# Patient Record
Sex: FEMALE | Race: WHITE
[De-identification: names, ages, dates, MRNs, and addresses within clinical notes are randomized per-mention and may not be internally consistent; named-entity substitution may affect disease eponyms.]

---

## 2018-09-24 ENCOUNTER — HOSPITAL ENCOUNTER (OUTPATIENT)
Dept: HOSPITAL 97 - OR | Age: 78
Discharge: HOME | End: 2018-09-24
Attending: OPHTHALMOLOGY
Payer: COMMERCIAL

## 2018-09-24 DIAGNOSIS — Z82.49: ICD-10-CM

## 2018-09-24 DIAGNOSIS — I10: ICD-10-CM

## 2018-09-24 DIAGNOSIS — Z83.511: ICD-10-CM

## 2018-09-24 DIAGNOSIS — H35.3130: ICD-10-CM

## 2018-09-24 DIAGNOSIS — Z88.3: ICD-10-CM

## 2018-09-24 DIAGNOSIS — H25.012: ICD-10-CM

## 2018-09-24 DIAGNOSIS — H25.12: Primary | ICD-10-CM

## 2018-09-24 DIAGNOSIS — Z88.8: ICD-10-CM

## 2018-09-24 DIAGNOSIS — Z83.518: ICD-10-CM

## 2018-09-24 DIAGNOSIS — Z91.041: ICD-10-CM

## 2018-09-24 DIAGNOSIS — H04.123: ICD-10-CM

## 2018-09-24 PROCEDURE — 08RK3JZ REPLACEMENT OF LEFT LENS WITH SYNTHETIC SUBSTITUTE, PERCUTANEOUS APPROACH: ICD-10-PCS

## 2018-09-24 PROCEDURE — 66984 XCAPSL CTRC RMVL W/O ECP: CPT

## 2018-09-24 RX ADMIN — TETRACAINE HYDROCHLORIDE ONE DROPS: 5 SOLUTION OPHTHALMIC at 13:33

## 2018-09-24 RX ADMIN — TETRACAINE HYDROCHLORIDE ONE DROPS: 5 SOLUTION OPHTHALMIC at 14:02

## 2018-09-24 RX ADMIN — BUPIVACAINE HYDROCHLORIDE ONE ML: 2.5 INJECTION, SOLUTION EPIDURAL; INFILTRATION; INTRACAUDAL at 14:03

## 2018-09-24 RX ADMIN — BUPIVACAINE HYDROCHLORIDE ONE ML: 2.5 INJECTION, SOLUTION EPIDURAL; INFILTRATION; INTRACAUDAL at 13:34

## 2018-09-24 RX ADMIN — BUPIVACAINE HYDROCHLORIDE ONE ML: 2.5 INJECTION, SOLUTION EPIDURAL; INFILTRATION; INTRACAUDAL at 13:33

## 2018-09-24 NOTE — P.BOP
Preoperative diagnosis: Nuclear sclerotic cataract OS


Postoperative diagnosis: Same


Primary procedure: Phacoemulsification with IOL OS


Estimated blood loss: None


Anesthesia: Local (Subtenon's infusion with anesthesia for cataract surgery)


Complications: None


Implants: [id number] +26.5


Transferred to: Other (Day surgery)


Condition: Good

## 2018-09-24 NOTE — XMS REPORT
Patient Summary Document

 Created on:2018



Patient:SHAHIDA ESTRADA

Sex:Female

:1940

External Reference #:[id number]





Demographics







 Address  [address] [place] [address]

 

 Home Phone  [phone]

 

 Preferred Language  Unknown

 

 Marital Status  Unknown

 

 Jain Affiliation  Unknown

 

 Race  Unknown

 

 Additional Race(s)  Unavailable

 

 Ethnic Group  Unknown









Author







 Organization  [facility]

 

 Address  [address] [place] [address]

 

 Phone  [phone]









Care Team Providers







 Name  Role  Phone

 

 MIGUE AMIN  Unavailable  Unavailable

 

 CHATA CALLE  Unavailable  Unavailable

 

 CIVUNIGUNTA MEJIA  Unavailable  Unavailable









Problems

This patient has no known problems.



Allergies, Adverse Reactions, Alerts

This patient has no known allergies or adverse reactions.



Medications

This patient has no known medications.



Results







 Test Description  Test Time  Test Comments  Text Results  Atomic Results  
Result Comments









 NV, ANGIOGRAM,  2017  Reason for  FINAL REPORT PATIENT ID:  



 CEREBRAL  11:48:00  Exam:->cerebral  [id number] DATE:  



     aneurysm unruptured  2017NAME: Shahida EstradaATTENDING:  



       Migue Amin MDFIRST  



       ASSISTANT: CESARIO LooREOPERATIVE DIAGNOSIS:  



       Unruptured right posterior  



       communicating artery  



       aneurysm status post stent  



       assisted  



       coilingPOSTOPERATIVE  



       DIAGNOSIS: Unruptured right  



       posterior communicating  



       artery aneurysm status post  



       stent assisted  



       coilingPROCEDURE PERFORMED:  



       Diagnostic Cerebral  



       AngiogramANESTHESIA:  



       MACCOMPLICATIONS:  



       NoneESTIMATED BLOOD LOSS:  



       Less than 15ml ARTERIAL  



       VESSELS STUDIED:RIGHT  



       SUBCLAVIAN ARTERY (x1)RIGHT  



       COMMON CAROTID ARTERY  



       (CERVICAL x2)RIGHT COMMON  



       CAROTID ARTERY (CRANIAL  



       x3)RIGHT COMMON FEMORAL  



       ARTERY (x1) MATERIALS  



       EMPLOYED:1. 5 [place] short  



       sheath 2. 4 [place]  



       Berenstein catheter3.  



       Bentson guidewire4. Terumo  



       0.035 LT glidewire5. 5  



       [place] Mynx device  



       INDICATIONS: 77-year-old  



       female with hypertension  



       who is status post  



       endovascular treatment of  



       an unruptured right  



       posterior communicating  



       artery aneurysm with  



       stent-assisted coiling on  



       May 19, 2017. She presents  



       for follow-up cerebral  



       angiogram. The indications  



       for the procedure as well  



       as the risks, benefits and  



       alternatives were discussed  



       with the patient and the  



       family. The risks discussed  



       included but were not  



       limited to stroke,  



       intracranial hemorrhage,  



       injury to the cervical  



       femoral or aortic vessels,  



       contrast reaction, kidney  



       to toxicity, groin  



       hematoma, weakness  



       paralysis and even death.  



       They demonstrated  



       understanding of the risk  



       benefit profile and agreed  



       to proceed. PROCEDURE:  



       After appropriate consent  



       was obtained, the patient  



       was brought to the  



       angiographic suite and  



       cardiopulmonary monitoring  



       was placed. The anesthesia  



       team performed light  



       sedation. A timeout was  



       performed. Both groins were  



       prepped and draped in the  



       usual sterile fashion.  



       After administration of 10  



       mL of 2% lidocaine, a  



       micropuncture needle was  



       used to perform a single  



       wall puncture of the right  



       common femoral artery, a  



       micropuncture sheath was  



       inserted, and an angled DSA  



       angiogram was performed  



       through the sheath. Once  



       good location of the  



       puncture site was  



       confirmed, a 5 [place] short  



       sheath was inserted over a  



       Glowpoint wire and was  



       maintained on heparinized  



       saline flush throughout the  



       remainder of the procedure.  



        Using coaxial technique, a  



       preflushed Berenstein  



       catheter on constant  



       heparinized saline flush  



       was advanced over the  



       Glidewire into the  



       descending aorta, the  



       Glidewire was removed, the  



       catheter back bled, flushed  



       in usual fashion and the  



       catheter was maintained on  



       heparinized flushed  



       throughout duration of the  



       case. Using coaxial  



       technique, the catheter was  



       advanced into the aortic  



       arch and with the aid of  



       roadmapping, digital  



       fluoroscopy, and careful  



       guidewire manipulation, the  



       arteries described below  



       were selectively  



       catheterized. Upon each  



       successive catheterization,  



       digital subtraction  



       angiography using the  



       appropriate rate and volume  



       of contrast in multiple  



       projections was performed.  



       At the conclusion of the  



       procedure, the catheter was  



       retracted into the aorta  



       and the images reviewed at  



       the outside workstation for  



       quality and content.  Then  



       the femoral sheath was  



       removed and hemostasis  



       achieved with a 5 [place]  



       Mynx device and manual  



       compression. The patient  



       tolerated the procedure  



       well and was transported in  



       unchanged neurological  



       status without groin  



       hematoma and with good  



       distal lower extremity  



       pulses. The patient was  



       transferred to the recovery  



       area to be monitored as per  



       protocol.  FINDINGS: RIGHT  



       SUBCLAVIAN ARTERY (DSA, PA,  



       LATERAL ROADMAP, x1): There  



       is normal course and  



       caliber of the subclavian  



       artery with physiological  



       filling of its distal  



       branches. Limited  



       visualization in this  



       roadmap of the origins of  



       the right vertebral artery,  



       internal mamillary artery,  



       thyrocervical and  



       costocervical trunks.  



       RIGHT COMMON CAROTID ARTERY  



       (DSA, PA, LATERAL, CERVICAL  



       x2): Tortuous course of the  



       right common carotid  



       artery. The distal cervical  



       common carotid as well as  



       the origins of the right  



       internal and external  



       carotid arteries are widely  



       patent without evidence of  



       ulceration or stenosis. The  



       proximal portions of the  



       superficial temporal  



       artery, middle meningeal  



       artery, internal maxillary  



       artery, occipital artery  



       and their branches are  



       visualized and appear  



       normal. RIGHT COMMON  



       CAROTID ARTERY (DSA, PA,  



       LATERAL, MAGNIFIED OBLIQUE,  



       CRANIAL x3): Minimal  



       residual aneurysm neck in  



       the previously treated  



       right posterior  



       communicating artery  



       aneurysm, best visualized  



       on sequence A9. Normal  



       distal cervical, petrous,  



       cavernous and supraclinoid  



       internal carotid artery  



       with physiological filling  



       of the MCA and MAXIMINO  



       branches. Limited  



       visualization of the venous  



       phase. No other aneurysms  



       or other vascular lesions  



       are seen. There is no  



       significant atherosclerosis  



       or stenosis. Normal course  



       and caliber of the external  



       carotid artery and its  



       branches. There is a well  



       visualized superficial  



       temporal artery, middle  



       meningeal artery, internal  



       maxillary artery, occipital  



       artery and their branches.  



       RIGHT COMMON FEMORAL ARTERY  



       (DSA, PA, X 1): Normal  



       location of the puncture  



       site above the bifurcation  



       and below markers of the  



       inguinal ligament.  



       IMPRESSION: Minimal  



       residual aneurysm neck in  



       the previously treated  



       right posterior  



       communicating artery  



       aneurysm. No technical or  



       procedural complications  



       FACULTY ATTESTATION: I,  



       Migue Amin M.D., was  



       present for the entirety of  



       the procedure. I performed  



       or directly supervised all  



       aspects of the procedure. I  



       performed all critical  



       aspects of the case. I  



       interpreted the images and  



       reported the results.  



       Signed: Chaz Cox  



       Verified Date/Time:  



       2017 11:48:56 Reading  



       Location: [facility] B1 Y026 Neuro  



       Angio Reading Room  



       Electronically signed by:  



       CHAZ COX on  



       2017 11:48 AM  









 BASIC METABOLIC PANEL  2017 11:56:00    









   Test Item  Value  Reference Range  Comments









 SODIUM (BEAKER) (test  136 meq/L  136-145  



 vcav=539)      

 

 POTASSIUM (BEAKER) (test  3.9 meq/L  3.5-5.1  



 code=379)      

 

 CHLORIDE (BEAKER) (test  102 meq/L    



 code=382)      

 

 CO2 (BEAKER) (test code=355)  25 meq/L  22-29  

 

 BLOOD UREA NITROGEN (BEAKER)  20 mg/dL  7-21  



 (test code=354)      

 

 CREATININE (BEAKER) (test  0.82 mg/dL  0.57-1.25  



 code=358)      

 

 GLUCOSE RANDOM (BEAKER)  106 mg/dL    



 (test code=652)      

 

 CALCIUM (BEAKER) (test  9.6 mg/dL  8.4-10.2  



 code=697)      

 

 EGFR (BEAKER) (test  68 mL/min/1.73 sq m    ESTIMATED GFR IS NOT AS



 code=1092)      ACCURATE AS CREATININE



       CLEARANCE IN PREDICTING



       GLOMERULAR FILTRATION RATE.



       ESTIMATED GFR IS NOT



       APPLICABLE FOR DIALYSIS



       PATIENTS.



CBC WITH PLATELET COUNT + MANUAL [id number] 11:33:00





 Test Item  Value  Reference Range  Comments

 

 WHITE BLOOD CELL COUNT  5.0 K/ L  3.5-10.5  



 (BEAKER) (test code=775)      

 

 RED BLOOD CELL COUNT (BEAKER)  3.94 M/ L  3.93-5.22  



 (test code=761)      

 

 HEMOGLOBIN (BEAKER) (test  12.1 GM/DL  11.2-15.7  



 code=410)      

 

 HEMATOCRIT (BEAKER) (test  37.1 %  34.1-44.9  



 code=411)      

 

 MEAN CORPUSCULAR VOLUME  94.2 fL  79.4-94.8  



 (BEAKER) (test code=753)      

 

 MEAN CORPUSCULAR HEMOGLOBIN  30.7 pg  25.6-32.2  



 (BEAKER) (test code=751)      

 

 MEAN CORPUSCULAR HEMOGLOBIN  32.6 GM/DL  32.2-35.5  



 CONC (BEAKER) (test code=752)      

 

 RED CELL DISTRIBUTION WIDTH  12.7 %  11.7-14.4  



 (BEAKER) (test code=412)      

 

 PLATELET COUNT (BEAKER) (test  212 K/CU MM  150-450  



 knge=774)      

 

 MEAN PLATELET VOLUME (BEAKER)  9.4 fL  9.4-12.3  



 (test code=754)      

 

 NUCLEATED RED BLOOD CELLS  0 /100 WBC  0-0  



 (BEAKER) (test code=413)      

 

 IMMATURE GRANULOCYTES-RELATIVE  0 %  0-1  



 PERCENT (BEAKER) (test      



 code=2801)      

 

 NEUTROPHILS RELATIVE PERCENT  60 %    This is an appended report.



 (BEAKER) (test code=429)       These results have been



       appended to a previously



       final verified report.

 

 LYMPHOCYTES RELATIVE PERCENT  30 %    This is an appended report.



 (BEAKER) (test code=430)       These results have been



       appended to a previously



       final verified report.

 

 MONOCYTES RELATIVE PERCENT  9 %    This is an appended report.



 (BEAKER) (test code=431)       These results have been



       appended to a previously



       final verified report.

 

 EOSINOPHILS RELATIVE PERCENT  1 %    This is an appended report.



 (BEAKER) (test code=432)       These results have been



       appended to a previously



       final verified report.

 

 BASOPHILS RELATIVE PERCENT  1 %    This is an appended report.



 (BEAKER) (test code=437)       These results have been



       appended to a previously



       final verified report.

 

 NEUTROPHILS ABSOLUTE COUNT  2.97 K/ L  1.56-6.13  This is an appended report.



 (BEAKER) (test code=670)       These results have been



       appended to a previously



       final verified report.

 

 LYMPHOCYTES ABSOLUTE COUNT  1.46 K/ L  1.18-3.74  This is an appended report.



 (BEAKER) (test code=414)       These results have been



       appended to a previously



       final verified report.

 

 MONOCYTES ABSOLUTE COUNT  0.42 K/ L  0.24-0.36  This is an appended report.



 (BEAKER) (test code=415)       These results have been



       appended to a previously



       final verified report.

 

 EOSINOPHILS ABSOLUTE COUNT  0.05 K/ L  0.04-0.36  This is an appended report.



 (BEAKER) (test code=416)       These results have been



       appended to a previously



       final verified report.

 

 BASOPHILS ABSOLUTE COUNT  0.04 K/ L  0.01-0.08  This is an appended report.



 (BEAKER) (test code=417)       These results have been



       appended to a previously



       final verified report.



[id number] 11:30:00





 Test Item  Value  Reference Range  Comments

 

 PROTIME (BEAKER) (test code=759)  15.2 seconds  11.7-14.7  

 

 INR (BEAKER) (test code=370)  1.2  <=5.9  

 

 PARTIAL THROMBOPLASTIN TIME (BEAKER) (test  42.8 seconds  22.5-36.0  



 code=760)      



RECOMMENDED COUMADIN/WARFARIN INR THERAPY RANGESSTANDARD DOSE: 2.0 - 3.0   
Includes: PROPHYLAXIS forvenous thrombosis, systemic embolization; TREATMENT 
for venous thrombosis and/or pulmonary embolus.HIGH RISK: Target INR is 2.5-3.5 
for patients with mechanical heart valves.PLATELET AGGREGATION: FUNCTION 
[id number] 09:23:00





 Test Item  Value  Reference Range  Comments

 

 WEAK ADP RESULT(BEAKER) (test  5 %  60-91  



 code=7180)      

 

 PLATELET FUNCTION SCREEN  0-39% indicates marked platelet    



 INTERP (BEAKER) (test  dysfunction    



 code=2173)      

 

 [id number]  Nelly Zurita MD    



 (BEAKER) (test code=2622)  (electronic signature)    

 

 PLATELET COUNT AGG (BEAKER)  243 K/CU MM  150-430  



 (test code=4806)      



CBC W/PLT COUNT &amp; AUTO [id number] 07:07:00





 Test Item  Value  Reference Range  Comments

 

 WHITE BLOOD CELL COUNT (BEAKER) (test code=775)  5.9 K/ L  4.0-10.0  

 

 RED BLOOD CELL COUNT (BEAKER) (test code=761)  3.88 M/ L  4.00-5.00  

 

 HEMOGLOBIN (BEAKER) (test code=410)  12.5 GM/DL  12.0-15.0  

 

 HEMATOCRIT (BEAKER) (test code=411)  37.8 %  36.0-45.0  

 

 MEAN CORPUSCULAR VOLUME (BEAKER) (test code=753)  97.3 fL  82.0-99.0  

 

 MEAN CORPUSCULAR HEMOGLOBIN (BEAKER) (test  32.1 pg  27.0-33.0  



 code=751)      

 

 MEAN CORPUSCULAR HEMOGLOBIN CONC (BEAKER) (test  33.0 GM/DL  32.0-36.0  



 code=752)      

 

 RED CELL DISTRIBUTION WIDTH (BEAKER) (test  13.4 %  10.3-14.2  



 code=412)      

 

 PLATELET COUNT (BEAKER) (test code=756)  238 K/CU MM  150-430  

 

 MEAN PLATELET VOLUME (BEAKER) (test code=754)  7.2 fL  6.5-10.5  

 

 NUCLEATED RED BLOOD CELLS (BEAKER) (test  0 /100 WBC  0-0  



 code=413)      

 

 NEUTROPHILS RELATIVE PERCENT (BEAKER) (test  58 %    



 code=429)      

 

 LYMPHOCYTES RELATIVE PERCENT (BEAKER) (test  31 %    



 code=430)      

 

 MONOCYTES RELATIVE PERCENT (BEAKER) (test  10 %    



 code=431)      

 

 EOSINOPHILS RELATIVE PERCENT (BEAKER) (test  0 %    



 code=432)      

 

 BASOPHILS RELATIVE PERCENT (BEAKER) (test  1 %    



 code=437)      

 

 NEUTROPHILS ABSOLUTE COUNT (BEAKER) (test  3.36 K/ L  1.80-8.00  



 code=670)      

 

 LYMPHOCYTES ABSOLUTE COUNT (BEAKER) (test  1.84 K/ L  1.48-4.50  



 code=414)      

 

 MONOCYTES ABSOLUTE COUNT (BEAKER) (test  0.58 K/ L  0.00-1.30  



 code=415)      

 

 EOSINOPHILS ABSOLUTE COUNT (BEAKER) (test  0.01 K/ L  0.00-0.50  



 code=416)      

 

 BASOPHILS ABSOLUTE COUNT (BEAKER) (test  0.06 K/ L  0.00-0.20  



 code=417)      



0.00BASIC METABOLIC [id number] 06:36:00





 Test Item  Value  Reference Range  Comments

 

 SODIUM (BEAKER) (test  138 meq/L  136-145  



 svpd=191)      

 

 POTASSIUM (BEAKER) (test  4.0 meq/L  3.5-5.1  Specimen slightly



 code=379)      hemolyzed

 

 CHLORIDE (BEAKER) (test  106 meq/L    



 code=382)      

 

 CO2 (BEAKER) (test  23 meq/L  22-29  



 code=355)      

 

 BLOOD UREA NITROGEN  13 mg/dL  7-21  



 (BEAKER) (test code=354)      

 

 CREATININE (BEAKER) (test  0.79 mg/dL  0.57-1.25  Specimen slightly



 code=358)      hemolyzed

 

 GLUCOSE RANDOM (BEAKER)  97 mg/dL    



 (test code=652)      

 

 CALCIUM (BEAKER) (test  9.0 mg/dL  8.4-10.2  



 code=697)      

 

 EGFR (BEAKER) (test  71 mL/min/1.73 sq m    ESTIMATED GFR IS NOT AS



 code=1092)      ACCURATE AS CREATININE



       CLEARANCE IN PREDICTING



       GLOMERULAR FILTRATION



       RATE. ESTIMATED GFR IS



       NOT APPLICABLE FOR



       DIALYSIS PATIENTS.



[id number] 06:21:00





 Test Item  Value  Reference Range  Comments

 

 PROTIME (BEAKER) (test code=759)  14.1 seconds  11.7-14.7  

 

 INR (BEAKER) (test code=370)  1.1  <=5.9  

 

 PARTIAL THROMBOPLASTIN TIME (BEAKER) (test  37.0 seconds  22.5-36.0  



 code=760)      



RECOMMENDED COUMADIN/WARFARIN INR THERAPY RANGESSTANDARD DOSE: 2.0 - 3.0   
Includes: PROPHYLAXIS forvenous thrombosis, systemic embolization; TREATMENT 
for venous thrombosis and/or pulmonary embolus.HIGH RISK: Target INR is 2.5-3.5 
for patients with mechanical heart valves.PROTHROMBIN [id number] 06:20:
00





 Test Item  Value  Reference Range  Comments

 

 PROTIME (BEAKER) (test code=759)  14.1 seconds  11.7-14.7  

 

 INR (BEAKER) (test code=370)  1.1  <=5.9  



RECOMMENDED COUMADIN/WARFARIN INR THERAPY RANGESSTANDARD DOSE: 2.0 - 3.0   
Includes: PROPHYLAXIS forvenous thrombosis, systemic embolization; TREATMENT 
for venous thrombosis and/or pulmonary embolus.HIGH RISK: Target INR is 2.5-3.5 
for patients with mechanical heart valves.PT/[id number] 23:38:00





 Test Item  Value  Reference Range  Comments

 

 PROTIME (BEAKER) (test code=759)  15.2 seconds  11.7-14.7  

 

 INR (BEAKER) (test code=370)  1.2  <=5.9  

 

 PARTIAL THROMBOPLASTIN TIME (BEAKER) (test  36.5 seconds  22.5-36.0  



 code=760)      



RECOMMENDED COUMADIN/WARFARIN INR THERAPY RANGESSTANDARD DOSE: 2.0 - 3.0   
Includes: PROPHYLAXIS forvenous thrombosis, systemic embolization; TREATMENT 
for venous thrombosis and/or pulmonary embolus.HIGH RISK: Target INR is 2.5-3.5 
for patients with mechanical heart valves.BASIC METABOLIC [id number] 23:33:
00





 Test Item  Value  Reference Range  Comments

 

 SODIUM (BEAKER) (test  137 meq/L  136-145  



 svyb=889)      

 

 POTASSIUM (BEAKER) (test  5.7 meq/L  3.5-5.1  Specimen markedly



 code=379)      hemolyzed

 

 CHLORIDE (BEAKER) (test  105 meq/L    



 code=382)      

 

 CO2 (BEAKER) (test  25 meq/L  22-29  



 code=355)      

 

 BLOOD UREA NITROGEN  13 mg/dL  7-21  



 (BEAKER) (test code=354)      

 

 CREATININE (BEAKER) (test  0.84 mg/dL  0.57-1.25  Specimen markedly



 code=358)      hemolyzed

 

 GLUCOSE RANDOM (BEAKER)  86 mg/dL    



 (test code=652)      

 

 CALCIUM (BEAKER) (test  8.9 mg/dL  8.4-10.2  



 code=697)      

 

 EGFR (BEAKER) (test  66 mL/min/1.73 sq m    ESTIMATED GFR IS NOT AS



 code=1092)      ACCURATE AS CREATININE



       CLEARANCE IN PREDICTING



       GLOMERULAR FILTRATION



       RATE. ESTIMATED GFR IS



       NOT APPLICABLE FOR



       DIALYSIS PATIENTS.



CBC W/PLT COUNT &amp; AUTO [id number] 23:19:00





 Test Item  Value  Reference Range  Comments

 

 WHITE BLOOD CELL COUNT (BEAKER) (test code=775)  7.0 K/ L  4.0-10.0  

 

 RED BLOOD CELL COUNT (BEAKER) (test code=761)  3.76 M/ L  4.00-5.00  

 

 HEMOGLOBIN (BEAKER) (test code=410)  12.7 GM/DL  12.0-15.0  

 

 HEMATOCRIT (BEAKER) (test code=411)  36.9 %  36.0-45.0  

 

 MEAN CORPUSCULAR VOLUME (BEAKER) (test code=753)  98.2 fL  82.0-99.0  

 

 MEAN CORPUSCULAR HEMOGLOBIN (BEAKER) (test  33.7 pg  27.0-33.0  



 code=751)      

 

 MEAN CORPUSCULAR HEMOGLOBIN CONC (BEAKER) (test  34.4 GM/DL  32.0-36.0  



 code=752)      

 

 RED CELL DISTRIBUTION WIDTH (BEAKER) (test  12.2 %  10.3-14.2  



 code=412)      

 

 PLATELET COUNT (BEAKER) (test code=756)  241 K/CU MM  150-430  

 

 MEAN PLATELET VOLUME (BEAKER) (test code=754)  7.2 fL  6.5-10.5  

 

 NUCLEATED RED BLOOD CELLS (BEAKER) (test  0 /100 WBC  0-0  



 code=413)      

 

 NEUTROPHILS RELATIVE PERCENT (BEAKER) (test  58 %    



 code=429)      

 

 LYMPHOCYTES RELATIVE PERCENT (BEAKER) (test  34 %    



 code=430)      

 

 MONOCYTES RELATIVE PERCENT (BEAKER) (test  7 %    



 code=431)      

 

 EOSINOPHILS RELATIVE PERCENT (BEAKER) (test  0 %    



 code=432)      

 

 BASOPHILS RELATIVE PERCENT (BEAKER) (test  1 %    



 code=437)      

 

 NEUTROPHILS ABSOLUTE COUNT (BEAKER) (test  4.08 K/ L  1.80-8.00  



 code=670)      

 

 LYMPHOCYTES ABSOLUTE COUNT (BEAKER) (test  2.36 K/ L  1.48-4.50  



 code=414)      

 

 MONOCYTES ABSOLUTE COUNT (BEAKER) (test  0.52 K/ L  0.00-1.30  



 code=415)      

 

 EOSINOPHILS ABSOLUTE COUNT (BEAKER) (test  0.02 K/ L  0.00-0.50  



 code=416)      

 

 BASOPHILS ABSOLUTE COUNT (BEAKER) (test  0.05 K/ L  0.00-0.20  



 code=417)      



0.00POCT-P2Y12 PLATELET [id number] 20:57:00





 Test Item  Value  Reference Range  Comments

 

 POC-P2Y12 PLATELET AGG (BEAKER) (test code=2303)  31 PRU    



RANGE INFORMATION: PRU reference range is 194-418. Post Drug Results: Lower PRU 
levels are associated with expected antiplatelet effect. Values may be below 
the stated reference range above. The post-drug PRU values reported in the 
[employer] P2Y12 package insert are .URINALYSIS W/ [id number] 09
:22:00





 Test Item  Value  Reference Range  Comments

 

 COLOR (BEAKER) (test code=470)  Light Yellow    

 

 CLARITY (BEAKER) (test code=469)  Hazy    

 

 SPECIFIC GRAVITY UA (BEAKER) (test  1.009  1.001-1.035  



 code=468)      

 

 PH UA (BEAKER) (test code=467)  5.5  5.0-8.0  

 

 PROTEIN UA (BEAKER) (test code=464)  Negative  Negative  

 

 GLUCOSE UA (BEAKER) (test code=365)  Negative  Negative  

 

 KETONES UA (BEAKER) (test code=371)  Negative  Negative  

 

 BILIRUBIN UA (BEAKER) (test  Negative  Negative  



 code=462)      

 

 BLOOD UA (BEAKER) (test code=461)  Small  Negative  

 

 NITRITE UA (BEAKER) (test code=465)  Positive  Negative  

 

 LEUKOCYTE ESTERASE UA (BEAKER)  Large  Negative  



 (test code=466)      

 

 UROBILINOGEN UA (BEAKER) (test  0.2 mg/dL  0.2-1.0  



 code=463)      

 

 RBC UA (BEAKER) (test code=519)  12 /HPF    

 

 WBC UA (BEAKER) (test code=520)  92 /HPF    

 

 BACTERIA (BEAKER) (test lkxe=787)  Rare    

 

 MUCUS (BEAKER) (test code=1574)  Rare    

 

 SQUAMOUS EPITHELIAL (BEAKER) (test  < /HPF    



 code=516)      

 

 YEAST (BEAKER) (test code=1585)  Occasional    

 

 SOURCE(BEAKER) (test code=8725)  Urine, Straight Catheter    



[id number] 04:55:00





 Test Item  Value  Reference Range  Comments

 

 PHOSPHORUS (BEAKER) (test code=604)  3.3 mg/dL  2.3-4.7  



[id number] 04:55:00





 Test Item  Value  Reference Range  Comments

 

 MAGNESIUM (BEAKER) (test code=627)  1.6 mg/dL  1.6-2.6  



BASIC METABOLIC [id number] 04:55:00





 Test Item  Value  Reference Range  Comments

 

 SODIUM (BEAKER) (test  140 meq/L  136-145  



 jufv=685)      

 

 POTASSIUM (BEAKER) (test  3.4 meq/L  3.5-5.1  



 code=379)      

 

 CHLORIDE (BEAKER) (test  107 meq/L    



 code=382)      

 

 CO2 (BEAKER) (test  22 meq/L  22-29  



 code=355)      

 

 BLOOD UREA NITROGEN  8 mg/dL  7-21  



 (BEAKER) (test code=354)      

 

 CREATININE (BEAKER) (test  0.71 mg/dL  0.57-1.25  



 code=358)      

 

 GLUCOSE RANDOM (BEAKER)  109 mg/dL    



 (test code=652)      

 

 CALCIUM (BEAKER) (test  8.6 mg/dL  8.4-10.2  



 code=697)      

 

 EGFR (BEAKER) (test  80 mL/min/1.73 sq m    ESTIMATED GFR IS NOT AS



 code=1092)      ACCURATE AS CREATININE



       CLEARANCE IN PREDICTING



       GLOMERULAR FILTRATION



       RATE. ESTIMATED GFR IS



       NOT APPLICABLE FOR



       DIALYSIS PATIENTS.



CBC W/PLT COUNT &amp; AUTO [id number] 04:52:00





 Test Item  Value  Reference Range  Comments

 

 WHITE BLOOD CELL COUNT (BEAKER) (test code=775)  7.5 K/ L  4.0-10.0  

 

 RED BLOOD CELL COUNT (BEAKER) (test code=761)  4.04 M/ L  4.00-5.00  

 

 HEMOGLOBIN (BEAKER) (test code=410)  12.9 GM/DL  12.0-15.0  

 

 HEMATOCRIT (BEAKER) (test code=411)  39.7 %  36.0-45.0  

 

 MEAN CORPUSCULAR VOLUME (BEAKER) (test code=753)  98.3 fL  82.0-99.0  

 

 MEAN CORPUSCULAR HEMOGLOBIN (BEAKER) (test  31.9 pg  27.0-33.0  



 code=751)      

 

 MEAN CORPUSCULAR HEMOGLOBIN CONC (BEAKER) (test  32.5 GM/DL  32.0-36.0  



 code=752)      

 

 RED CELL DISTRIBUTION WIDTH (BEAKER) (test  12.0 %  10.3-14.2  



 code=412)      

 

 PLATELET COUNT (BEAKER) (test code=756)  196 K/CU MM  150-430  

 

 MEAN PLATELET VOLUME (BEAKER) (test code=754)  7.2 fL  6.5-10.5  

 

 NUCLEATED RED BLOOD CELLS (BEAKER) (test  0 /100 WBC  0-0  



 code=413)      

 

 NEUTROPHILS RELATIVE PERCENT (BEAKER) (test  81 %    



 code=429)      

 

 LYMPHOCYTES RELATIVE PERCENT (BEAKER) (test  14 %    



 code=430)      

 

 MONOCYTES RELATIVE PERCENT (BEAKER) (test  6 %    



 code=431)      

 

 EOSINOPHILS RELATIVE PERCENT (BEAKER) (test  0 %    



 code=432)      

 

 BASOPHILS RELATIVE PERCENT (BEAKER) (test  0 %    



 code=437)      

 

 NEUTROPHILS ABSOLUTE COUNT (BEAKER) (test  6.03 K/ L  1.80-8.00  



 code=670)      

 

 LYMPHOCYTES ABSOLUTE COUNT (BEAKER) (test  1.01 K/ L  1.48-4.50  



 code=414)      

 

 MONOCYTES ABSOLUTE COUNT (BEAKER) (test  0.42 K/ L  0.00-1.30  



 code=415)      

 

 EOSINOPHILS ABSOLUTE COUNT (BEAKER) (test  0.01 K/ L  0.00-0.50  



 code=416)      

 

 BASOPHILS ABSOLUTE COUNT (BEAKER) (test  0.01 K/ L  0.00-0.20  



 code=417)      



[id number] 16:01:00





 Test Item  Value  Reference Range  Comments

 

 ACTIVATED CLOTTING TIME  245 sec    [facility] [place] [address] [facility]



 (BEAKER) (test code=441)      [place] [address]



[id number] 15:00:00





 Test Item  Value  Reference Range  Comments

 

 ACTIVATED CLOTTING TIME  255 sec    [facility] [place] [address] [facility]



 (BEAKER) (test code=441)      [place] [address]



[id number] 14:44:00





 Test Item  Value  Reference Range  Comments

 

 ACTIVATED CLOTTING TIME  234 sec    [facility] AT [place] [address] [facility]



 (BEAKER) (test code=441)      [place] [address]



[id number] 14:31:00





 Test Item  Value  Reference Range  Comments

 

 ACTIVATED CLOTTING TIME  219 sec    [facility]ED AT [place] [address] [facility]



 (BEAKER) (test code=441)      [place] [address]



[id number] 14:31:00





 Test Item  Value  Reference Range  Comments

 

 ACTIVATED CLOTTING TIME  183 sec    TESTED AT [facility] [address]



 (BEAKER) (test code=441)      [facility] [address]



[id number] 13:22:00





 Test Item  Value  Reference Range  Comments

 

 ACTIVATED CLOTTING TIME  137 sec    TESTED AT [facility] [address]



 (BEAKER) (test code=441)      [facility] [address]



POCT-P2Y12 PLATELET [id number] 07:24:00





 Test Item  Value  Reference Range  Comments

 

 POC-P2Y12 PLATELET AGG (BEAKER) (test code=2303)  110 PRU    



RANGE INFORMATION: PRU reference range is 194-418. Post Drug Results: Lower PRU 
levels are associated with expected antiplatelet effect. Values may be below 
the stated reference range above. The post-drug PRU values reported in the 
[employer] P2Y12 package insert are .POCT-ASPIRIN PLATELET [id number]
[id number] 07:24:00





 Test Item  Value  Reference Range  Comments

 

 POC-ASPIRIN PLATELET AGG (BEAKER) (test code=2302)  402 ARU    



RANGE INFORMATION: 350-549 ARU Therapeutic range for platelet function.        
           550-700 ARU Non-Therapeutic range for platelet function.PLATELET 
AGGREGATION: FUNCTION [id number] 10:21:00





 Test Item  Value  Reference Range  Comments

 

 WEAK ADP RESULT(BEAKER) (test  92 %  60-91  



 code=2135)      

 

 PLATELET FUNCTION SCREEN  % indicates normal    



 INTERP (BEAKER) (test  platelet function    



 code=2173)      

 

 [id number] (BEAKER)  Meredith Reyes, MD (electronic    



 (test code=2622)  signature)    

 

 PLATELET COUNT AGG (BEAKER)  219 K/CU MM  150-430  



 (test nvdt=1459)      



COMPREHENSIVE METABOLIC [id number] 05:11:00





 Test Item  Value  Reference Range  Comments

 

 TOTAL PROTEIN (BEAKER)  6.5 gm/dL  6.0-8.3  



 (test code=770)      

 

 ALBUMIN (BEAKER) (test  3.6 g/dL  3.5-5.0  



 code=1145)      

 

 ALKALINE PHOSPHATASE  60 U/L    



 (BEAKER) (test code=346)      

 

 BILIRUBIN TOTAL (BEAKER)  1.1 mg/dL  0.2-1.2  



 (test code=377)      

 

 SODIUM (BEAKER) (test  136 meq/L  136-145  



 fkec=971)      

 

 POTASSIUM (BEAKER) (test  3.8 meq/L  3.5-5.1  



 code=379)      

 

 CHLORIDE (BEAKER) (test  104 meq/L    



 code=382)      

 

 CO2 (BEAKER) (test  23 meq/L  22-29  



 code=355)      

 

 BLOOD UREA NITROGEN  13 mg/dL  7-21  



 (BEAKER) (test code=354)      

 

 CREATININE (BEAKER) (test  0.79 mg/dL  0.57-1.25  



 code=358)      

 

 GLUCOSE RANDOM (BEAKER)  89 mg/dL    



 (test code=652)      

 

 CALCIUM (BEAKER) (test  9.2 mg/dL  8.4-10.2  



 code=697)      

 

 AST (SGOT) (BEAKER) (test  19 U/L  5-34  



 code=353)      

 

 ALT (SGPT) (BEAKER) (test  8 U/L  6-55  



 code=347)      

 

 EGFR (BEAKER) (test  71 mL/min/1.73 sq m    ESTIMATED GFR IS NOT AS



 code=1092)      ACCURATE AS CREATININE



       CLEARANCE IN PREDICTING



       GLOMERULAR FILTRATION



       RATE. ESTIMATED GFR IS



       NOT APPLICABLE FOR



       DIALYSIS PATIENTS.



[id number] 05:04:00





 Test Item  Value  Reference Range  Comments

 

 PARTIAL THROMBOPLASTIN TIME (BEAKER) (test  37.9 seconds  22.5-36.0  



 code=760)      



[id number] 16:56:00





 Test Item  Value  Reference Range  Comments

 

 PARTIAL THROMBOPLASTIN TIME (BEAKER) (test  39.4 seconds  22.5-36.0  



 code=760)      



PROTHROMBIN [id number] 16:55:00





 Test Item  Value  Reference Range  Comments

 

 PROTIME (BEAKER) (test code=759)  14.4 seconds  11.7-14.7  

 

 INR (BEAKER) (test code=370)  1.1  <=5.9  



RECOMMENDED COUMADIN/WARFARIN INR THERAPY RANGESSTANDARD DOSE: 2.0 - 3.0   
Includes: PROPHYLAXIS forvenous thrombosis, systemic embolization; TREATMENT 
for venous thrombosis and/or pulmonary embolus.HIGH RISK: Target INR is 2.5-3.5 
for patients with mechanical heart valves.COMPREHENSIVE METABOLIC [id number]
[id number] 02:20:00





 Test Item  Value  Reference Range  Comments

 

 TOTAL PROTEIN (BEAKER)  7.0 gm/dL  6.0-8.3  



 (test code=770)      

 

 ALBUMIN (BEAKER) (test  3.8 g/dL  3.5-5.0  



 code=1145)      

 

 ALKALINE PHOSPHATASE  68 U/L    



 (BEAKER) (test code=346)      

 

 BILIRUBIN TOTAL (BEAKER)  0.9 mg/dL  0.2-1.2  



 (test code=377)      

 

 SODIUM (BEAKER) (test  137 meq/L  136-145  



 ljoy=222)      

 

 POTASSIUM (BEAKER) (test  3.5 meq/L  3.5-5.1  



 code=379)      

 

 CHLORIDE (BEAKER) (test  104 meq/L    



 code=382)      

 

 CO2 (BEAKER) (test  23 meq/L  22-29  



 code=355)      

 

 BLOOD UREA NITROGEN  10 mg/dL  7-21  



 (BEAKER) (test code=354)      

 

 CREATININE (BEAKER) (test  0.81 mg/dL  0.57-1.25  



 code=358)      

 

 GLUCOSE RANDOM (BEAKER)  106 mg/dL    



 (test code=652)      

 

 CALCIUM (BEAKER) (test  9.7 mg/dL  8.4-10.2  



 code=697)      

 

 AST (SGOT) (BEAKER) (test  21 U/L  5-34  



 code=353)      

 

 ALT (SGPT) (BEAKER) (test  8 U/L  6-55  



 code=347)      

 

 EGFR (BEAKER) (test  69 mL/min/1.73 sq m    ESTIMATED GFR IS NOT AS



 code=1092)      ACCURATE AS CREATININE



       CLEARANCE IN PREDICTING



       GLOMERULAR FILTRATION



       RATE. ESTIMATED GFR IS



       NOT APPLICABLE FOR



       DIALYSIS PATIENTS.



HEMOGLOBIN [id number] 07:40:00





 Test Item  Value  Reference Range  Comments

 

 HEMOGLOBIN A1C (BEAKER) (test code=368)  4.9 %  4.3-6.1  



VITAMIN [id number] 06:01:00





 Test Item  Value  Reference Range  Comments

 

 VITAMIN B12 (BEAKER) (test code=774)  826 pg/mL  213-816  



TSH/FREE T4 IF [id number] 06:01:00





 Test Item  Value  Reference Range  Comments

 

 THYROID STIMULATING HORMONE (BEAKER) (test  1.37 uIU/mL  0.35-4.94  



 code=772)      



CALCIUM, [id number] 05:48:00





 Test Item  Value  Reference Range  Comments

 

 CALCIUM IONIZED (BEAKER) (test code=698)  1.11 mmol/L  1.12-1.27  

 

 PH, BLOOD (BEAKER) (test code=1810)  7.41    



[id number] 05:33:00





 Test Item  Value  Reference Range  Comments

 

 PHOSPHORUS (BEAKER) (test code=604)  3.3 mg/dL  2.3-4.7  



[id number] 05:33:00





 Test Item  Value  Reference Range  Comments

 

 MAGNESIUM (BEAKER) (test code=627)  2.0 mg/dL  1.6-2.6  



COMPREHENSIVE METABOLIC [id number] 05:33:00





 Test Item  Value  Reference Range  Comments

 

 TOTAL PROTEIN (BEAKER)  5.9 gm/dL  6.0-8.3  



 (test code=770)      

 

 ALBUMIN (BEAKER) (test  3.3 g/dL  3.5-5.0  



 code=1145)      

 

 ALKALINE PHOSPHATASE  56 U/L    



 (BEAKER) (test code=346)      

 

 BILIRUBIN TOTAL (BEAKER)  0.6 mg/dL  0.2-1.2  



 (test code=377)      

 

 SODIUM (BEAKER) (test  140 meq/L  136-145  



 tlra=134)      

 

 POTASSIUM (BEAKER) (test  3.7 meq/L  3.5-5.1  



 code=379)      

 

 CHLORIDE (BEAKER) (test  106 meq/L    



 code=382)      

 

 CO2 (BEAKER) (test  27 meq/L  22-29  



 code=355)      

 

 BLOOD UREA NITROGEN  13 mg/dL  7-21  



 (BEAKER) (test code=354)      

 

 CREATININE (BEAKER) (test  0.79 mg/dL  0.57-1.25  



 code=358)      

 

 GLUCOSE RANDOM (BEAKER)  95 mg/dL    



 (test code=652)      

 

 CALCIUM (BEAKER) (test  8.7 mg/dL  8.4-10.2  



 code=697)      

 

 AST (SGOT) (BEAKER) (test  16 U/L  5-34  



 code=353)      

 

 ALT (SGPT) (BEAKER) (test  7 U/L  6-55  



 code=347)      

 

 EGFR (BEAKER) (test  71 mL/min/1.73 sq m    ESTIMATED GFR IS NOT AS



 code=1092)      ACCURATE AS CREATININE



       CLEARANCE IN PREDICTING



       GLOMERULAR FILTRATION



       RATE. ESTIMATED GFR IS



       NOT APPLICABLE FOR



       DIALYSIS PATIENTS.



LIPID [id number] 05:33:00





 Test Item  Value  Reference Range  Comments

 

 TRIGLYCERIDES (BEAKER) (test code=540)  57 mg/dL    

 

 CHOLESTEROL (BEAKER) (test code=631)  204 mg/dL    

 

 HDL CHOLESTEROL (BEAKER) (test code=976)  62 mg/dL    

 

 LDL CHOLESTEROL CALCULATED (BEAKER) (test  131 mg/dL    



 code=633)      



Triglyceride Reference Range:   Low Risk         &lt;150   Borderline    150-
199   High Risk     200-499   Very High Risk  &gt;=500Cholesterol Reference 
Range:   Low Risk         &lt;200   Borderline 200-239    High Risk        &gt;
240HDL Cholesterol Reference Range:   Low Risk         &gt;=60   High Risk     
    &lt;40LDL Cholesterol Reference Range:   Optimal          &lt;100   Near 
Optimal  100-129   Borderline    130-159   High          160-189   Very High   
    &gt;=190CBC W/PLT COUNT &amp; AUTO [id number] 04:51:00





 Test Item  Value  Reference Range  Comments

 

 WHITE BLOOD CELL COUNT (BEAKER) (test code=775)  4.8 K/ L  4.0-10.0  

 

 RED BLOOD CELL COUNT (BEAKER) (test code=761)  4.01 M/ L  4.00-5.00  

 

 HEMOGLOBIN (BEAKER) (test code=410)  13.4 GM/DL  12.0-15.0  

 

 HEMATOCRIT (BEAKER) (test code=411)  39.0 %  36.0-45.0  

 

 MEAN CORPUSCULAR VOLUME (BEAKER) (test code=753)  97.5 fL  82.0-99.0  

 

 MEAN CORPUSCULAR HEMOGLOBIN (BEAKER) (test  33.5 pg  27.0-33.0  



 code=751)      

 

 MEAN CORPUSCULAR HEMOGLOBIN CONC (BEAKER) (test  34.4 GM/DL  32.0-36.0  



 code=752)      

 

 RED CELL DISTRIBUTION WIDTH (BEAKER) (test  12.8 %  10.3-14.2  



 code=412)      

 

 PLATELET COUNT (BEAKER) (test code=756)  193 K/CU MM  150-430  

 

 MEAN PLATELET VOLUME (BEAKER) (test code=754)  7.0 fL  6.5-10.5  

 

 NUCLEATED RED BLOOD CELLS (BEAKER) (test  0 /100 WBC  0-0  



 code=413)      

 

 NEUTROPHILS RELATIVE PERCENT (BEAKER) (test  50 %    



 code=429)      

 

 LYMPHOCYTES RELATIVE PERCENT (BEAKER) (test  40 %    



 code=430)      

 

 MONOCYTES RELATIVE PERCENT (BEAKER) (test  9 %    



 code=431)      

 

 EOSINOPHILS RELATIVE PERCENT (BEAKER) (test  0 %    



 code=432)      

 

 BASOPHILS RELATIVE PERCENT (BEAKER) (test  0 %    



 code=437)      

 

 NEUTROPHILS ABSOLUTE COUNT (BEAKER) (test  2.38 K/ L  1.80-8.00  



 code=670)      

 

 LYMPHOCYTES ABSOLUTE COUNT (BEAKER) (test  1.93 K/ L  1.48-4.50  



 code=414)      

 

 MONOCYTES ABSOLUTE COUNT (BEAKER) (test  0.44 K/ L  0.00-1.30  



 code=415)      

 

 EOSINOPHILS ABSOLUTE COUNT (BEAKER) (test  0.01 K/ L  0.00-0.50  



 code=416)      

 

 BASOPHILS ABSOLUTE COUNT (BEAKER) (test  0.02 K/ L  0.00-0.20  



 code=417)      



0.00COMPREHENSIVE METABOLIC [id number] 22:47:00





 Test Item  Value  Reference Range  Comments

 

 TOTAL PROTEIN (BEAKER)  5.8 gm/dL  6.0-8.3  



 (test code=770)      

 

 ALBUMIN (BEAKER) (test  3.2 g/dL  3.5-5.0  



 code=1145)      

 

 ALKALINE PHOSPHATASE  56 U/L    



 (BEAKER) (test code=346)      

 

 BILIRUBIN TOTAL (BEAKER)  0.5 mg/dL  0.2-1.2  



 (test code=377)      

 

 SODIUM (BEAKER) (test  140 meq/L  136-145  



 tsae=170)      

 

 POTASSIUM (BEAKER) (test  3.7 meq/L  3.5-5.1  



 code=379)      

 

 CHLORIDE (BEAKER) (test  108 meq/L    



 code=382)      

 

 CO2 (BEAKER) (test  25 meq/L  22-29  



 code=355)      

 

 BLOOD UREA NITROGEN  11 mg/dL  7-21  



 (BEAKER) (test code=354)      

 

 CREATININE (BEAKER) (test  0.83 mg/dL  0.57-1.25  



 code=358)      

 

 GLUCOSE RANDOM (BEAKER)  94 mg/dL    



 (test code=652)      

 

 CALCIUM (BEAKER) (test  8.6 mg/dL  8.4-10.2  



 code=697)      

 

 AST (SGOT) (BEAKER) (test  15 U/L  5-34  



 code=353)      

 

 ALT (SGPT) (BEAKER) (test  7 U/L  6-55  



 code=347)      

 

 EGFR (JOVI) (test  67 mL/min/1.73 sq m    ESTIMATED GFR IS NOT AS



 code=1092)      ACCURATE AS CREATININE



       CLEARANCE IN PREDICTING



       GLOMERULAR FILTRATION



       RATE. ESTIMATED GFR IS



       NOT APPLICABLE FOR



       DIALYSIS PATIENTS.

## 2018-09-24 NOTE — XMS REPORT
Clinical Summary

 Created on:2018



Patient:Shahida Estrada

Sex:Female

:1940

External Reference #:[id number]





Demographics







 Address  [address] [place] [address]

 

 Mobile Phone  [phone]

 

 Home Phone  [phone]

 

 Email Address  [email or web address]

 

 Preferred Language  English

 

 Marital Status  

 

 Catholic Affiliation  Unknown

 

 Race  White

 

 Ethnic Group  Not  or 









Author







 Organization  [facility]

 

 Address  [address] [place] [address]









Support







 Name  Relationship  Address  Phone

 

 Wan Estrada  Spouse  [address] [facility]  [phone]



     [place] [address]  









Care Team Providers







 Name  Role  Phone

 

 Samuel Todd MD  Primary Care Provider  [phone]









Allergies







 Active Allergy  Reactions  Severity  Noted Date  Comments

 

 Sulfamethoxazole-Trimethoprim      2018  

 

 Ciprofloxacin      2018  

 

 Valsartan      2018  

 

 Iodine      2018  

 

 No Known Drug Allergies      2016  

 

 Prednisone      2018  

 

 Tetanus Vaccines And Toxoid      2018  







Current Medications







 Prescription  Sig.  Disp.  Refills  Start Date  End Date  Status

 

 doxycycline  Take 100 mg by    0  2016    Active



 (VIBRAMYCIN) 100 MG  mouth 2 (two)          



 capsule  times a day.          

 

 losartan (COZAAR) 50        10/31/2016    Active



 MG tablet            

 

 nitrofurantoin,        10/26/2016    Active



 macrocrystal-monohyd            



 rate, (MACROBID) 100            



 MG capsule            

 

 amLODIPine (NORVASC)  TAKE ONE (1)    1  03/15/2018    Active



 10 mg tablet  TABLET(S) BY MOUTH          



   ONCE A DAY.          

 

 atorvastatin  TAKE ONE (1)    0  2017    Active



 (LIPITOR) 80 MG  TABLET(S) BY MOUTH          



 tablet  ONCE A DAY.          

 

 clopidogrel (PLAVIX)  TAKE ONE (1)    1  2018    Active



 75 mg tablet  TABLET(S) BY MOUTH          



   ONCE A DAY.          

 

 metoprolol tartrate  TAKE ONE (1)    1  2018    Active



 (LOPRESSOR) 25 mg  TABLET(S) BY MOUTH          



 tablet  TWICE A DAY.          

 

 aspirin 325 MG  Take 325 mg by          Active



 tablet  mouth daily.          

 

 amoxicillin-pot  Dental/Surgical: 4 tablets at once 1-2 hr prior to procedure  
28 tablet  1  2018  Active



 clavulanate  Other: 4 tablets at once immediately          



 (AUGMENTIN) 500-125            



 mg per            



 tabletIndications:            



 History of total            



 right knee            



 replacement,            



 Prophylactic            



 antibiotic            







Active Problems

No known active problems



Encounters







 Date  Type  Specialty  Care Team  Description

 

 2018  Office Visit  Orthopedic Surgery  Sanjay Samson,  Rotator cuff 
tendinitis, left (Primary Dx);



       MD  Acute pain of left shoulder

 

 2018  Office Visit  Orthopedic Surgery  Sanjay Samson,  Chronic left 
shoulder pain (Primary Dx);



       MD  Arthritis, shoulder region;



         Rotator cuff tendinitis, left;



         Trochanteric bursitis of right hip;



         Pain of right hip joint;



         Degeneration of lumbar intervertebral disc;



         Chronic right-sided low back pain, with sciatica presence unspecified

 

 2018  Office Visit  Orthopedic Surgery  Sanjay Samson,  Chronic right-
sided low back pain, with sciatica presence unspecified (Primary Dx);



       MD  Degeneration of lumbar intervertebral disc;



         Trochanteric bursitis of right hip;



         Pain of right hip joint;



         Chronic pain of right knee;



         History of total right knee replacement;



         Prophylactic antibiotic



after 2017



Social History







 Tobacco Use  Types  Packs/Day  Years Used  Date

 

 Never Assessed        









 Sex Assigned at Birth  Date Recorded

 

 Not on file  







Last Filed Vital Signs







 Vital Sign  Reading  Time Taken

 

 Blood Pressure  -  -

 

 Pulse  -  -

 

 Temperature  -  -

 

 Respiratory Rate  -  -

 

 Oxygen Saturation  -  -

 

 Inhaled Oxygen Concentration  -  -

 

 Weight  84.8 kg (187 lb)  2018 10:17 AM CDT

 

 Height  167.6 cm (5' 6")  2018 10:17 AM CDT

 

 Body Mass Index  30.18  2018 10:17 AM CDT







Plan of Treatment







 Health Maintenance  Due Date  Last Done  Comments

 

 SHINGRIX VACCINE (#1)  1992    

 

 ZOSTER VACCINE  2002    

 

 PNEUMOCOCCAL POLYSACCHARIDE VACCINE AGE 65 AND OVER  2007    

 

 PNEUMOCOCCAL-13  2007    

 

 INFLUENZA VACCINE  2018    







Procedures







 Procedure Name  Priority  Date/Time  Associated Diagnosis  Comments

 

 CT ARTHROCENTESIS  Routine  2018 10:10  Rotator cuff  Results for this



 ASPIR&/INJ MAJOR    AM CDT  tendinitis, left



  procedure are in



 JT/BURSA W/O US      Acute pain of left  the results



       shoulder  section.

 

 CT INJECT TRIGGER  Routine  2018  9:40  Degeneration of  Results for this



 POINT, 1 OR 2    AM CDT  lumbar intervertebral  procedure are in



       disc



  the results



       Chronic right-sided  section.



       low back pain, with  



       sciatica presence  



       unspecified  

 

 CT ARTHROCENTESIS  Routine  2018  9:40  Trochanteric bursitis  Results 
for this



 ASPIR&/INJ MAJOR    AM CDT  of right hip



  procedure are in



 JT/BURSA W/O US      Pain of right hip  the results



       joint  section.

 

 XR SHOULDER 2+ VW LEFT  Routine  2018  9:21  Chronic left shoulder  
Results for this



     AM CDT  pain  procedure are in



         the results



         section.

 

 XR KNEE 1 OR 2 VW  Routine  2018 10:21  Chronic pain of right  Results 
for this



 RIGHT    AM CDT  knee



  procedure are in



       History of total  the results



       right knee  section.



       replacement  

 

 XR HIP 2-3 VIEWS RIGHT  Routine  2018 10:21  Pain of right hip  Results 
for this



     AM CDT  joint  procedure are in



         the results



         section.

 

 XR LUMBAR SPINE  Routine  2018 10:21  Chronic right-sided  Results for 
this



 COMPLETE 4+ VW    AM CDT  low back pain, with  procedure are in



       sciatica presence  the results



       unspecified  section.

 

 CT ARTHROCENTESIS  Routine  2018  9:50  Trochanteric bursitis  Results 
for this



 ASPIR&/INJ MAJOR    AM CDT  of right hip



  procedure are in



 JT/BURSA W/O US      Pain of right hip  the results



       joint  section.

 

 CT INJECT TRIGGER  Routine  2018  9:50  Chronic right-sided  Results for 
this



 POINT, 1 OR 2    AM CDT  low back pain, with  procedure are in



       sciatica presence  the results



       unspecified



  section.



       Degeneration of  



       lumbar intervertebral  



       disc  



after 2017



Results

Large Joint Arthrocentesis (2018 10:10 AM)





 Narrative  Performed At

 

 Sanjay Samson MD 20183:13 PM



  



 Large Joint Arthrocentesis



  



 Consent given by: patient



  



 Supporting Documentation



  



 Indications: pain 



  



 Procedure Details



  



 Ultrasound guided: no



  



  



  



 Platelet Rich Plasma Used: no PRP Used Location: shoulder - L 



  



 subacromial bursa



  



  



  



 Left side:



  



 Needle size: 25 G



  



 Approach: anterior



  



 Left shoulder medications administered: 1 mL lidocaine 10 mg/mL (1 %); 6  



 



  



 mg betamethasone acetate & sodium phosphate 6 mg/mL



  



 Patient tolerance: patient tolerated the procedure well with no  



 immediate 



  



 complications



  



  



  



  



  



   



Large Joint Arthrocentesis (2018  9:40 AM)





 Narrative  Performed At

 

 Sanjay Samson MD 3/29/2018 11:47 AM



  



 Large Joint Arthrocentesis



  



 Consent given by: patient



  



 Timeout: Immediately prior to procedure a time out was called to verify 



  



 the correct patient, procedure, equipment, support staff and site/side 



  



 marked as required 



  



 Supporting Documentation



  



 Indications: pain 



  



 Procedure Details



  



 Ultrasound guided: no



  



  



  



 Platelet Rich Plasma Used: no PRP Used Location: hip - R greater 



  



 trochanteric bursa



  



  



  



 Right side:



  



 Needle size: 25 G



  



 Approach: lateral



  



 Right hip medications administered: 1 mL lidocaine 10 mg/mL (1 %); 6 mg 



  



 betamethasone acetate & sodium phosphate 6 mg/mL



  



 Patient tolerance: patient tolerated the procedure well with no  



 immediate 



  



 complications



  



  



  



  



  



   



INJECT TRIGGER POINT, 1 OR 2 (2018  9:40 AM)





 Narrative  Performed At

 

 Sanjay Samson MD 3/29/2018 11:47 AM



  



 1 or 2 Trigger Point Injection



  



 Date/Time: 3/29/2018 11:46 AM



  



 Performed by: SANJAY SAMSON



  



 Authorized by: SANJAY SAMSON 



  



  



  



 Consent: 



  



 Consent obtained:Verbal



  



 Consent given by:Patient



  



 Risks discussed:Allergic reaction, swelling and pain



  



 Indications: 



  



 Indications:Pain relief



  



 Location: 



  



 Therapeutic Trigger Point Injection:Single/multiple trigger  



 point(s): 



  



 1-2 muscle groups



  



 Location: back



  



 Back location injected:R sacral



  



 Platelet Rich Plasma Used: no PRP Used 



  



 EMG Guidance Used: no emg guidance used



  



 Right side:



  



 Right Sacral Medications administered: 6 mg betamethasone acetate &  



 sodium 



  



 phosphate 6 mg/mL; 1 mL lidocaine 10 mg/mL (1 %)



  



  



  



  



  



  



  



 Pre-procedure details: 



  



 Neurovascular status: intact



  



 Skin preparation:Alcohol



  



 Procedure details: 



  



 Topical anesthetic:Ethyl chloride



  



 Syringe type:Normal syringe



  



 Needle gauge:25 G



  



 Post-procedure details: 



  



 Patient tolerance of procedure:Tolerated well, no immediate 



  



 complications  



XR Shoulder 2+ Vw Left (2018  9:21 AM)





 Narrative  Performed At

 

 This result has an attachment that is not available.



Internal and external rotation and outlet view of the left shoulder so  [facility] 
[facility]



 glenohumeral narrowing with humeral head spurring. This patient had  



 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  









 Performing Organization  Address  City/State/Zipcode  Phone Number

 

  [facility]  [address] [place] [address]  



XR Hip 2-3 View Right (2018 10:21 AM)





 Narrative  Performed At

 

 This result has an attachment that is not available.



AP and lateral x-rays right hip are normal.   [facility]









 Performing Organization  Address  City/[facility]/Zipcode  Phone Number

 

  [facility]  [address] [place] [address]  



XR Knee 1 Or 2 Vw Right (2018 10:21 AM)





 Narrative  Performed At

 

 This result has an attachment that is not available.



AP and lateral x-rays of the right knee shows a right total knee  [facility]



 replacement with a cemented femoral, tibial patellar components. Position  



 alignment are satisfactory. There is no evidence of loosening, lysis or  



 abnormal wear  









 Performing Organization  Address  City[facility]  Phone Number

 

  [facility]  [address] [place] [address]  



XR Lumbar Spine Complete 4+ Vw (2018 10:21 AM)





 Narrative  Performed At

 

 This result has an attachment that is not available.



AP, lateral and oblique x-rays of the lumbar spine show marked narrowing  [facility] 
[facility]



 and almost obliteration of the interspace at all levels in the lumbar  



 spine. The patient also has a mild degenerative first degree  



 spondylolisthesis of L4 on L5. There is marked facet narrowing at all  



 levels in the lumbar spine.  









 Performing Organization  Address  City/State[place]

 

  [facility]  [address] [place] [address]  



Large Joint Arthrocentesis (2018  9:50 AM)





 Narrative  Performed At

 

 Sanjay Samson MD 3/16/2018 12:17 PM



  



 Large Joint Arthrocentesis



  



 Consent given by: patient



  



 Timeout: Immediately prior to procedure a time out was called to verify 



  



 the correct patient, procedure, equipment, support staff and site/side 



  



 marked as required 



  



 Supporting Documentation



  



 Indications: pain 



  



 Procedure Details



  



 Ultrasound guided: no



  



  



  



 Platelet Rich Plasma Used: no PRP Used Location: hip - R greater 



  



 trochanteric bursa



  



  



  



 Right side:



  



 Needle size: 25 G



  



 Approach: lateral



  



 Right hip medications administered: 1 mL lidocaine 10 mg/mL (1 %); 6 mg 



  



 betamethasone acetate & sodium phosphate 6 mg/mL



  



 Patient tolerance: patient tolerated the procedure well with no  



 immediate 



  



 complications



  



  



  



  



  



   



INJECT TRIGGER POINT, 1 OR 2 (2018  9:50 AM)





 Narrative  Performed At

 

 Sanjay Samson MD 3/16/2018 12:17 PM



  



 1 or 2 Trigger Point Injection



  



 Date/Time: 3/16/2018 12:16 PM



  



 Performed by: SANJAY SAMSON



  



 Authorized by: SANJAY SAMSON 



  



  



  



 Consent: 



  



 Consent obtained:Verbal



  



 Consent given by:Patient



  



 Risks discussed:Allergic reaction, swelling and pain



  



 Indications: 



  



 Indications:Pain relief



  



 Location: 



  



 Therapeutic Trigger Point Injection:Single/multiple trigger  



 point(s): 



  



 1-2 muscle groups



  



 Location: back



  



 Back location injected:R sacral



  



 Platelet Rich Plasma Used: no PRP Used 



  



 EMG Guidance Used: no emg guidance used



  



 Right side:



  



 Right Sacral Medications administered: 6 mg betamethasone acetate &  



 sodium 



  



 phosphate 6 mg/mL; 1 mL lidocaine 10 mg/mL (1 %)



  



  



  



  



  



  



  



 Pre-procedure details: 



  



 Neurovascular status: intact



  



 Skin preparation:Alcohol



  



 Procedure details: 



  



 Topical anesthetic:Ethyl chloride



  



 Syringe type:Normal syringe



  



 Needle gauge:25 G



  



 Post-procedure details: 



  



 Patient tolerance of procedure:Tolerated well, no immediate 



  



 complications  



after 2017



Insurance







 Payer  Benefit Plan / Group  Subscriber ID  Type  Phone  Address

 

 MEDICARE  MEDICARE PART A AND B  xxxxxxxxxx  Medicare    [facility]  AAR SUPPLEMENT  xxxxxxxxxxx  Commercial    









 Guarantor Name  Account Type  Relation to  Date of  Phone  Billing



     Patient  Birth    Address

 

 SHAHIDA ESTRADA  Personal/Family  Self  1940  Home:  [address] [place], [place] [phone]  [address] [address]

## 2018-09-24 NOTE — OP
Date of Procedure:  09/24/2018



Surgeon:  Padmini An MD



Anesthesiologist:  Nelida Phillip CRNA and iTmi Houston MD.



Preoperative Diagnosis:  Nuclear sclerotic cataract, left eye.



Operation Performed:  Phacoemulsification with intraocular lens implant, left eye.



Anesthesia:  Per cataract surgery.



Complications:  None.



Description Of Procedure:  In day surgery, the patient was prepped with Betadine and draped.  A conju
nctival incision was made in the inferior nasal quadrant with Sydnee scissors.  A sub-Tenon block c
onsisting of a 1:1 mixture of 2% Xylocaine and 0.25% bupivacaine was placed through the conjunctival 
incision with a blunt cannula.  A Honan balloon was placed over the eye and the patient was transferr
ed to the operating room. 



In the operating room the patient was prepped and draped in the usual sterile fashion for ophthalmic 
surgery.  A lid speculum was placed in the left eye.  Two paracentesis sites were made superiorly and
 inferiorly in the limbal cornea.  Viscoat was placed in the anterior chamber and a crescent blade wa
s used to make a corneal groove and tunnel, and a keratome was used to enter the anterior chamber.  P
rovisc was placed in the anterior chamber and a 360 degree capsulotomy was performed with a cystitome
.  The lens was hydrodissected with BSS and rotated freely.  The lens was removed with a stop and cho
p technique.  7.59 phaco CDE was used to remove the lens.  Residual cortex was removed with the irrig
ation and aspiration.  Provisc was placed in the capsular bag.  A ZCB00 +26.5 lens was placed in the 
capsular bag without complications.  Irrigation and aspiration was used to remove residual viscoelast
ic.  The paracentesis sites were hydrated with BSS.  The wound and paracentesis sites were inspected 
and found to be watertight.  Vigamox 0.07 cc was placed intracamerally at the end of the procedure.  
The eye was irrigated with balanced salt solution.  The eye was patched with a soft cotton patch and 
Kim metal shield. 



The patient was returned to day surgery in good condition.



Comments:  The lens was hydrodelineated rather than hydrodissected.



Discharge Instructions:  Ms. Estrada is discharged to home in good condition and is to follow up [facility] Dr. nA in the morning.





JHL/MODL

DD:  09/24/2018 15:00:40Voice ID:  [id number]

DT:  09/24/2018 23:42:24Report ID:  [id number]

## 2018-09-24 NOTE — XMS REPORT
Clinical Summary

 Created on:2018



Patient:Jerica Estrada

Sex:Female

:1940

External Reference #:[id number]





Demographics







 Address  [address] [place] [address]

 

 Mobile Phone  [phone]

 

 Home Phone  [phone]

 

 Preferred Language  English

 

 Marital Status  Unknown

 

 [employer] Affiliation  Unknown

 

 Race  White

 

 Ethnic Group  Not  or 









Author







 Organization  [facility]

 

 Address  [address] [place]



   [place] [address]

 

 Phone  [phone]









Support







 Name  Relationship  Address  Phone

 

 Unavailable  Unavailable  [address] [facility]  [phone]



     [place] [address]  

 

 Unavailable  [employer]  Unavailable  [phone]









Care Team Providers







 Name  Role  Phone

 

 Unavailable  Primary Care Provider  Unavailable









Allergies







 Active Allergy  Reactions  Severity  Noted Date  Comments

 

 Ciprofloxacin  Anaphylaxis  High  2017  

 

 Penicillins  Anaphylaxis  High  2017  

 

 Tetanus Vaccines And Toxoid  Itching  High  2017  

 

 Iodine And Iodide Containing  Other (See Comments)  Medium  2017  Cold 
chills



 Products        







Current Medications







 Prescription  Sig.  Disp.  Refills  Start Date  End Date  Status

 

 CALCIUM  Take by mouth          Active



 CARBONATE/VITAMIN D3  2 (two) times          



 (CALCIUM 600 + D,3,  daily.          



 ORAL)            

 

 VIT C/VIT  Take by mouth          Active



 E/LUTEIN/MIN/OMEGA-3  daily.          



 (OCUVITE ORAL)            

 

 folic acid (FOLVITE)  Take 400 mcg          Active



 400 MCG tablet  by mouth          



   nightly.          

 

 cyanocobalamin 1000 MCG  Take 1,000 mcg          Active



 tablet  by mouth          



   daily.          

 

 cranberry extract  Take by mouth          Active



 (CRANBERRY CONCENTRATE)  2 (two) times          



 500 mg Cap  daily.          

 

 docusate sodium  Take 100 mg by          Active



 (COLACE) 100 MG capsule  mouth 2 (two)          



   times daily as          



   needed for          



   Constipation.          

 

 losartan (COZAAR) 100  Take 1 tablet  30 tablet  0  2017  




 MG tablet  (100 mg total)          



   by mouth          



   daily.          

 

 amLODIPine (NORVASC) 10  Take 1 tablet  30 tablet  0  2017  




 MG tablet  (10 mg total)          



   by mouth          



   daily.          

 

 atorvastatin (LIPITOR)  Take 1 tablet  30 tablet  0  2017  




 80 MG tablet  (80 mg total)          



   by mouth          



   nightly.          

 

 metoprolol (LOPRESSOR)  Take 1 tablet  30 tablet  0  2017  




 25 MG tablet  (25 mg total)          



   by mouth 2          



   (two) times          



   daily.          

 

 aspirin 325 MG EC  Take 1 tablet  90 tablet  0  2017  



 tablet  (325 mg total)          



   by mouth          



   daily.          

 

 clopidogrel (PLAVIX) 75  Take 1 tablet  90 tablet  0  2017  




 mg tablet  (75 mg total)          



   by mouth          



   daily.          







Active Problems







 Problem  Noted Date

 

 Aneurysm (HCC)  2017

 

 Hypertension  2017

 

 Other specified transient cerebral ischemias  2017

 

 Hypertensive emergency  2017

 

 Syncope  05/15/2017

 

 Carotid aneurysm, right (HCC)  05/15/2017

 

 Essential hypertension  2017

 

 TIA (transient ischemic attack)  2017

 

 Hypertension  2017







Encounters







 Date  Type  Specialty  Care Team  Description

 

 2018  Hospital Encounter  Radiology  Migue Weber  Cerebral aneurysm



       MD Kaushal  

 

 2018  Outside Orders  Radiology  Migue Weber  Cerebral aneurysm



       MD Kaushal  (Primary Dx)

 

 2017  Hospital Encounter  Radiology  Migue Weber  Aneurysm ([facility])



       MD Kaushal  

 

 2017  Orders Only    [facility]  Cerebral aneurysm,



         nonruptured

 

 2017  Anesthesia Event    Sheri Melendez MD  

 

 2017  Procedure Pass      

 

 2017  Surgery    Virtual, Surgeon  PROCEDURE DONE



         OUTSIDE OR

 

 2017  Hospital Encounter    Migue Weber  No Show



       MD Kaushal  

 

 2017  Hospital Encounter      Aneurysm (HCC)

 

 2017  Procedure Pass      

 

 2017  Surgery    Virtual, Surgeon  PROCEDURE DONE



         OUTSIDE OR

 

 2017  Anesthesia Event    Casandra Peacock MD  

 

 2017  Orders Only    Grace Orozco,  Cerebral aneurysm,



       RN  nonruptured

 

 2017  Outside Orders  Central Scheduling  Migue Weber  Cerebral 
Kaushal chavez MD  nonruptured



         (Primary Dx)



after 2017



Social History







 Tobacco Use  Types  Packs/Day  Years Used  Date

 

 Never Smoker        









 Smokeless Tobacco: Never Used      









 Alcohol Use  Drinks/Week  oz/Week  Comments

 

 No      









 Sex Assigned at Birth  Date Recorded

 

 Not on file  







Last Filed Vital Signs







 Vital Sign  Reading  Time Taken

 

 Blood Pressure  142/67  2017  7:48 PM CST

 

 Pulse  90  2017  7:48 PM CST

 

 Temperature  35.6 C (96.1 F)  2017 10:08 AM CST

 

 Respiratory Rate  18  2017  7:48 PM CST

 

 Oxygen Saturation  97%  2017  6:00 PM CST

 

 Inhaled Oxygen Concentration  -  -

 

 Weight  84.8 kg (186 lb 14.4 oz)  2017 10:08 AM CST

 

 Height  160 cm (5' 3")  2017 10:08 AM CST

 

 Body Mass Index  33.11  2017 10:08 AM CST







Plan of Treatment

Not on file



Procedures







 Procedure Name  Priority  Date/Time  Associated Diagnosis  Comments

 

 PROCEDURE DONE OUTSIDE    2017 12:30 PM CST  Cerebral aneurysm  



 OR        



after 2017



Results

NV cerebral 4 vessel angiogram (2017  5:34 PM)





 Specimen  Performing Laboratory

 

   GE RIS









 Narrative

 

 FINAL REPORT







  







 PATIENT ID: [id number]







  







 DATE: 2017







 NAME: Jerica Estrada







 ATTENDING: Migue Weber MD







 FIRST ASSISTANT: Asaf Hurtado MD







 PREOPERATIVE DIAGNOSIS: Unruptured right posterior communicating 







 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







 POSTOPERATIVE DIAGNOSIS: Unruptured right posterior communicating 







 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







 PROCEDURE PERFORMED: Diagnostic Cerebral Angiogram







 ANESTHESIA: MAC







 COMPLICATIONS: None







 ESTIMATED BLOOD LOSS: Less than 15ml







  







 ARTERIAL VESSELS STUDIED:







 RIGHT SUBCLAVIAN ARTERY (x1)







 RIGHT COMMON CAROTID ARTERY (CERVICAL x2)







 RIGHT COMMON CAROTID ARTERY (CRANIAL x3)







 RIGHT COMMON FEMORAL ARTERY (x1)







  







 MATERIALS EMPLOYED:







 1. 5 French short sheath 







 2. 4 French Berenstein catheter







 3. Bentson guidewire







 4. Terumo 0.035 LT glidewire







 5. 5 French Mynx device







  







 INDICATIONS: 77-year-old female with hypertension who is status post 







 endovascular treatment of an unruptured right posterior communicating 







 artery aneurysm with stent-assisted coiling on May 19, 2017. She 







 presents for follow-up cerebral angiogram. The indications for the 







 procedure as well as the risks, benefits and alternatives were 







 discussed with the patient and the family. The risks discussed 







 included but were not limited to stroke, intracranial hemorrhage, 







 injury to the cervical femoral or aortic vessels, contrast reaction, 







 kidney to toxicity, groin hematoma, weakness paralysis and even 







 death. They demonstrated understanding of the risk benefit profile 







 and agreed to proceed.







  







 PROCEDURE: After appropriate consent was obtained, the patient was 







 brought to the angiographic suite and cardiopulmonary monitoring was 







 placed. The anesthesia team performed light sedation. A timeout was 







 performed. Both groins were prepped and draped in the usual sterile 







 fashion. After administration of 10 mL of 2% lidocaine, a 







 micropuncture needle was used to perform a single wall puncture of 







 the right common femoral artery, a micropuncture sheath was inserted, 







 and an angled DSA angiogram was performed through the sheath. Once 







 good location of the puncture site was confirmed, a 5 French short 







 sheath was inserted over a Bentson wire and was maintained on 







 heparinized saline flush throughout the remainder of the procedure.







 Using coaxial technique, a preflushed Zokemenstein catheter on constant 







 heparinized saline flush was advanced over the Glidewire into the 







 descending aorta, the Glidewire was removed, the catheter back bled, 







 flushed in usual fashion and the catheter was maintained on 







 heparinized flushed throughout duration of the case. Using coaxial 







 technique, the catheter was advanced into the aortic arch and with 







 the aid of roadmapping, digital fluoroscopy, and careful guidewire 







 manipulation, the arteries described below were selectively 







 catheterized. Upon each successive catheterization, digital 







 subtraction angiography using the appropriate rate and volume of 







 contrast in multiple projections was performed. At the conclusion of 







 the procedure, the catheter was retracted into the aorta and the 







 images reviewed at the outside workstation for quality and content.







 Then the femoral sheath was removed and hemostasis achieved with a 5 







 French Mynx device and manual compression. The patient tolerated the 







 procedure well and was transported in unchanged neurological status 







 without groin hematoma and with good distal lower extremity pulses. 







 The patient was transferred to the recovery area to be monitored as 







 per protocol.







  







  







 FINDINGS:







  







 RIGHT SUBCLAVIAN ARTERY (DSA, PA, LATERAL ROADMAP, x1): There is 







 normal course and caliber of the subclavian artery with physiological 







 filling of its distal branches. Limited visualization in this roadmap 







 of the origins of the right vertebral artery, internal mamillary 







 artery, thyrocervical and costocervical trunks. 







  







 RIGHT COMMON CAROTID ARTERY (DSA, PA, LATERAL, CERVICAL x2): Tortuous 







 course of the right common carotid artery. The distal cervical common 







 carotid as well as the origins of the right internal and external 







 carotid arteries are widely patent without evidence of ulceration or 







 stenosis. The proximal portions of the superficial temporal artery, 







 middle meningeal artery, internal maxillary artery, occipital artery 







 and their branches are visualized and appear normal.







  







 RIGHT COMMON CAROTID ARTERY (DSA, PA, LATERAL, MAGNIFIED OBLIQUE, 







 CRANIAL x3): Minimal residual aneurysm neck in the previously treated 







 right posterior communicating artery aneurysm, best visualized on 







 sequence A9. Normal distal cervical, petrous, cavernous and 







 supraclinoid internal carotid artery with physiological filling of 







 the MCA and MAXIMINO branches. Limited visualization of the venous phase. 







 No other aneurysms or other vascular lesions are seen. There is no 







 significant atherosclerosis or stenosis. Normal course and caliber of 







 the external carotid artery and its branches. There is a well 







 visualized superficial temporal artery, middle meningeal artery, 







 internal maxillary artery, occipital artery and their branches.







  







 RIGHT COMMON FEMORAL ARTERY (DSA, PA, X 1): Normal location of the 







 puncture site above the bifurcation and below markers of the inguinal 







 ligament.







  







  







 IMPRESSION: Minimal residual aneurysm neck in the previously treated 







 right posterior communicating artery aneurysm. No technical or 







 procedural complications







  







 FACULTY ATTESTATION: I, Migue Weber M.D., was present for the 







 entirety of the procedure. I performed or directly supervised all 







 aspects of the procedure. I performed all critical aspects of the 







 case. I interpreted the images and reported the results.







  







 Signed: Joshua Cox MD







 Report Verified Date/Time:2017 11:48:56







  







 Reading [facility]







  Electronically signed by: JOSHUA COX on 2017 11:48 AM







 









 Procedure Note

 

 Interface, External Ris In - 2017 11:51 AM CST



FINAL REPORT



 



 PATIENT ID:   [id number]



 



 DATE: 2017



 NAME: Jericahua Felix Sean



 ATTENDING: Migue Weber MD



 FIRST ASSISTANT: Asaf Hurtado MD



 PREOPERATIVE DIAGNOSIS: Unruptured right posterior communicating



 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



 POSTOPERATIVE DIAGNOSIS: Unruptured right posterior communicating



 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



 PROCEDURE PERFORMED: Diagnostic Cerebral Angiogram



 ANESTHESIA: MAC



 COMPLICATIONS: None



 ESTIMATED BLOOD LOSS: Less than 15ml



 



 ARTERIAL VESSELS STUDIED:



 RIGHT SUBCLAVIAN ARTERY (x1)



 RIGHT COMMON CAROTID ARTERY (CERVICAL x2)



 RIGHT COMMON CAROTID ARTERY (CRANIAL x3)



 RIGHT COMMON FEMORAL ARTERY (x1)



 



 MATERIALS EMPLOYED:



 1. 5 French short sheath



 2. 4 French Berenstein catheter



 3. Bentson guidewire



 4. Terumo 0.035 LT glidewire



 5. 5 French Mynx device



 



 INDICATIONS: 77-year-old female with hypertension who is status post



 endovascular treatment of an unruptured right posterior communicating



 artery aneurysm with stent-assisted coiling on May 19, 2017. She



 presents for follow-up cerebral angiogram. The indications for the



 procedure as well as the risks, benefits and alternatives were



 discussed with the patient and the family. The risks discussed



 included but were not limited to stroke, intracranial hemorrhage,



 injury to the cervical femoral or aortic vessels, contrast reaction,



 kidney to toxicity, groin hematoma, weakness paralysis and even



 death. They demonstrated understanding of the risk benefit profile



 and agreed to proceed.



 



 PROCEDURE: After appropriate consent was obtained, the patient was



 brought to the angiographic suite and cardiopulmonary monitoring was



 placed. The anesthesia team performed light sedation. A timeout was



 performed. Both groins were prepped and draped in the usual sterile



 fashion. After administration of 10 mL of 2% lidocaine, a



 micropuncture needle was used to perform a single wall puncture of



 the right common femoral artery, a micropuncture sheath was inserted,



 and an angled DSA angiogram was performed through the sheath. Once



 good location of the puncture site was confirmed, a 5 French short



 sheath was inserted over a Bentson wire and was maintained on



 heparinized saline flush throughout the remainder of the procedure.



 Using coaxial technique, a preflushed Berenstein catheter on constant



 heparinized saline flush was advanced over the Glidewire into the



 descending aorta, the Glidewire was removed, the catheter back bled,



 flushed in usual fashion and the catheter was maintained on



 heparinized flushed throughout duration of the case. Using coaxial



 technique, the catheter was advanced into the aortic arch and with



 the aid of roadmapping, digital fluoroscopy, and careful guidewire



 manipulation, the arteries described below were selectively



 catheterized. Upon each successive catheterization, digital



 subtraction angiography using the appropriate rate and volume of



 contrast in multiple projections was performed. At the conclusion of



 the procedure, the catheter was retracted into the aorta and the



 images reviewed at the outside workstation for quality and content.



 Then the femoral sheath was removed and hemostasis achieved with a 5



 French Mynx device and manual compression. The patient tolerated the



 procedure well and was transported in unchanged neurological status



 without groin hematoma and with good distal lower extremity pulses.



 The patient was transferred to the recovery area to be monitored as



 per protocol.



 



 



 FINDINGS:



 



 RIGHT SUBCLAVIAN ARTERY (DSA, PA, LATERAL ROADMAP, x1): There is



 normal course and caliber of the subclavian artery with physiological



 filling of its distal branches. Limited visualization in this roadmap



 of the origins of the right vertebral artery, internal mamillary



 artery, thyrocervical and costocervical trunks.



 



 RIGHT COMMON CAROTID ARTERY (DSA, PA, LATERAL, CERVICAL x2): Tortuous



 course of the right common carotid artery. The distal cervical common



 carotid as well as the origins of the right internal and external



 carotid arteries are widely patent without evidence of ulceration or



 stenosis. The proximal portions of the superficial temporal artery,



 middle meningeal artery, internal maxillary artery, occipital artery



 and their branches are visualized and appear normal.



 



 RIGHT COMMON CAROTID ARTERY (DSA, PA, LATERAL, MAGNIFIED OBLIQUE,



 CRANIAL x3): Minimal residual aneurysm neck in the previously treated



 right posterior communicating artery aneurysm, best visualized on



 sequence A9. Normal distal cervical, petrous, cavernous and



 supraclinoid internal carotid artery with physiological filling of



 the MCA and MAXIMINO branches. Limited visualization of the venous phase.



 No other aneurysms or other vascular lesions are seen. There is no



 significant atherosclerosis or stenosis. Normal course and caliber of



 the external carotid artery and its branches. There is a well



 visualized superficial temporal artery, middle meningeal artery,



 internal maxillary artery, occipital artery and their branches.



 



 RIGHT COMMON FEMORAL ARTERY (DSA, PA, X 1): Normal location of the



 puncture site above the bifurcation and below markers of the inguinal



 ligament.



 



 



 IMPRESSION: Minimal residual aneurysm neck in the previously treated



 right posterior communicating artery aneurysm. No technical or



 procedural complications



 



 FACULTY ATTESTATION: I, Migue Weber M.D., was present for the



 entirety of the procedure. I performed or directly supervised all



 aspects of the procedure. I performed all critical aspects of the



 case. I interpreted the images and reported the results.



 



 Signed: Joshua Cox MD



 Report Verified Date/Time:  2017 11:48:56



 



 Reading Location: [facility] B1 Y026 Neuro Angio Reading Room



        Electronically signed by: JOSHUA COX on 2017 11:48 AM



 



ECG 12 lead (2017 11:31 AM)





 Specimen  Performing Laboratory

 

   GE MUSE









 Narrative

 

 Ventricular Rate 66 BPM







 Atrial Rate 66 BPM







 P-R Interval 192 ms







 QRS Duration 80 ms







 Q-T Interval 414 ms







 QTC Calculation(Bazett) 434 ms







 P Axis 69 degrees







 R Axis 28 degrees







 T Axis 54 degrees







  







 Normal sinus rhythm







 Normal ECG







 When compared with ECG of 15-MAY-2017 16:43,







 No significant change was found







 Confirmed by MD MURO YOCHAI ([id number]) on 2017 6:40:26 AM









 Procedure Note

 

 Interface, External Ris In - 2017  6:40 AM CST



Ventricular Rate 66 BPM



 Atrial Rate 66 BPM



 P-R Interval 192 ms



 QRS Duration 80 ms



 Q-T Interval 414 ms



 QTC Calculation(Bazett) 434 ms



 P Axis 69 degrees



 R Axis 28 degrees



 T Axis 54 degrees



 



 Normal sinus rhythm



 Normal ECG



 When compared with ECG of 15-MAY-2017 16:43,



 No significant change was found



 Confirmed by MD MURO YOCHAI ([id number]) on 2017 6:40:26 AM



CBC with platelet count + manual diff (2017 11:14 AM)





 Component  Value  Ref Range

 

 WBC  5.0  3.5 - 10.5 K/L

 

 RBC  3.94  3.93 - 5.22 M/L

 

 Hemoglobin  12.1  11.2 - 15.7 GM/DL

 

 Hematocrit  37.1  34.1 - 44.9 %

 

 MCV  94.2  79.4 - 94.8 fL

 

 MCH  30.7  25.6 - 32.2 pg

 

 MCHC  32.6  32.2 - 35.5 GM/DL

 

 RDW  12.7  11.7 - 14.4 %

 

 Platelets  212  150 - 450 K/CU MM

 

 MPV  9.4  9.4 - 12.3 fL

 

 nRBC  0  0 - 0 /100 WBC

 

 % Neutros  60Comment: This is an appended  %



   report.  These results have been  



   appended to a previously final  



   verified report.  

 

 % Lymphs  30Comment: This is an appended  %



   report.  These results have been  



   appended to a previously final  



   verified report.  

 

 % Monos  9Comment: This is an appended  %



   report.  These results have been  



   appended to a previously final  



   verified report.  

 

 % Eos  1Comment: This is an appended  %



   report.  These results have been  



   appended to a previously final  



   verified report.  

 

 % Baso  1Comment: This is an appended  %



   report.  These results have been  



   appended to a previously final  



   verified report.  

 

 # Neutros  2.97Comment: This is an appended  1.56 - 6.13 K/L



   report.  These results have been  



   appended to a previously final  



   verified report.  

 

 # Lymphs  1.46Comment: This is an appended  1.18 - 3.74 K/L



   report.  These results have been  



   appended to a previously final  



   verified report.  

 

 # Monos  0.42 (H)Comment: This is an appended  0.24 - 0.36 K/L



   report.  These results have been  



   appended to a previously final  



   verified report.  

 

 # Eos  0.05Comment: This is an appended  0.04 - 0.36 K/L



   report.  These results have been  



   appended to a previously final  



   verified report.  

 

 # Baso  0.04Comment: This is an appended  0.01 - 0.08 K/L



   report.  These results have been  



   appended to a previously final  



   verified report.  

 

 Immature Granulocytes-Relative  0  0 - 1 %









 Specimen  Performing Laboratory

 

 Blood  [address] [address]



PT/aPTT (2017 11:14 AM)





 Component  Value  Ref Range

 

 Protime  15.2 (H)  11.7 - 14.7 seconds

 

 INR  1.2  <=5.9

 

 PTT  42.8 (H)  22.5 - 36.0 seconds









 Specimen  Performing Laboratory

 

 Blood  [address] [address]









 Narrative

 

  







 RECOMMENDED COUMADIN/WARFARIN INR THERAPY RANGES







 STANDARD DOSE: 2.0 - 3.0 Includes: PROPHYLAXIS for venous thrombosis, 
systemic



 embolization; TREATMENT for venous thrombosis and/or pulmonary embolus.







 HIGH RISK: Target INR is 2.5-3.5 for patients with mechanical heart valves.



CBC with platelet count + manual diff (2017 11:14 AM)





 Specimen  Performing Laboratory

 

 Blood  









 Narrative

 

 The following orders were created for panel order CBC with platelet count + 
manual



 diff.







 Procedure



 Abnormality Status 







 ---------



 ----------- ------ 







 CBC with platelet count ...[[id number]]Abnormal
Edited



 Result - FINAL







 Manual



 Differential[[id number]]




  







  







 Please view results for these tests on the individual orders.



Basic Metabolic Panel (2017 11:14 AM)





 Component  Value  Ref Range

 

 Sodium  136  136 - 145 meq/L

 

 Potassium  3.9  3.5 - 5.1 meq/L

 

 Chloride  102  98 - 107 meq/L

 

 CO2  25  22 - 29 meq/L

 

 BUN  20  7 - 21 mg/dL

 

 Creatinine  0.82  0.57 - 1.25 mg/dL

 

 Glucose  106 (H)  70 - 105 mg/dL

 

 Calcium  9.6  8.4 - 10.2 mg/dL

 

 EGFR  68Comment: ESTIMATED GFR IS NOT AS ACCURATE AS  mL/min/1.73 sq m



   CREATININE CLEARANCE IN PREDICTING GLOMERULAR FILTRATION  



   RATE. ESTIMATED GFR IS NOT APPLICABLE FOR DIALYSIS  



   PATIENTS.  









 Specimen  Performing Laboratory

 

 Blood  [facility] [address] [place] [address]



after 2017

## 2018-12-12 ENCOUNTER — HOSPITAL ENCOUNTER (EMERGENCY)
Dept: HOSPITAL 97 - ER | Age: 78
Discharge: HOME | End: 2018-12-12
Payer: COMMERCIAL

## 2018-12-12 DIAGNOSIS — M62.838: Primary | ICD-10-CM

## 2018-12-12 DIAGNOSIS — Z95.818: ICD-10-CM

## 2018-12-12 DIAGNOSIS — Z88.7: ICD-10-CM

## 2018-12-12 DIAGNOSIS — I10: ICD-10-CM

## 2018-12-12 DIAGNOSIS — Z88.1: ICD-10-CM

## 2018-12-12 LAB
BUN BLD-MCNC: 13 MG/DL (ref 7–18)
GLUCOSE SERPLBLD-MCNC: 96 MG/DL (ref 74–106)
HCT VFR BLD CALC: 38.7 % (ref 36–45)
LYMPHOCYTES # SPEC AUTO: 1.2 K/UL (ref 0.7–4.9)
MAGNESIUM SERPL-MCNC: 1.9 MG/DL (ref 1.8–2.4)
MCH RBC QN AUTO: 32.7 PG (ref 27–35)
MCV RBC: 94.1 FL (ref 80–100)
PMV BLD: 8.1 FL (ref 7.6–11.3)
POTASSIUM SERPL-SCNC: 4 MMOL/L (ref 3.5–5.1)
RBC # BLD: 4.12 M/UL (ref 3.86–4.86)

## 2018-12-12 PROCEDURE — 83735 ASSAY OF MAGNESIUM: CPT

## 2018-12-12 PROCEDURE — 93971 EXTREMITY STUDY: CPT

## 2018-12-12 PROCEDURE — 80048 BASIC METABOLIC PNL TOTAL CA: CPT

## 2018-12-12 PROCEDURE — 36415 COLL VENOUS BLD VENIPUNCTURE: CPT

## 2018-12-12 PROCEDURE — 99283 EMERGENCY DEPT VISIT LOW MDM: CPT

## 2018-12-12 PROCEDURE — 85025 COMPLETE CBC W/AUTO DIFF WBC: CPT

## 2018-12-12 NOTE — ER
Nurse's Notes                                                                                     

 [facility]                                                                

Name: Jerica Estrada                                                                           

Age: 78 yrs                                                                                       

Sex: Female                                                                                       

: 1940                                                                                   

MRN: [id number]                                                                                   

Arrival Date: 2018                                                                          

Time: 09:39                                                                                       

Account#: [id number]                                                                            

Bed 18                                                                                            

Private MD:                                                                                       

Diagnosis: Pain in left foot;Muscle spasm                                                         

                                                                                                  

Presentation:                                                                                     

                                                                                             

09:40 Presenting complaint: Patient states: left foot pain. Denies known injury. Pt states "I aa5 

      saw Dr. Todd on Friday and he gave me some antibiotics and I got an x-ray on             

      Monday". Transition of care: patient was not received from another setting of care.         

      Onset of symptoms was 2018. Risk Assessment: Do you want to hurt yourself or       

      someone else? Patient reports no desire to harm self or others. Initial Sepsis Screen:      

      Does the patient meet any 2 criteria? No. Patient's initial sepsis screen is negative.      

      Does the patient have a suspected source of infection? No. Patient's initial sepsis         

      screen is negative. Care prior to arrival: None.                                            

09:40 Method Of Arrival: EMS: [facility]                                                aa5 

09:40 Acuity: LYNNETTE 3                                                                           aa5 

                                                                                                  

Historical:                                                                                       

- Allergies:                                                                                      

09:41 Ciprofloxacin;                                                                          aa5 

09:41 Tetanus Vaccines \T\ Toxoid;                                                              aa5

- Home Meds:                                                                                      

09:42 amlodipine 10 mg tab 1 tab once daily [Active]; atorvastatin 80 mg Oral tab 1 tab once  aa5 

      daily [Active]; clopidogrel 75 mg Oral tab 1 tab once daily [Active]; Ecotrin 325 mg        

      Oral TbEC 1 tab once daily [Active]; losartan 100 mg Oral tab 1 tab once daily              

      [Active]; metoprolol tartrate 25 mg Oral tab 1 tab 2 times per day [Active];                

- PMHx:                                                                                           

09:41 Aneurysm; Hypertension;                                                                 aa5 

- PSHx:                                                                                           

09:41 R carotid stent; Knee surgery; Bladder suspension; Hysterectomy; Tonsillectomy; Brain   aa5 

      aneurysm repair;                                                                            

                                                                                                  

- Immunization history:: Pneumococcal vaccine is not up to date, Flu vaccine is not up            

  to date.                                                                                        

- Ebola Screening: : No symptoms or risks identified at this time.                                

- Family history:: not pertinent.                                                                 

- Social history:: Smoking status: Patient/guardian denies using tobacco.                         

- Hospitalizations: : No recent hospitalization is reported.                                      

                                                                                                  

                                                                                                  

Screenin:46 Abuse screen: Denies threats or abuse. Nutritional screening: No deficits noted.        aa5 

      Tuberculosis screening: No symptoms or risk factors identified.                             

10:05 Fall Risk None identified.                                                              aj  

                                                                                                  

Assessment:                                                                                       

09:47 General: Appears uncomfortable, Behavior is calm, cooperative. Pain: Complains of pain  aa5 

      in lateral side of left foot Pain does not radiate. Pain currently is 10 out of 10 on a     

      pain scale. level that patient reports is acceptable is 2 out of 10 on a pain scale.        

      Quality of pain is described as aching, crampy, Is intermittent. Neuro: Level of            

      Consciousness is awake, alert, obeys commands, Oriented to person, place, time,             

      situation. Cardiovascular: Heart tones S1 S2 present Rhythm is regular. Respiratory:        

      Airway is patent Respiratory effort is even, unlabored, Respiratory pattern is regular,     

      symmetrical. GI: No signs and/or symptoms were reported involving the gastrointestinal      

      system. : No signs and/or symptoms were reported regarding the genitourinary system.      

      EENT: No signs and/or symptoms were reported regarding the EENT system. Derm: Skin is       

      pink, warm \T\ dry. Musculoskeletal: Range of motion: intact in all extremities, Swelling   

      present in dorsum of left foot.                                                             

10:05 General: Appears in no apparent distress. uncomfortable, Behavior is calm, cooperative, aj  

      appropriate for age. Pain: Complains of pain in left foot and lateral side of left          

      foot. Neuro: Level of Consciousness is awake, alert, obeys commands, Oriented to            

      person, place, time, situation. Respiratory: Airway is patent Respiratory effort is         

      even, unlabored, Respiratory pattern is regular, symmetrical. GI: Abdomen is                

      non-distended. Derm: Skin is intact, is healthy with good turgor, Skin is pink, warm \T\    

      dry. normal. Musculoskeletal: Reports pain in left foot and lateral side of left foot.      

                                                                                                  

Vital Signs:                                                                                      

09:43  / 81; Pulse 72; Resp 18 S; Temp 97.6(O); Pulse Ox 97% on R/A; Weight 79.38 kg    aa5 

      (R); Pain 10/10;                                                                            

11:17  / 76; Pulse 81; Resp 20; Pulse Ox 100% on R/A;                                   aj  

                                                                                                  

ED Course:                                                                                        

09:39 Patient arrived in ED.                                                                  aa5 

09:40 Arm band placed on Patient placed in an exam room, on a stretcher.                      aa5 

09:40 Patient has correct armband on for positive identification. Placed in gown. Bed in low  aa5 

      position. Call light in reach. Side rails up X2.                                            

09:41 Triage completed.                                                                       aa5 

09:41 Efrem Mi MD is Attending Physician.                                                rn  

09:51 Rowena George, RN is Primary Nurse.                                                   aa5 

09:55 Report given to FABY Noonan.                                                             aa5 

10:05 Missed attempt(s): 22 gauge in right antecubital area. Bleeding controlled, band aid    aj  

      applied, catheter tip intact.                                                               

10:44 Extremity Venous Uni Ltd US In Process Unspecified.                                     EDMS

11:37 No provider procedures requiring assistance completed. Patient did not have IV access   aj  

      during this emergency room visit.                                                           

                                                                                                  

Administered Medications:                                                                         

10:06 Drug: Flexeril 10 mg Route: PO;                                                         aj  

11:09 Follow up: Response: No change in condition                                             aj  

11:21 Drug: Norco 5 mg-325 mg 1 tabs Route: PO;                                               aj  

                                                                                                  

                                                                                                  

Outcome:                                                                                          

11:23 Discharge ordered by MD.                                                                rn  

12:02 Patient left the ED.                                                                    aj  

                                                                                                  

Signatures:                                                                                       

Dispatcher [employer]                           EDMS                                                 

Shaista Sales RN RN aj Nieto, Roman, MD MD rn Calderon, Audri, FABY                     RN   Juma Morales RN                      RN   bp                                                   

                                                                                                  

Corrections: (The following items were deleted from the chart)                                    

09:46 09:43  / 81; Pulse 72bpm; Resp 18bpm; Spontaneous; Pulse Ox 97% RA; Pain 10/10;   aa5 

      aa5                                                                                         

10:04 09:40 General: Appears in no apparent distress. comfortable, ill, slender, Behavior is  bp  

      appropriate for age, bp                                                                     

                                                                                                  

**************************************************************************************************

## 2018-12-12 NOTE — XMS REPORT
Clinical Summary

 Created on:2018



Patient:Jerica Estrada

Sex:Female

:1940

External Reference #:[id number]





Demographics







 Address  [address] [place] [address]

 

 Mobile Phone  [phone]

 

 Home Phone  [phone]

 

 Email Address  [email or web address]

 

 Preferred Language  English

 

 Marital Status  

 

 Yazdanism Affiliation  Unknown

 

 Race  White

 

 Ethnic Group  Not  or 









Author







 Organization  [facility]

 

 Address  [address] [place] [address]









Support







 Name  Relationship  Address  Phone

 

 Wan Estrada  Spouse  [address] [facility]  [phone]



     [place] [address]  









Care Team Providers







 Name  Role  Phone

 

 Samuel Todd MD  Primary Care Provider  [phone]









Allergies







 Active Allergy  Reactions  Severity  Noted Date  Comments

 

 Sulfamethoxazole-Trimethoprim      2018  

 

 Ciprofloxacin      2018  

 

 Valsartan      2018  

 

 Iodine      2018  

 

 No Known Drug Allergies      2016  

 

 Prednisone      2018  

 

 Tetanus Vaccines And Toxoid      2018  







Medications







 Medication  Sig  Dispensed  Refills  Start Date  End Date  Status

 

 doxycycline  Take 100 mg by    0  2016    Active



 (VIBRAMYCIN) 100 MG  mouth 2 (two)          



 capsule  times a day.          

 

 losartan (COZAAR) 50      0  10/31/2016    Active



 MG tablet            

 

 nitrofurantoin,      0  10/26/2016    Active



 macrocrystal-monohyd            



 rate, (MACROBID) 100            



 MG capsule            

 

 amLODIPine (NORVASC)  TAKE ONE (1)    1  03/15/2018    Active



 10 mg tablet  TABLET(S) BY MOUTH          



   ONCE A DAY.          

 

 atorvastatin  TAKE ONE (1)    0  2017    Active



 (LIPITOR) 80 MG  TABLET(S) BY MOUTH          



 tablet  ONCE A DAY.          

 

 clopidogrel (PLAVIX)  TAKE ONE (1)    1  2018    Active



 75 mg tablet  TABLET(S) BY MOUTH          



   ONCE A DAY.          

 

 metoprolol tartrate  TAKE ONE (1)    1  2018    Active



 (LOPRESSOR) 25 mg  TABLET(S) BY MOUTH          



 tablet  TWICE A DAY.          

 

 aspirin 325 MG  Take 325 mg by    0      Active



 tablet  mouth daily.          

 

 amoxicillin-pot  Dental/Surgical: 4 tablets at once 1-2 hr prior to procedure  
28 tablet  1  2018  Active



 clavulanate  Other: 4 tablets at once immediately          



 (AUGMENTIN) 500-125            



 mg per            



 tabletIndications:            



 History of total            



 right knee            



 replacement,            



 Prophylactic            



 antibiotic            







Active Problems

No known active problems



Encounters







 Date  Type  Specialty  Care Team  Description

 

 2018  Office Visit  Orthopedic Surgery  Sanjay Samson,  Rotator cuff 
tendinitis, left (Primary Dx);



       MD  Acute pain of left shoulder

 

 2018  Office Visit  Orthopedic Surgery  Sanjay Samson,  Chronic left 
shoulder pain (Primary Dx);



       MD  Arthritis, shoulder region;



         Rotator cuff tendinitis, left;



         Trochanteric bursitis of right hip;



         Pain of right hip joint;



         Degeneration of lumbar intervertebral disc;



         Chronic right-sided low back pain, with sciatica presence unspecified

 

 2018  Office Visit  Orthopedic Surgery  Sanjay Samson,  Chronic right-
sided low back pain, with sciatica presence unspecified (Primary Dx);



       MD  Degeneration of lumbar intervertebral disc;



         Trochanteric bursitis of right hip;



         Pain of right hip joint;



         Chronic pain of right knee;



         History of total right knee replacement;



         Prophylactic antibiotic



after 2017



Social History







 Tobacco Use  Types  Packs/Day  Years Used  Date

 

 Never Assessed        









 Sex Assigned at Birth  Date Recorded

 

 Not on file  









 Job Start Date  Occupation  Industry

 

 Not on file  Not on file  Not on file









 Travel History  Travel Start  Travel End









 No recent travel history available.







Last Filed Vital Signs







 Vital Sign  Reading  Time Taken

 

 Blood Pressure  -  -

 

 Pulse  -  -

 

 Temperature  -  -

 

 Respiratory Rate  -  -

 

 Oxygen Saturation  -  -

 

 Inhaled Oxygen Concentration  -  -

 

 Weight  84.8 kg (187 lb)  2018 10:17 AM CDT

 

 Height  167.6 cm (5' 6")  2018 10:17 AM CDT

 

 Body Mass Index  30.18  2018 10:17 AM CDT







Plan of Treatment







 Health Maintenance  Due Date  Last Done  Comments

 

 SHINGRIX VACCINE (1 of 2)  1992    

 

 ZOSTER VACCINE  2002    

 

 PNEUMOCOCCAL POLYSACCHARIDE VACCINE AGE 65 AND OVER  2007    

 

 PNEUMOCOCCAL-13  2007    

 

 INFLUENZA VACCINE  2018    







Procedures







 Procedure Name  Priority  Date/Time  Associated Diagnosis  Comments

 

 ME ARTHROCENTESIS  Routine  2018 10:10  Rotator cuff  Results for this



 ASPIR&/INJ MAJOR    AM CDT  tendinitis, left



  procedure are in



 JT/BURSA W/O US      Acute pain of left  the results



       shoulder  section.

 

 ME INJECT TRIGGER  Routine  2018  9:40  Degeneration of  Results for this



 POINT, 1 OR 2    AM CDT  lumbar intervertebral  procedure are in



       disc



  the results



       Chronic right-sided  section.



       low back pain, with  



       sciatica presence  



       unspecified  

 

 ME ARTHROCENTESIS  Routine  2018  9:40  Trochanteric bursitis  Results 
for this



 ASPIR&/INJ MAJOR    AM CDT  of right hip



  procedure are in



 JT/BURSA W/O US      Pain of right hip  the results



       joint  section.

 

 XR SHOULDER 2+ VW LEFT  Routine  2018  9:21  Chronic left shoulder  
Results for this



     AM CDT  pain  procedure are in



         the results



         section.

 

 XR KNEE 1 OR 2 VW  Routine  2018 10:21  Chronic pain of right  Results 
for this



 RIGHT    AM CDT  knee



  procedure are in



       History of total  the results



       right knee  section.



       replacement  

 

 XR HIP 2-3 VIEWS RIGHT  Routine  2018 10:21  Pain of right hip  Results 
for this



     AM CDT  joint  procedure are in



         the results



         section.

 

 XR LUMBAR SPINE  Routine  2018 10:21  Chronic right-sided  Results for 
this



 COMPLETE 4+ VW    AM CDT  low back pain, with  procedure are in



       sciatica presence  the results



       unspecified  section.

 

 ME ARTHROCENTESIS  Routine  2018  9:50  Trochanteric bursitis  Results 
for this



 ASPIR&/INJ MAJOR    AM CDT  of right hip



  procedure are in



 JT/BURSA W/O US      Pain of right hip  the results



       joint  section.

 

 ME INJECT TRIGGER  Routine  2018  9:50  Chronic right-sided  Results for 
this



 POINT, 1 OR 2    AM CDT  low back pain, with  procedure are in



       sciatica presence  the results



       unspecified



  section.



       Degeneration of  



       lumbar intervertebral  



       disc  



after 2017



Results

Large Joint Arthrocentesis (2018 10:10 AM CDT)





 Narrative  Performed At

 

 Sanjay Samson MD 20183:13 PM



  



 Large Joint Arthrocentesis



  



 Consent given by: patient



  



 Supporting Documentation



  



 Indications: pain 



  



 Procedure Details



  



 Ultrasound guided: no



  



  



  



 Platelet Rich Plasma Used: no PRP Used Location: shoulder - L 



  



 subacromial bursa



  



  



  



 Left side:



  



 Needle size: 25 G



  



 Approach: anterior



  



 Left shoulder medications administered: 1 mL lidocaine 10 mg/mL (1 %); 6  



 



  



 mg betamethasone acetate & sodium phosphate 6 mg/mL



  



 Patient tolerance: patient tolerated the procedure well with no  



 immediate 



  



 complications



  



  



  



  



  



   



Large Joint Arthrocentesis (2018  9:40 AM CDT)





 Narrative  Performed At

 

 Sanjay Samson MD 3/29/2018 11:47 AM



  



 Large Joint Arthrocentesis



  



 Consent given by: patient



  



 Timeout: Immediately prior to procedure a time out was called to verify 



  



 the correct patient, procedure, equipment, support staff and site/side 



  



 marked as required 



  



 Supporting Documentation



  



 Indications: pain 



  



 Procedure Details



  



 Ultrasound guided: no



  



  



  



 Platelet Rich Plasma Used: no PRP Used Location: hip - R greater 



  



 trochanteric bursa



  



  



  



 Right side:



  



 Needle size: 25 G



  



 Approach: lateral



  



 Right hip medications administered: 1 mL lidocaine 10 mg/mL (1 %); 6 mg 



  



 betamethasone acetate & sodium phosphate 6 mg/mL



  



 Patient tolerance: patient tolerated the procedure well with no  



 immediate 



  



 complications



  



  



  



  



  



   



INJECT TRIGGER POINT, 1 OR 2 (2018  9:40 AM CDT)





 Narrative  Performed At

 

 Sanjay Samson MD 3/29/2018 11:47 AM



  



 1 or 2 Trigger Point Injection



  



 Date/Time: 3/29/2018 11:46 AM



  



 Performed by: SANJAY SAMSON



  



 Authorized by: SANJAY SAMSON 



  



  



  



 Consent: 



  



 Consent obtained:Verbal



  



 Consent given by:Patient



  



 Risks discussed:Allergic reaction, swelling and pain



  



 Indications: 



  



 Indications:Pain relief



  



 Location: 



  



 Therapeutic Trigger Point Injection:Single/multiple trigger  



 point(s): 



  



 1-2 muscle groups



  



 Location: back



  



 Back location injected:R sacral



  



 Platelet Rich Plasma Used: no PRP Used 



  



 EMG Guidance Used: no emg guidance used



  



 Right side:



  



 Right Sacral Medications administered: 6 mg betamethasone acetate &  



 sodium 



  



 phosphate 6 mg/mL; 1 mL lidocaine 10 mg/mL (1 %)



  



  



  



  



  



  



  



 Pre-procedure details: 



  



 Neurovascular status: intact



  



 Skin preparation:Alcohol



  



 Procedure details: 



  



 Topical anesthetic:Ethyl chloride



  



 Syringe type:Normal syringe



  



 Needle gauge:25 G



  



 Post-procedure details: 



  



 Patient tolerance of procedure:Tolerated well, no immediate 



  



 complications  



XR Shoulder 2+ Vw Left (2018  9:21 AM CDT)





 Narrative  Performed At

 

 This result has an attachment that is not available.



Internal and external rotation and outlet view of the left shoulder so  [facility]



 glenohumeral narrowing with humeral head spurring. This patient had  



 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  









 Performing Organization  Address  City/State/Zipcode  Phone Number

 

 [facility]  [address] [place] [address]  



XR Hip 2-3 View Right (2018 10:21 AM CDT)





 Narrative  Performed At

 

 This result has an attachment that is not available.



AP and lateral x-rays right hip are normal.  [facility]









 Performing Organization  Address  [facility]  Phone Number

 

  [facility]  [address] [place] [address]  



XR Knee 1 Or 2 Vw Right (2018 10:21 AM CDT)





 Narrative  Performed At

 

 This result has an attachment that is not available.



AP and lateral x-rays of the right knee shows a right total knee  [facility]



 replacement with a cemented femoral, tibial patellar components. Position  



 alignment are satisfactory. There is no evidence of loosening, lysis or  



 abnormal wear  









 Performing Organization  Address  City/State[facility]  Phone Number

 

  [facility]  [address] [place] [address]  



XR Lumbar Spine Complete 4+ Vw (2018 10:21 AM CDT)





 Narrative  Performed At

 

 This result has an attachment that is not available.



AP, lateral and oblique x-rays of the lumbar spine show marked narrowing  [facility]



 and almost obliteration of the interspace at all levels in the lumbar  



 spine. The patient also has a mild degenerative first degree  



 spondylolisthesis of L4 on L5. There is marked facet narrowing at all  



 levels in the lumbar spine.  









 Performing Organization  Address  City/State/[facility]  Phone Number

 

  [facility]  [address] [place] [address]  



Large Joint Arthrocentesis (2018  9:50 AM CDT)





 Narrative  Performed At

 

 Sanjay Samson MD 3/16/2018 12:17 PM



  



 Large Joint Arthrocentesis



  



 Consent given by: patient



  



 Timeout: Immediately prior to procedure a time out was called to verify 



  



 the correct patient, procedure, equipment, support staff and site/side 



  



 marked as required 



  



 Supporting Documentation



  



 Indications: pain 



  



 Procedure Details



  



 Ultrasound guided: no



  



  



  



 Platelet Rich Plasma Used: no PRP Used Location: hip - R greater 



  



 trochanteric bursa



  



  



  



 Right side:



  



 Needle size: 25 G



  



 Approach: lateral



  



 Right hip medications administered: 1 mL lidocaine 10 mg/mL (1 %); 6 mg 



  



 betamethasone acetate & sodium phosphate 6 mg/mL



  



 Patient tolerance: patient tolerated the procedure well with no  



 immediate 



  



 complications



  



  



  



  



  



   



INJECT TRIGGER POINT, 1 OR 2 (2018  9:50 AM CDT)





 Narrative  Performed At

 

 Sanjay Samson MD 3/16/2018 12:17 PM



  



 1 or 2 Trigger Point Injection



  



 Date/Time: 3/16/2018 12:16 PM



  



 Performed by: SANJAY SAMSON



  



 Authorized by: SANJAY SAMSON 



  



  



  



 Consent: 



  



 Consent obtained:Verbal



  



 Consent given by:Patient



  



 Risks discussed:Allergic reaction, swelling and pain



  



 Indications: 



  



 Indications:Pain relief



  



 Location: 



  



 Therapeutic Trigger Point Injection:Single/multiple trigger  



 point(s): 



  



 1-2 muscle groups



  



 Location: back



  



 Back location injected:R sacral



  



 Platelet Rich Plasma Used: no PRP Used 



  



 EMG Guidance Used: no emg guidance used



  



 Right side:



  



 Right Sacral Medications administered: 6 mg betamethasone acetate &  



 sodium 



  



 phosphate 6 mg/mL; 1 mL lidocaine 10 mg/mL (1 %)



  



  



  



  



  



  



  



 Pre-procedure details: 



  



 Neurovascular status: intact



  



 Skin preparation:Alcohol



  



 Procedure details: 



  



 Topical anesthetic:Ethyl chloride



  



 Syringe type:Normal syringe



  



 Needle gauge:25 G



  



 Post-procedure details: 



  



 Patient tolerance of procedure:Tolerated well, no immediate 



  



 complications  



after 2017



Insurance







 Payer  Benefit Plan / Group  Subscriber ID  Type  Phone  Address

 

 MEDICARE MEDICARE PART A AND B  xxxxxxxxxx  Medicare [place], [place]

 

 AAR  [employer] SUPPLEMENT  xxxxxxxxxxx  Commercial    









 Guarantor Name  Account Type  Relation to  Date of  Phone  Billing



     Patient  Birth    Address

 

 Jerica Estrada  Personal/Family  Self  1940  [phone] [address] [facility]







         ([place])  [place] [address]







Advance Directives

Patient has advance care planning documents on file. For more information, 
please contact:Ja Monge[address] [place] [address]

## 2018-12-12 NOTE — RAD REPORT
EXAM DESCRIPTION:  US - Extremity [facility]ous [facility] - 12/12/2018 10:43 am

 

CLINICAL HISTORY:  PAIN

Leg swelling and edema.

 

COMPARISON:  [facility] dated 8/29/2014

 

FINDINGS:  Left lower extremity venous system was interrogated with Doppler technique. Normal flow, c
ompressibility and augmentation was noted. There is no DVT present.

 

IMPRESSION:  No evidence of left lower extremity deep venous thrombosis.

## 2018-12-12 NOTE — XMS REPORT
Clinical Summary

 Created on:2018



Patient:Jerica Estrada

Sex:Female

:1940

External Reference #:[id number]





Demographics







 Address  [address] [place] [address]

 

 Mobile Phone  [phone]

 

 Home Phone  [phone]

 

 Preferred Language  English

 

 Marital Status  Unknown

 

 Roman [employer] Affiliation  Unknown

 

 Race  White

 

 Ethnic Group  Not  or 









Author







 Organization  [facility]

 

 Address  [address] [place] [address]









Support







 Name  Relationship  Address  Phone

 

 Wan Estrada  Unavailable  [address] [facility]  [phone]



     [place] [address]  

 

 Musa Estrada  Unavailable  [phone]









Care Team Providers







 Name  Role  Phone

 

 PeytonSamuel  Primary Care Provider  [phone]









Allergies







 Active Allergy  Reactions  Severity  Noted Date  Comments

 

 Ciprofloxacin  Anaphylaxis  High  2017  

 

 Iodine And Iodide Containing  Other (See Comments)  Medium  2017  Cold 
chills



 Products        

 

 Penicillins  Anaphylaxis  High  2017  

 

 Tetanus Vaccines And Toxoid  Itching  High  2017  







Medications







 Medication  Sig  Dispensed  Refills  Start Date  End Date  Status

 

 CALCIUM  Take by mouth    0      Active



 CARBONATE/VITAMIN D3  2 (two) times          



 (CALCIUM 600 + D,3,  daily.          



 ORAL)            

 

 VIT C/VIT  Take by mouth    0      Active



 E/LUTEIN/MIN/OMEGA-3  daily.          



 (OCUVITE ORAL)            

 

 folic acid (FOLVITE)  Take 400 mcg    0      Active



 400 MCG tablet  by mouth          



   nightly.          

 

 cyanocobalamin 1000 MCG  Take 1,000 mcg    0      Active



 tablet  by mouth          



   daily.          

 

 cranberry extract  Take by mouth    0      Active



 (CRANBERRY CONCENTRATE)  2 (two) times          



 500 mg Cap  daily.          

 

 docusate sodium  Take 100 mg by    0      Active



 (COLACE) 100 MG capsule  mouth 2 (two)          



   times daily as          



   needed for          



   Constipation.          

 

 losartan (COZAAR) 100  Take 1 tablet  30 tablet  0  2017  




 MG tablet  (100 mg total)          



   by mouth          



   daily.          

 

 amLODIPine (NORVASC) 10  Take 1 tablet  30 tablet  0  2017  




 MG tablet  (10 mg total)          



   by mouth          



   daily.          

 

 atorvastatin (LIPITOR)  Take 1 tablet  30 tablet  0  2017  




 80 MG tablet  (80 mg total)          



   by mouth          



   nightly.          

 

 metoprolol (LOPRESSOR)  Take 1 tablet  30 tablet  0  2017  




 25 MG tablet  (25 mg total)          



   by mouth 2          



   (two) times          



   daily.          

 

 aspirin 325 MG EC  Take 1 tablet  90 tablet  0  2017  



 tablet  (325 mg total)          



   by mouth          



   daily.          

 

 clopidogrel (PLAVIX) 75  Take 1 tablet  90 tablet  0  2017  




 mg tablet  (75 mg total)          



   by mouth          



   daily.          







Active Problems







 Problem  Noted Date

 

 Aneurysm  2017

 

 Hypertension  2017

 

 Other specified transient cerebral ischemias  2017

 

 Hypertensive emergency  2017

 

 Syncope  05/15/2017

 

 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

 

 Essential hypertension  2017

 

 TIA (transient ischemic attack)  2017

 

 Hypertension  2017







Encounters







 Date  Type  Specialty  Care Team  Description

 

 2018  Hospital Encounter  Radiology  Migue Weber  Cerebral aneurysm



       MD Kaushal  

 

 2018  Outside Orders  Radiology  Migue Weber  Cerebral aneurysm



       MD Kaushal  (Primary Dx)

 

 2017  Anesthesia Event    Sheri Melendez MD  

 

 2017  Surgery    Virtual, Surgeon  PROCEDURE DONE OUTSIDE



         OR

 

 2017  Hospital Encounter  Radiology  Migue Weber  Aneurysm (HCC)



       MD Kaushal  

 

 2017  Orders Only    [facility]  Cerebral aneurysm,



         nonruptured



after 2017



Social History







 Tobacco Use  Types  Packs/Day  Years Used  Date

 

 Never Smoker        









 Smokeless Tobacco: Never Used      









 Alcohol Use  Drinks/Week  oz/Week  Comments

 

 No      









 Sex Assigned at Birth  Date Recorded

 

 Not on file  









 Job Start Date  Occupation  Industry

 

 Not on file  Not on file  Not on file









 Travel History  Travel Start  Travel End









 No recent travel history available.







Last Filed Vital Signs







 Vital Sign  Reading  Time Taken

 

 Blood Pressure  142/67  2017  7:48 PM CST

 

 Pulse  90  2017  7:48 PM CST

 

 Temperature  35.6 C (96.1 F)  2017 10:08 AM CST

 

 Respiratory Rate  18  2017  7:48 PM CST

 

 Oxygen Saturation  97%  2017  6:00 PM CST

 

 Inhaled Oxygen Concentration  -  -

 

 Weight  84.8 kg (186 lb 14.4 oz)  2017 10:08 AM CST

 

 Height  160 cm (5' 3")  2017 10:08 AM CST

 

 Body Mass Index  33.11  2017 10:08 AM CST







Plan of Treatment

Not on file



Procedures







 Procedure Name  Priority  Date/Time  Associated Diagnosis  Comments

 

 NV CEREBRAL 4  Routine  2017  5:34 PM  Cerebral aneurysm,  Results for 
this



 VESSEL ANGIOGRAM    CST  nonruptured  procedure are in



         the results



         section.

 

 PROCEDURE DONE    2017 12:30 PM  Cerebral aneurysm  



 OUTSIDE OR    CST    

 

 ECG 12-LEAD  Routine  2017 11:31 AM    Results for this



     CST    procedure are in



         the results



         section.

 

 CBC WITH PLATELET  Routine  2017 11:14 AM    Results for this



 COUNT + MANUAL DIFF    CST    procedure are in



         the results



         section.

 

 CBC W/PLT+MANUAL  Routine  2017 11:14 AM    Results for this



 DIFF    CST    procedure are in



         the results



         section.

 

 BASIC METABOLIC  Routine  2017 11:14 AM    Results for this



 PANEL (7)    CST    procedure are in



         the results



         section.

 

 PT/APTT  Routine  2017 11:14 AM    Results for this



     CST    procedure are in



         the results



         section.



after 2017



Results

NV cerebral 4 vessel angiogram (2017  5:34 PM CST)





 Narrative  Performed At

 

 FINAL REPORT



  [facility]



  



  



 PATIENT ID: [id number]



  



  



  



 DATE: 2017



  



 NAME: Jerica Estrada



  



 ATTENDING: Migue Weber MD



  



 FIRST ASSISTANT: Asaf Hurtado MD



  



 PREOPERATIVE DIAGNOSIS: Unruptured right posterior communicating 



  



 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



  



 POSTOPERATIVE DIAGNOSIS: Unruptured right posterior communicating 



  



 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



  



 PROCEDURE PERFORMED: Diagnostic Cerebral Angiogram



  



 ANESTHESIA: MAC



  



 COMPLICATIONS: None



  



 ESTIMATED BLOOD LOSS: Less than 15ml



  



  



  



 ARTERIAL VESSELS STUDIED:



  



 RIGHT SUBCLAVIAN ARTERY (x1)



  



 RIGHT COMMON CAROTID ARTERY (CERVICAL x2)



  



 RIGHT COMMON CAROTID ARTERY (CRANIAL x3)



  



 RIGHT COMMON FEMORAL ARTERY (x1)



  



  



  



 MATERIALS EMPLOYED:



  



 1. 5 French short sheath 



  



 2. 4 French Berenstein catheter



  



 3. Bentson guidewire



  



 4. Terumo 0.035 LT glidewire



  



 5. 5 French Mynx device



  



  



  



 INDICATIONS: 77-year-old female with hypertension who is status post 



  



 endovascular treatment of an unruptured right posterior communicating 



  



 artery aneurysm with stent-assisted coiling on May 19, 2017. She 



  



 presents for follow-up cerebral angiogram. The indications for the 



  



 procedure as well as the risks, benefits and alternatives were 



  



 discussed with the patient and the family. The risks discussed 



  



 included but were not limited to stroke, intracranial hemorrhage, 



  



 injury to the cervical femoral or aortic vessels, contrast reaction, 



  



 kidney to toxicity, groin hematoma, weakness paralysis and even 



  



 death. They demonstrated understanding of the risk benefit profile 



  



 and agreed to proceed.



  



  



  



 PROCEDURE: After appropriate consent was obtained, the patient was 



  



 brought to the angiographic suite and cardiopulmonary monitoring was 



  



 placed. The anesthesia team performed light sedation. A timeout was 



  



 performed. Both groins were prepped and draped in the usual sterile 



  



 fashion. After administration of 10 mL of 2% lidocaine, a 



  



 micropuncture needle was used to perform a single wall puncture of 



  



 the right common femoral artery, a micropuncture sheath was inserted, 



  



 and an angled DSA angiogram was performed through the sheath. Once 



  



 good location of the puncture site was confirmed, a 5 French short 



  



 sheath was inserted over a [employer] wire and was maintained on 



  



 heparinized saline flush throughout the remainder of the procedure.



  



 Using coaxial technique, a preflushed Berenstein catheter on constant 



  



 heparinized saline flush was advanced over the Glidewire into the 



  



 descending aorta, the Glidewire was removed, the catheter back bled, 



  



 flushed in usual fashion and the catheter was maintained on 



  



 heparinized flushed throughout duration of the case. Using coaxial 



  



 technique, the catheter was advanced into the aortic arch and with 



  



 the aid of roadmapping, digital fluoroscopy, and careful guidewire 



  



 manipulation, the arteries described below were selectively 



  



 catheterized. Upon each successive catheterization, digital 



  



 subtraction angiography using the appropriate rate and volume of 



  



 contrast in multiple projections was performed. At the conclusion of 



  



 the procedure, the catheter was retracted into the aorta and the 



  



 images reviewed at the outside workstation for quality and content.



  



 Then the femoral sheath was removed and hemostasis achieved with a 5 



  



 French Mynx device and manual compression. The patient tolerated the 



  



 procedure well and was transported in unchanged neurological status 



  



 without groin hematoma and with good distal lower extremity pulses. 



  



 The patient was transferred to the recovery area to be monitored as 



  



 per protocol.



  



  



  



  



  



 FINDINGS:



  



  



  



 RIGHT SUBCLAVIAN ARTERY (DSA, PA, LATERAL ROADMAP, x1): There is 



  



 normal course and caliber of the subclavian artery with physiological 



  



 filling of its distal branches. Limited visualization in this roadmap 



  



 of the origins of the right vertebral artery, internal mamillary 



  



 artery, thyrocervical and costocervical trunks. 



  



  



  



 RIGHT COMMON CAROTID ARTERY (DSA, PA, LATERAL, CERVICAL x2): Tortuous 



  



 course of the right common carotid artery. The distal cervical common 



  



 carotid as well as the origins of the right internal and external 



  



 carotid arteries are widely patent without evidence of ulceration or 



  



 stenosis. The proximal portions of the superficial temporal artery, 



  



 middle meningeal artery, internal maxillary artery, occipital artery 



  



 and their branches are visualized and appear normal.



  



  



  



 RIGHT COMMON CAROTID ARTERY (DSA, PA, LATERAL, MAGNIFIED OBLIQUE, 



  



 CRANIAL x3): Minimal residual aneurysm neck in the previously treated 



  



 right posterior communicating artery aneurysm, best visualized on 



  



 sequence A9. Normal distal cervical, petrous, cavernous and 



  



 supraclinoid internal carotid artery with physiological filling of 



  



 the MCA and MAXIMINO branches. Limited visualization of the venous phase. 



  



 No other aneurysms or other vascular lesions are seen. There is no 



  



 significant atherosclerosis or stenosis. Normal course and caliber of 



  



 the external carotid artery and its branches. There is a well 



  



 visualized superficial temporal artery, middle meningeal artery, 



  



 internal maxillary artery, occipital artery and their branches.



  



  



  



 RIGHT COMMON FEMORAL ARTERY (DSA, PA, X 1): Normal location of the 



  



 puncture site above the bifurcation and below markers of the inguinal 



  



 ligament.



  



  



  



  



  



 IMPRESSION: Minimal residual aneurysm neck in the previously treated 



  



 right posterior communicating artery aneurysm. No technical or 



  



 procedural complications



  



  



  



 FACULTY ATTESTATION: I, Migue Weber M.D., was present for the 



  



 entirety of the procedure. I performed or directly supervised all 



  



 aspects of the procedure. I performed all critical aspects of the 



  



 case. I interpreted the images and reported the results.



  



  



  



 Signed: Chaz Cox MD



  



 Report Verified Date/Time:2017 11:48:56



  



  



  



 Reading [facility]



  



  Electronically signed by: CHAZ COX on 2017  



 11:48 AM



  



   









 Procedure Note

 

 Interface, External Ris In - 2017 11:51 AM CST



FINAL REPORT



 



 PATIENT ID:   [id number]



 



 DATE: 2017



 NAME: Jerica Estrada



 ATTENDING: Migue Weber MD



 FIRST ASSISTANT: Asaf Hurtado MD



 PREOPERATIVE DIAGNOSIS: Unruptured right posterior communicating



 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



 POSTOPERATIVE DIAGNOSIS: Unruptured right posterior communicating



 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



 PROCEDURE PERFORMED: Diagnostic Cerebral Angiogram



 ANESTHESIA: MAC



 COMPLICATIONS: None



 ESTIMATED BLOOD LOSS: Less than 15ml



 



 ARTERIAL VESSELS STUDIED:



 RIGHT SUBCLAVIAN ARTERY (x1)



 RIGHT COMMON CAROTID ARTERY (CERVICAL x2)



 RIGHT COMMON CAROTID ARTERY (CRANIAL x3)



 RIGHT COMMON FEMORAL ARTERY (x1)



 



 MATERIALS EMPLOYED:



 1. 5 French short sheath



 2. 4 French Berenstein catheter



 3. Bentson guidewire



 4. Terumo 0.035 LT glidewire



 5. 5 French Mynx device



 



 INDICATIONS: 77-year-old female with hypertension who is status post



 endovascular treatment of an unruptured right posterior communicating



 artery aneurysm with stent-assisted coiling on May 19, 2017. She



 presents for follow-up cerebral angiogram. The indications for the



 procedure as well as the risks, benefits and alternatives were



 discussed with the patient and the family. The risks discussed



 included but were not limited to stroke, intracranial hemorrhage,



 injury to the cervical femoral or aortic vessels, contrast reaction,



 kidney to toxicity, groin hematoma, weakness paralysis and even



 death. They demonstrated understanding of the risk benefit profile



 and agreed to proceed.



 



 PROCEDURE: After appropriate consent was obtained, the patient was



 brought to the angiographic suite and cardiopulmonary monitoring was



 placed. The anesthesia team performed light sedation. A timeout was



 performed. Both groins were prepped and draped in the usual sterile



 fashion. After administration of 10 mL of 2% lidocaine, a



 micropuncture needle was used to perform a single wall puncture of



 the right common femoral artery, a micropuncture sheath was inserted,



 and an angled DSA angiogram was performed through the sheath. Once



 good location of the puncture site was confirmed, a 5 French short



 sheath was inserted over a Bentson wire and was maintained on



 heparinized saline flush throughout the remainder of the procedure.



 Using coaxial technique, a preflushed Berenstein catheter on constant



 heparinized saline flush was advanced over the Glidewire into the



 descending aorta, the Glidewire was removed, the catheter back bled,



 flushed in usual fashion and the catheter was maintained on



 heparinized flushed throughout duration of the case. Using coaxial



 technique, the catheter was advanced into the aortic arch and with



 the aid of roadmapping, digital fluoroscopy, and careful guidewire



 manipulation, the arteries described below were selectively



 catheterized. Upon each successive catheterization, digital



 subtraction angiography using the appropriate rate and volume of



 contrast in multiple projections was performed. At the conclusion of



 the procedure, the catheter was retracted into the aorta and the



 images reviewed at the outside workstation for quality and content.



 Then the femoral sheath was removed and hemostasis achieved with a 5



 French Mynx device and manual compression. The patient tolerated the



 procedure well and was transported in unchanged neurological status



 without groin hematoma and with good distal lower extremity pulses.



 The patient was transferred to the recovery area to be monitored as



 per protocol.



 



 



 FINDINGS:



 



 RIGHT SUBCLAVIAN ARTERY (DSA, PA, LATERAL ROADMAP, x1): There is



 normal course and caliber of the subclavian artery with physiological



 filling of its distal branches. Limited visualization in this roadmap



 of the origins of the right vertebral artery, internal mamillary



 artery, thyrocervical and costocervical trunks.



 



 RIGHT COMMON CAROTID ARTERY (DSA, PA, LATERAL, CERVICAL x2): Tortuous



 course of the right common carotid artery. The distal cervical common



 carotid as well as the origins of the right internal and external



 carotid arteries are widely patent without evidence of ulceration or



 stenosis. The proximal portions of the superficial temporal artery,



 middle meningeal artery, internal maxillary artery, occipital artery



 and their branches are visualized and appear normal.



 



 RIGHT COMMON CAROTID ARTERY (DSA, PA, LATERAL, MAGNIFIED OBLIQUE,



 CRANIAL x3): Minimal residual aneurysm neck in the previously treated



 right posterior communicating artery aneurysm, best visualized on



 sequence A9. Normal distal cervical, petrous, cavernous and



 supraclinoid internal carotid artery with physiological filling of



 the MCA and MAXIMINO branches. Limited visualization of the venous phase.



 No other aneurysms or other vascular lesions are seen. There is no



 significant atherosclerosis or stenosis. Normal course and caliber of



 the external carotid artery and its branches. There is a well



 visualized superficial temporal artery, middle meningeal artery,



 internal maxillary artery, occipital artery and their branches.



 



 RIGHT COMMON FEMORAL ARTERY (DSA, PA, X 1): Normal location of the



 puncture site above the bifurcation and below markers of the inguinal



 ligament.



 



 



 IMPRESSION: Minimal residual aneurysm neck in the previously treated



 right posterior communicating artery aneurysm. No technical or



 procedural complications



 



 FACULTY ATTESTATION: IMigue M.D., was present for the



 entirety of the procedure. I performed or directly supervised all



 aspects of the procedure. I performed all critical aspects of the



 case. I interpreted the images and reported the results.



 



 Signed: Chaz Cox MD



 Report Verified Date/Time:  2017 11:48:56



 



 Reading Location: [facility]



        Electronically signed by: CHAZ COX on 2017 11:48 AM



 









 Performing Organization  Address  City/State/Zipcode  Phone Number

 

 [place]      



ECG 12 lead (2017 11:31 AM CST)





 Narrative  Performed At

 

 Ventricular Rate 66 BPM



  [facility]



 Atrial Rate 66 BPM



  



 P-R Interval 192 ms



  



 QRS Duration 80 ms



  



 Q-T Interval 414 ms



  



 QTC Calculation(Bazett) 434 ms



  



 P Axis 69 degrees



  



 R Axis 28 degrees



  



 T Axis 54 degrees



  



  



  



 Normal sinus rhythm



  



 Normal ECG



  



 When compared with ECG of 15-MAY-2017 16:43,



  



 No significant change was found



  



 Confirmed by MD MURO YOCHAI ([id number]) on 2017 6:40:26 AM  









 Procedure Note

 

 Interface, External Ris In - 2017  6:40 AM CST



Ventricular Rate 66 BPM



 Atrial Rate 66 BPM



 P-R Interval 192 ms



 QRS Duration 80 ms



 Q-T Interval 414 ms



 QTC Calculation(Bazett) 434 ms



 P Axis 69 degrees



 R Axis 28 degrees



 T Axis 54 degrees



 



 Normal sinus rhythm



 Normal ECG



 When compared with ECG of 15-MAY-2017 16:43,



 No significant change was found



 Confirmed by MD MURO YOCHAI ([id number]) on 2017 6:40:26 AM









 Performing Organization  Address  City/State/Zipcode  Phone Number

 

 [place]      



CBC with platelet count + manual diff (2017 11:14 AM CST)





 Component  Value  Ref Range  Performed At

 

 WBC  5.0  3.5 - 10.5 K/L  [facility]

 

 RBC  3.94  3.93 - 5.22 M/L  [facility]

 

 Hemoglobin  12.1  11.2 - 15.7 GM/DL  [facility]

 

 Hematocrit  37.1  34.1 - 44.9 %  [facility]

 

 MCV  94.2  79.4 - 94.8 fL  [facility]

 

 MCH  30.7  25.6 - 32.2 pg  [facility]

 

 MCHC  32.6  32.2 - 35.5 GM/DL  [facility]

 

 RDW  12.7  11.7 - 14.4 %  [facility]

 

 Platelets  212  150 - 450 K/CU MM  [facility]

 

 MPV  9.4  9.4 - 12.3 fL  [facility]

 

 nRBC  0  0 - 0 /100 WBC  [facility]

 

 % Neutros  60Comment: This is an  %  [facility]



   appended report.  These    [facility]



   results have been    



   appended to a    



   previously final    



   verified report.    

 

 % Lymphs  30Comment: This is an  %  [facility]



   appended report.  These    [facility]



   results have been    



   appended to a    



   previously final    



   verified report.    

 

 % Monos  9Comment: This is an  %  [facility]



   appended report.  These    [facility]



   results have been    



   appended to a    



   previously final    



   verified report.    

 

 % Eos  1Comment: This is an  %  [facility]



   appended report.  These    [facility]



   results have been    



   appended to a    



   previously final    



   verified report.    

 

 % Baso  1Comment: This is an  %  [facility]



   appended report.  These    [facility]



   results have been    



   appended to a    



   previously final    



   verified report.    

 

 # Neutros  2.97Comment: This is an  1.56 - 6.13 K/L  [facility]



   appended report.  These    [facility]



   results have been    



   appended to a    



   previously final    



   verified report.    

 

 # Lymphs  1.46Comment: This is an  1.18 - 3.74 K/L  [facility]



   appended report.  These    [facility]



   results have been    



   appended to a    



   previously final    



   verified report.    

 

 # Monos  0.42 (H)Comment: This  0.24 - 0.36 K/L  [facility]



   is an appended report.    [facility]



   These results have been    



   appended to a    



   previously final    



   verified report.    

 

 # Eos  0.05Comment: This is an  0.04 - 0.36 K/L  [facility]



   appended report.  These    [facility]



   results have been    



   appended to a    



   previously final    



   verified report.    

 

 # Baso  0.04Comment: This is an  0.01 - 0.08 K/L  [facility]



   appended report.  These    [facility]



   results have been    



   appended to a    



   previously final    



   verified report.    

 

 Immature  0  0 - 1 %  [facility]



 Granulocytes-Relative      [facility] [place]









 Specimen

 

 Blood









 Performing Organization  Address  City/State/Zipcode  Phone Number

 

 [facility]  [address] [place] [address] [phone]
[phone]



 CENTER      



PT/aPTT (2017 11:14 AM CST)





 Component  Value  Ref Range  Performed At

 

 Protime  15.2 (H)  11.7 - 14.7 seconds  [facility]

 

 INR  1.2  <=5.9  [facility]

 

 PTT  42.8 (H)  22.5 - 36.0 seconds  [facility]









 Specimen

 

 Blood









 Narrative  Performed At

 

  



  [facility]



 RECOMMENDED COUMADIN/WARFARIN INR THERAPY  



 RANGES



  



 STANDARD DOSE: 2.0 - 3.0 Includes:  



 PROPHYLAXIS for venous thrombosis, systemic  



 embolization; TREATMENT for venous thrombosis  



 and/or pulmonary embolus.



  



 HIGH RISK: Target INR is 2.5-3.5 for patients  



 with mechanical heart valves.  









 Performing Organization  Address  City[place] [place]

 

 [address] [id number] [phone]
[phone]



 [place]      



Basic Metabolic Panel (2017 11:14 AM CST)





 Component  Value  Ref Range  Performed At

 

 Sodium  136  136 - 145 meq/L  [facility]

 

 Potassium  3.9  3.5 - 5.1 meq/L  [facility]

 

 Chloride  102  98 - 107 meq/L  [facility]

 

 CO2  25  22 - 29 meq/L  [facility]

 

 BUN  20  7 - 21 mg/dL  [facility]

 

 Creatinine  0.82  0.57 - 1.25 mg/dL  [facility]

 

 Glucose  106 (H)  70 - 105 mg/dL  [facility]

 

 Calcium  9.6  8.4 - 10.2 mg/dL  [facility]

 

 EGFR  68Comment: ESTIMATED GFR IS  mL/min/1.73 sq m  [facility]



   NOT AS ACCURATE AS CREATININE    MEDICAL CENTER



   CLEARANCE IN PREDICTING    



   GLOMERULAR FILTRATION RATE.    



   ESTIMATED GFR IS NOT    



   APPLICABLE FOR DIALYSIS    



   PATIENTS.    









 Specimen

 

 Blood









 Performing Organization  Address  [facility]  Phone Number

 

 [facility]  [address] [place] [address]  [phone]
[phone]



 CENTER      



after 2017



Insurance







 Payer  Benefit Plan / Group  Subscriber ID  Type  Phone  Address

 

 MEDICARE  MEDICARE A B  xxxxxxxxxx  Medicare    

 

 MCR SUPPLEMENT/INDIVIDUAL  [employer]/[facility]  xxxxxxxxxxx  Medigap    









 Guarantor Name  Account Type  Relation to  Date of  Phone  Billing



     Patient  Birth    Address

 

 Jerica Estrada  Personal/Family  Self  1940  [phone]        (Home)  [place] [address]







Advance Directives

For more information, please contact:[facility] [place], [place] [phone]





 Code Status  Date Activated  Date Inactivated  Comments

 

 Full Code  2017  5:31 PM  2017 10:34 PM  









 This code status was determined by:  Patient  









       

 

 Full Code  2017 10:12 PM  2017  4:57 PM  









 This code status was determined by:  Patient  









       

 

 Full Code  2017  7:37 PM  2017  8:30 PM  









 This code status was determined by:  Patient

## 2018-12-12 NOTE — XMS REPORT
Patient Summary Document

 Created on:2018



Patient:SHAHIDA ESTRADA

Sex:Female

:1940

External Reference #:[id number]





Demographics







 Address  [address] [place] [address]

 

 Home Phone  [phone]

 

 Email Address  [place]

 

 Preferred Language  Unknown

 

 Marital Status  Unknown

 

 Confucianism Affiliation  Unknown

 

 Race  Unknown

 

 Additional Race(s)  Unavailable

 

 Ethnic Group  Unknown









Author







 Organization  [facility]

 

 Address  [address]



   [place] [address]

 

 Phone  [phone]









Care Team Providers







 Name  Role  Phone

 

 MIGUE AMIN  Unavailable  Unavailable

 

 CHATA CALLE  Unavailable  Unavailable

 

 CIVUNIGUNTA, MEJIA  Unavailable  Unavailable









Problems

This patient has no known problems.



Allergies, Adverse Reactions, Alerts

This patient has no known allergies or adverse reactions.



Medications

This patient has no known medications.



Results







 Test Description  Test Time  Test Comments  Text Results  Atomic Results  
Result Comments









 NV, ANGIOGRAM,  2017  Reason for  FINAL REPORT PATIENT ID:  



 CEREBRAL  11:48:00  Exam:->cerebral  [id number] DATE:  



     aneurysm unruptured  2017NAME: Shahida EstradaATTENDING:  



       Migue Amin MDFIRST  



       ASSISTANT: CESARIO LooREOPERATIVE DIAGNOSIS:  



       Unruptured right posterior  



       communicating artery  



       aneurysm status post stent  



       assisted  



       coilingPOSTOPERATIVE  



       DIAGNOSIS: Unruptured right  



       posterior communicating  



       artery aneurysm status post  



       stent assisted  



       coilingPROCEDURE PERFORMED:  



       Diagnostic Cerebral  



       AngiogramANESTHESIA:  



       MACCOMPLICATIONS:  



       NoneESTIMATED BLOOD LOSS:  



       Less than 15ml ARTERIAL  



       VESSELS STUDIED:RIGHT  



       SUBCLAVIAN ARTERY (x1)RIGHT  



       COMMON CAROTID ARTERY  



       (CERVICAL x2)RIGHT COMMON  



       CAROTID ARTERY (CRANIAL  



       x3)RIGHT COMMON FEMORAL  



       ARTERY (x1) MATERIALS  



       EMPLOYED:1. 5 Jamaican short  



       sheath 2. 4 Jamaican  



       Berenstein catheter3.  



       Bentson guidewire4. Terumo  



       0.035 LT glidewire5. 5  



       Jamaican Mynx device  



       INDICATIONS: 77-year-old  



       female with hypertension  



       who is status post  



       endovascular treatment of  



       an unruptured right  



       posterior communicating  



       artery aneurysm with  



       stent-assisted coiling on  



       May 19, 2017. She presents  



       for follow-up cerebral  



       angiogram. The indications  



       for the procedure as well  



       as the risks, benefits and  



       alternatives were discussed  



       with the patient and the  



       family. The risks discussed  



       included but were not  



       limited to stroke,  



       intracranial hemorrhage,  



       injury to the cervical  



       femoral or aortic vessels,  



       contrast reaction, kidney  



       to toxicity, groin  



       hematoma, weakness  



       paralysis and even death.  



       They demonstrated  



       understanding of the risk  



       benefit profile and agreed  



       to proceed. PROCEDURE:  



       After appropriate consent  



       was obtained, the patient  



       was brought to the  



       angiographic suite and  



       cardiopulmonary monitoring  



       was placed. The anesthesia  



       team performed light  



       sedation. A timeout was  



       performed. Both groins were  



       prepped and draped in the  



       usual sterile fashion.  



       After administration of 10  



       mL of 2% lidocaine, a  



       micropuncture needle was  



       used to perform a single  



       wall puncture of the right  



       common femoral artery, a  



       micropuncture sheath was  



       inserted, and an angled DSA  



       angiogram was performed  



       through the sheath. Once  



       good location of the  



       puncture site was  



       confirmed, a 5 Jamaican short  



       sheath was inserted over a  



       [employer] wire and was  



       maintained on heparinized  



       saline flush throughout the  



       remainder of the procedure.  



        Using coaxial technique, a  



       preflushed Berenstein  



       catheter on constant  



       heparinized saline flush  



       was advanced over the  



       Glidewire into the  



       descending aorta, the  



       Glidewire was removed, the  



       catheter back bled, flushed  



       in usual fashion and the  



       catheter was maintained on  



       heparinized flushed  



       throughout duration of the  



       case. Using coaxial  



       technique, the catheter was  



       advanced into the aortic  



       arch and with the aid of  



       roadmapping, digital  



       fluoroscopy, and careful  



       guidewire manipulation, the  



       arteries described below  



       were selectively  



       catheterized. Upon each  



       successive catheterization,  



       digital subtraction  



       angiography using the  



       appropriate rate and volume  



       of contrast in multiple  



       projections was performed.  



       At the conclusion of the  



       procedure, the catheter was  



       retracted into the aorta  



       and the images reviewed at  



       the outside workstation for  



       quality and content.  Then  



       the femoral sheath was  



       removed and hemostasis  



       achieved with a 5 Jamaican  



       Mynx device and manual  



       compression. The patient  



       tolerated the procedure  



       well and was transported in  



       unchanged neurological  



       status without groin  



       hematoma and with good  



       distal lower extremity  



       pulses. The patient was  



       transferred to the recovery  



       area to be monitored as per  



       protocol.  FINDINGS: RIGHT  



       SUBCLAVIAN ARTERY (DSA, PA,  



       LATERAL ROADMAP, x1): There  



       is normal course and  



       caliber of the subclavian  



       artery with physiological  



       filling of its distal  



       branches. Limited  



       visualization in this  



       roadmap of the origins of  



       the right vertebral artery,  



       internal mamillary artery,  



       thyrocervical and  



       costocervical trunks.  



       RIGHT COMMON CAROTID ARTERY  



       (DSA, PA, LATERAL, CERVICAL  



       x2): Tortuous course of the  



       right common carotid  



       artery. The distal cervical  



       common carotid as well as  



       the origins of the right  



       internal and external  



       carotid arteries are widely  



       patent without evidence of  



       ulceration or stenosis. The  



       proximal portions of the  



       superficial temporal  



       artery, middle meningeal  



       artery, internal maxillary  



       artery, occipital artery  



       and their branches are  



       visualized and appear  



       normal. RIGHT COMMON  



       CAROTID ARTERY (DSA, PA,  



       LATERAL, MAGNIFIED OBLIQUE,  



       CRANIAL x3): Minimal  



       residual aneurysm neck in  



       the previously treated  



       right posterior  



       communicating artery  



       aneurysm, best visualized  



       on sequence A9. Normal  



       distal cervical, petrous,  



       cavernous and supraclinoid  



       internal carotid artery  



       with physiological filling  



       of the MCA and MAXIMINO  



       branches. Limited  



       visualization of the venous  



       phase. No other aneurysms  



       or other vascular lesions  



       are seen. There is no  



       significant atherosclerosis  



       or stenosis. Normal course  



       and caliber of the external  



       carotid artery and its  



       branches. There is a well  



       visualized superficial  



       temporal artery, middle  



       meningeal artery, internal  



       maxillary artery, occipital  



       artery and their branches.  



       RIGHT COMMON FEMORAL ARTERY  



       (DSA, PA, X 1): Normal  



       location of the puncture  



       site above the bifurcation  



       and below markers of the  



       inguinal ligament.  



       IMPRESSION: Minimal  



       residual aneurysm neck in  



       the previously treated  



       right posterior  



       communicating artery  



       aneurysm. No technical or  



       procedural complications  



       FACULTY ATTESTATION: I,  



       Migue Amin M.D., was  



       present for the entirety of  



       the procedure. I performed  



       or directly supervised all  



       aspects of the procedure. I  



       performed all critical  



       aspects of the case. I  



       interpreted the images and  



       reported the results.  



       Signed: Chaz Cox  



       Verified Date/Time:  



       2017 11:48:56 Reading  



       Location: [facility] Y[facility] Neuro  



       Angio Reading Room  



       Electronically signed by:  



       CHAZ COX on  



       2017 11:48 AM  









 BASIC METABOLIC PANEL  2017 11:56:00    









   Test Item  Value  Reference Range  Comments









 SODIUM (BEAKER) (test  136 meq/L  136-145  



 xgch=421)      

 

 POTASSIUM (BEAKER) (test  3.9 meq/L  3.5-5.1  



 code=379)      

 

 CHLORIDE (BEAKER) (test  102 meq/L    



 code=382)      

 

 CO2 (BEAKER) (test code=355)  25 meq/L  22-29  

 

 BLOOD UREA NITROGEN (BEAKER)  20 mg/dL  7-21  



 (test code=354)      

 

 CREATININE (BEAKER) (test  0.82 mg/dL  0.57-1.25  



 code=358)      

 

 GLUCOSE RANDOM (BEAKER)  106 mg/dL    



 (test code=652)      

 

 CALCIUM (BEAKER) (test  9.6 mg/dL  8.4-10.2  



 code=697)      

 

 EGFR (BEAKER) (test  68 mL/min/1.73 sq m    ESTIMATED GFR IS NOT AS



 code=1092)      ACCURATE AS CREATININE



       CLEARANCE IN PREDICTING



       GLOMERULAR FILTRATION RATE.



       ESTIMATED GFR IS NOT



       APPLICABLE FOR DIALYSIS



       PATIENTS.



CBC WITH PLATELET COUNT + MANUAL [id number] 11:33:00





 Test Item  Value  Reference Range  Comments

 

 WHITE BLOOD CELL COUNT  5.0 K/ L  3.5-10.5  



 (BEAKER) (test code=775)      

 

 RED BLOOD CELL COUNT (BEAKER)  3.94 M/ L  3.93-5.22  



 (test code=761)      

 

 HEMOGLOBIN (BEAKER) (test  12.1 GM/DL  11.2-15.7  



 code=410)      

 

 HEMATOCRIT (BEAKER) (test  37.1 %  34.1-44.9  



 code=411)      

 

 MEAN CORPUSCULAR VOLUME  94.2 fL  79.4-94.8  



 (BEAKER) (test code=753)      

 

 MEAN CORPUSCULAR HEMOGLOBIN  30.7 pg  25.6-32.2  



 (BEAKER) (test code=751)      

 

 MEAN CORPUSCULAR HEMOGLOBIN  32.6 GM/DL  32.2-35.5  



 CONC (BEAKER) (test code=752)      

 

 RED CELL DISTRIBUTION WIDTH  12.7 %  11.7-14.4  



 (BEAKER) (test code=412)      

 

 PLATELET COUNT (BEAKER) (test  212 K/CU MM  150-450  



 afnx=953)      

 

 MEAN PLATELET VOLUME (BEAKER)  9.4 fL  9.4-12.3  



 (test code=754)      

 

 NUCLEATED RED BLOOD CELLS  0 /100 WBC  0-0  



 (BEAKER) (test code=413)      

 

 IMMATURE GRANULOCYTES-RELATIVE  0 %  0-1  



 PERCENT (BEAKER) (test      



 code=2801)      

 

 NEUTROPHILS RELATIVE PERCENT  60 %    This is an appended report.



 (BEAKER) (test code=429)       These results have been



       appended to a previously



       final verified report.

 

 LYMPHOCYTES RELATIVE PERCENT  30 %    This is an appended report.



 (BEAKER) (test code=430)       These results have been



       appended to a previously



       final verified report.

 

 MONOCYTES RELATIVE PERCENT  9 %    This is an appended report.



 (BEAKER) (test code=431)       These results have been



       appended to a previously



       final verified report.

 

 EOSINOPHILS RELATIVE PERCENT  1 %    This is an appended report.



 (BEAKER) (test code=432)       These results have been



       appended to a previously



       final verified report.

 

 BASOPHILS RELATIVE PERCENT  1 %    This is an appended report.



 (BEAKER) (test code=437)       These results have been



       appended to a previously



       final verified report.

 

 NEUTROPHILS ABSOLUTE COUNT  2.97 K/ L  1.56-6.13  This is an appended report.



 (BEAKER) (test code=670)       These results have been



       appended to a previously



       final verified report.

 

 LYMPHOCYTES ABSOLUTE COUNT  1.46 K/ L  1.18-3.74  This is an appended report.



 (BEAKER) (test code=414)       These results have been



       appended to a previously



       final verified report.

 

 MONOCYTES ABSOLUTE COUNT  0.42 K/ L  0.24-0.36  This is an appended report.



 (BEAKER) (test code=415)       These results have been



       appended to a previously



       final verified report.

 

 EOSINOPHILS ABSOLUTE COUNT  0.05 K/ L  0.04-0.36  This is an appended report.



 (BEAKER) (test code=416)       These results have been



       appended to a previously



       final verified report.

 

 BASOPHILS ABSOLUTE COUNT  0.04 K/ L  0.01-0.08  This is an appended report.



 (BEAKER) (test code=417)       These results have been



       appended to a previously



       final verified report.



[id number] 11:30:00





 Test Item  Value  Reference Range  Comments

 

 PROTIME (BEAKER) (test code=759)  15.2 seconds  11.7-14.7  

 

 INR (BEAKER) (test code=370)  1.2  <=5.9  

 

 PARTIAL THROMBOPLASTIN TIME (BEAKER) (test  42.8 seconds  22.5-36.0  



 code=760)      



RECOMMENDED COUMADIN/WARFARIN INR THERAPY RANGESSTANDARD DOSE: 2.0 - 3.0   
Includes: PROPHYLAXIS forvenous thrombosis, systemic embolization; TREATMENT 
for venous thrombosis and/or pulmonary embolus.HIGH RISK: Target INR is 2.5-3.5 
for patients with mechanical heart valves.PLATELET AGGREGATION: FUNCTION 
[id number] 09:23:00





 Test Item  Value  Reference Range  Comments

 

 WEAK ADP RESULT(BEAKER) (test  5 %  60-91  



 code=2131)      

 

 PLATELET FUNCTION SCREEN  0-39% indicates marked platelet    



 INTERP (BEAKER) (test  dysfunction    



 code=2173)      

 

 [id number]  Nelly Zurita MD    



 (BEAKER) (test code=2622)  (electronic signature)    

 

 PLATELET COUNT AGG (BEAKER)  243 K/CU MM  150-430  



 (test code=2656)      



CBC W/PLT COUNT &amp; AUTO [id number] 07:07:00





 Test Item  Value  Reference Range  Comments

 

 WHITE BLOOD CELL COUNT (BEAKER) (test code=775)  5.9 K/ L  4.0-10.0  

 

 RED BLOOD CELL COUNT (BEAKER) (test code=761)  3.88 M/ L  4.00-5.00  

 

 HEMOGLOBIN (BEAKER) (test code=410)  12.5 GM/DL  12.0-15.0  

 

 HEMATOCRIT (BEAKER) (test code=411)  37.8 %  36.0-45.0  

 

 MEAN CORPUSCULAR VOLUME (BEAKER) (test code=753)  97.3 fL  82.0-99.0  

 

 MEAN CORPUSCULAR HEMOGLOBIN (BEAKER) (test  32.1 pg  27.0-33.0  



 code=751)      

 

 MEAN CORPUSCULAR HEMOGLOBIN CONC (BEAKER) (test  33.0 GM/DL  32.0-36.0  



 code=752)      

 

 RED CELL DISTRIBUTION WIDTH (BEAKER) (test  13.4 %  10.3-14.2  



 code=412)      

 

 PLATELET COUNT (BEAKER) (test code=756)  238 K/CU MM  150-430  

 

 MEAN PLATELET VOLUME (BEAKER) (test code=754)  7.2 fL  6.5-10.5  

 

 NUCLEATED RED BLOOD CELLS (BEAKER) (test  0 /100 WBC  0-0  



 code=413)      

 

 NEUTROPHILS RELATIVE PERCENT (BEAKER) (test  58 %    



 code=429)      

 

 LYMPHOCYTES RELATIVE PERCENT (BEAKER) (test  31 %    



 code=430)      

 

 MONOCYTES RELATIVE PERCENT (BEAKER) (test  10 %    



 code=431)      

 

 EOSINOPHILS RELATIVE PERCENT (BEAKER) (test  0 %    



 code=432)      

 

 BASOPHILS RELATIVE PERCENT (BEAKER) (test  1 %    



 code=437)      

 

 NEUTROPHILS ABSOLUTE COUNT (BEAKER) (test  3.36 K/ L  1.80-8.00  



 code=670)      

 

 LYMPHOCYTES ABSOLUTE COUNT (BEAKER) (test  1.84 K/ L  1.48-4.50  



 code=414)      

 

 MONOCYTES ABSOLUTE COUNT (BEAKER) (test  0.58 K/ L  0.00-1.30  



 code=415)      

 

 EOSINOPHILS ABSOLUTE COUNT (BEAKER) (test  0.01 K/ L  0.00-0.50  



 code=416)      

 

 BASOPHILS ABSOLUTE COUNT (BEAKER) (test  0.06 K/ L  0.00-0.20  



 code=417)      



0.00BASIC METABOLIC [id number] 06:36:00





 Test Item  Value  Reference Range  Comments

 

 SODIUM (BEAKER) (test  138 meq/L  136-145  



 adus=411)      

 

 POTASSIUM (BEAKER) (test  4.0 meq/L  3.5-5.1  Specimen slightly



 code=379)      hemolyzed

 

 CHLORIDE (BEAKER) (test  106 meq/L    



 code=382)      

 

 CO2 (BEAKER) (test  23 meq/L  22-29  



 code=355)      

 

 BLOOD UREA NITROGEN  13 mg/dL  7-21  



 (BEAKER) (test code=354)      

 

 CREATININE (BEAKER) (test  0.79 mg/dL  0.57-1.25  Specimen slightly



 code=358)      hemolyzed

 

 GLUCOSE RANDOM (BEAKER)  97 mg/dL    



 (test code=652)      

 

 CALCIUM (BEAKER) (test  9.0 mg/dL  8.4-10.2  



 code=697)      

 

 EGFR (BEAKER) (test  71 mL/min/1.73 sq m    ESTIMATED GFR IS NOT AS



 code=1092)      ACCURATE AS CREATININE



       CLEARANCE IN PREDICTING



       GLOMERULAR FILTRATION



       RATE. ESTIMATED GFR IS



       NOT APPLICABLE FOR



       DIALYSIS PATIENTS.



[id number] 06:21:00





 Test Item  Value  Reference Range  Comments

 

 PROTIME (BEAKER) (test code=759)  14.1 seconds  11.7-14.7  

 

 INR (BEAKER) (test code=370)  1.1  <=5.9  

 

 PARTIAL THROMBOPLASTIN TIME (BEAKER) (test  37.0 seconds  22.5-36.0  



 code=760)      



RECOMMENDED COUMADIN/WARFARIN INR THERAPY RANGESSTANDARD DOSE: 2.0 - 3.0   
Includes: PROPHYLAXIS forvenous thrombosis, systemic embolization; TREATMENT 
for venous thrombosis and/or pulmonary embolus.HIGH RISK: Target INR is 2.5-3.5 
for patients with mechanical heart valves.PROTHROMBIN [id number] 06:20:
00





 Test Item  Value  Reference Range  Comments

 

 PROTIME (BEAKER) (test code=759)  14.1 seconds  11.7-14.7  

 

 INR (BEAKER) (test code=370)  1.1  <=5.9  



RECOMMENDED COUMADIN/WARFARIN INR THERAPY RANGESSTANDARD DOSE: 2.0 - 3.0   
Includes: PROPHYLAXIS forvenous thrombosis, systemic embolization; TREATMENT 
for venous thrombosis and/or pulmonary embolus.HIGH RISK: Target INR is 2.5-3.5 
for patients with mechanical heart valves.[id number] 23:38:00





 Test Item  Value  Reference Range  Comments

 

 PROTIME (BEAKER) (test code=759)  15.2 seconds  11.7-14.7  

 

 INR (BEAKER) (test code=370)  1.2  <=5.9  

 

 PARTIAL THROMBOPLASTIN TIME (BEAKER) (test  36.5 seconds  22.5-36.0  



 code=760)      



RECOMMENDED COUMADIN/WARFARIN INR THERAPY RANGESSTANDARD DOSE: 2.0 - 3.0   
Includes: PROPHYLAXIS forvenous thrombosis, systemic embolization; TREATMENT 
for venous thrombosis and/or pulmonary embolus.HIGH RISK: Target INR is 2.5-3.5 
for patients with mechanical heart valves.BASIC METABOLIC [id number] 23:33:
00





 Test Item  Value  Reference Range  Comments

 

 SODIUM (BEAKER) (test  137 meq/L  136-145  



 kopf=352)      

 

 POTASSIUM (BEAKER) (test  5.7 meq/L  3.5-5.1  Specimen markedly



 code=379)      hemolyzed

 

 CHLORIDE (BEAKER) (test  105 meq/L    



 code=382)      

 

 CO2 (BEAKER) (test  25 meq/L  22-29  



 code=355)      

 

 BLOOD UREA NITROGEN  13 mg/dL  7-21  



 (BEAKER) (test code=354)      

 

 CREATININE (BEAKER) (test  0.84 mg/dL  0.57-1.25  Specimen markedly



 code=358)      hemolyzed

 

 GLUCOSE RANDOM (BEAKER)  86 mg/dL    



 (test code=652)      

 

 CALCIUM (BEAKER) (test  8.9 mg/dL  8.4-10.2  



 code=697)      

 

 EGFR (BEAKER) (test  66 mL/min/1.73 sq m    ESTIMATED GFR IS NOT AS



 code=1092)      ACCURATE AS CREATININE



       CLEARANCE IN PREDICTING



       GLOMERULAR FILTRATION



       RATE. ESTIMATED GFR IS



       NOT APPLICABLE FOR



       DIALYSIS PATIENTS.



CBC W/PLT COUNT &amp; AUTO [id number] 23:19:00





 Test Item  Value  Reference Range  Comments

 

 WHITE BLOOD CELL COUNT (BEAKER) (test code=775)  7.0 K/ L  4.0-10.0  

 

 RED BLOOD CELL COUNT (BEAKER) (test code=761)  3.76 M/ L  4.00-5.00  

 

 HEMOGLOBIN (BEAKER) (test code=410)  12.7 GM/DL  12.0-15.0  

 

 HEMATOCRIT (BEAKER) (test code=411)  36.9 %  36.0-45.0  

 

 MEAN CORPUSCULAR VOLUME (BEAKER) (test code=753)  98.2 fL  82.0-99.0  

 

 MEAN CORPUSCULAR HEMOGLOBIN (BEAKER) (test  33.7 pg  27.0-33.0  



 code=751)      

 

 MEAN CORPUSCULAR HEMOGLOBIN CONC (BEAKER) (test  34.4 GM/DL  32.0-36.0  



 code=752)      

 

 RED CELL DISTRIBUTION WIDTH (BEAKER) (test  12.2 %  10.3-14.2  



 code=412)      

 

 PLATELET COUNT (BEAKER) (test code=756)  241 K/CU MM  150-430  

 

 MEAN PLATELET VOLUME (BEAKER) (test code=754)  7.2 fL  6.5-10.5  

 

 NUCLEATED RED BLOOD CELLS (BEAKER) (test  0 /100 WBC  0-0  



 code=413)      

 

 NEUTROPHILS RELATIVE PERCENT (BEAKER) (test  58 %    



 code=429)      

 

 LYMPHOCYTES RELATIVE PERCENT (BEAKER) (test  34 %    



 code=430)      

 

 MONOCYTES RELATIVE PERCENT (BEAKER) (test  7 %    



 code=431)      

 

 EOSINOPHILS RELATIVE PERCENT (BEAKER) (test  0 %    



 code=432)      

 

 BASOPHILS RELATIVE PERCENT (BEAKER) (test  1 %    



 code=437)      

 

 NEUTROPHILS ABSOLUTE COUNT (BEAKER) (test  4.08 K/ L  1.80-8.00  



 code=670)      

 

 LYMPHOCYTES ABSOLUTE COUNT (BEAKER) (test  2.36 K/ L  1.48-4.50  



 code=414)      

 

 MONOCYTES ABSOLUTE COUNT (BEAKER) (test  0.52 K/ L  0.00-1.30  



 code=415)      

 

 EOSINOPHILS ABSOLUTE COUNT (BEAKER) (test  0.02 K/ L  0.00-0.50  



 code=416)      

 

 BASOPHILS ABSOLUTE COUNT (BEAKER) (test  0.05 K/ L  0.00-0.20  



 code=417)      



0.00POCT-P2Y12 PLATELET [id number] 20:57:00





 Test Item  Value  Reference Range  Comments

 

 POC-P2Y12 PLATELET AGG (BEAKER) (test code=2303)  31 PRU    



RANGE INFORMATION: PRU reference range is 194-418. Post Drug Results: Lower PRU 
levels are associated with expected antiplatelet effect. Values may be below 
the stated reference range above. The post-drug PRU values reported in the 
[employer] P2Y12 package insert are .URINALYSIS W/ [id number] 09
:22:00





 Test Item  Value  Reference Range  Comments

 

 COLOR (BEAKER) (test code=470)  Light Yellow    

 

 CLARITY (BEAKER) (test code=469)  Hazy    

 

 SPECIFIC GRAVITY UA (BEAKER) (test  1.009  1.001-1.035  



 code=468)      

 

 PH UA (BEAKER) (test code=467)  5.5  5.0-8.0  

 

 PROTEIN UA (BEAKER) (test code=464)  Negative  Negative  

 

 GLUCOSE UA (BEAKER) (test code=365)  Negative  Negative  

 

 KETONES UA (BEAKER) (test code=371)  Negative  Negative  

 

 BILIRUBIN UA (BEAKER) (test  Negative  Negative  



 code=462)      

 

 BLOOD UA (BEAKER) (test code=461)  Small  Negative  

 

 NITRITE UA (BEAKER) (test code=465)  Positive  Negative  

 

 LEUKOCYTE ESTERASE UA (BEAKER)  Large  Negative  



 (test code=466)      

 

 UROBILINOGEN UA (BEAKER) (test  0.2 mg/dL  0.2-1.0  



 code=463)      

 

 RBC UA (BEAKER) (test code=519)  12 /HPF    

 

 WBC UA (BEAKER) (test code=520)  92 /HPF    

 

 BACTERIA (BEAKER) (test xkxw=638)  Rare    

 

 MUCUS (BEAKER) (test code=1574)  Rare    

 

 SQUAMOUS EPITHELIAL (BEAKER) (test  < /HPF    



 code=516)      

 

 YEAST (BEAKER) (test code=1585)  Occasional    

 

 SOURCE(BEAKER) (test code=3564)  Urine, Straight Catheter    



[id number] 04:55:00





 Test Item  Value  Reference Range  Comments

 

 PHOSPHORUS (BEAKER) (test code=604)  3.3 mg/dL  2.3-4.7  



[id number] 04:55:00





 Test Item  Value  Reference Range  Comments

 

 MAGNESIUM (BEAKER) (test code=627)  1.6 mg/dL  1.6-2.6  



BASIC METABOLIC [id number] 04:55:00





 Test Item  Value  Reference Range  Comments

 

 SODIUM (BEAKER) (test  140 meq/L  136-145  



 uolo=259)      

 

 POTASSIUM (BEAKER) (test  3.4 meq/L  3.5-5.1  



 code=379)      

 

 CHLORIDE (BEAKER) (test  107 meq/L    



 code=382)      

 

 CO2 (BEAKER) (test  22 meq/L  22-29  



 code=355)      

 

 BLOOD UREA NITROGEN  8 mg/dL  7-21  



 (BEAKER) (test code=354)      

 

 CREATININE (BEAKER) (test  0.71 mg/dL  0.57-1.25  



 code=358)      

 

 GLUCOSE RANDOM (BEAKER)  109 mg/dL    



 (test code=652)      

 

 CALCIUM (BEAKER) (test  8.6 mg/dL  8.4-10.2  



 code=697)      

 

 EGFR (BEAKER) (test  80 mL/min/1.73 sq m    ESTIMATED GFR IS NOT AS



 code=1092)      ACCURATE AS CREATININE



       CLEARANCE IN PREDICTING



       GLOMERULAR FILTRATION



       RATE. ESTIMATED GFR IS



       NOT APPLICABLE FOR



       DIALYSIS PATIENTS.



CBC W/PLT COUNT &amp; AUTO [id number] 04:52:00





 Test Item  Value  Reference Range  Comments

 

 WHITE BLOOD CELL COUNT (BEAKER) (test code=775)  7.5 K/ L  4.0-10.0  

 

 RED BLOOD CELL COUNT (BEAKER) (test code=761)  4.04 M/ L  4.00-5.00  

 

 HEMOGLOBIN (BEAKER) (test code=410)  12.9 GM/DL  12.0-15.0  

 

 HEMATOCRIT (BEAKER) (test code=411)  39.7 %  36.0-45.0  

 

 MEAN CORPUSCULAR VOLUME (BEAKER) (test code=753)  98.3 fL  82.0-99.0  

 

 MEAN CORPUSCULAR HEMOGLOBIN (BEAKER) (test  31.9 pg  27.0-33.0  



 code=751)      

 

 MEAN CORPUSCULAR HEMOGLOBIN CONC (BEAKER) (test  32.5 GM/DL  32.0-36.0  



 code=752)      

 

 RED CELL DISTRIBUTION WIDTH (BEAKER) (test  12.0 %  10.3-14.2  



 code=412)      

 

 PLATELET COUNT (BEAKER) (test code=756)  196 K/CU MM  150-430  

 

 MEAN PLATELET VOLUME (BEAKER) (test code=754)  7.2 fL  6.5-10.5  

 

 NUCLEATED RED BLOOD CELLS (BEAKER) (test  0 /100 WBC  0-0  



 code=413)      

 

 NEUTROPHILS RELATIVE PERCENT (BEAKER) (test  81 %    



 code=429)      

 

 LYMPHOCYTES RELATIVE PERCENT (BEAKER) (test  14 %    



 code=430)      

 

 MONOCYTES RELATIVE PERCENT (BEAKER) (test  6 %    



 code=431)      

 

 EOSINOPHILS RELATIVE PERCENT (BEAKER) (test  0 %    



 code=432)      

 

 BASOPHILS RELATIVE PERCENT (BEAKER) (test  0 %    



 code=437)      

 

 NEUTROPHILS ABSOLUTE COUNT (BEAKER) (test  6.03 K/ L  1.80-8.00  



 code=670)      

 

 LYMPHOCYTES ABSOLUTE COUNT (BEAKER) (test  1.01 K/ L  1.48-4.50  



 code=414)      

 

 MONOCYTES ABSOLUTE COUNT (BEAKER) (test  0.42 K/ L  0.00-1.30  



 code=415)      

 

 EOSINOPHILS ABSOLUTE COUNT (BEAKER) (test  0.01 K/ L  0.00-0.50  



 code=416)      

 

 BASOPHILS ABSOLUTE COUNT (BEAKER) (test  0.01 K/ L  0.00-0.20  



 code=417)      



[id number] 16:01:00





 Test Item  Value  Reference Range  Comments

 

 ACTIVATED CLOTTING TIME  245 sec    TESTED AT [address]



 (BEAKER) (test code=441)      [place] [address]



[id number] 15:00:00





 Test Item  Value  Reference Range  Comments

 

 ACTIVATED CLOTTING TIME  255 sec    [facility] [place] [address] [facility]



 (BE[facility]ER) (test code=441)      [place] [address]



[id number] 14:44:00





 Test Item  Value  Reference Range  Comments

 

 ACTIVATED CLOTTING TIME  234 sec    [facility] [place] [address] [facility]



 (BEAKER) (test code=441)      [place] [address]



[id number] 14:31:00





 Test Item  Value  Reference Range  Comments

 

 ACTIVATED CLOTTING TIME  219 sec    [facility] [place] [address] [facility]



 (BEAKER) (test code=441)      [place] [address]



[id number] 14:31:00





 Test Item  Value  Reference Range  Comments

 

 ACTIVATED CLOTTING TIME  183 sec    TESTED AT [facility] [address]



 (BEAKER) (test code=441)      [facility] [address]



[id number] 13:22:00





 Test Item  Value  Reference Range  Comments

 

 ACTIVATED CLOTTING TIME  137 sec    TESTED AT [facility] [address]



 (BEAKER) (test code=441)      [facility] [address]



POCT-P2Y12 PLATELET [id number] 07:24:00





 Test Item  Value  Reference Range  Comments

 

 POC-P2Y12 PLATELET AGG (BEAKER) (test code=2303)  110 PRU    



RANGE INFORMATION: PRU reference range is 194-418. Post Drug Results: Lower PRU 
levels are associated with expected antiplatelet effect. Values may be below 
the stated reference range above. The post-drug PRU values reported in the 
[employer] P2Y12 package insert are .POCT-ASPIRIN PLATELET [id number]
[id number] 07:24:00





 Test Item  Value  Reference Range  Comments

 

 POC-ASPIRIN PLATELET AGG (BEAKER) (test code=2302)  402 ARU    



RANGE INFORMATION: 350-549 ARU Therapeutic range for platelet function.        
           550-700 ARU Non-Therapeutic range for platelet function.PLATELET 
AGGREGATION: FUNCTION [id number] 10:21:00





 Test Item  Value  Reference Range  Comments

 

 WEAK ADP RESULT(BEAKER) (test  92 %  60-91  



 code=2135)      

 

 PLATELET FUNCTION SCREEN  % indicates normal    



 INTERP (BEAKER) (test  platelet function    



 code=2173)      

 

 [id number] (BEAKER)  Meredith Reyes, MD (electronic    



 (test code=2622)  signature)    

 

 PLATELET COUNT AGG (BEAKER)  219 K/CU MM  150-430  



 (test qnot=5287)      



COMPREHENSIVE METABOLIC [id number] 05:11:00





 Test Item  Value  Reference Range  Comments

 

 TOTAL PROTEIN (BEAKER)  6.5 gm/dL  6.0-8.3  



 (test code=770)      

 

 ALBUMIN (BEAKER) (test  3.6 g/dL  3.5-5.0  



 code=1145)      

 

 ALKALINE PHOSPHATASE  60 U/L    



 (BEAKER) (test code=346)      

 

 BILIRUBIN TOTAL (BEAKER)  1.1 mg/dL  0.2-1.2  



 (test code=377)      

 

 SODIUM (BEAKER) (test  136 meq/L  136-145  



 qkeo=834)      

 

 POTASSIUM (BEAKER) (test  3.8 meq/L  3.5-5.1  



 code=379)      

 

 CHLORIDE (BEAKER) (test  104 meq/L    



 code=382)      

 

 CO2 (BEAKER) (test  23 meq/L  22-29  



 code=355)      

 

 BLOOD UREA NITROGEN  13 mg/dL  7-21  



 (BEAKER) (test code=354)      

 

 CREATININE (BEAKER) (test  0.79 mg/dL  0.57-1.25  



 code=358)      

 

 GLUCOSE RANDOM (BEAKER)  89 mg/dL    



 (test code=652)      

 

 CALCIUM (BEAKER) (test  9.2 mg/dL  8.4-10.2  



 code=697)      

 

 AST (SGOT) (BEAKER) (test  19 U/L  5-34  



 code=353)      

 

 ALT (SGPT) (BEAKER) (test  8 U/L  6-55  



 code=347)      

 

 EGFR (BEAKER) (test  71 mL/min/1.73 sq m    ESTIMATED GFR IS NOT AS



 code=1092)      ACCURATE AS CREATININE



       CLEARANCE IN PREDICTING



       GLOMERULAR FILTRATION



       RATE. ESTIMATED GFR IS



       NOT APPLICABLE FOR



       DIALYSIS PATIENTS.



[id number] 05:04:00





 Test Item  Value  Reference Range  Comments

 

 PARTIAL THROMBOPLASTIN TIME (BEAKER) (test  37.9 seconds  22.5-36.0  



 code=760)      



[id number] 16:56:00





 Test Item  Value  Reference Range  Comments

 

 PARTIAL THROMBOPLASTIN TIME (BEAKER) (test  39.4 seconds  22.5-36.0  



 code=760)      



PROTHROMBIN [id number] 16:55:00





 Test Item  Value  Reference Range  Comments

 

 PROTIME (BEAKER) (test code=759)  14.4 seconds  11.7-14.7  

 

 INR (BEAKER) (test code=370)  1.1  <=5.9  



RECOMMENDED COUMADIN/WARFARIN INR THERAPY RANGESSTANDARD DOSE: 2.0 - 3.0   
Includes: PROPHYLAXIS forvenous thrombosis, systemic embolization; TREATMENT 
for venous thrombosis and/or pulmonary embolus.HIGH RISK: Target INR is 2.5-3.5 
for patients with mechanical heart valves.COMPREHENSIVE METABOLIC [id number]
[id number] 02:20:00





 Test Item  Value  Reference Range  Comments

 

 TOTAL PROTEIN (BEAKER)  7.0 gm/dL  6.0-8.3  



 (test code=770)      

 

 ALBUMIN (BEAKER) (test  3.8 g/dL  3.5-5.0  



 code=1145)      

 

 ALKALINE PHOSPHATASE  68 U/L    



 (BEAKER) (test code=346)      

 

 BILIRUBIN TOTAL (BEAKER)  0.9 mg/dL  0.2-1.2  



 (test code=377)      

 

 SODIUM (BEAKER) (test  137 meq/L  136-145  



 ihax=755)      

 

 POTASSIUM (BEAKER) (test  3.5 meq/L  3.5-5.1  



 code=379)      

 

 CHLORIDE (BEAKER) (test  104 meq/L    



 code=382)      

 

 CO2 (BEAKER) (test  23 meq/L  22-29  



 code=355)      

 

 BLOOD UREA NITROGEN  10 mg/dL  7-21  



 (BEAKER) (test code=354)      

 

 CREATININE (BEAKER) (test  0.81 mg/dL  0.57-1.25  



 code=358)      

 

 GLUCOSE RANDOM (BEAKER)  106 mg/dL    



 (test code=652)      

 

 CALCIUM (BEAKER) (test  9.7 mg/dL  8.4-10.2  



 code=697)      

 

 AST (SGOT) (BEAKER) (test  21 U/L  5-34  



 code=353)      

 

 ALT (SGPT) (BEAKER) (test  8 U/L  6-55  



 code=347)      

 

 EGFR (BEAKER) (test  69 mL/min/1.73 sq m    ESTIMATED GFR IS NOT AS



 code=1092)      ACCURATE AS CREATININE



       CLEARANCE IN PREDICTING



       GLOMERULAR FILTRATION



       RATE. ESTIMATED GFR IS



       NOT APPLICABLE FOR



       DIALYSIS PATIENTS.



HEMOGLOBIN [id number] 07:40:00





 Test Item  Value  Reference Range  Comments

 

 HEMOGLOBIN A1C (BEAKER) (test code=368)  4.9 %  4.3-6.1  



VITAMIN [id number] 06:01:00





 Test Item  Value  Reference Range  Comments

 

 VITAMIN B12 (BEAKER) (test code=774)  826 pg/mL  213-816  



TSH/FREE T4 IF [id number] 06:01:00





 Test Item  Value  Reference Range  Comments

 

 THYROID STIMULATING HORMONE (BEAKER) (test  1.37 uIU/mL  0.35-4.94  



 code=772)      



CALCIUM, [id number] 05:48:00





 Test Item  Value  Reference Range  Comments

 

 CALCIUM IONIZED (BEAKER) (test code=698)  1.11 mmol/L  1.12-1.27  

 

 PH, BLOOD (BEAKER) (test code=1810)  7.41    



[id number] 05:33:00





 Test Item  Value  Reference Range  Comments

 

 PHOSPHORUS (BEAKER) (test code=604)  3.3 mg/dL  2.3-4.7  



[id number] 05:33:00





 Test Item  Value  Reference Range  Comments

 

 MAGNESIUM (BEAKER) (test code=627)  2.0 mg/dL  1.6-2.6  



COMPREHENSIVE METABOLIC [id number] 05:33:00





 Test Item  Value  Reference Range  Comments

 

 TOTAL PROTEIN (BEAKER)  5.9 gm/dL  6.0-8.3  



 (test code=770)      

 

 ALBUMIN (BEAKER) (test  3.3 g/dL  3.5-5.0  



 code=1145)      

 

 ALKALINE PHOSPHATASE  56 U/L    



 (BEAKER) (test code=346)      

 

 BILIRUBIN TOTAL (BEAKER)  0.6 mg/dL  0.2-1.2  



 (test code=377)      

 

 SODIUM (BEAKER) (test  140 meq/L  136-145  



 oblv=377)      

 

 POTASSIUM (BEAKER) (test  3.7 meq/L  3.5-5.1  



 code=379)      

 

 CHLORIDE (BEAKER) (test  106 meq/L    



 code=382)      

 

 CO2 (BEAKER) (test  27 meq/L  22-29  



 code=355)      

 

 BLOOD UREA NITROGEN  13 mg/dL  7-21  



 (BEAKER) (test code=354)      

 

 CREATININE (BEAKER) (test  0.79 mg/dL  0.57-1.25  



 code=358)      

 

 GLUCOSE RANDOM (BEAKER)  95 mg/dL    



 (test code=652)      

 

 CALCIUM (BEAKER) (test  8.7 mg/dL  8.4-10.2  



 code=697)      

 

 AST (SGOT) (BEAKER) (test  16 U/L  5-34  



 code=353)      

 

 ALT (SGPT) (BEAKER) (test  7 U/L  6-55  



 code=347)      

 

 EGFR (BEAKER) (test  71 mL/min/1.73 sq m    ESTIMATED GFR IS NOT AS



 code=1092)      ACCURATE AS CREATININE



       CLEARANCE IN PREDICTING



       GLOMERULAR FILTRATION



       RATE. ESTIMATED GFR IS



       NOT APPLICABLE FOR



       DIALYSIS PATIENTS.



LIPID [id number] 05:33:00





 Test Item  Value  Reference Range  Comments

 

 TRIGLYCERIDES (BEAKER) (test code=540)  57 mg/dL    

 

 CHOLESTEROL (BEAKER) (test code=631)  204 mg/dL    

 

 HDL CHOLESTEROL (BEAKER) (test code=976)  62 mg/dL    

 

 LDL CHOLESTEROL CALCULATED (BEAKER) (test  131 mg/dL    



 code=633)      



Triglyceride Reference Range:   Low Risk         &lt;150   Borderline    150-
199   High Risk     200-499   Very High Risk  &gt;=500Cholesterol Reference 
Range:   Low Risk         &lt;200   Borderline 200-239    High Risk        &gt;
240HDL Cholesterol Reference Range:   Low Risk         &gt;=60   High Risk     
    &lt;40LDL Cholesterol Reference Range:   Optimal          &lt;100   Near 
Optimal  100-129   Borderline    130-159   High          160-189   Very High   
    &gt;=190CBC W/PLT COUNT &amp; AUTO [id number] 04:51:00





 Test Item  Value  Reference Range  Comments

 

 WHITE BLOOD CELL COUNT (BEAKER) (test code=775)  4.8 K/ L  4.0-10.0  

 

 RED BLOOD CELL COUNT (BEAKER) (test code=761)  4.01 M/ L  4.00-5.00  

 

 HEMOGLOBIN (BEAKER) (test code=410)  13.4 GM/DL  12.0-15.0  

 

 HEMATOCRIT (BEAKER) (test code=411)  39.0 %  36.0-45.0  

 

 MEAN CORPUSCULAR VOLUME (BEAKER) (test code=753)  97.5 fL  82.0-99.0  

 

 MEAN CORPUSCULAR HEMOGLOBIN (BEAKER) (test  33.5 pg  27.0-33.0  



 code=751)      

 

 MEAN CORPUSCULAR HEMOGLOBIN CONC (BEAKER) (test  34.4 GM/DL  32.0-36.0  



 code=752)      

 

 RED CELL DISTRIBUTION WIDTH (BEAKER) (test  12.8 %  10.3-14.2  



 code=412)      

 

 PLATELET COUNT (BEAKER) (test code=756)  193 K/CU MM  150-430  

 

 MEAN PLATELET VOLUME (BEAKER) (test code=754)  7.0 fL  6.5-10.5  

 

 NUCLEATED RED BLOOD CELLS (BEAKER) (test  0 /100 WBC  0-0  



 code=413)      

 

 NEUTROPHILS RELATIVE PERCENT (BEAKER) (test  50 %    



 code=429)      

 

 LYMPHOCYTES RELATIVE PERCENT (BEAKER) (test  40 %    



 code=430)      

 

 MONOCYTES RELATIVE PERCENT (BEAKER) (test  9 %    



 code=431)      

 

 EOSINOPHILS RELATIVE PERCENT (BEAKER) (test  0 %    



 code=432)      

 

 BASOPHILS RELATIVE PERCENT (BEAKER) (test  0 %    



 code=437)      

 

 NEUTROPHILS ABSOLUTE COUNT (BEAKER) (test  2.38 K/ L  1.80-8.00  



 code=670)      

 

 LYMPHOCYTES ABSOLUTE COUNT (BEAKER) (test  1.93 K/ L  1.48-4.50  



 code=414)      

 

 MONOCYTES ABSOLUTE COUNT (BEAKER) (test  0.44 K/ L  0.00-1.30  



 code=415)      

 

 EOSINOPHILS ABSOLUTE COUNT (BEAKER) (test  0.01 K/ L  0.00-0.50  



 code=416)      

 

 BASOPHILS ABSOLUTE COUNT (BEAKER) (test  0.02 K/ L  0.00-0.20  



 code=417)      



0.00COMPREHENSIVE METABOLIC [id number] 22:47:00





 Test Item  Value  Reference Range  Comments

 

 TOTAL PROTEIN (BEAKER)  5.8 gm/dL  6.0-8.3  



 (test code=770)      

 

 ALBUMIN (BEAKER) (test  3.2 g/dL  3.5-5.0  



 code=1145)      

 

 ALKALINE PHOSPHATASE  56 U/L    



 (BEAKER) (test code=346)      

 

 BILIRUBIN TOTAL (BEAKER)  0.5 mg/dL  0.2-1.2  



 (test code=377)      

 

 SODIUM (BEAKER) (test  140 meq/L  136-145  



 ihlf=193)      

 

 POTASSIUM (BEAKER) (test  3.7 meq/L  3.5-5.1  



 code=379)      

 

 CHLORIDE (BEAKER) (test  108 meq/L    



 code=382)      

 

 CO2 (BEAKER) (test  25 meq/L  22-29  



 code=355)      

 

 BLOOD UREA NITROGEN  11 mg/dL  7-21  



 (BEAKER) (test code=354)      

 

 CREATININE (BEAKER) (test  0.83 mg/dL  0.57-1.25  



 code=358)      

 

 GLUCOSE RANDOM (BEAKER)  94 mg/dL    



 (test code=652)      

 

 CALCIUM (BEAKER) (test  8.6 mg/dL  8.4-10.2  



 code=697)      

 

 AST (SGOT) (BEAKER) (test  15 U/L  5-34  



 code=353)      

 

 ALT (SGPT) (BEAKER) (test  7 U/L  6-55  



 code=347)      

 

 EGFR (JOVI) (test  67 mL/min/1.73 sq m    ESTIMATED GFR IS NOT AS



 code=1092)      ACCURATE AS CREATININE



       CLEARANCE IN PREDICTING



       GLOMERULAR FILTRATION



       RATE. ESTIMATED GFR IS



       NOT APPLICABLE FOR



       DIALYSIS PATIENTS.

## 2018-12-12 NOTE — EDPHYS
Physician Documentation                                                                           

 [facility]                                                                

Name: Jerica Estrada                                                                           

Age: 78 yrs                                                                                       

Sex: Female                                                                                       

: 1940                                                                                   

MRN: [id number]                                                                                   

Arrival Date: 2018                                                                          

Time: 09:39                                                                                       

Account#: [id number]                                                                            

Bed 18                                                                                            

Private MD:                                                                                       

ED Physician fErem Mi                                                                         

HPI:                                                                                              

                                                                                             

09:42 This 78 yrs old  Female presents to ER via EMS with complaints of Foot Pain.   rn  

09:43 The patient presents with pain. The complaints affect the left foot. Onset: The         rn  

      symptoms/episode began/occurred 5 day(s) ago. Modifying factors: The symptoms are           

      alleviated by nothing, the symptoms are aggravated by nothing. Severity of symptoms: At     

      their worst the symptoms were mild, in the emergency department the symptoms are            

      unchanged. The patient has not experienced similar symptoms in the past. The patient        

      has not recently seen a physician. Reports foot pain, began 5 days ago, intermittent,       

      no obvious injury other than heard a pop in foot while walking up walkway recently,         

      seen by pcp 4 days ago, xray ordered outpt, told normal, and given abx for suspected        

      infection, no swelling, no hx of gout. NO fever. Pain is cramping, intermittent, helps      

      with straightening foot. no pain in rest of leg. .                                          

                                                                                                  

Historical:                                                                                       

- Allergies:                                                                                      

09:41 Ciprofloxacin;                                                                          aa5 

09:41 Tetanus Vaccines \T\ Toxoid;                                                              aa5

- Home Meds:                                                                                      

09:42 amlodipine 10 mg tab 1 tab once daily [Active]; atorvastatin 80 mg Oral tab 1 tab once  aa5 

      daily [Active]; clopidogrel 75 mg Oral tab 1 tab once daily [Active]; Ecotrin 325 mg        

      Oral TbEC 1 tab once daily [Active]; losartan 100 mg Oral tab 1 tab once daily              

      [Active]; metoprolol tartrate 25 mg Oral tab 1 tab 2 times per day [Active];                

- PMHx:                                                                                           

09:41 Aneurysm; Hypertension;                                                                 aa5 

- PSHx:                                                                                           

09:41 R carotid stent; Knee surgery; Bladder suspension; Hysterectomy; Tonsillectomy; Brain   aa5 

      aneurysm repair;                                                                            

                                                                                                  

- Immunization history:: Pneumococcal vaccine is not up to date, Flu vaccine is not up            

  to date.                                                                                        

- Ebola Screening: : No symptoms or risks identified at this time.                                

- Family history:: not pertinent.                                                                 

- Social history:: Smoking status: Patient/guardian denies using tobacco.                         

- Hospitalizations: : No recent hospitalization is reported.                                      

                                                                                                  

                                                                                                  

ROS:                                                                                              

09:43 Constitutional: Negative for fever, chills, and weight loss, MS/Extremity: Negative for rn  

      deformity, Skin: Negative for injury, rash, and discoloration, Neuro: Negative for          

      weakness, numbness, tingling                                                                

                                                                                                  

Exam:                                                                                             

09:45 Constitutional:  This is a well developed, well nourished patient who is awake, alert,  rn  

      intermittent cramping evident in foot, asymptomatic between episodes.  Skin:  Warm, dry     

      with normal turgor.  Normal color with no rashes, no lesions, and no evidence of            

      cellulitis. MS/ Extremity:  Pulses equal, no cyanosis.  Neurovascular intact.  Full,        

      normal range of motion.  Equal circumference. No focal tenderness. No erythema, no          

      warmth, no fluctuance.  Neuro:  Awake and alert, GCS 15, oriented to person, place,         

      time, and situation.  Cranial nerves II-XII grossly intact.  Motor strength 5/5 in all      

      extremities.  Sensory grossly intact.                                                       

                                                                                                  

Vital Signs:                                                                                      

09:43  / 81; Pulse 72; Resp 18 S; Temp 97.6(O); Pulse Ox 97% on R/A; Weight 79.38 kg    aa5 

      (R); Pain 10/10;                                                                            

11:17  / 76; Pulse 81; Resp 20; Pulse Ox 100% on R/A;                                   aj  

                                                                                                  

MDM:                                                                                              

09:41 Patient medically screened.                                                             rn  

11:20 Differential diagnosis: sprain, arthritis, DVT. Data reviewed: vital signs, nurses      rn  

      notes, radiologic studies, doppler, plain films, and as a result, I will discharge          

      patient. Counseling: I had a detailed discussion with the patient and/or guardian           

      regarding: the historical points, exam findings, and any diagnostic results supporting      

      the discharge/admit diagnosis, lab results, radiology results, the need for outpatient      

      follow up, to return to the emergency department if symptoms worsen or persist or if        

      there are any questions or concerns that arise at home. Special discussion: I discussed     

      with the patient/guardian in detail that at this point there is no indication for           

      admission to the hospital. It is understood, however, that if the symptoms persist or       

      worsen the patient needs to return immediately for re-evaluation. ED course: Pt with        

      negative xray of foot, negative DVT study, normal bloodwork, most likely cramping. .        

                                                                                                  

                                                                                             

09:42 Order name: MARK; Complete Time: 10:38                                                   rn  

                                                                                             

09:42 Order name: Magnesium; Complete Time: 10:                                             rn  

                                                                                             

09:43 Order name: CBC with Diff; Complete Time: 10:                                         rn  

                                                                                             

10:29 Order name: Extremity Venous Uni Ltd US; Complete Time: 11:12                           rn  

                                                                                                  

Administered Medications:                                                                         

10:06 Drug: Flexeril 10 mg Route: PO;                                                         aj  

11:09 Follow up: Response: No change in condition                                             aj  

11:21 Drug: Norco 5 mg-325 mg 1 tabs Route: PO;                                               aj  

                                                                                                  

                                                                                                  

Disposition:                                                                                      

18 11:23 Discharged to Home. Impression: Pain in left foot, Muscle spasm.                   

- Condition is Stable.                                                                            

- Discharge Instructions: Musculoskeletal Pain, Foot Pain.                                        

                                                                                                  

- Medication Reconciliation Form, Thank You Letter, Antibiotic Education, Prescription            

  Opioid Use form.                                                                                

- Follow up: Private Physician; When: As needed; Reason: Recheck today's complaints,              

  Re-evaluation by your physician.                                                                

- Problem is new.                                                                                 

- Symptoms have improved.                                                                         

                                                                                                  

                                                                                                  

                                                                                                  

Signatures:                                                                                       

Dispatcher [employer]                           EDShiasta Castro RN                       Efrem Aburto MD MD rn Calderon, Audri RN                     RN   aa5                                                  

                                                                                                  

Corrections: (The following items were deleted from the chart)                                    

11: 09:42 IV Saline Lock ordered. jimmie gray  

12:02 11:23 2018 11:23 Discharged to Home. Impression: Pain in left foot; Muscle spasm. aj  

      Condition is Stable. Forms are Medication Reconciliation Form, Thank You Letter,            

      Antibiotic Education, Prescription Opioid Use. Follow up: Private Physician; When: As       

      needed; Reason: Recheck today's complaints, Re-evaluation by your physician. Problem is     

      new. Symptoms have improved. rn                                                             

                                                                                                  

**************************************************************************************************

## 2019-03-24 ENCOUNTER — HOSPITAL ENCOUNTER (EMERGENCY)
Dept: HOSPITAL 97 - ER | Age: 79
Discharge: HOME | End: 2019-03-24
Payer: COMMERCIAL

## 2019-03-24 DIAGNOSIS — S00.90XA: ICD-10-CM

## 2019-03-24 DIAGNOSIS — I10: ICD-10-CM

## 2019-03-24 DIAGNOSIS — S70.01XA: Primary | ICD-10-CM
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
## 2019-03-24 DIAGNOSIS — Z88.8: ICD-10-CM
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
## 2019-03-24 DIAGNOSIS — Z88.1: ICD-10-CM

## 2019-03-24 DIAGNOSIS — Z88.7: ICD-10-CM
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
## 2019-03-24 LAB
ALBUMIN SERPL BCP-MCNC: 3.6 G/DL (ref 3.4–5)
ALP SERPL-CCNC: 54 U/L (ref 45–117)
ALT SERPL W P-5'-P-CCNC: 14 U/L (ref 12–78)
AST SERPL W P-5'-P-CCNC: 18 U/L (ref 15–37)
BUN BLD-MCNC: 14 MG/DL (ref 7–18)
GLUCOSE SERPLBLD-MCNC: 95 MG/DL (ref 74–106)
HCT VFR BLD CALC: 39.9 % (ref 36–45)
INR BLD: 1.02
LYMPHOCYTES # SPEC AUTO: 1.2 K/UL (ref 0.7–4.9)
MAGNESIUM SERPL-MCNC: 2 MG/DL (ref 1.8–2.4)
NT-PROBNP SERPL-MCNC: 394 PG/ML (ref ?–450)
PMV BLD: 7.5 FL (ref 7.6–11.3)
POTASSIUM SERPL-SCNC: 3.8 MMOL/L (ref 3.5–5.1)
RBC # BLD: 4.22 M/UL (ref 3.86–4.86)
TROPONIN (EMERG DEPT USE ONLY): < 0.02 NG/ML (ref 0–0.04)

## 2019-03-24 PROCEDURE — 96361 HYDRATE IV INFUSION ADD-ON: CPT

## 2019-03-24 PROCEDURE — 87086 URINE CULTURE/COLONY COUNT: CPT

## 2019-03-24 PROCEDURE — 73502 X-RAY EXAM HIP UNI 2-3 VIEWS: CPT

## 2019-03-24 PROCEDURE — 93005 ELECTROCARDIOGRAM TRACING: CPT

## 2019-03-24 PROCEDURE — 96360 HYDRATION IV INFUSION INIT: CPT

## 2019-03-24 PROCEDURE — 85610 PROTHROMBIN TIME: CPT

## 2019-03-24 PROCEDURE — 72125 CT NECK SPINE W/O DYE: CPT

## 2019-03-24 PROCEDURE — 85025 COMPLETE CBC W/AUTO DIFF WBC: CPT

## 2019-03-24 PROCEDURE — 83735 ASSAY OF MAGNESIUM: CPT

## 2019-03-24 PROCEDURE — 80048 BASIC METABOLIC PNL TOTAL CA: CPT

## 2019-03-24 PROCEDURE — 99284 EMERGENCY DEPT VISIT MOD MDM: CPT

## 2019-03-24 PROCEDURE — 80076 HEPATIC FUNCTION PANEL: CPT

## 2019-03-24 PROCEDURE — 84484 ASSAY OF TROPONIN QUANT: CPT

## 2019-03-24 PROCEDURE — 71045 X-RAY EXAM CHEST 1 VIEW: CPT

## 2019-03-24 PROCEDURE — 83880 ASSAY OF NATRIURETIC PEPTIDE: CPT

## 2019-03-24 PROCEDURE — 87088 URINE BACTERIA CULTURE: CPT

## 2019-03-24 PROCEDURE — 70450 CT HEAD/BRAIN W/O DYE: CPT

## 2019-03-24 PROCEDURE — 73552 X-RAY EXAM OF FEMUR 2/>: CPT

## 2019-03-24 PROCEDURE — 72170 X-RAY EXAM OF PELVIS: CPT

## 2019-03-24 PROCEDURE — 81003 URINALYSIS AUTO W/O SCOPE: CPT

## 2019-03-24 PROCEDURE — 36415 COLL VENOUS BLD VENIPUNCTURE: CPT

## 2019-03-24 NOTE — ER
Nurse's Notes                                                                                     

 [facility]                                                                 

Name: Jerica Estrada                                                                           

Age: 78 yrs                                                                                       

Sex: Female                                                                                       

: 1940                                                                                   

MRN: [id number]                                                                                   

Arrival Date: 2019                                                                          

Time: 08:58                                                                                       

Account#: [id number]                                                                            

Bed X-Ray                                                                                         

Private MD:                                                                                       

Diagnosis: Fall due to bumping against object;Contusion of right hip;Superficial injury of head   

                                                                                                  

Presentation:                                                                                     

                                                                                             

08:58 Presenting complaint: EMS states: pt was getting up to go to the restroom and just      tw2 

      started going backwards to the bed, and slid down the bed onto her RIGHT hip, states        

      she hit the back of her head on the bedrail, pt was ambulatory on scene, vs stable.         

08:59 Transition of care: patient was not received from another setting of care. Onset of     tw2 

      symptoms was 2019. Risk Assessment: Do you want to hurt yourself or someone       

      else? Patient reports no desire to harm self or others. Initial Sepsis Screen: Does the     

      patient meet any 2 criteria? No. Patient's initial sepsis screen is negative. Does the      

      patient have a suspected source of infection? No. Patient's initial sepsis screen is        

      negative. Care prior to arrival: None.                                                      

08:59 Method Of Arrival: EMS: [facility]                                                tw 

08:59 Acuity: LYNNETTE 3                                                                           tw 

                                                                                                  

Historical:                                                                                       

- Allergies:                                                                                      

09:03 Ciprofloxacin;                                                                          tw2 

09:03 Tetanus Vaccines \T\ Toxoid;                                                              tw2

09:03 Hydrochlorothiazide;                                                                    tw2 

09:03 clopidogrel;                                                                            tw 

09:03 valsartan;                                                                              tw2 

- Home Meds:                                                                                      

09:03 losartan 100 mg Oral tab 1 tab once daily [Active]; amlodipine 10 mg tab 1 tab once     tw2 

      daily [Active]; atorvastatin 80 mg Oral tab 1 tab once daily [Active]; metoprolol           

      tartrate 25 mg Oral tab 1 tab 2 times per day [Active]; Ecotrin 325 mg Oral TbEC 1 tab      

      once daily [Active];                                                                        

- PMHx:                                                                                           

09:03 Aneurysm; Hypertension;                                                                 tw2 

- PSHx:                                                                                           

09:03 R carotid stent; Knee surgery; Bladder suspension; Hysterectomy; Tonsillectomy; Brain   tw2 

      aneurysm repair;                                                                            

                                                                                                  

- Immunization history:: Adult Immunizations.                                                     

- Social history:: Smoking status: .                                                              

- Ebola Screening: : Patient denies travel to an Ebola-affected area in the 21 days               

  before illness onset.                                                                           

                                                                                                  

                                                                                                  

Screenin:01 Abuse screen: Denies threats or abuse. Nutritional screening: No deficits noted.        tw2 

      Tuberculosis screening: No symptoms or risk factors identified. Fall Risk Secondary         

      diagnosis (15 points) impaired mobility.                                                    

                                                                                                  

Assessment:                                                                                       

08:58 General: Appears in no apparent distress. comfortable, Behavior is calm, cooperative.   rb1 

      Pain: Complains of pain in right hip. Neuro: Level of Consciousness is awake, alert,        

      obeys commands, Oriented to person, place, time, situation. Cardiovascular: Capillary       

      refill < 3 seconds is brisk in bilateral fingers. Respiratory: Airway is patent             

      Respiratory effort is even, unlabored, Respiratory pattern is regular, symmetrical. GI:     

      No signs and/or symptoms were reported involving the gastrointestinal system. : No        

      signs and/or symptoms were reported regarding the genitourinary system. Derm: Skin is       

      pink, warm \T\ dry. Musculoskeletal: Range of motion: intact in all extremities.            

09:53 Reassessment: Pt. went to CT.                                                           rb1 

11:00 Reassessment: Patient appears in no apparent distress at this time. Patient and/or      rb1 

      family updated on plan of care and expected duration. Pain level reassessed. Patient is     

      alert, oriented x 3, equal unlabored respirations, skin warm/dry/pink. Patient denies       

      pain at this time.                                                                          

12:00 Reassessment: Patient appears in no apparent distress at this time. Patient and/or      rb1 

      family updated on plan of care and expected duration. Pain level reassessed. Patient is     

      alert, oriented x 3, equal unlabored respirations, skin warm/dry/pink. Pt. requested        

      pain medication; provider notified.                                                         

12:20 Reassessment: Pt. refused the pain medication when I went in to administer it. Pt.      rb1 

      stated, "It doesn't hurt now that I sat up.".                                               

                                                                                                  

Vital Signs:                                                                                      

09:00  / 77; Pulse 73; Resp 17; Temp 97.9(O); Pulse Ox 99% on R/A; Pain 0/10;           tw2 

09:55                                                                                         rb1 

10:00  / 73; Pulse 68; Resp 19; Pulse Ox 99% ;                                          rb1 

11:00  / 82; Pulse 78; Resp 16; Pulse Ox 100% on R/A; Pain 0/10;                        rb1 

12:45  / 76; Pulse 71; Resp 16; Pulse Ox 99% on R/A;                                    rb1 

09:55 Pt. in CT                                                                               rb1 

                                                                                                  

ED Course:                                                                                        

08:58 Patient arrived in ED.                                                                  tw2 

09:00 Triage completed.                                                                       tw2 

09:00 Arm band placed on.                                                                     tw2 

09:01 Bed in low position. Call light in reach. Pulse ox on. NIBP on.                         tw2 

09:03 Kenney Mcdermott MD is Attending Physician.                                             jolie 

09:32 Agustina Smith, RN is Primary Nurse.                                                   rb1 

10:02 CT Head C Spine In Process Unspecified.                                                 EDMS

11:44 XRAY Chest (1 view) In Process Unspecified.                                             EDMS

11:44 Pelvis XRAY In Process Unspecified.                                                     EDMS

11:44 Hip Right 2 View XRAY In Process Unspecified.                                           EDMS

11:44 Femur Right XRAY In Process Unspecified.                                                EDMS

12:50 No provider procedures requiring assistance completed. IV discontinued, intact,         rb1 

      bleeding controlled, No redness/swelling at site. Pressure dressing applied.                

                                                                                                  

Administered Medications:                                                                         

10:27 Drug: NS 0.9% 1000 ml Route: IV; Rate: 125 ml/hr; Site: right antecubital;              rb1 

12:45 Follow up: IV Status: Completed infusion                                                rb1 

10:27 Not Given (Patient Refused): morphine 2 mg IVP once                                     rb1 

10:28 Not Given (Patient Refused): Zofran 4 mg IVP once; over 2 minutes                       rb1 

12:18 Not Given (Patient Refused): morphine 2 mg IVP once                                     rb1 

12:18 Not Given (Patient Refused): Zofran 4 mg IVP once; over 2 minutes                       rb1 

                                                                                                  

                                                                                                  

Outcome:                                                                                          

12:01 Discharge ordered by MD.                                                                [facility] 

12:50 Patient left the ED.                                                                    rb1 

12:50 Discharged to home via wheelchair, with family.                                         rb1 

12:50 Condition: stable                                                                           

12:50 Discharge instructions given to patient, Instructed on discharge instructions, follow       

      up and referral plans. Demonstrated understanding of instructions, follow-up care,          

      Prescriptions given X none. Pt. refused Tylenol #3; Dr. Mcdermott recommended regular        

      Tylenol or Ibuprofen.                                                                       

                                                                                                  

Signatures:                                                                                       

Dispatcher [employer]                           Kenney John MD MD cha Smirch, Shelby, RN                      RN                                                      

Agustina Smith, RN                     RN   rb1                                                  

Magui Jackson RN                          RN   tw2                                                  

                                                                                                  

Corrections: (The following items were deleted from the chart)                                    

16:09 13:12 Patient left the ED. [facility] 

                                                                                                  

**************************************************************************************************

## 2019-03-24 NOTE — RAD REPORT
EXAM DESCRIPTION:  RAD - Pelvis - 3/24/2019 11:43 am

 

CLINICAL HISTORY:  PAIN

Fall, trauma, right hip pain

 

COMPARISON:  None

 

FINDINGS:  AP pelvis, right hip and right femur- multiple projections are submitted.

 

No acute fracture or dislocation seen. 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

## 2019-03-24 NOTE — RAD REPORT
EXAM DESCRIPTION:  RAD - Chest Single View - 3/24/2019 11:43 am

 

CLINICAL HISTORY:  COUGH

Chest pain.

 

COMPARISON:  Chest Pa And Lat (2 Views) dated 2/12/2018; Chest Single View dated 2/4/2018; Chest Sing
le View dated 5/27/2017; Chest Single View dated 5/13/2017

 

FINDINGS:  Portable technique limits examination quality.

 

The lungs are emphysematous but grossly clear. The heart is mildly enlarged in size. No displaced fra
ctures.Aortic atherosclerosis.

 

IMPRESSION:  No acute intrathoracic process suspected.

## 2019-03-24 NOTE — XMS REPORT
Clinical Summary

 Created on:2019



Patient:Jerica Estrada

Sex:Female

:1940

External Reference #:[id number]





Demographics







 Address  [address] [place] [address]

 

 Mobile Phone  [phone]

 

 Home Phone  [phone]

 

 Email Address  [email or web address]

 

 Preferred Language  English

 

 Marital Status  

 

 Mor[employer] Affiliation  Unknown

 

 Race  White

 

 Ethnic Group  Not  or 









Author







 Organization  [facility]

 

 Address  [address] [place] [address]









Support







 Name  Relationship  Address  Phone

 

 Wan Estrada  Spouse  [address] [facility]  [phone]



     [place] [address]  









Care Team Providers







 Name  Role  Phone

 

 Samuel Todd MD  Primary Care Provider  [phone]









Allergies







 Active Allergy  Reactions  Severity  Noted Date  Comments

 

 Sulfamethoxazole-Trimethoprim      2018  

 

 Ciprofloxacin      2018  

 

 Valsartan      2018  

 

 Iodine      2018  

 

 No Known Drug Allergies      2016  

 

 Prednisone      2018  

 

 Tetanus Vaccines And Toxoid      2018  







Medications







 Medication  Sig  Dispensed  Refills  Start Date  End Date  Status

 

 doxycycline  Take 100 mg by    0  2016    Active



 (VIBRAMYCIN) 100 MG  mouth 2 (two)          



 capsule  times a day.          

 

 losartan (COZAAR)      0  10/31/2016    Active



 50 MG tablet            

 

 nitrofurantoin,      0  10/26/2016    Active



 macrocrystal-monohy            



 drate, (MACROBID)            



 100 MG capsule            

 

 amLODIPine  TAKE ONE (1)    1  03/15/2018    Active



 (NORVASC) 10 mg  TABLET(S) BY MOUTH          



 tablet  ONCE A DAY.          

 

 atorvastatin  TAKE ONE (1)    0  2017    Active



 (LIPITOR) 80 MG  TABLET(S) BY MOUTH          



 tablet  ONCE A DAY.          

 

 clopidogrel  TAKE ONE (1)    1  2018    Active



 (PLAVIX) 75 mg  TABLET(S) BY MOUTH          



 tablet  ONCE A DAY.          

 

 metoprolol tartrate  TAKE ONE (1)    1  2018    Active



 (LOPRESSOR) 25 mg  TABLET(S) BY MOUTH          



 tablet  TWICE A DAY.          

 

 aspirin 325 MG  Take 325 mg by    0      Active



 tablet  mouth daily.          

 

 amoxicillin-pot  Dental/Surgical: 4 tablets at once 1-2 hr prior to procedure  
28 tablet  1  2018  



 clavulanate  Other: 4 tablets at once immediately          



 (AUGMENTIN) 500-125            



 mg per            



 tabletIndications:            



 History of total            



 right knee            



 replacement,            



 Prophylactic            



 antibiotic            







Active Problems

No known active problems



Encounters







 Date  Type  Specialty  Care Team  Description

 

 2018  Office Visit  Orthopedic Surgery  Sanjay Samson,  Rotator cuff 
tendinitis, left (Primary Dx);



       MD  Acute pain of left shoulder

 

 2018  Office Visit  Orthopedic Surgery  Sanjay Samson,  Chronic left 
shoulder pain (Primary Dx);



       MD  Arthritis, shoulder region;



         Rotator cuff tendinitis, left;



         Trochanteric bursitis of right hip;



         Pain of right hip joint;



         Degeneration of lumbar intervertebral disc;



         Chronic right-sided low back pain, with sciatica presence unspecified



after 2018



Social History







 Tobacco Use  Types  Packs/Day  Years Used  Date

 

 Never Assessed        









 Sex Assigned at Birth  Date Recorded

 

 Not on file  









 Job Start Date  Occupation  Industry

 

 Not on file  Not on file  Not on file









 Travel History  Travel Start  Travel End









 No recent travel history available.







Last Filed Vital Signs

Not on file



Plan of Treatment







 Health Maintenance  Due Date  Last Done  Comments

 

 SHINGLES VACCINES (#1)  1992    

 

 65+ PNEUMOCOCCAL VACCINE (1 of 2 - PCV13)  2007    

 

 PNEUMOCOCCAL POLYSACCHARIDE VACCINE AGE 65 AND OVER  2007    

 

 INFLUENZA VACCINE  2018    







Procedures







 Procedure Name  Priority  Date/Time  Associated Diagnosis  Comments

 

 OH ARTHROCENTESIS  Routine  2018 10:10  Rotator cuff  Results for this



 ASPIR&/INJ MAJOR    AM CDT  tendinitis, left



  procedure are in



 JT/BURSA W/O US      Acute pain of left  the results



       shoulder  section.

 

 OH INJECT TRIGGER  Routine  2018  9:40  Degeneration of  Results for this



 POINT, 1 OR 2    AM CDT  lumbar intervertebral  procedure are in



       disc



  the results



       Chronic right-sided  section.



       low back pain, with  



       sciatica presence  



       unspecified  

 

 OH ARTHROCENTESIS  Routine  2018  9:40  Trochanteric bursitis  Results 
for this



 ASPIR&/INJ MAJOR    AM CDT  of right hip



  procedure are in



 JT/BURSA W/O US      Pain of right hip  the results



       joint  section.

 

 XR SHOULDER 2+ VW LEFT  Routine  2018  9:21  Chronic left shoulder  
Results for this



     AM CDT  pain  procedure are in



         the results



         section.



after 2018



Results

Large Joint Arthrocentesis (2018 10:10 AM CDT)





 Narrative  Performed At

 

 Sanjay Samson MD 20183:13 PM



  



 Large Joint Arthrocentesis



  



 Consent given by: patient



  



 Supporting Documentation



  



 Indications: pain 



  



 Procedure Details



  



 Ultrasound guided: no



  



  



  



 Platelet Rich Plasma Used: no PRP Used Location: shoulder - L 



  



 subacromial bursa



  



  



  



 Left side:



  



 Needle size: 25 G



  



 Approach: anterior



  



 Left shoulder medications administered: 1 mL lidocaine 10 mg/mL (1 %); 6  



 



  



 mg betamethasone acetate & sodium phosphate 6 mg/mL



  



 Patient tolerance: patient tolerated the procedure well with no  



 immediate 



  



 complications



  



  



  



  



  



   



Large Joint Arthrocentesis (2018  9:40 AM CDT)





 Narrative  Performed At

 

 Sanjay Samson MD 3/29/2018 11:47 AM



  



 Large Joint Arthrocentesis



  



 Consent given by: patient



  



 Timeout: Immediately prior to procedure a time out was called to verify 



  



 the correct patient, procedure, equipment, support staff and site/side 



  



 marked as required 



  



 Supporting Documentation



  



 Indications: pain 



  



 Procedure Details



  



 Ultrasound guided: no



  



  



  



 Platelet Rich Plasma Used: no PRP Used Location: hip - R greater 



  



 trochanteric bursa



  



  



  



 Right side:



  



 Needle size: 25 G



  



 Approach: lateral



  



 Right hip medications administered: 1 mL lidocaine 10 mg/mL (1 %); 6 mg 



  



 betamethasone acetate & sodium phosphate 6 mg/mL



  



 Patient tolerance: patient tolerated the procedure well with no  



 immediate 



  



 complications



  



  



  



  



  



   



INJECT TRIGGER POINT, 1 OR 2 (2018  9:40 AM CDT)





 Narrative  Performed At

 

 Sanjay Samson MD 3/29/2018 11:47 AM



  



 1 or 2 Trigger Point Injection



  



 Date/Time: 3/29/2018 11:46 AM



  



 Performed by: SANJAY SAMSON



  



 Authorized by: SANJAY SAMSON 



  



  



  



 Consent: 



  



 Consent obtained:Verbal



  



 Consent given by:Patient



  



 Risks discussed:Allergic reaction, swelling and pain



  



 Indications: 



  



 Indications:Pain relief



  



 Location: 



  



 Therapeutic Trigger Point Injection:Single/multiple trigger  



 point(s): 



  



 1-2 muscle groups



  



 Location: back



  



 Back location injected:R sacral



  



 Platelet Rich Plasma Used: no PRP Used 



  



 EMG Guidance Used: no emg guidance used



  



 Right side:



  



 Right Sacral Medications administered: 6 mg betamethasone acetate &  



 sodium 



  



 phosphate 6 mg/mL; 1 mL lidocaine 10 mg/mL (1 %)



  



  



  



  



  



  



  



 Pre-procedure details: 



  



 Neurovascular status: intact



  



 Skin preparation:Alcohol



  



 Procedure details: 



  



 Topical anesthetic:Ethyl chloride



  



 Syringe type:Normal syringe



  



 Needle gauge:25 G



  



 Post-procedure details: 



  



 Patient tolerance of procedure:Tolerated well, no immediate 



  



 complications  



XR Shoulder 2+ Vw Left (2018  9:21 AM CDT)





 Narrative  Performed At

 

 This result has an attachment that is not available.



Internal and external rotation and outlet view of the left shoulder so  [facility] 
[facility]



 glenohumeral narrowing with humeral head spurring. This patient had  



 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  









 Performing Organization  Address  City[facility]  Phone Number

 

  [facility]  [address] [place] [address]  



after 2018



Insurance







 Payer  Benefit Plan / Group  Subscriber ID  Type  Phone  Address

 

 MEDICARE  MEDICARE PART A AND B  xxxxxxxxxx  Medicare    [place], [place]

 

 AARP  AARP SUPPLEMENT  xxxxxxxxxxx  Commercial    









 Guarantor Name  Account Type  Relation to  Date of  Phone  Billing



     Patient  Birth    Address

 

 Jerica Estrada  Personal/Family  Self  1940  [phone]  [address] [place] [address]







         ([place])  [place] [address]







Advance Directives

Patient has advance care planning documents on file. For more information, 
please contact:Ja Monge[address] [place] [address]

## 2019-03-24 NOTE — EDPHYS
Physician Documentation                                                                           

 [facility]                                                                 

Name: Jerica Estrada                                                                           

Age: 78 yrs                                                                                       

Sex: Female                                                                                       

: 1940                                                                                   

MRN: [id number]                                                                                   

Arrival Date: 2019                                                                          

Time: 08:58                                                                                       

Account#: [id number]                                                                            

Bed X-Ray                                                                                         

Private MD:                                                                                       

ED Physician Kenney Mcdermott                                                                      

HPI:                                                                                              

                                                                                             

09:31 This 78 yrs old  Female presents to ER via EMS with complaints of Hip Pain.    jolie 

09:31 The patient or guardian reports decreased range of motion, pain. that occurred at home, jolie 

      sustained from a fall, There is no obvious deformity, The patient is able to ambulate       

      with assistance. The patient is able to bear partial body weight. There is no radiation     

      of the patient's discomfort. The complaints affect the right femoral area. Onset: The       

      symptoms/episode began/occurred just prior to arrival, this morning. Modifying factors:     

      The symptoms are alleviated by remaining still, the symptoms are aggravated by any          

      movement. Associated signs and symptoms: Loss of consciousness: the patient experienced     

      no loss of consciousness, Pertinent positives: None. Severity of symptoms: At their         

      worst the symptoms were mild. The patient has not experienced similar symptoms in the       

      past.                                                                                       

                                                                                                  

Historical:                                                                                       

- Allergies:                                                                                      

09:03 Ciprofloxacin;                                                                          tw2 

09:03 Tetanus Vaccines \T\ Toxoid;                                                              tw2

09:03 Hydrochlorothiazide;                                                                    tw2 

09:03 clopidogrel;                                                                            tw2 

09:03 valsartan;                                                                              tw2 

- Home Meds:                                                                                      

09:03 losartan 100 mg Oral tab 1 tab once daily [Active]; amlodipine 10 mg tab 1 tab once     tw2 

      daily [Active]; atorvastatin 80 mg Oral tab 1 tab once daily [Active]; metoprolol           

      tartrate 25 mg Oral tab 1 tab 2 times per day [Active]; Ecotrin 325 mg Oral TbEC 1 tab      

      once daily [Active];                                                                        

- PMHx:                                                                                           

09:03 Aneurysm; Hypertension;                                                                 tw2 

- PSHx:                                                                                           

09:03 R carotid stent; Knee surgery; Bladder suspension; Hysterectomy; Tonsillectomy; Brain   tw2 

      aneurysm repair;                                                                            

                                                                                                  

- Immunization history:: Adult Immunizations.                                                     

- Social history:: Smoking status: .                                                              

- Ebola Screening: : Patient denies travel to an Ebola-affected area in the 21 days               

  before illness onset.                                                                           

                                                                                                  

                                                                                                  

ROS:                                                                                              

09:31 Constitutional: Negative for fever, chills, and weight loss, Eyes: Negative for injury, jolie 

      pain, redness, and discharge, ENT: Negative for injury, pain, and discharge, Neck:          

      Negative for injury, pain, and swelling, Cardiovascular: Negative for chest pain,           

      palpitations, and edema, Respiratory: Negative for shortness of breath, cough,              

      wheezing, and pleuritic chest pain, Abdomen/GI: Negative for abdominal pain, nausea,        

      vomiting, diarrhea, and constipation, Back: Negative for injury and pain, : Negative      

      for injury, bleeding, discharge, and swelling, Skin: Negative for injury, rash, and         

      discoloration, Psych: Negative for depression, anxiety, suicide ideation, homicidal         

      ideation, and hallucinations, Allergy/Immunology: Negative for hives, rash, and             

      allergies, Endocrine: Negative for neck swelling, polydipsia, polyuria, polyphagia, and     

      marked weight changes, Hematologic/Lymphatic: Negative for swollen nodes, abnormal          

      bleeding, and unusual bruising.                                                             

09:31 MS/extremity: Positive for pain, swelling, of the right femoral area and right hip.         

09:31 Neuro: Positive for headache.                                                               

                                                                                                  

Exam:                                                                                             

09:31 Constitutional:  This is a well developed, well nourished patient who is awake, alert,  jolie 

      and in no acute distress. Head/Face:  Normocephalic, atraumatic. Eyes:  Pupils equal        

      round and reactive to light, extra-ocular motions intact.  Lids and lashes normal.          

      Conjunctiva and sclera are non-icteric and not injected.  Cornea within normal limits.      

      Periorbital areas with no swelling, redness, or edema. ENT:  Nares patent. No nasal         

      discharge, no septal abnormalities noted.  Tympanic membranes are normal and external       

      auditory canals are clear.  Oropharynx with no redness, swelling, or masses, exudates,      

      or evidence of obstruction, uvula midline.  Mucous membranes moist. Neck:  Trachea          

      midline, no thyromegaly or masses palpated, and no cervical lymphadenopathy.  Supple,       

      full range of motion without nuchal rigidity, or vertebral point tenderness.  No            

      Meningismus. Chest/axilla:  Normal chest wall appearance and motion.  Nontender with no     

      deformity.  No lesions are appreciated. Cardiovascular:  Regular rate and rhythm with a     

      normal S1 and S2.  No gallops, murmurs, or rubs.  Normal PMI, no JVD.  No pulse             

      deficits. Respiratory:  Lungs have equal breath sounds bilaterally, clear to                

      auscultation and percussion.  No rales, rhonchi or wheezes noted.  No increased work of     

      breathing, no retractions or nasal flaring. Abdomen/GI:  Soft, non-tender, with normal      

      bowel sounds.  No distension or tympany.  No guarding or rebound.  No evidence of           

      tenderness throughout. Back:  No spinal tenderness.  No costovertebral tenderness.          

      Full range of motion. Skin:  Warm, dry with normal turgor.  Normal color with no            

      rashes, no lesions, and no evidence of cellulitis. MS/ Extremity:  Pulses equal, no         

      cyanosis.  Neurovascular intact.  Full, normal range of motion. Neuro:  Awake and           

      alert, GCS 15, oriented to person, place, time, and situation.  Cranial nerves II-XII       

      grossly intact.  Motor strength 5/5 in all extremities.  Sensory grossly intact.            

      Cerebellar exam normal.  Normal gait. Psych:  Awake, alert, with orientation to person,     

      place and time.  Behavior, mood, and affect are within normal limits.                       

                                                                                                  

Vital Signs:                                                                                      

09:00  / 77; Pulse 73; Resp 17; Temp 97.9(O); Pulse Ox 99% on R/A; Pain 0/10;           tw2 

09:55                                                                                         rb1 

10:00  / 73; Pulse 68; Resp 19; Pulse Ox 99% ;                                          rb1 

11:00  / 82; Pulse 78; Resp 16; Pulse Ox 100% on R/A; Pain 0/10;                        rb1 

12:45  / 76; Pulse 71; Resp 16; Pulse Ox 99% on R/A;                                    rb1 

09:55 Pt. in CT                                                                               rb1 

                                                                                                  

MDM:                                                                                              

09:03 Patient medically screened.                                                             [facility] 

09:08 Patient medically screened.                                                             [facility] 

09:33 Data reviewed: vital signs, nurses notes, lab test result(s), EKG, radiologic studies,  [facility] 

      CT scan, plain films.                                                                       

                                                                                                  

                                                                                             

09:08 Order name: Basic Metabolic Panel; Complete Time: 11:                                 [facility] 

                                                                                             

09:08 Order name: CBC with Diff; Complete Time: 11:                                         [facility] 

                                                                                             

09:08 Order name: LFT's; Complete Time: 11:                                                 [facility] 

                                                                                             

09:08 Order name: Magnesium; Complete Time: 11:                                             [facility] 

                                                                                             

09:08 Order name: NT PRO-BNP; Complete Time: 11:                                            [facility] 

                                                                                             

09:08 Order name: PT-INR; Complete Time: 11:                                                [facility] 

                                                                                             

09:08 Order name: Troponin (emerg Dept Use Only); Complete Time: 11:21                        [facility] 

                                                                                             

09:08 Order name: XRAY Chest (1 view); Complete Time: 11:56                                   [facility] 

                                                                                             

09:08 Order name: Pelvis XRAY; Complete Time: 11:56                                           [facility] 

                                                                                             

09:08 Order name: CT Head C Spine; Complete Time: 11:21                                       [facility] 

                                                                                             

09:08 Order name: Hip Right 2 View XRAY                                                                                                                                                    

09:08 Order name: Femur Right XRAY                                                            [facility] 

                                                                                             

09:08 Order name: Urine Culture                                                                                                                                                            

09:58 Order name: Urine Dipstick--Ancillary (enter results); Complete Time: 11:21               

                                                                                             

09:08 Order name: EKG; Complete Time: 09:09                                                   [facility] 

                                                                                             

09:08 Order name: Cardiac monitoring; Complete Time: 12:18                                    [facility] 

                                                                                             

09:08 Order name: EKG - Nurse/Tech; Complete Time: 11:54                                      [facility] 

                                                                                             

09:08 Order name: IV Saline Lock; Complete Time: 10:28                                        [facility] 

                                                                                             

09:08 Order name: Labs collected and sent; Complete Time: 10:28                               [facility] 

                                                                                             

09:08 Order name: O2 Per Protocol; Complete Time: 09:50                                       [facility] 

                                                                                             

09:08 Order name: O2 Sat Monitoring; Complete Time: 09:50                                     [facility] 

                                                                                             

09:08 Order name: Urine Dipstick-Ancillary (obtain specimen); Complete Time: 10:28            [facility] 

                                                                                                  

Administered Medications:                                                                         

10:27 Drug: NS 0.9% 1000 ml Route: IV; Rate: 125 ml/hr; Site: right antecubital;              rb1 

12:45 Follow up: IV Status: Completed infusion                                                rb1 

10:27 Not Given (Patient Refused): morphine 2 mg IVP once                                     rb1 

10:28 Not Given (Patient Refused): Zofran 4 mg IVP once; over 2 minutes                       rb1 

12:18 Not Given (Patient Refused): morphine 2 mg IVP once                                     rb1 

12:18 Not Given (Patient Refused): Zofran 4 mg IVP once; over 2 minutes                       rb1 

                                                                                                  

                                                                                                  

Disposition:                                                                                      

19 12:01 Discharged to Home. Impression: Fall due to bumping against object, Contusion      

  of right hip, Superficial injury of head.                                                       

- Condition is Stable.                                                                            

- Discharge Instructions: Contusion, Head Injury, Adult, Contusion, Easy-to-Read, Fall            

  Prevention in the Home, Easy-to-Read, Hip Pain, Head Injury, Adult, Easy-to-Read.               

- Prescriptions for Tylenol- Codeine #3 300-30 mg Oral Tablet - take 1 tablet by ORAL             

  route every 6 hours As needed; 24 tablet.                                                       

- Medication Reconciliation Form, Thank You Letter, Antibiotic Education, Prescription            

  Opioid Use form.                                                                                

- Follow up: Private Physician; When: 2 - 3 days; Reason: Recheck today's complaints,             

  Continuance of care, Re-evaluation by your physician.                                           

- Problem is new.                                                                                 

- Symptoms have improved.                                                                         

                                                                                                  

                                                                                                  

                                                                                                  

Signatures:                                                                                       

Dispatcher [employer]                           EDMS                                                 

Kenney Mcdermott MD MD cha Smirch, Shelby RN                      RN   Agustina Chan RN RN   [facility]                                                  

Magui Jackson RN                          RN   tw2                                                  

                                                                                                  

Corrections: (The following items were deleted from the chart)                                    

13:12 12:01 2019 12:01 Discharged to Home. Impression: Fall due to bumping against      ss  

      object; Contusion of right hip; Superficial injury of head. Condition is Stable.            

      Discharge Instructions: Contusion, Contusion, Easy-to-Read, Fall Prevention in the          

      Home, Easy-to-Read, Hip Pain. Prescriptions for Tylenol-Codeine #3 300-30 mg Oral           

      Tablet - take 1 tablet by ORAL route every 6 hours As needed; 24 tablet. and Forms are      

      Medication Reconciliation Form, Thank You Letter, Antibiotic Education, Prescription        

      Opioid Use. Follow up: Private Physician; When: 2 - 3 days; Reason: Recheck today's         

      complaints, Continuance of care, Re-evaluation by your physician. Problem is new.           

      Symptoms have improved. jolie                                                                 

                                                                                                  

**************************************************************************************************

## 2019-03-24 NOTE — XMS REPORT
Clinical Summary

 Created on:2019



Patient:Jerica Estrada

Sex:Female

:1940

External Reference #:[id number]





Demographics







 Address  [address] [place] [address]

 

 Mobile Phone  [phone]

 

 Home Phone  [phone]

 

 Preferred Language  English

 

 Marital Status  Unknown

 

 Mosque Affiliation  Unknown

 

 Race  White

 

 Ethnic Group  Not  or 









Author







 Organization  [facility]

 

 Address  [address] [place] [address]









Support







 Name  Relationship  Address  Phone

 

 Wan Estrada  Unavailable  [address]  [phone]



     [place] [address]  

 

 Musa Estrada  Unavailable  [phone]









Care Team Providers







 Name  Role  Phone

 

 PeytonSamuel  Primary Care Provider  [phone]









Allergies







 Active Allergy  Reactions  Severity  Noted Date  Comments

 

 Ciprofloxacin  Anaphylaxis  High  2017  

 

 Iodine And Iodide Containing  Other (See Comments)  Medium  2017  Cold 
chills



 Products        

 

 Penicillins  Anaphylaxis  High  2017  

 

 Tetanus Vaccines And Toxoid  Itching  High  2017  







Medications







 Medication  Sig  Dispensed  Refills  Start Date  End Date  Status

 

 CALCIUM  Take by mouth    0      Active



 CARBONATE/VITAMIN D3  2 (two) times          



 (CALCIUM 600 + D,3,  daily.          



 ORAL)            

 

 VIT C/VIT  Take by mouth    0      Active



 E/LUTEIN/MIN/OMEGA-3  daily.          



 (OCUVITE ORAL)            

 

 folic acid (FOLVITE)  Take 400 mcg    0      Active



 400 MCG tablet  by mouth          



   nightly.          

 

 cyanocobalamin 1000 MCG  Take 1,000 mcg    0      Active



 tablet  by mouth          



   daily.          

 

 cranberry extract  Take by mouth    0      Active



 (CRANBERRY CONCENTRATE)  2 (two) times          



 500 mg Cap  daily.          

 

 docusate sodium  Take 100 mg by    0      Active



 (COLACE) 100 MG capsule  mouth 2 (two)          



   times daily as          



   needed for          



   Constipation.          

 

 losartan (COZAAR) 100  Take 1 tablet  30 tablet  0  2017  




 MG tablet  (100 mg total)          



   by mouth          



   daily.          

 

 amLODIPine (NORVASC) 10  Take 1 tablet  30 tablet  0  2017  




 MG tablet  (10 mg total)          



   by mouth          



   daily.          

 

 atorvastatin (LIPITOR)  Take 1 tablet  30 tablet  0  2017  




 80 MG tablet  (80 mg total)          



   by mouth          



   nightly.          

 

 metoprolol (LOPRESSOR)  Take 1 tablet  30 tablet  0  2017  




 25 MG tablet  (25 mg total)          



   by mouth 2          



   (two) times          



   daily.          

 

 aspirin 325 MG EC  Take 1 tablet  90 tablet  0  2017  



 tablet  (325 mg total)          



   by mouth          



   daily.          

 

 clopidogrel (PLAVIX) 75  Take 1 tablet  90 tablet  0  2017  




 mg tablet  (75 mg total)          



   by mouth          



   daily.          







Active Problems







 Problem  Noted Date

 

 Aneurysm  2017

 

 Hypertension  2017

 

 Other specified transient cerebral ischemias  2017

 

 Hypertensive emergency  2017

 

 Syncope  05/15/2017

 

 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

 

 Essential hypertension  2017

 

 TIA (transient ischemic attack)  2017

 

 Hypertension  2017







Encounters







 Date  Type  Specialty  Care Team  Description

 

 2018  Hospital Encounter  Radiology  Migue Weber  Cerebral aneurysm



       MD Kaushal  

 

 2018  Outside Orders  Radiology  Migue Weber  Cerebral aneurysm



       MD Kaushal  (Primary Dx)



after 2018



Social History







 Tobacco Use  Types  Packs/Day  Years Used  Date

 

 Never Smoker        









 Smokeless Tobacco: Never Used      









 Alcohol Use  Drinks/Week  oz/Week  Comments

 

 No      









 Sex Assigned at Birth  Date Recorded

 

 Not on file  









 Job Start Date  Occupation  Industry

 

 Not on file  Not on file  Not on file









 Travel History  Travel Start  Travel End









 No recent travel history available.







Last Filed Vital Signs

Not on file



Plan of Treatment

Not on file



Results

Not on fileafter 2018



Insurance







 Payer  Benefit Plan / Group  Subscriber ID  Type  Phone  Address

 

 MEDICARE  MEDICARE A B  xxxxxxxxxx  Medicare    

 

 MCR SUPPLEMENT/INDIVIDUAL  [employer]  xxxxxxxxxxx  [facility]    









 Guarantor Name  Account Type  Relation to  Date of  Phone  Billing



     Patient  Birth    Address

 

 Jerica Estrada  Personal/Family  Self  1940  [phone] [address]        (Home)  [place] [address]







Advance Directives

For more information, please contact:[address] [phone]





 Code Status  Date Activated  Date Inactivated  Comments

 

 Full Code  2017  5:31 PM  2017 10:34 PM  









 This code status was determined by:  Patient  









       

 

 Full Code  2017 10:12 PM  2017  4:57 PM  









 This code status was determined by:  Patient  









       

 

 Full Code  2017  7:37 PM  2017  8:30 PM  









 This code status was determined by:  Patient

## 2019-03-24 NOTE — XMS REPORT
Patient Summary Document

 Created on:2019



Patient:SHAHIDA ESTRADA

Sex:Female

:1940

External Reference #:[id number]





Demographics







 Address  [address] [place] [address]

 

 Home Phone  [phone]

 

 Preferred Language  Unknown

 

 Marital Status  Unknown

 

 Zoroastrian Affiliation  Unknown

 

 Race  Unknown

 

 Additional Race(s)  Unavailable

 

 Ethnic Group  Unknown









Author







 Organization  [facility]

 

 Address  [address] [place] [address] [place] [address]

 

 Phone  [phone]









Care Team Providers







 Name  Role  Phone

 

 MIGUE AMIN  Unavailable  Unavailable

 

 CHATA CALLE  Unavailable  Unavailable

 

 CIVUNIGUNTA MEJIA  Unavailable  Unavailable









Problems

This patient has no known problems.



Allergies, Adverse Reactions, Alerts

This patient has no known allergies or adverse reactions.



Medications

This patient has no known medications.



Results







 Test Description  Test Time  Test Comments  Text Results  Atomic Results  
Result Comments









 NV, ANGIOGRAM,  2017  Reason for  FINAL REPORT PATIENT ID:  



 CEREBRAL  11:48:00  Exam:->cerebral  [id number] DATE:  



     aneurysm unruptured  2017NAME: Shahida EstradaATTENDING:  



       Migue Amin MDFIRST  



       ASSISTANT: CESARIO LooREOPERATIVE DIAGNOSIS:  



       Unruptured right posterior  



       communicating artery  



       aneurysm status post stent  



       assisted  



       coilingPOSTOPERATIVE  



       DIAGNOSIS: Unruptured right  



       posterior communicating  



       artery aneurysm status post  



       stent assisted  



       coilingPROCEDURE PERFORMED:  



       Diagnostic Cerebral  



       AngiogramANESTHESIA:  



       MACCOMPLICATIONS:  



       NoneESTIMATED BLOOD LOSS:  



       Less than 15ml ARTERIAL  



       VESSELS STUDIED:RIGHT  



       SUBCLAVIAN ARTERY (x1)RIGHT  



       COMMON CAROTID ARTERY  



       (CERVICAL x2)RIGHT COMMON  



       CAROTID ARTERY (CRANIAL  



       x3)RIGHT COMMON FEMORAL  



       ARTERY (x1) MATERIALS  



       EMPLOYED:1. 5 Somali short  



       sheath 2. 4 Somali  



       Berenstein catheter3.  



       Bentson guidewire4. Terumo  



       0.035 LT glidewire5. 5  



       Somali Mynx device  



       INDICATIONS: 77-year-old  



       female with hypertension  



       who is status post  



       endovascular treatment of  



       an unruptured right  



       posterior communicating  



       artery aneurysm with  



       stent-assisted coiling on  



       May 19, 2017. She presents  



       for follow-up cerebral  



       angiogram. The indications  



       for the procedure as well  



       as the risks, benefits and  



       alternatives were discussed  



       with the patient and the  



       family. The risks discussed  



       included but were not  



       limited to stroke,  



       intracranial hemorrhage,  



       injury to the cervical  



       femoral or aortic vessels,  



       contrast reaction, kidney  



       to toxicity, groin  



       hematoma, weakness  



       paralysis and even death.  



       They demonstrated  



       understanding of the risk  



       benefit profile and agreed  



       to proceed. PROCEDURE:  



       After appropriate consent  



       was obtained, the patient  



       was brought to the  



       angiographic suite and  



       cardiopulmonary monitoring  



       was placed. The anesthesia  



       team performed light  



       sedation. A timeout was  



       performed. Both groins were  



       prepped and draped in the  



       usual sterile fashion.  



       After administration of 10  



       mL of 2% lidocaine, a  



       micropuncture needle was  



       used to perform a single  



       wall puncture of the right  



       common femoral artery, a  



       micropuncture sheath was  



       inserted, and an angled DSA  



       angiogram was performed  



       through the sheath. Once  



       good location of the  



       puncture site was  



       confirmed, a 5 Somali short  



       sheath was inserted over a  



       Zlio wire and was  



       maintained on heparinized  



       saline flush throughout the  



       remainder of the procedure.  



        Using coaxial technique, a  



       preflushed Berenstein  



       catheter on constant  



       heparinized saline flush  



       was advanced over the  



       Glidewire into the  



       descending aorta, the  



       Glidewire was removed, the  



       catheter back bled, flushed  



       in usual fashion and the  



       catheter was maintained on  



       heparinized flushed  



       throughout duration of the  



       case. Using coaxial  



       technique, the catheter was  



       advanced into the aortic  



       arch and with the aid of  



       roadmapping, digital  



       fluoroscopy, and careful  



       guidewire manipulation, the  



       arteries described below  



       were selectively  



       catheterized. Upon each  



       successive catheterization,  



       digital subtraction  



       angiography using the  



       appropriate rate and volume  



       of contrast in multiple  



       projections was performed.  



       At the conclusion of the  



       procedure, the catheter was  



       retracted into the aorta  



       and the images reviewed at  



       the outside workstation for  



       quality and content.  Then  



       the femoral sheath was  



       removed and hemostasis  



       achieved with a 5 Somali  



       Mynx device and manual  



       compression. The patient  



       tolerated the procedure  



       well and was transported in  



       unchanged neurological  



       status without groin  



       hematoma and with good  



       distal lower extremity  



       pulses. The patient was  



       transferred to the recovery  



       area to be monitored as per  



       protocol.  FINDINGS: RIGHT  



       SUBCLAVIAN ARTERY (DSA, PA,  



       LATERAL ROADMAP, x1): There  



       is normal course and  



       caliber of the subclavian  



       artery with physiological  



       filling of its distal  



       branches. Limited  



       visualization in this  



       roadmap of the origins of  



       the right vertebral artery,  



       internal mamillary artery,  



       thyrocervical and  



       costocervical trunks.  



       RIGHT COMMON CAROTID ARTERY  



       (DSA, PA, LATERAL, CERVICAL  



       x2): Tortuous course of the  



       right common carotid  



       artery. The distal cervical  



       common carotid as well as  



       the origins of the right  



       internal and external  



       carotid arteries are widely  



       patent without evidence of  



       ulceration or stenosis. The  



       proximal portions of the  



       superficial temporal  



       artery, middle meningeal  



       artery, internal maxillary  



       artery, occipital artery  



       and their branches are  



       visualized and appear  



       normal. RIGHT COMMON  



       CAROTID ARTERY (DSA, PA,  



       LATERAL, MAGNIFIED OBLIQUE,  



       CRANIAL x3): Minimal  



       residual aneurysm neck in  



       the previously treated  



       right posterior  



       communicating artery  



       aneurysm, best visualized  



       on sequence A9. Normal  



       distal cervical, petrous,  



       cavernous and supraclinoid  



       internal carotid artery  



       with physiological filling  



       of the MCA and MAXIMINO  



       branches. Limited  



       visualization of the venous  



       phase. No other aneurysms  



       or other vascular lesions  



       are seen. There is no  



       significant atherosclerosis  



       or stenosis. Normal course  



       and caliber of the external  



       carotid artery and its  



       branches. There is a well  



       visualized superficial  



       temporal artery, middle  



       meningeal artery, internal  



       maxillary artery, occipital  



       artery and their branches.  



       RIGHT COMMON FEMORAL ARTERY  



       (DSA, PA, X 1): Normal  



       location of the puncture  



       site above the bifurcation  



       and below markers of the  



       inguinal ligament.  



       IMPRESSION: Minimal  



       residual aneurysm neck in  



       the previously treated  



       right posterior  



       communicating artery  



       aneurysm. No technical or  



       procedural complications  



       FACULTY ATTESTATION: I,  



       Migue Amin M.D., was  



       present for the entirety of  



       the procedure. I performed  



       or directly supervised all  



       aspects of the procedure. I  



       performed all critical  



       aspects of the case. I  



       interpreted the images and  



       reported the results.  



       Signed: Chaz Cox  



       Verified Date/Time:  



       2017 11:48:56 Reading  



       Location: [facility] B1 Y026 Neuro  



       Angio Reading Room  



       Electronically signed by:  



       CHAZ COX on  



       2017 11:48 AM  









 BASIC METABOLIC PANEL  2017 11:56:00    









   Test Item  Value  Reference Range  Comments









 SODIUM (BEAKER) (test  136 meq/L  136-145  



 bmzp=669)      

 

 POTASSIUM (BEAKER) (test  3.9 meq/L  3.5-5.1  



 code=379)      

 

 CHLORIDE (BEAKER) (test  102 meq/L    



 code=382)      

 

 CO2 (BEAKER) (test code=355)  25 meq/L  22-29  

 

 BLOOD UREA NITROGEN (BEAKER)  20 mg/dL  7-21  



 (test code=354)      

 

 CREATININE (BEAKER) (test  0.82 mg/dL  0.57-1.25  



 code=358)      

 

 GLUCOSE RANDOM (BEAKER)  106 mg/dL    



 (test code=652)      

 

 CALCIUM (BEAKER) (test  9.6 mg/dL  8.4-10.2  



 code=697)      

 

 EGFR (BEAKER) (test  68 mL/min/1.73 sq m    ESTIMATED GFR IS NOT AS



 code=1092)      ACCURATE AS CREATININE



       CLEARANCE IN PREDICTING



       GLOMERULAR FILTRATION RATE.



       ESTIMATED GFR IS NOT



       APPLICABLE FOR DIALYSIS



       PATIENTS.



CBC WITH PLATELET COUNT + MANUAL [id number] 11:33:00





 Test Item  Value  Reference Range  Comments

 

 WHITE BLOOD CELL COUNT  5.0 K/ L  3.5-10.5  



 (BEAKER) (test code=775)      

 

 RED BLOOD CELL COUNT (BEAKER)  3.94 M/ L  3.93-5.22  



 (test code=761)      

 

 HEMOGLOBIN (BEAKER) (test  12.1 GM/DL  11.2-15.7  



 code=410)      

 

 HEMATOCRIT (BEAKER) (test  37.1 %  34.1-44.9  



 code=411)      

 

 MEAN CORPUSCULAR VOLUME  94.2 fL  79.4-94.8  



 (BEAKER) (test code=753)      

 

 MEAN CORPUSCULAR HEMOGLOBIN  30.7 pg  25.6-32.2  



 (BEAKER) (test code=751)      

 

 MEAN CORPUSCULAR HEMOGLOBIN  32.6 GM/DL  32.2-35.5  



 CONC (BEAKER) (test code=752)      

 

 RED CELL DISTRIBUTION WIDTH  12.7 %  11.7-14.4  



 (BEAKER) (test code=412)      

 

 PLATELET COUNT (BEAKER) (test  212 K/CU MM  150-450  



 hhrz=407)      

 

 MEAN PLATELET VOLUME (BEAKER)  9.4 fL  9.4-12.3  



 (test code=754)      

 

 NUCLEATED RED BLOOD CELLS  0 /100 WBC  0-0  



 (BEAKER) (test code=413)      

 

 IMMATURE GRANULOCYTES-RELATIVE  0 %  0-1  



 PERCENT (BEAKER) (test      



 code=2801)      

 

 NEUTROPHILS RELATIVE PERCENT  60 %    This is an appended report.



 (BEAKER) (test code=429)       These results have been



       appended to a previously



       final verified report.

 

 LYMPHOCYTES RELATIVE PERCENT  30 %    This is an appended report.



 (BEAKER) (test code=430)       These results have been



       appended to a previously



       final verified report.

 

 MONOCYTES RELATIVE PERCENT  9 %    This is an appended report.



 (BEAKER) (test code=431)       These results have been



       appended to a previously



       final verified report.

 

 EOSINOPHILS RELATIVE PERCENT  1 %    This is an appended report.



 (BEAKER) (test code=432)       These results have been



       appended to a previously



       final verified report.

 

 BASOPHILS RELATIVE PERCENT  1 %    This is an appended report.



 (BEAKER) (test code=437)       These results have been



       appended to a previously



       final verified report.

 

 NEUTROPHILS ABSOLUTE COUNT  2.97 K/ L  1.56-6.13  This is an appended report.



 (BEAKER) (test code=670)       These results have been



       appended to a previously



       final verified report.

 

 LYMPHOCYTES ABSOLUTE COUNT  1.46 K/ L  1.18-3.74  This is an appended report.



 (BEAKER) (test code=414)       These results have been



       appended to a previously



       final verified report.

 

 MONOCYTES ABSOLUTE COUNT  0.42 K/ L  0.24-0.36  This is an appended report.



 (BEAKER) (test code=415)       These results have been



       appended to a previously



       final verified report.

 

 EOSINOPHILS ABSOLUTE COUNT  0.05 K/ L  0.04-0.36  This is an appended report.



 (BEAKER) (test code=416)       These results have been



       appended to a previously



       final verified report.

 

 BASOPHILS ABSOLUTE COUNT  0.04 K/ L  0.01-0.08  This is an appended report.



 (BEAKER) (test code=417)       These results have been



       appended to a previously



       final verified report.



[id number] 11:30:00





 Test Item  Value  Reference Range  Comments

 

 PROTIME (BEAKER) (test code=759)  15.2 seconds  11.7-14.7  

 

 INR (BEAKER) (test code=370)  1.2  <=5.9  

 

 PARTIAL THROMBOPLASTIN TIME (BEAKER) (test  42.8 seconds  22.5-36.0  



 code=760)      



RECOMMENDED COUMADIN/WARFARIN INR THERAPY RANGESSTANDARD DOSE: 2.0 - 3.0   
Includes: PROPHYLAXIS forvenous thrombosis, systemic embolization; TREATMENT 
for venous thrombosis and/or pulmonary embolus.HIGH RISK: Target INR is 2.5-3.5 
for patients with mechanical heart valves.PLATELET AGGREGATION: FUNCTION 
[id number] 09:23:00





 Test Item  Value  Reference Range  Comments

 

 WEAK ADP RESULT(BEAKER) (test  5 %  60-91  



 code=4752)      

 

 PLATELET FUNCTION SCREEN  0-39% indicates marked platelet    



 INTERP (BEAKER) (test  dysfunction    



 code=2173)      

 

 [id number]  Nelly Zurita MD    



 (BEAKER) (test code=2622)  (electronic signature)    

 

 PLATELET COUNT AGG (BEAKER)  243 K/CU MM  150-430  



 (test code=0896)      



CBC W/PLT COUNT &amp; AUTO [id number] 07:07:00





 Test Item  Value  Reference Range  Comments

 

 WHITE BLOOD CELL COUNT (BEAKER) (test code=775)  5.9 K/ L  4.0-10.0  

 

 RED BLOOD CELL COUNT (BEAKER) (test code=761)  3.88 M/ L  4.00-5.00  

 

 HEMOGLOBIN (BEAKER) (test code=410)  12.5 GM/DL  12.0-15.0  

 

 HEMATOCRIT (BEAKER) (test code=411)  37.8 %  36.0-45.0  

 

 MEAN CORPUSCULAR VOLUME (BEAKER) (test code=753)  97.3 fL  82.0-99.0  

 

 MEAN CORPUSCULAR HEMOGLOBIN (BEAKER) (test  32.1 pg  27.0-33.0  



 code=751)      

 

 MEAN CORPUSCULAR HEMOGLOBIN CONC (BEAKER) (test  33.0 GM/DL  32.0-36.0  



 code=752)      

 

 RED CELL DISTRIBUTION WIDTH (BEAKER) (test  13.4 %  10.3-14.2  



 code=412)      

 

 PLATELET COUNT (BEAKER) (test code=756)  238 K/CU MM  150-430  

 

 MEAN PLATELET VOLUME (BEAKER) (test code=754)  7.2 fL  6.5-10.5  

 

 NUCLEATED RED BLOOD CELLS (BEAKER) (test  0 /100 WBC  0-0  



 code=413)      

 

 NEUTROPHILS RELATIVE PERCENT (BEAKER) (test  58 %    



 code=429)      

 

 LYMPHOCYTES RELATIVE PERCENT (BEAKER) (test  31 %    



 code=430)      

 

 MONOCYTES RELATIVE PERCENT (BEAKER) (test  10 %    



 code=431)      

 

 EOSINOPHILS RELATIVE PERCENT (BEAKER) (test  0 %    



 code=432)      

 

 BASOPHILS RELATIVE PERCENT (BEAKER) (test  1 %    



 code=437)      

 

 NEUTROPHILS ABSOLUTE COUNT (BEAKER) (test  3.36 K/ L  1.80-8.00  



 code=670)      

 

 LYMPHOCYTES ABSOLUTE COUNT (BEAKER) (test  1.84 K/ L  1.48-4.50  



 code=414)      

 

 MONOCYTES ABSOLUTE COUNT (BEAKER) (test  0.58 K/ L  0.00-1.30  



 code=415)      

 

 EOSINOPHILS ABSOLUTE COUNT (BEAKER) (test  0.01 K/ L  0.00-0.50  



 code=416)      

 

 BASOPHILS ABSOLUTE COUNT (BEAKER) (test  0.06 K/ L  0.00-0.20  



 code=417)      



0.00BASIC METABOLIC [id number] 06:36:00





 Test Item  Value  Reference Range  Comments

 

 SODIUM (BEAKER) (test  138 meq/L  136-145  



 chgi=814)      

 

 POTASSIUM (BEAKER) (test  4.0 meq/L  3.5-5.1  Specimen slightly



 code=379)      hemolyzed

 

 CHLORIDE (BEAKER) (test  106 meq/L    



 code=382)      

 

 CO2 (BEAKER) (test  23 meq/L  22-29  



 code=355)      

 

 BLOOD UREA NITROGEN  13 mg/dL  7-21  



 (BEAKER) (test code=354)      

 

 CREATININE (BEAKER) (test  0.79 mg/dL  0.57-1.25  Specimen slightly



 code=358)      hemolyzed

 

 GLUCOSE RANDOM (BEAKER)  97 mg/dL    



 (test code=652)      

 

 CALCIUM (BEAKER) (test  9.0 mg/dL  8.4-10.2  



 code=697)      

 

 EGFR (BEAKER) (test  71 mL/min/1.73 sq m    ESTIMATED GFR IS NOT AS



 code=1092)      ACCURATE AS CREATININE



       CLEARANCE IN PREDICTING



       GLOMERULAR FILTRATION



       RATE. ESTIMATED GFR IS



       NOT APPLICABLE FOR



       DIALYSIS PATIENTS.



[id number] 06:21:00





 Test Item  Value  Reference Range  Comments

 

 PROTIME (BEAKER) (test code=759)  14.1 seconds  11.7-14.7  

 

 INR (BEAKER) (test code=370)  1.1  <=5.9  

 

 PARTIAL THROMBOPLASTIN TIME (BEAKER) (test  37.0 seconds  22.5-36.0  



 code=760)      



RECOMMENDED COUMADIN/WARFARIN INR THERAPY RANGESSTANDARD DOSE: 2.0 - 3.0   
Includes: PROPHYLAXIS forvenous thrombosis, systemic embolization; TREATMENT 
for venous thrombosis and/or pulmonary embolus.HIGH RISK: Target INR is 2.5-3.5 
for patients with mechanical heart valves.PROTHROMBIN [id number] 06:20:
00





 Test Item  Value  Reference Range  Comments

 

 PROTIME (BEAKER) (test code=759)  14.1 seconds  11.7-14.7  

 

 INR (BEAKER) (test code=370)  1.1  <=5.9  



RECOMMENDED COUMADIN/WARFARIN INR THERAPY RANGESSTANDARD DOSE: 2.0 - 3.0   
Includes: PROPHYLAXIS forvenous thrombosis, systemic embolization; TREATMENT 
for venous thrombosis and/or pulmonary embolus.HIGH RISK: Target INR is 2.5-3.5 
for patients with mechanical heart valves.[id number] 23:38:00





 Test Item  Value  Reference Range  Comments

 

 PROTIME (BEAKER) (test code=759)  15.2 seconds  11.7-14.7  

 

 INR (BEAKER) (test code=370)  1.2  <=5.9  

 

 PARTIAL THROMBOPLASTIN TIME (BEAKER) (test  36.5 seconds  22.5-36.0  



 code=760)      



RECOMMENDED COUMADIN/WARFARIN INR THERAPY RANGESSTANDARD DOSE: 2.0 - 3.0   
Includes: PROPHYLAXIS forvenous thrombosis, systemic embolization; TREATMENT 
for venous thrombosis and/or pulmonary embolus.HIGH RISK: Target INR is 2.5-3.5 
for patients with mechanical heart valves.BASIC METABOLIC [id number] 23:33:
00





 Test Item  Value  Reference Range  Comments

 

 SODIUM (BEAKER) (test  137 meq/L  136-145  



 mdie=483)      

 

 POTASSIUM (BEAKER) (test  5.7 meq/L  3.5-5.1  Specimen markedly



 code=379)      hemolyzed

 

 CHLORIDE (BEAKER) (test  105 meq/L    



 code=382)      

 

 CO2 (BEAKER) (test  25 meq/L  22-29  



 code=355)      

 

 BLOOD UREA NITROGEN  13 mg/dL  7-21  



 (BEAKER) (test code=354)      

 

 CREATININE (BEAKER) (test  0.84 mg/dL  0.57-1.25  Specimen markedly



 code=358)      hemolyzed

 

 GLUCOSE RANDOM (BEAKER)  86 mg/dL    



 (test code=652)      

 

 CALCIUM (BEAKER) (test  8.9 mg/dL  8.4-10.2  



 code=697)      

 

 EGFR (BEAKER) (test  66 mL/min/1.73 sq m    ESTIMATED GFR IS NOT AS



 code=1092)      ACCURATE AS CREATININE



       CLEARANCE IN PREDICTING



       GLOMERULAR FILTRATION



       RATE. ESTIMATED GFR IS



       NOT APPLICABLE FOR



       DIALYSIS PATIENTS.



CBC W/PLT COUNT &amp; AUTO [id number] 23:19:00





 Test Item  Value  Reference Range  Comments

 

 WHITE BLOOD CELL COUNT (BEAKER) (test code=775)  7.0 K/ L  4.0-10.0  

 

 RED BLOOD CELL COUNT (BEAKER) (test code=761)  3.76 M/ L  4.00-5.00  

 

 HEMOGLOBIN (BEAKER) (test code=410)  12.7 GM/DL  12.0-15.0  

 

 HEMATOCRIT (BEAKER) (test code=411)  36.9 %  36.0-45.0  

 

 MEAN CORPUSCULAR VOLUME (BEAKER) (test code=753)  98.2 fL  82.0-99.0  

 

 MEAN CORPUSCULAR HEMOGLOBIN (BEAKER) (test  33.7 pg  27.0-33.0  



 code=751)      

 

 MEAN CORPUSCULAR HEMOGLOBIN CONC (BEAKER) (test  34.4 GM/DL  32.0-36.0  



 code=752)      

 

 RED CELL DISTRIBUTION WIDTH (BEAKER) (test  12.2 %  10.3-14.2  



 code=412)      

 

 PLATELET COUNT (BEAKER) (test code=756)  241 K/CU MM  150-430  

 

 MEAN PLATELET VOLUME (BEAKER) (test code=754)  7.2 fL  6.5-10.5  

 

 NUCLEATED RED BLOOD CELLS (BEAKER) (test  0 /100 WBC  0-0  



 code=413)      

 

 NEUTROPHILS RELATIVE PERCENT (BEAKER) (test  58 %    



 code=429)      

 

 LYMPHOCYTES RELATIVE PERCENT (BEAKER) (test  34 %    



 code=430)      

 

 MONOCYTES RELATIVE PERCENT (BEAKER) (test  7 %    



 code=431)      

 

 EOSINOPHILS RELATIVE PERCENT (BEAKER) (test  0 %    



 code=432)      

 

 BASOPHILS RELATIVE PERCENT (BEAKER) (test  1 %    



 code=437)      

 

 NEUTROPHILS ABSOLUTE COUNT (BEAKER) (test  4.08 K/ L  1.80-8.00  



 code=670)      

 

 LYMPHOCYTES ABSOLUTE COUNT (BEAKER) (test  2.36 K/ L  1.48-4.50  



 code=414)      

 

 MONOCYTES ABSOLUTE COUNT (BEAKER) (test  0.52 K/ L  0.00-1.30  



 code=415)      

 

 EOSINOPHILS ABSOLUTE COUNT (BEAKER) (test  0.02 K/ L  0.00-0.50  



 code=416)      

 

 BASOPHILS ABSOLUTE COUNT (BEAKER) (test  0.05 K/ L  0.00-0.20  



 code=417)      



0.00POCT-P2Y12 PLATELET [id number] 20:57:00





 Test Item  Value  Reference Range  Comments

 

 POC-P2Y12 PLATELET AGG (BEAKER) (test code=2303)  31 PRU    



RANGE INFORMATION: PRU reference range is 194-418. Post Drug Results: Lower PRU 
levels are associated with expected antiplatelet effect. Values may be below 
the stated reference range above. The post-drug PRU values reported in the 
[employer] P2Y12 package insert are .URINALYSIS W/ [id number] 09
:22:00





 Test Item  Value  Reference Range  Comments

 

 COLOR (BEAKER) (test code=470)  Light Yellow    

 

 CLARITY (BEAKER) (test code=469)  Hazy    

 

 SPECIFIC GRAVITY UA (BEAKER) (test  1.009  1.001-1.035  



 code=468)      

 

 PH UA (BEAKER) (test code=467)  5.5  5.0-8.0  

 

 PROTEIN UA (BEAKER) (test code=464)  Negative  Negative  

 

 GLUCOSE UA (BEAKER) (test code=365)  Negative  Negative  

 

 KETONES UA (BEAKER) (test code=371)  Negative  Negative  

 

 BILIRUBIN UA (BEAKER) (test  Negative  Negative  



 code=462)      

 

 BLOOD UA (BEAKER) (test code=461)  Small  Negative  

 

 NITRITE UA (BEAKER) (test code=465)  Positive  Negative  

 

 LEUKOCYTE ESTERASE UA (BEAKER)  Large  Negative  



 (test code=466)      

 

 UROBILINOGEN UA (BEAKER) (test  0.2 mg/dL  0.2-1.0  



 code=463)      

 

 RBC UA (BEAKER) (test code=519)  12 /HPF    

 

 WBC UA (BEAKER) (test code=520)  92 /HPF    

 

 BACTERIA (BEAKER) (test wbqk=308)  Rare    

 

 MUCUS (BEAKER) (test code=1574)  Rare    

 

 SQUAMOUS EPITHELIAL (BEAKER) (test  < /HPF    



 code=516)      

 

 YEAST (BEAKER) (test code=1585)  Occasional    

 

 SOURCE(BEAKER) (test code=4485)  Urine, Straight Catheter    



[id number] 04:55:00





 Test Item  Value  Reference Range  Comments

 

 PHOSPHORUS (BEAKER) (test code=604)  3.3 mg/dL  2.3-4.7  



[id number] 04:55:00





 Test Item  Value  Reference Range  Comments

 

 MAGNESIUM (BEAKER) (test code=627)  1.6 mg/dL  1.6-2.6  



BASIC METABOLIC [id number] 04:55:00





 Test Item  Value  Reference Range  Comments

 

 SODIUM (BEAKER) (test  140 meq/L  136-145  



 oubm=889)      

 

 POTASSIUM (BEAKER) (test  3.4 meq/L  3.5-5.1  



 code=379)      

 

 CHLORIDE (BEAKER) (test  107 meq/L    



 code=382)      

 

 CO2 (BEAKER) (test  22 meq/L  22-29  



 code=355)      

 

 BLOOD UREA NITROGEN  8 mg/dL  7-21  



 (BEAKER) (test code=354)      

 

 CREATININE (BEAKER) (test  0.71 mg/dL  0.57-1.25  



 code=358)      

 

 GLUCOSE RANDOM (BEAKER)  109 mg/dL    



 (test code=652)      

 

 CALCIUM (BEAKER) (test  8.6 mg/dL  8.4-10.2  



 code=697)      

 

 EGFR (BEAKER) (test  80 mL/min/1.73 sq m    ESTIMATED GFR IS NOT AS



 code=1092)      ACCURATE AS CREATININE



       CLEARANCE IN PREDICTING



       GLOMERULAR FILTRATION



       RATE. ESTIMATED GFR IS



       NOT APPLICABLE FOR



       DIALYSIS PATIENTS.



CBC W/PLT COUNT &amp; AUTO [id number] 04:52:00





 Test Item  Value  Reference Range  Comments

 

 WHITE BLOOD CELL COUNT (BEAKER) (test code=775)  7.5 K/ L  4.0-10.0  

 

 RED BLOOD CELL COUNT (BEAKER) (test code=761)  4.04 M/ L  4.00-5.00  

 

 HEMOGLOBIN (BEAKER) (test code=410)  12.9 GM/DL  12.0-15.0  

 

 HEMATOCRIT (BEAKER) (test code=411)  39.7 %  36.0-45.0  

 

 MEAN CORPUSCULAR VOLUME (BEAKER) (test code=753)  98.3 fL  82.0-99.0  

 

 MEAN CORPUSCULAR HEMOGLOBIN (BEAKER) (test  31.9 pg  27.0-33.0  



 code=751)      

 

 MEAN CORPUSCULAR HEMOGLOBIN CONC (BEAKER) (test  32.5 GM/DL  32.0-36.0  



 code=752)      

 

 RED CELL DISTRIBUTION WIDTH (BEAKER) (test  12.0 %  10.3-14.2  



 code=412)      

 

 PLATELET COUNT (BEAKER) (test code=756)  196 K/CU MM  150-430  

 

 MEAN PLATELET VOLUME (BEAKER) (test code=754)  7.2 fL  6.5-10.5  

 

 NUCLEATED RED BLOOD CELLS (BEAKER) (test  0 /100 WBC  0-0  



 code=413)      

 

 NEUTROPHILS RELATIVE PERCENT (BEAKER) (test  81 %    



 code=429)      

 

 LYMPHOCYTES RELATIVE PERCENT (BEAKER) (test  14 %    



 code=430)      

 

 MONOCYTES RELATIVE PERCENT (BEAKER) (test  6 %    



 code=431)      

 

 EOSINOPHILS RELATIVE PERCENT (BEAKER) (test  0 %    



 code=432)      

 

 BASOPHILS RELATIVE PERCENT (BEAKER) (test  0 %    



 code=437)      

 

 NEUTROPHILS ABSOLUTE COUNT (BEAKER) (test  6.03 K/ L  1.80-8.00  



 code=670)      

 

 LYMPHOCYTES ABSOLUTE COUNT (BEAKER) (test  1.01 K/ L  1.48-4.50  



 code=414)      

 

 MONOCYTES ABSOLUTE COUNT (BEAKER) (test  0.42 K/ L  0.00-1.30  



 code=415)      

 

 EOSINOPHILS ABSOLUTE COUNT (BEAKER) (test  0.01 K/ L  0.00-0.50  



 code=416)      

 

 BASOPHILS ABSOLUTE COUNT (BEAKER) (test  0.01 K/ L  0.00-0.20  



 code=417)      



[id number] 16:01:00





 Test Item  Value  Reference Range  Comments

 

 ACTIVATED CLOTTING TIME  245 sec    [facility] [place] [address] [facility]



 (BE[facility]) (test code=441)      [place] [address]



[id number] 15:00:00





 Test Item  Value  Reference Range  Comments

 

 ACTIVATED CLOTTING TIME  255 sec    [facility] [place] [address] [facility]



 (BE[facility]ER) (test code=441)      [place] [address]



[id number] 14:44:00





 Test Item  Value  Reference Range  Comments

 

 ACTIVATED CLOTTING TIME  234 sec    [facility] [place] [address] [facility]



 (BE[facility]ER) (test code=441)      [place] [address]



[id number] 14:31:00





 Test Item  Value  Reference Range  Comments

 

 ACTIVATED CLOTTING TIME  219 sec    [facility] [place] [address] [facility]



 (BE[facility]ER) (test code=441)      [place] [address]



[id number] 14:31:00





 Test Item  Value  Reference Range  Comments

 

 ACTIVATED CLOTTING TIME  183 sec    TESTED AT [facility] [address]



 (BEAKER) (test code=441)      [facility] [address]



[id number] 13:22:00





 Test Item  Value  Reference Range  Comments

 

 ACTIVATED CLOTTING TIME  137 sec    TESTED AT [facility] [address]



 (BEAKER) (test code=441)      [facility] [address]



POCT-P2Y12 PLATELET [id number] 07:24:00





 Test Item  Value  Reference Range  Comments

 

 POC-P2Y12 PLATELET AGG (BEAKER) (test code=2303)  110 PRU    



RANGE INFORMATION: PRU reference range is 194-418. Post Drug Results: Lower PRU 
levels are associated with expected antiplatelet effect. Values may be below 
the stated reference range above. The post-drug PRU values reported in the 
[employer] P2Y12 package insert are .POCT-ASPIRIN PLATELET [id number]
[id number] 07:24:00





 Test Item  Value  Reference Range  Comments

 

 POC-ASPIRIN PLATELET AGG (BEAKER) (test code=2302)  402 ARU    



RANGE INFORMATION: 350-549 ARU Therapeutic range for platelet function.        
           550-700 ARU Non-Therapeutic range for platelet function.PLATELET 
AGGREGATION: FUNCTION [id number] 10:21:00





 Test Item  Value  Reference Range  Comments

 

 WEAK ADP RESULT(BEAKER) (test  92 %  60-91  



 code=2135)      

 

 PLATELET FUNCTION SCREEN  % indicates normal    



 INTERP (BEAKER) (test  platelet function    



 code=2173)      

 

 [id number] (BEAKER)  Meredith Reyes, MD (electronic    



 (test code=2622)  signature)    

 

 PLATELET COUNT AGG (BEAKER)  219 K/CU MM  150-430  



 (test ixdw=2041)      



COMPREHENSIVE METABOLIC [id number] 05:11:00





 Test Item  Value  Reference Range  Comments

 

 TOTAL PROTEIN (BEAKER)  6.5 gm/dL  6.0-8.3  



 (test code=770)      

 

 ALBUMIN (BEAKER) (test  3.6 g/dL  3.5-5.0  



 code=1145)      

 

 ALKALINE PHOSPHATASE  60 U/L    



 (BEAKER) (test code=346)      

 

 BILIRUBIN TOTAL (BEAKER)  1.1 mg/dL  0.2-1.2  



 (test code=377)      

 

 SODIUM (BEAKER) (test  136 meq/L  136-145  



 oube=929)      

 

 POTASSIUM (BEAKER) (test  3.8 meq/L  3.5-5.1  



 code=379)      

 

 CHLORIDE (BEAKER) (test  104 meq/L    



 code=382)      

 

 CO2 (BEAKER) (test  23 meq/L  22-29  



 code=355)      

 

 BLOOD UREA NITROGEN  13 mg/dL  7-21  



 (BEAKER) (test code=354)      

 

 CREATININE (BEAKER) (test  0.79 mg/dL  0.57-1.25  



 code=358)      

 

 GLUCOSE RANDOM (BEAKER)  89 mg/dL    



 (test code=652)      

 

 CALCIUM (BEAKER) (test  9.2 mg/dL  8.4-10.2  



 code=697)      

 

 AST (SGOT) (BEAKER) (test  19 U/L  5-34  



 code=353)      

 

 ALT (SGPT) (BEAKER) (test  8 U/L  6-55  



 code=347)      

 

 EGFR (BEAKER) (test  71 mL/min/1.73 sq m    ESTIMATED GFR IS NOT AS



 code=1092)      ACCURATE AS CREATININE



       CLEARANCE IN PREDICTING



       GLOMERULAR FILTRATION



       RATE. ESTIMATED GFR IS



       NOT APPLICABLE FOR



       DIALYSIS PATIENTS.



[id number] 05:04:00





 Test Item  Value  Reference Range  Comments

 

 PARTIAL THROMBOPLASTIN TIME (BEAKER) (test  37.9 seconds  22.5-36.0  



 code=760)      



[id number] 16:56:00





 Test Item  Value  Reference Range  Comments

 

 PARTIAL THROMBOPLASTIN TIME (BEAKER) (test  39.4 seconds  22.5-36.0  



 code=760)      



PROTHROMBIN [id number] 16:55:00





 Test Item  Value  Reference Range  Comments

 

 PROTIME (BEAKER) (test code=759)  14.4 seconds  11.7-14.7  

 

 INR (BEAKER) (test code=370)  1.1  <=5.9  



RECOMMENDED COUMADIN/WARFARIN INR THERAPY RANGESSTANDARD DOSE: 2.0 - 3.0   
Includes: PROPHYLAXIS forvenous thrombosis, systemic embolization; TREATMENT 
for venous thrombosis and/or pulmonary embolus.HIGH RISK: Target INR is 2.5-3.5 
for patients with mechanical heart valves.COMPREHENSIVE METABOLIC [id number]
[id number] 02:20:00





 Test Item  Value  Reference Range  Comments

 

 TOTAL PROTEIN (BEAKER)  7.0 gm/dL  6.0-8.3  



 (test code=770)      

 

 ALBUMIN (BEAKER) (test  3.8 g/dL  3.5-5.0  



 code=1145)      

 

 ALKALINE PHOSPHATASE  68 U/L    



 (BEAKER) (test code=346)      

 

 BILIRUBIN TOTAL (BEAKER)  0.9 mg/dL  0.2-1.2  



 (test code=377)      

 

 SODIUM (BEAKER) (test  137 meq/L  136-145  



 efqb=389)      

 

 POTASSIUM (BEAKER) (test  3.5 meq/L  3.5-5.1  



 code=379)      

 

 CHLORIDE (BEAKER) (test  104 meq/L    



 code=382)      

 

 CO2 (BEAKER) (test  23 meq/L  22-29  



 code=355)      

 

 BLOOD UREA NITROGEN  10 mg/dL  7-21  



 (BEAKER) (test code=354)      

 

 CREATININE (BEAKER) (test  0.81 mg/dL  0.57-1.25  



 code=358)      

 

 GLUCOSE RANDOM (BEAKER)  106 mg/dL    



 (test code=652)      

 

 CALCIUM (BEAKER) (test  9.7 mg/dL  8.4-10.2  



 code=697)      

 

 AST (SGOT) (BEAKER) (test  21 U/L  5-34  



 code=353)      

 

 ALT (SGPT) (BEAKER) (test  8 U/L  6-55  



 code=347)      

 

 EGFR (BEAKER) (test  69 mL/min/1.73 sq m    ESTIMATED GFR IS NOT AS



 code=1092)      ACCURATE AS CREATININE



       CLEARANCE IN PREDICTING



       GLOMERULAR FILTRATION



       RATE. ESTIMATED GFR IS



       NOT APPLICABLE FOR



       DIALYSIS PATIENTS.



HEMOGLOBIN [id number] 07:40:00





 Test Item  Value  Reference Range  Comments

 

 HEMOGLOBIN A1C (BEAKER) (test code=368)  4.9 %  4.3-6.1  



VITAMIN [id number] 06:01:00





 Test Item  Value  Reference Range  Comments

 

 VITAMIN B12 (BEAKER) (test code=774)  826 pg/mL  213-816  



TSH/FREE T4 IF [id number] 06:01:00





 Test Item  Value  Reference Range  Comments

 

 THYROID STIMULATING HORMONE (BEAKER) (test  1.37 uIU/mL  0.35-4.94  



 code=772)      



CALCIUM, [id number] 05:48:00





 Test Item  Value  Reference Range  Comments

 

 CALCIUM IONIZED (BEAKER) (test code=698)  1.11 mmol/L  1.12-1.27  

 

 PH, BLOOD (BEAKER) (test code=1810)  7.41    



[id number] 05:33:00





 Test Item  Value  Reference Range  Comments

 

 PHOSPHORUS (BEAKER) (test code=604)  3.3 mg/dL  2.3-4.7  



[id number] 05:33:00





 Test Item  Value  Reference Range  Comments

 

 MAGNESIUM (BEAKER) (test code=627)  2.0 mg/dL  1.6-2.6  



COMPREHENSIVE METABOLIC [id number] 05:33:00





 Test Item  Value  Reference Range  Comments

 

 TOTAL PROTEIN (BEAKER)  5.9 gm/dL  6.0-8.3  



 (test code=770)      

 

 ALBUMIN (BEAKER) (test  3.3 g/dL  3.5-5.0  



 code=1145)      

 

 ALKALINE PHOSPHATASE  56 U/L    



 (BEAKER) (test code=346)      

 

 BILIRUBIN TOTAL (BEAKER)  0.6 mg/dL  0.2-1.2  



 (test code=377)      

 

 SODIUM (BEAKER) (test  140 meq/L  136-145  



 czah=798)      

 

 POTASSIUM (BEAKER) (test  3.7 meq/L  3.5-5.1  



 code=379)      

 

 CHLORIDE (BEAKER) (test  106 meq/L    



 code=382)      

 

 CO2 (BEAKER) (test  27 meq/L  22-29  



 code=355)      

 

 BLOOD UREA NITROGEN  13 mg/dL  7-21  



 (BEAKER) (test code=354)      

 

 CREATININE (BEAKER) (test  0.79 mg/dL  0.57-1.25  



 code=358)      

 

 GLUCOSE RANDOM (BEAKER)  95 mg/dL    



 (test code=652)      

 

 CALCIUM (BEAKER) (test  8.7 mg/dL  8.4-10.2  



 code=697)      

 

 AST (SGOT) (BEAKER) (test  16 U/L  5-34  



 code=353)      

 

 ALT (SGPT) (BEAKER) (test  7 U/L  6-55  



 code=347)      

 

 EGFR (BEAKER) (test  71 mL/min/1.73 sq m    ESTIMATED GFR IS NOT AS



 code=1092)      ACCURATE AS CREATININE



       CLEARANCE IN PREDICTING



       GLOMERULAR FILTRATION



       RATE. ESTIMATED GFR IS



       NOT APPLICABLE FOR



       DIALYSIS PATIENTS.



LIPID [id number] 05:33:00





 Test Item  Value  Reference Range  Comments

 

 TRIGLYCERIDES (BEAKER) (test code=540)  57 mg/dL    

 

 CHOLESTEROL (BEAKER) (test code=631)  204 mg/dL    

 

 HDL CHOLESTEROL (BEAKER) (test code=976)  62 mg/dL    

 

 LDL CHOLESTEROL CALCULATED (BEAKER) (test  131 mg/dL    



 code=633)      



Triglyceride Reference Range:   Low Risk         &lt;150   Borderline    150-
199   High Risk     200-499   Very High Risk  &gt;=500Cholesterol Reference 
Range:   Low Risk         &lt;200   Borderline 200-239    High Risk        &gt;
240HDL Cholesterol Reference Range:   Low Risk         &gt;=60   High Risk     
    &lt;40LDL Cholesterol Reference Range:   Optimal          &lt;100   Near 
Optimal  100-129   Borderline    130-159   High          160-189   Very High   
    &gt;=190CBC W/PLT COUNT &amp; AUTO [id number] 04:51:00





 Test Item  Value  Reference Range  Comments

 

 WHITE BLOOD CELL COUNT (BEAKER) (test code=775)  4.8 K/ L  4.0-10.0  

 

 RED BLOOD CELL COUNT (BEAKER) (test code=761)  4.01 M/ L  4.00-5.00  

 

 HEMOGLOBIN (BEAKER) (test code=410)  13.4 GM/DL  12.0-15.0  

 

 HEMATOCRIT (BEAKER) (test code=411)  39.0 %  36.0-45.0  

 

 MEAN CORPUSCULAR VOLUME (BEAKER) (test code=753)  97.5 fL  82.0-99.0  

 

 MEAN CORPUSCULAR HEMOGLOBIN (BEAKER) (test  33.5 pg  27.0-33.0  



 code=751)      

 

 MEAN CORPUSCULAR HEMOGLOBIN CONC (BEAKER) (test  34.4 GM/DL  32.0-36.0  



 code=752)      

 

 RED CELL DISTRIBUTION WIDTH (BEAKER) (test  12.8 %  10.3-14.2  



 code=412)      

 

 PLATELET COUNT (BEAKER) (test code=756)  193 K/CU MM  150-430  

 

 MEAN PLATELET VOLUME (BEAKER) (test code=754)  7.0 fL  6.5-10.5  

 

 NUCLEATED RED BLOOD CELLS (BEAKER) (test  0 /100 WBC  0-0  



 code=413)      

 

 NEUTROPHILS RELATIVE PERCENT (BEAKER) (test  50 %    



 code=429)      

 

 LYMPHOCYTES RELATIVE PERCENT (BEAKER) (test  40 %    



 code=430)      

 

 MONOCYTES RELATIVE PERCENT (BEAKER) (test  9 %    



 code=431)      

 

 EOSINOPHILS RELATIVE PERCENT (BEAKER) (test  0 %    



 code=432)      

 

 BASOPHILS RELATIVE PERCENT (BEAKER) (test  0 %    



 code=437)      

 

 NEUTROPHILS ABSOLUTE COUNT (BEAKER) (test  2.38 K/ L  1.80-8.00  



 code=670)      

 

 LYMPHOCYTES ABSOLUTE COUNT (BEAKER) (test  1.93 K/ L  1.48-4.50  



 code=414)      

 

 MONOCYTES ABSOLUTE COUNT (BEAKER) (test  0.44 K/ L  0.00-1.30  



 code=415)      

 

 EOSINOPHILS ABSOLUTE COUNT (BEAKER) (test  0.01 K/ L  0.00-0.50  



 code=416)      

 

 BASOPHILS ABSOLUTE COUNT (BEAKER) (test  0.02 K/ L  0.00-0.20  



 code=417)      



0.00COMPREHENSIVE METABOLIC [id number] 22:47:00





 Test Item  Value  Reference Range  Comments

 

 TOTAL PROTEIN (BEAKER)  5.8 gm/dL  6.0-8.3  



 (test code=770)      

 

 ALBUMIN (BEAKER) (test  3.2 g/dL  3.5-5.0  



 code=1145)      

 

 ALKALINE PHOSPHATASE  56 U/L    



 (BEAKER) (test code=346)      

 

 BILIRUBIN TOTAL (BEAKER)  0.5 mg/dL  0.2-1.2  



 (test code=377)      

 

 SODIUM (BEAKER) (test  140 meq/L  136-145  



 mjzm=580)      

 

 POTASSIUM (BEAKER) (test  3.7 meq/L  3.5-5.1  



 code=379)      

 

 CHLORIDE (BEAKER) (test  108 meq/L    



 code=382)      

 

 CO2 (BEAKER) (test  25 meq/L  22-29  



 code=355)      

 

 BLOOD UREA NITROGEN  11 mg/dL  7-21  



 (BEAKER) (test code=354)      

 

 CREATININE (BEAKER) (test  0.83 mg/dL  0.57-1.25  



 code=358)      

 

 GLUCOSE RANDOM (BEAKER)  94 mg/dL    



 (test code=652)      

 

 CALCIUM (BEAKER) (test  8.6 mg/dL  8.4-10.2  



 code=697)      

 

 AST (SGOT) (BEAKER) (test  15 U/L  5-34  



 code=353)      

 

 ALT (SGPT) (BEAKER) (test  7 U/L  6-55  



 code=347)      

 

 EGFR (JOVI) (test  67 mL/min/1.73 sq m    ESTIMATED GFR IS NOT AS



 code=1092)      ACCURATE AS CREATININE



       CLEARANCE IN PREDICTING



       GLOMERULAR FILTRATION



       RATE. ESTIMATED GFR IS



       NOT APPLICABLE FOR



       DIALYSIS PATIENTS.

## 2019-03-24 NOTE — RAD REPORT
EXAM DESCRIPTION:  CT - CTHCSPWOC - 3/24/2019 10:02 am

 

CLINICAL HISTORY:  Trauma, head and neck injury.

PAIN

 

COMPARISON:  Neck Angio dated 5/27/2017; Head C Spine Mpr Wo Con dated 11/19/2016

 

TECHNIQUE:  Axial 5 mm thick images of the head were obtained.

 

Axial 2 mm thick images of the cervical spine were obtained with sagittal and coronal reconstruction 
images generated and reviewed.

 

All CT scans are performed using dose optimization technique as appropriate and may include automated
 exposure control or mA/KV adjustment according to patient size.

 

FINDINGS:  CT HEAD WITHOUT CONTRAST:

 

No acute hemorrhage, hydrocephalus or extra-axial collection is identified.Mild generalized brain atr
ophy is present with mild periventricular and deep white matter chronic microvascular ischemic change
s.No areas of brain edema or midline shift. Aneurysm coils seen along the right aspect of the [place] 
[place].

 

The paranasal sinuses and mastoids are clear.The calvarium is intact.

 

CT CERVICAL SPINE WITHOUT CONTRAST:

 

No fracture or subluxation.Mild lower cervical degenerative changes.No prevertebral soft tissues swel
ling is identified.

 

IMPRESSION:  No acute intracranial or cervical spine findings.

## 2019-03-25 NOTE — RAD REPORT
EXAM DESCRIPTION:  RAD - Femur Right - 3/24/2019 11:43 am

 

CLINICAL HISTORY:  PAIN

Fall, trauma, right hip pain

 

COMPARISON:  None

 

FINDINGS:  AP pelvis, right hip and right femur- multiple projections are submitted.

 

No acute fracture or dislocation seen. 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

## 2019-03-25 NOTE — EKG
Test Date:    2019-03-24               Test Time:    11:44:41

Technician:   NALINI                                    

                                                     

MEASUREMENT RESULTS:                                       

Intervals:                                           

Rate:         86                                     

OR:           170                                    

QRSD:         78                                     

QT:           372                                    

QTc:          445                                    

Axis:                                                

P:            49                                     

OR:           170                                    

QRS:          22                                     

T:            48                                     

                                                     

INTERPRETIVE STATEMENTS:                                       

                                                     

Normal sinus rhythm

Possible Left atrial enlargement

Borderline ECG

Compared to ECG 02/04/2018 18:14:14

No significant changes



Electronically Signed On 03-25-19 08:50:39 CDT by Pipo Brunson

## 2019-03-25 NOTE — RAD REPORT
EXAM DESCRIPTION:  RAD - Hip Right 2 View - 3/24/2019 11:43 am

 

CLINICAL HISTORY:  PAIN

Fall, trauma, right hip pain

 

COMPARISON:  None

 

FINDINGS:  AP pelvis, right hip and right femur- multiple projections are submitted.

 

No acute fracture or dislocation seen. 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

## 2019-06-11 ENCOUNTER — HOSPITAL ENCOUNTER (EMERGENCY)
Dept: HOSPITAL 97 - ER | Age: 79
LOS: 1 days | Discharge: HOME | End: 2019-06-12
Payer: COMMERCIAL

## 2019-06-11 DIAGNOSIS — Z88.7: ICD-10-CM

## 2019-06-11 DIAGNOSIS — I10: ICD-10-CM

## 2019-06-11 DIAGNOSIS — Z79.82: ICD-10-CM

## 2019-06-11 DIAGNOSIS — Z88.8: ICD-10-CM

## 2019-06-11 DIAGNOSIS — Z88.5: ICD-10-CM

## 2019-06-11 DIAGNOSIS — S01.01XA: Primary | ICD-10-CM

## 2019-06-11 DIAGNOSIS — Z91.041: ICD-10-CM

## 2019-06-11 DIAGNOSIS — Z88.1: ICD-10-CM

## 2019-06-11 PROCEDURE — 70450 CT HEAD/BRAIN W/O DYE: CPT

## 2019-06-11 PROCEDURE — 99284 EMERGENCY DEPT VISIT MOD MDM: CPT

## 2019-06-11 PROCEDURE — 72170 X-RAY EXAM OF PELVIS: CPT

## 2019-06-11 NOTE — XMS REPORT
Patient Summary Document

 Created on:2019



Patient:SHAHIDA ESTRADA

Sex:Female

:1940

External Reference #:[id number]





Demographics







 Address  [address] [place] [address]

 

 Home Phone  [phone]

 

 Email Address  [place]

 

 Preferred Language  Unknown

 

 Marital Status  Unknown

 

 Mormonism Affiliation  Unknown

 

 Race  Unknown

 

 Additional Race(s)  Unavailable

 

 Ethnic Group  Unknown









Author







 Organization  [facility]

 

 Address  [address]



   [place] [address]

 

 Phone  [phone]









Care Team Providers







 Name  Role  Phone

 

 MIGUE AMIN  Unavailable  Unavailable

 

 CHATA CALLE  Unavailable  Unavailable

 

 CIVUNIGUNTA, MEJIA  Unavailable  Unavailable









Problems

This patient has no known problems.



Allergies, Adverse Reactions, Alerts

This patient has no known allergies or adverse reactions.



Medications

This patient has no known medications.



Results







 Test Description  Test Time  Test Comments  Text Results  Atomic Results  
Result Comments









 NV, ANGIOGRAM,  2017  Reason for  FINAL REPORT PATIENT ID:  



 CEREBRAL  11:48:00  Exam:->cerebral  [id number] DATE:  



     aneurysm unruptured  2017NAME: Shahida EstradaATTENDING:  



       Migue Amin MDFIRST  



       ASSISTANT: CESARIO LooREOPERATIVE DIAGNOSIS:  



       Unruptured right posterior  



       communicating artery  



       aneurysm status post stent  



       assisted  



       coilingPOSTOPERATIVE  



       DIAGNOSIS: Unruptured right  



       posterior communicating  



       artery aneurysm status post  



       stent assisted  



       coilingPROCEDURE PERFORMED:  



       Diagnostic Cerebral  



       AngiogramANESTHESIA:  



       MACCOMPLICATIONS:  



       NoneESTIMATED BLOOD LOSS:  



       Less than 15ml ARTERIAL  



       VESSELS STUDIED:RIGHT  



       SUBCLAVIAN ARTERY (x1)RIGHT  



       COMMON CAROTID ARTERY  



       (CERVICAL x2)RIGHT COMMON  



       CAROTID ARTERY (CRANIAL  



       x3)RIGHT COMMON FEMORAL  



       ARTERY (x1) MATERIALS  



       EMPLOYED:1. 5 [employer] short  



       sheath 2. 4 [employer]  



       Berenstein catheter3.  



       Bentson guidewire4. Terumo  



       0.035 LT glidewire5. 5  



       [place] Mynx device  



       INDICATIONS: 77-year-old  



       female with hypertension  



       who is status post  



       endovascular treatment of  



       an unruptured right  



       posterior communicating  



       artery aneurysm with  



       stent-assisted coiling on  



       May 19, 2017. She presents  



       for follow-up cerebral  



       angiogram. The indications  



       for the procedure as well  



       as the risks, benefits and  



       alternatives were discussed  



       with the patient and the  



       family. The risks discussed  



       included but were not  



       limited to stroke,  



       intracranial hemorrhage,  



       injury to the cervical  



       femoral or aortic vessels,  



       contrast reaction, kidney  



       to toxicity, groin  



       hematoma, weakness  



       paralysis and even death.  



       They demonstrated  



       understanding of the risk  



       benefit profile and agreed  



       to proceed. PROCEDURE:  



       After appropriate consent  



       was obtained, the patient  



       was brought to the  



       angiographic suite and  



       cardiopulmonary monitoring  



       was placed. The anesthesia  



       team performed light  



       sedation. A timeout was  



       performed. Both groins were  



       prepped and draped in the  



       usual sterile fashion.  



       After administration of 10  



       mL of 2% lidocaine, a  



       micropuncture needle was  



       used to perform a single  



       wall puncture of the right  



       common femoral artery, a  



       micropuncture sheath was  



       inserted, and an angled DSA  



       angiogram was performed  



       through the sheath. Once  



       good location of the  



       puncture site was  



       confirmed, a 5 Guinean short  



       sheath was inserted over a  



       "[employer] wire and was  



       maintained on heparinized  



       saline flush throughout the  



       remainder of the procedure.  



        Using coaxial technique, a  



       preflushed Berenstein  



       catheter on constant  



       heparinized saline flush  



       was advanced over the  



       Glidewire into the  



       descending aorta, the  



       Glidewire was removed, the  



       catheter back bled, flushed  



       in usual fashion and the  



       catheter was maintained on  



       heparinized flushed  



       throughout duration of the  



       case. Using coaxial  



       technique, the catheter was  



       advanced into the aortic  



       arch and with the aid of  



       roadmapping, digital  



       fluoroscopy, and careful  



       guidewire manipulation, the  



       arteries described below  



       were selectively  



       catheterized. Upon each  



       successive catheterization,  



       digital subtraction  



       angiography using the  



       appropriate rate and volume  



       of contrast in multiple  



       projections was performed.  



       At the conclusion of the  



       procedure, the catheter was  



       retracted into the aorta  



       and the images reviewed at  



       the outside workstation for  



       quality and content.  Then  



       the femoral sheath was  



       removed and hemostasis  



       achieved with a 5 [place]an  



       Mynx device and manual  



       compression. The patient  



       tolerated the procedure  



       well and was transported in  



       unchanged neurological  



       status without groin  



       hematoma and with good  



       distal lower extremity  



       pulses. The patient was  



       transferred to the recovery  



       area to be monitored as per  



       protocol.  FINDINGS: RIGHT  



       SUBCLAVIAN ARTERY (DSA, PA,  



       LATERAL ROADMAP, x1): There  



       is normal course and  



       caliber of the subclavian  



       artery with physiological  



       filling of its distal  



       branches. Limited  



       visualization in this  



       roadmap of the origins of  



       the right vertebral artery,  



       internal mamillary artery,  



       thyrocervical and  



       costocervical trunks.  



       RIGHT COMMON CAROTID ARTERY  



       (DSA, PA, LATERAL, CERVICAL  



       x2): Tortuous course of the  



       right common carotid  



       artery. The distal cervical  



       common carotid as well as  



       the origins of the right  



       internal and external  



       carotid arteries are widely  



       patent without evidence of  



       ulceration or stenosis. The  



       proximal portions of the  



       superficial temporal  



       artery, middle meningeal  



       artery, internal maxillary  



       artery, occipital artery  



       and their branches are  



       visualized and appear  



       normal. RIGHT COMMON  



       CAROTID ARTERY (DSA, PA,  



       LATERAL, MAGNIFIED OBLIQUE,  



       CRANIAL x3): Minimal  



       residual aneurysm neck in  



       the previously treated  



       right posterior  



       communicating artery  



       aneurysm, best visualized  



       on sequence A9. Normal  



       distal cervical, petrous,  



       cavernous and supraclinoid  



       internal carotid artery  



       with physiological filling  



       of the MCA and MAXIMINO  



       branches. Limited  



       visualization of the venous  



       phase. No other aneurysms  



       or other vascular lesions  



       are seen. There is no  



       significant atherosclerosis  



       or stenosis. Normal course  



       and caliber of the external  



       carotid artery and its  



       branches. There is a well  



       visualized superficial  



       temporal artery, middle  



       meningeal artery, internal  



       maxillary artery, occipital  



       artery and their branches.  



       RIGHT COMMON FEMORAL ARTERY  



       (DSA, PA, X 1): Normal  



       location of the puncture  



       site above the bifurcation  



       and below markers of the  



       inguinal ligament.  



       IMPRESSION: Minimal  



       residual aneurysm neck in  



       the previously treated  



       right posterior  



       communicating artery  



       aneurysm. No technical or  



       procedural complications  



       FACULTY ATTESTATION: I,  



       Migue Amin M.D., was  



       present for the entirety of  



       the procedure. I performed  



       or directly supervised all  



       aspects of the procedure. I  



       performed all critical  



       aspects of the case. I  



       interpreted the images and  



       reported the results.  



       Signed: Chaz Cox  



       Verified Date/Time:  



       2017 11:48:56 Reading  



       Location: [facility] Neuro  



       Angio Reading Room  



       Electronically signed by:  



       CHAZ COX on  



       2017 11:48 AM  









 BASIC METABOLIC PANEL  2017 11:56:00    









   Test Item  Value  Reference Range  Comments









 SODIUM (BEAKER) (test  136 meq/L  136-145  



 mual=392)      

 

 POTASSIUM (BEAKER) (test  3.9 meq/L  3.5-5.1  



 code=379)      

 

 CHLORIDE (BEAKER) (test  102 meq/L    



 code=382)      

 

 CO2 (BEAKER) (test code=355)  25 meq/L  22-29  

 

 BLOOD UREA NITROGEN (BEAKER)  20 mg/dL  7-21  



 (test code=354)      

 

 CREATININE (BEAKER) (test  0.82 mg/dL  0.57-1.25  



 code=358)      

 

 GLUCOSE RANDOM (BEAKER)  106 mg/dL    



 (test code=652)      

 

 CALCIUM (BEAKER) (test  9.6 mg/dL  8.4-10.2  



 code=697)      

 

 EGFR (BEAKER) (test  68 mL/min/1.73 sq m    ESTIMATED GFR IS NOT AS



 code=1092)      ACCURATE AS CREATININE



       CLEARANCE IN PREDICTING



       GLOMERULAR FILTRATION RATE.



       ESTIMATED GFR IS NOT



       APPLICABLE FOR DIALYSIS



       PATIENTS.



CBC WITH PLATELET COUNT + MANUAL [id number] 11:33:00





 Test Item  Value  Reference Range  Comments

 

 WHITE BLOOD CELL COUNT  5.0 K/ L  3.5-10.5  



 (BEAKER) (test code=775)      

 

 RED BLOOD CELL COUNT (BEAKER)  3.94 M/ L  3.93-5.22  



 (test code=761)      

 

 HEMOGLOBIN (BEAKER) (test  12.1 GM/DL  11.2-15.7  



 code=410)      

 

 HEMATOCRIT (BEAKER) (test  37.1 %  34.1-44.9  



 code=411)      

 

 MEAN CORPUSCULAR VOLUME  94.2 fL  79.4-94.8  



 (BEAKER) (test code=753)      

 

 MEAN CORPUSCULAR HEMOGLOBIN  30.7 pg  25.6-32.2  



 (BEAKER) (test code=751)      

 

 MEAN CORPUSCULAR HEMOGLOBIN  32.6 GM/DL  32.2-35.5  



 CONC (BEAKER) (test code=752)      

 

 RED CELL DISTRIBUTION WIDTH  12.7 %  11.7-14.4  



 (BEAKER) (test code=412)      

 

 PLATELET COUNT (BEAKER) (test  212 K/CU MM  150-450  



 flut=446)      

 

 MEAN PLATELET VOLUME (BEAKER)  9.4 fL  9.4-12.3  



 (test code=754)      

 

 NUCLEATED RED BLOOD CELLS  0 /100 WBC  0-0  



 (BEAKER) (test code=413)      

 

 IMMATURE GRANULOCYTES-RELATIVE  0 %  0-1  



 PERCENT (BEAKER) (test      



 code=2801)      

 

 NEUTROPHILS RELATIVE PERCENT  60 %    This is an appended report.



 (BEAKER) (test code=429)       These results have been



       appended to a previously



       final verified report.

 

 LYMPHOCYTES RELATIVE PERCENT  30 %    This is an appended report.



 (BEAKER) (test code=430)       These results have been



       appended to a previously



       final verified report.

 

 MONOCYTES RELATIVE PERCENT  9 %    This is an appended report.



 (BEAKER) (test code=431)       These results have been



       appended to a previously



       final verified report.

 

 EOSINOPHILS RELATIVE PERCENT  1 %    This is an appended report.



 (BEAKER) (test code=432)       These results have been



       appended to a previously



       final verified report.

 

 BASOPHILS RELATIVE PERCENT  1 %    This is an appended report.



 (BEAKER) (test code=437)       These results have been



       appended to a previously



       final verified report.

 

 NEUTROPHILS ABSOLUTE COUNT  2.97 K/ L  1.56-6.13  This is an appended report.



 (BEAKER) (test code=670)       These results have been



       appended to a previously



       final verified report.

 

 LYMPHOCYTES ABSOLUTE COUNT  1.46 K/ L  1.18-3.74  This is an appended report.



 (BEAKER) (test code=414)       These results have been



       appended to a previously



       final verified report.

 

 MONOCYTES ABSOLUTE COUNT  0.42 K/ L  0.24-0.36  This is an appended report.



 (BEAKER) (test code=415)       These results have been



       appended to a previously



       final verified report.

 

 EOSINOPHILS ABSOLUTE COUNT  0.05 K/ L  0.04-0.36  This is an appended report.



 (BEAKER) (test code=416)       These results have been



       appended to a previously



       final verified report.

 

 BASOPHILS ABSOLUTE COUNT  0.04 K/ L  0.01-0.08  This is an appended report.



 (BEAKER) (test code=417)       These results have been



       appended to a previously



       final verified report.



[id number] 11:30:00





 Test Item  Value  Reference Range  Comments

 

 PROTIME (BEAKER) (test code=759)  15.2 seconds  11.7-14.7  

 

 INR (BEAKER) (test code=370)  1.2  <=5.9  

 

 PARTIAL THROMBOPLASTIN TIME (BEAKER) (test  42.8 seconds  22.5-36.0  



 code=760)      



RECOMMENDED COUMADIN/WARFARIN INR THERAPY RANGESSTANDARD DOSE: 2.0 - 3.0   
Includes: PROPHYLAXIS forvenous thrombosis, systemic embolization; TREATMENT 
for venous thrombosis and/or pulmonary embolus.HIGH RISK: Target INR is 2.5-3.5 
for patients with mechanical heart valves.PLATELET AGGREGATION: FUNCTION 
[id number] 09:23:00





 Test Item  Value  Reference Range  Comments

 

 WEAK ADP RESULT(BEAKER) (test  5 %  60-91  



 code=2133)      

 

 PLATELET FUNCTION SCREEN  0-39% indicates marked platelet    



 INTERP (BEAKER) (test  dysfunction    



 code=2173)      

 

 [id number]  Nelly Zurita MD    



 (BEAKER) (test code=2622)  (electronic signature)    

 

 PLATELET COUNT AGG (BEAKER)  243 K/CU MM  150-430  



 (test code=2656)      



CBC W/PLT COUNT &amp; AUTO [id number] 07:07:00





 Test Item  Value  Reference Range  Comments

 

 WHITE BLOOD CELL COUNT (BEAKER) (test code=775)  5.9 K/ L  4.0-10.0  

 

 RED BLOOD CELL COUNT (BEAKER) (test code=761)  3.88 M/ L  4.00-5.00  

 

 HEMOGLOBIN (BEAKER) (test code=410)  12.5 GM/DL  12.0-15.0  

 

 HEMATOCRIT (BEAKER) (test code=411)  37.8 %  36.0-45.0  

 

 MEAN CORPUSCULAR VOLUME (BEAKER) (test code=753)  97.3 fL  82.0-99.0  

 

 MEAN CORPUSCULAR HEMOGLOBIN (BEAKER) (test  32.1 pg  27.0-33.0  



 code=751)      

 

 MEAN CORPUSCULAR HEMOGLOBIN CONC (BEAKER) (test  33.0 GM/DL  32.0-36.0  



 code=752)      

 

 RED CELL DISTRIBUTION WIDTH (BEAKER) (test  13.4 %  10.3-14.2  



 code=412)      

 

 PLATELET COUNT (BEAKER) (test code=756)  238 K/CU MM  150-430  

 

 MEAN PLATELET VOLUME (BEAKER) (test code=754)  7.2 fL  6.5-10.5  

 

 NUCLEATED RED BLOOD CELLS (BEAKER) (test  0 /100 WBC  0-0  



 code=413)      

 

 NEUTROPHILS RELATIVE PERCENT (BEAKER) (test  58 %    



 code=429)      

 

 LYMPHOCYTES RELATIVE PERCENT (BEAKER) (test  31 %    



 code=430)      

 

 MONOCYTES RELATIVE PERCENT (BEAKER) (test  10 %    



 code=431)      

 

 EOSINOPHILS RELATIVE PERCENT (BEAKER) (test  0 %    



 code=432)      

 

 BASOPHILS RELATIVE PERCENT (BEAKER) (test  1 %    



 code=437)      

 

 NEUTROPHILS ABSOLUTE COUNT (BEAKER) (test  3.36 K/ L  1.80-8.00  



 code=670)      

 

 LYMPHOCYTES ABSOLUTE COUNT (BEAKER) (test  1.84 K/ L  1.48-4.50  



 code=414)      

 

 MONOCYTES ABSOLUTE COUNT (BEAKER) (test  0.58 K/ L  0.00-1.30  



 code=415)      

 

 EOSINOPHILS ABSOLUTE COUNT (BEAKER) (test  0.01 K/ L  0.00-0.50  



 code=416)      

 

 BASOPHILS ABSOLUTE COUNT (BEAKER) (test  0.06 K/ L  0.00-0.20  



 code=417)      



0.00BASIC METABOLIC [id number] 06:36:00





 Test Item  Value  Reference Range  Comments

 

 SODIUM (BEAKER) (test  138 meq/L  136-145  



 ewdj=622)      

 

 POTASSIUM (BEAKER) (test  4.0 meq/L  3.5-5.1  Specimen slightly



 code=379)      hemolyzed

 

 CHLORIDE (BEAKER) (test  106 meq/L    



 code=382)      

 

 CO2 (BEAKER) (test  23 meq/L  22-29  



 code=355)      

 

 BLOOD UREA NITROGEN  13 mg/dL  7-21  



 (BEAKER) (test code=354)      

 

 CREATININE (BEAKER) (test  0.79 mg/dL  0.57-1.25  Specimen slightly



 code=358)      hemolyzed

 

 GLUCOSE RANDOM (BEAKER)  97 mg/dL    



 (test code=652)      

 

 CALCIUM (BEAKER) (test  9.0 mg/dL  8.4-10.2  



 code=697)      

 

 EGFR (BEAKER) (test  71 mL/min/1.73 sq m    ESTIMATED GFR IS NOT AS



 code=1092)      ACCURATE AS CREATININE



       CLEARANCE IN PREDICTING



       GLOMERULAR FILTRATION



       RATE. ESTIMATED GFR IS



       NOT APPLICABLE FOR



       DIALYSIS PATIENTS.



[id number] 06:21:00





 Test Item  Value  Reference Range  Comments

 

 PROTIME (BEAKER) (test code=759)  14.1 seconds  11.7-14.7  

 

 INR (BEAKER) (test code=370)  1.1  <=5.9  

 

 PARTIAL THROMBOPLASTIN TIME (BEAKER) (test  37.0 seconds  22.5-36.0  



 code=760)      



RECOMMENDED COUMADIN/WARFARIN INR THERAPY RANGESSTANDARD DOSE: 2.0 - 3.0   
Includes: PROPHYLAXIS forvenous thrombosis, systemic embolization; TREATMENT 
for venous thrombosis and/or pulmonary embolus.HIGH RISK: Target INR is 2.5-3.5 
for patients with mechanical heart valves.PROTHROMBIN [id number] 06:20:
00





 Test Item  Value  Reference Range  Comments

 

 PROTIME (BEAKER) (test code=759)  14.1 seconds  11.7-14.7  

 

 INR (BEAKER) (test code=370)  1.1  <=5.9  



RECOMMENDED COUMADIN/WARFARIN INR THERAPY RANGESSTANDARD DOSE: 2.0 - 3.0   
Includes: PROPHYLAXIS forvenous thrombosis, systemic embolization; TREATMENT 
for venous thrombosis and/or pulmonary embolus.HIGH RISK: Target INR is 2.5-3.5 
for patients with mechanical heart valves.PT/[id number] 23:38:00





 Test Item  Value  Reference Range  Comments

 

 PROTIME (BEAKER) (test code=759)  15.2 seconds  11.7-14.7  

 

 INR (BEAKER) (test code=370)  1.2  <=5.9  

 

 PARTIAL THROMBOPLASTIN TIME (BEAKER) (test  36.5 seconds  22.5-36.0  



 code=760)      



RECOMMENDED COUMADIN/WARFARIN INR THERAPY RANGESSTANDARD DOSE: 2.0 - 3.0   
Includes: PROPHYLAXIS forvenous thrombosis, systemic embolization; TREATMENT 
for venous thrombosis and/or pulmonary embolus.HIGH RISK: Target INR is 2.5-3.5 
for patients with mechanical heart valves.BASIC METABOLIC [id number] 23:33:
00





 Test Item  Value  Reference Range  Comments

 

 SODIUM (BEAKER) (test  137 meq/L  136-145  



 jagr=309)      

 

 POTASSIUM (BEAKER) (test  5.7 meq/L  3.5-5.1  Specimen markedly



 code=379)      hemolyzed

 

 CHLORIDE (BEAKER) (test  105 meq/L    



 code=382)      

 

 CO2 (BEAKER) (test  25 meq/L  22-29  



 code=355)      

 

 BLOOD UREA NITROGEN  13 mg/dL  7-21  



 (BEAKER) (test code=354)      

 

 CREATININE (BEAKER) (test  0.84 mg/dL  0.57-1.25  Specimen markedly



 code=358)      hemolyzed

 

 GLUCOSE RANDOM (BEAKER)  86 mg/dL    



 (test code=652)      

 

 CALCIUM (BEAKER) (test  8.9 mg/dL  8.4-10.2  



 code=697)      

 

 EGFR (BEAKER) (test  66 mL/min/1.73 sq m    ESTIMATED GFR IS NOT AS



 code=1092)      ACCURATE AS CREATININE



       CLEARANCE IN PREDICTING



       GLOMERULAR FILTRATION



       RATE. ESTIMATED GFR IS



       NOT APPLICABLE FOR



       DIALYSIS PATIENTS.



CBC W/PLT COUNT &amp; AUTO [id number] 23:19:00





 Test Item  Value  Reference Range  Comments

 

 WHITE BLOOD CELL COUNT (BEAKER) (test code=775)  7.0 K/ L  4.0-10.0  

 

 RED BLOOD CELL COUNT (BEAKER) (test code=761)  3.76 M/ L  4.00-5.00  

 

 HEMOGLOBIN (BEAKER) (test code=410)  12.7 GM/DL  12.0-15.0  

 

 HEMATOCRIT (BEAKER) (test code=411)  36.9 %  36.0-45.0  

 

 MEAN CORPUSCULAR VOLUME (BEAKER) (test code=753)  98.2 fL  82.0-99.0  

 

 MEAN CORPUSCULAR HEMOGLOBIN (BEAKER) (test  33.7 pg  27.0-33.0  



 code=751)      

 

 MEAN CORPUSCULAR HEMOGLOBIN CONC (BEAKER) (test  34.4 GM/DL  32.0-36.0  



 code=752)      

 

 RED CELL DISTRIBUTION WIDTH (BEAKER) (test  12.2 %  10.3-14.2  



 code=412)      

 

 PLATELET COUNT (BEAKER) (test code=756)  241 K/CU MM  150-430  

 

 MEAN PLATELET VOLUME (BEAKER) (test code=754)  7.2 fL  6.5-10.5  

 

 NUCLEATED RED BLOOD CELLS (BEAKER) (test  0 /100 WBC  0-0  



 code=413)      

 

 NEUTROPHILS RELATIVE PERCENT (BEAKER) (test  58 %    



 code=429)      

 

 LYMPHOCYTES RELATIVE PERCENT (BEAKER) (test  34 %    



 code=430)      

 

 MONOCYTES RELATIVE PERCENT (BEAKER) (test  7 %    



 code=431)      

 

 EOSINOPHILS RELATIVE PERCENT (BEAKER) (test  0 %    



 code=432)      

 

 BASOPHILS RELATIVE PERCENT (BEAKER) (test  1 %    



 code=437)      

 

 NEUTROPHILS ABSOLUTE COUNT (BEAKER) (test  4.08 K/ L  1.80-8.00  



 code=670)      

 

 LYMPHOCYTES ABSOLUTE COUNT (BEAKER) (test  2.36 K/ L  1.48-4.50  



 code=414)      

 

 MONOCYTES ABSOLUTE COUNT (BEAKER) (test  0.52 K/ L  0.00-1.30  



 code=415)      

 

 EOSINOPHILS ABSOLUTE COUNT (BEAKER) (test  0.02 K/ L  0.00-0.50  



 code=416)      

 

 BASOPHILS ABSOLUTE COUNT (BEAKER) (test  0.05 K/ L  0.00-0.20  



 code=417)      



0.00POCT-P2Y12 PLATELET [id number] 20:57:00





 Test Item  Value  Reference Range  Comments

 

 POC-P2Y12 PLATELET AGG (BEAKER) (test code=2303)  31 PRU    



RANGE INFORMATION: PRU reference range is 194-418. Post Drug Results: Lower PRU 
levels are associated with expected antiplatelet effect. Values may be below 
the stated reference range above. The post-drug PRU values reported in the 
[employer] P2Y12 package insert are .URINALYSIS W/ [id number] 09
:22:00





 Test Item  Value  Reference Range  Comments

 

 COLOR (BEAKER) (test code=470)  Light Yellow    

 

 CLARITY (BEAKER) (test code=469)  Hazy    

 

 SPECIFIC GRAVITY UA (BEAKER) (test  1.009  1.001-1.035  



 code=468)      

 

 PH UA (BEAKER) (test code=467)  5.5  5.0-8.0  

 

 PROTEIN UA (BEAKER) (test code=464)  Negative  Negative  

 

 GLUCOSE UA (BEAKER) (test code=365)  Negative  Negative  

 

 KETONES UA (BEAKER) (test code=371)  Negative  Negative  

 

 BILIRUBIN UA (BEAKER) (test  Negative  Negative  



 code=462)      

 

 BLOOD UA (BEAKER) (test code=461)  Small  Negative  

 

 NITRITE UA (BEAKER) (test code=465)  Positive  Negative  

 

 LEUKOCYTE ESTERASE UA (BEAKER)  Large  Negative  



 (test code=466)      

 

 UROBILINOGEN UA (BEAKER) (test  0.2 mg/dL  0.2-1.0  



 code=463)      

 

 RBC UA (BEAKER) (test code=519)  12 /HPF    

 

 WBC UA (BEAKER) (test code=520)  92 /HPF    

 

 BACTERIA (BEAKER) (test ukor=385)  Rare    

 

 MUCUS (BEAKER) (test code=1574)  Rare    

 

 SQUAMOUS EPITHELIAL (BEAKER) (test  < /HPF    



 code=516)      

 

 YEAST (BEAKER) (test code=1585)  Occasional    

 

 SOURCE(BEAKER) (test code=7595)  Urine, Straight Catheter    



[id number] 04:55:00





 Test Item  Value  Reference Range  Comments

 

 PHOSPHORUS (BEAKER) (test code=604)  3.3 mg/dL  2.3-4.7  



[id number] 04:55:00





 Test Item  Value  Reference Range  Comments

 

 MAGNESIUM (BEAKER) (test code=627)  1.6 mg/dL  1.6-2.6  



BASIC METABOLIC [id number] 04:55:00





 Test Item  Value  Reference Range  Comments

 

 SODIUM (BEAKER) (test  140 meq/L  136-145  



 jpkd=441)      

 

 POTASSIUM (BEAKER) (test  3.4 meq/L  3.5-5.1  



 code=379)      

 

 CHLORIDE (BEAKER) (test  107 meq/L    



 code=382)      

 

 CO2 (BEAKER) (test  22 meq/L  22-29  



 code=355)      

 

 BLOOD UREA NITROGEN  8 mg/dL  7-21  



 (BEAKER) (test code=354)      

 

 CREATININE (BEAKER) (test  0.71 mg/dL  0.57-1.25  



 code=358)      

 

 GLUCOSE RANDOM (BEAKER)  109 mg/dL    



 (test code=652)      

 

 CALCIUM (BEAKER) (test  8.6 mg/dL  8.4-10.2  



 code=697)      

 

 EGFR (BEAKER) (test  80 mL/min/1.73 sq m    ESTIMATED GFR IS NOT AS



 code=1092)      ACCURATE AS CREATININE



       CLEARANCE IN PREDICTING



       GLOMERULAR FILTRATION



       RATE. ESTIMATED GFR IS



       NOT APPLICABLE FOR



       DIALYSIS PATIENTS.



CBC W/PLT COUNT &amp; AUTO [id number] 04:52:00





 Test Item  Value  Reference Range  Comments

 

 WHITE BLOOD CELL COUNT (BEAKER) (test code=775)  7.5 K/ L  4.0-10.0  

 

 RED BLOOD CELL COUNT (BEAKER) (test code=761)  4.04 M/ L  4.00-5.00  

 

 HEMOGLOBIN (BEAKER) (test code=410)  12.9 GM/DL  12.0-15.0  

 

 HEMATOCRIT (BEAKER) (test code=411)  39.7 %  36.0-45.0  

 

 MEAN CORPUSCULAR VOLUME (BEAKER) (test code=753)  98.3 fL  82.0-99.0  

 

 MEAN CORPUSCULAR HEMOGLOBIN (BEAKER) (test  31.9 pg  27.0-33.0  



 code=751)      

 

 MEAN CORPUSCULAR HEMOGLOBIN CONC (BEAKER) (test  32.5 GM/DL  32.0-36.0  



 code=752)      

 

 RED CELL DISTRIBUTION WIDTH (BEAKER) (test  12.0 %  10.3-14.2  



 code=412)      

 

 PLATELET COUNT (BEAKER) (test code=756)  196 K/CU MM  150-430  

 

 MEAN PLATELET VOLUME (BEAKER) (test code=754)  7.2 fL  6.5-10.5  

 

 NUCLEATED RED BLOOD CELLS (BEAKER) (test  0 /100 WBC  0-0  



 code=413)      

 

 NEUTROPHILS RELATIVE PERCENT (BEAKER) (test  81 %    



 code=429)      

 

 LYMPHOCYTES RELATIVE PERCENT (BEAKER) (test  14 %    



 code=430)      

 

 MONOCYTES RELATIVE PERCENT (BEAKER) (test  6 %    



 code=431)      

 

 EOSINOPHILS RELATIVE PERCENT (BEAKER) (test  0 %    



 code=432)      

 

 BASOPHILS RELATIVE PERCENT (BEAKER) (test  0 %    



 code=437)      

 

 NEUTROPHILS ABSOLUTE COUNT (BEAKER) (test  6.03 K/ L  1.80-8.00  



 code=670)      

 

 LYMPHOCYTES ABSOLUTE COUNT (BEAKER) (test  1.01 K/ L  1.48-4.50  



 code=414)      

 

 MONOCYTES ABSOLUTE COUNT (BEAKER) (test  0.42 K/ L  0.00-1.30  



 code=415)      

 

 EOSINOPHILS ABSOLUTE COUNT (BEAKER) (test  0.01 K/ L  0.00-0.50  



 code=416)      

 

 BASOPHILS ABSOLUTE COUNT (BEAKER) (test  0.01 K/ L  0.00-0.20  



 code=417)      



[id number] 16:01:00





 Test Item  Value  Reference Range  Comments

 

 ACTIVATED CLOTTING TIME  245 sec    TESTED AT [address]



 (BEAKER) (test code=441)      [place] [address]



[id number] 15:00:00





 Test Item  Value  Reference Range  Comments

 

 ACTIVATED CLOTTING TIME  255 sec    [facility] [place] [address] [facility]



 (BE[facility]) (test code=441)      [place] [address]



[id number] 14:44:00





 Test Item  Value  Reference Range  Comments

 

 ACTIVATED CLOTTING TIME  234 sec    [facility] [place] [address] [facility]



 (BEAKER) (test code=441)      [place] [address]



[id number] 14:31:00





 Test Item  Value  Reference Range  Comments

 

 ACTIVATED CLOTTING TIME  219 sec    [facility] [place] [address] [facility]



 (BEAKER) (test code=441)      [place] [address]



[id number] 14:31:00





 Test Item  Value  Reference Range  Comments

 

 ACTIVATED CLOTTING TIME  183 sec    TESTED AT [facility] [address]



 (BEAKER) (test code=441)      [facility] [address]



[id number] 13:22:00





 Test Item  Value  Reference Range  Comments

 

 ACTIVATED CLOTTING TIME  137 sec    TESTED AT [facility] [address]



 (BEAKER) (test code=441)      [facility] [address]



POCT-P2Y12 PLATELET [id number] 07:24:00





 Test Item  Value  Reference Range  Comments

 

 POC-P2Y12 PLATELET AGG (BEAKER) (test code=2303)  110 PRU    



RANGE INFORMATION: PRU reference range is 194-418. Post Drug Results: Lower PRU 
levels are associated with expected antiplatelet effect. Values may be below 
the stated reference range above. The post-drug PRU values reported in the 
[employer] P2Y12 package insert are .POCT-ASPIRIN PLATELET [id number]
[id number] 07:24:00





 Test Item  Value  Reference Range  Comments

 

 POC-ASPIRIN PLATELET AGG (BEAKER) (test code=2302)  402 ARU    



RANGE INFORMATION: 350-549 ARU Therapeutic range for platelet function.        
           550-700 ARU Non-Therapeutic range for platelet function.PLATELET 
AGGREGATION: FUNCTION [id number] 10:21:00





 Test Item  Value  Reference Range  Comments

 

 WEAK ADP RESULT(BEAKER) (test  92 %  60-91  



 code=2135)      

 

 PLATELET FUNCTION SCREEN  % indicates normal    



 INTERP (BEAKER) (test  platelet function    



 code=2173)      

 

 [id number] (BEAKER)  Meredith Reyes, MD (electronic    



 (test code=2622)  signature)    

 

 PLATELET COUNT AGG (BEAKER)  219 K/CU MM  150-430  



 (test rouv=6431)      



COMPREHENSIVE METABOLIC [id number] 05:11:00





 Test Item  Value  Reference Range  Comments

 

 TOTAL PROTEIN (BEAKER)  6.5 gm/dL  6.0-8.3  



 (test code=770)      

 

 ALBUMIN (BEAKER) (test  3.6 g/dL  3.5-5.0  



 code=1145)      

 

 ALKALINE PHOSPHATASE  60 U/L    



 (BEAKER) (test code=346)      

 

 BILIRUBIN TOTAL (BEAKER)  1.1 mg/dL  0.2-1.2  



 (test code=377)      

 

 SODIUM (BEAKER) (test  136 meq/L  136-145  



 toci=772)      

 

 POTASSIUM (BEAKER) (test  3.8 meq/L  3.5-5.1  



 code=379)      

 

 CHLORIDE (BEAKER) (test  104 meq/L    



 code=382)      

 

 CO2 (BEAKER) (test  23 meq/L  22-29  



 code=355)      

 

 BLOOD UREA NITROGEN  13 mg/dL  7-21  



 (BEAKER) (test code=354)      

 

 CREATININE (BEAKER) (test  0.79 mg/dL  0.57-1.25  



 code=358)      

 

 GLUCOSE RANDOM (BEAKER)  89 mg/dL    



 (test code=652)      

 

 CALCIUM (BEAKER) (test  9.2 mg/dL  8.4-10.2  



 code=697)      

 

 AST (SGOT) (BEAKER) (test  19 U/L  5-34  



 code=353)      

 

 ALT (SGPT) (BEAKER) (test  8 U/L  6-55  



 code=347)      

 

 EGFR (BEAKER) (test  71 mL/min/1.73 sq m    ESTIMATED GFR IS NOT AS



 code=1092)      ACCURATE AS CREATININE



       CLEARANCE IN PREDICTING



       GLOMERULAR FILTRATION



       RATE. ESTIMATED GFR IS



       NOT APPLICABLE FOR



       DIALYSIS PATIENTS.



[id number] 05:04:00





 Test Item  Value  Reference Range  Comments

 

 PARTIAL THROMBOPLASTIN TIME (BEAKER) (test  37.9 seconds  22.5-36.0  



 code=760)      



[id number] 16:56:00





 Test Item  Value  Reference Range  Comments

 

 PARTIAL THROMBOPLASTIN TIME (BEAKER) (test  39.4 seconds  22.5-36.0  



 code=760)      



PROTHROMBIN [id number] 16:55:00





 Test Item  Value  Reference Range  Comments

 

 PROTIME (BEAKER) (test code=759)  14.4 seconds  11.7-14.7  

 

 INR (BEAKER) (test code=370)  1.1  <=5.9  



RECOMMENDED COUMADIN/WARFARIN INR THERAPY RANGESSTANDARD DOSE: 2.0 - 3.0   
Includes: PROPHYLAXIS forvenous thrombosis, systemic embolization; TREATMENT 
for venous thrombosis and/or pulmonary embolus.HIGH RISK: Target INR is 2.5-3.5 
for patients with mechanical heart valves.COMPREHENSIVE METABOLIC [id number]
[id number] 02:20:00





 Test Item  Value  Reference Range  Comments

 

 TOTAL PROTEIN (BEAKER)  7.0 gm/dL  6.0-8.3  



 (test code=770)      

 

 ALBUMIN (BEAKER) (test  3.8 g/dL  3.5-5.0  



 code=1145)      

 

 ALKALINE PHOSPHATASE  68 U/L    



 (BEAKER) (test code=346)      

 

 BILIRUBIN TOTAL (BEAKER)  0.9 mg/dL  0.2-1.2  



 (test code=377)      

 

 SODIUM (BEAKER) (test  137 meq/L  136-145  



 kcsz=775)      

 

 POTASSIUM (BEAKER) (test  3.5 meq/L  3.5-5.1  



 code=379)      

 

 CHLORIDE (BEAKER) (test  104 meq/L    



 code=382)      

 

 CO2 (BEAKER) (test  23 meq/L  22-29  



 code=355)      

 

 BLOOD UREA NITROGEN  10 mg/dL  7-21  



 (BEAKER) (test code=354)      

 

 CREATININE (BEAKER) (test  0.81 mg/dL  0.57-1.25  



 code=358)      

 

 GLUCOSE RANDOM (BEAKER)  106 mg/dL    



 (test code=652)      

 

 CALCIUM (BEAKER) (test  9.7 mg/dL  8.4-10.2  



 code=697)      

 

 AST (SGOT) (BEAKER) (test  21 U/L  5-34  



 code=353)      

 

 ALT (SGPT) (BEAKER) (test  8 U/L  6-55  



 code=347)      

 

 EGFR (BEAKER) (test  69 mL/min/1.73 sq m    ESTIMATED GFR IS NOT AS



 code=1092)      ACCURATE AS CREATININE



       CLEARANCE IN PREDICTING



       GLOMERULAR FILTRATION



       RATE. ESTIMATED GFR IS



       NOT APPLICABLE FOR



       DIALYSIS PATIENTS.



HEMOGLOBIN [id number] 07:40:00





 Test Item  Value  Reference Range  Comments

 

 HEMOGLOBIN A1C (BEAKER) (test code=368)  4.9 %  4.3-6.1  



VITAMIN [id number] 06:01:00





 Test Item  Value  Reference Range  Comments

 

 VITAMIN B12 (BEAKER) (test code=774)  826 pg/mL  213-816  



TSH/FREE T4 IF [id number] 06:01:00





 Test Item  Value  Reference Range  Comments

 

 THYROID STIMULATING HORMONE (BEAKER) (test  1.37 uIU/mL  0.35-4.94  



 code=772)      



CALCIUM, [id number] 05:48:00





 Test Item  Value  Reference Range  Comments

 

 CALCIUM IONIZED (BEAKER) (test code=698)  1.11 mmol/L  1.12-1.27  

 

 PH, BLOOD (BEAKER) (test code=1810)  7.41    



[id number] 05:33:00





 Test Item  Value  Reference Range  Comments

 

 PHOSPHORUS (BEAKER) (test code=604)  3.3 mg/dL  2.3-4.7  



[id number] 05:33:00





 Test Item  Value  Reference Range  Comments

 

 MAGNESIUM (BEAKER) (test code=627)  2.0 mg/dL  1.6-2.6  



COMPREHENSIVE METABOLIC [id number] 05:33:00





 Test Item  Value  Reference Range  Comments

 

 TOTAL PROTEIN (BEAKER)  5.9 gm/dL  6.0-8.3  



 (test code=770)      

 

 ALBUMIN (BEAKER) (test  3.3 g/dL  3.5-5.0  



 code=1145)      

 

 ALKALINE PHOSPHATASE  56 U/L    



 (BEAKER) (test code=346)      

 

 BILIRUBIN TOTAL (BEAKER)  0.6 mg/dL  0.2-1.2  



 (test code=377)      

 

 SODIUM (BEAKER) (test  140 meq/L  136-145  



 ooma=044)      

 

 POTASSIUM (BEAKER) (test  3.7 meq/L  3.5-5.1  



 code=379)      

 

 CHLORIDE (BEAKER) (test  106 meq/L    



 code=382)      

 

 CO2 (BEAKER) (test  27 meq/L  22-29  



 code=355)      

 

 BLOOD UREA NITROGEN  13 mg/dL  7-21  



 (BEAKER) (test code=354)      

 

 CREATININE (BEAKER) (test  0.79 mg/dL  0.57-1.25  



 code=358)      

 

 GLUCOSE RANDOM (BEAKER)  95 mg/dL    



 (test code=652)      

 

 CALCIUM (BEAKER) (test  8.7 mg/dL  8.4-10.2  



 code=697)      

 

 AST (SGOT) (BEAKER) (test  16 U/L  5-34  



 code=353)      

 

 ALT (SGPT) (BEAKER) (test  7 U/L  6-55  



 code=347)      

 

 EGFR (BEAKER) (test  71 mL/min/1.73 sq m    ESTIMATED GFR IS NOT AS



 code=1092)      ACCURATE AS CREATININE



       CLEARANCE IN PREDICTING



       GLOMERULAR FILTRATION



       RATE. ESTIMATED GFR IS



       NOT APPLICABLE FOR



       DIALYSIS PATIENTS.



LIPID [id number] 05:33:00





 Test Item  Value  Reference Range  Comments

 

 TRIGLYCERIDES (BEAKER) (test code=540)  57 mg/dL    

 

 CHOLESTEROL (BEAKER) (test code=631)  204 mg/dL    

 

 HDL CHOLESTEROL (BEAKER) (test code=976)  62 mg/dL    

 

 LDL CHOLESTEROL CALCULATED (BEAKER) (test  131 mg/dL    



 code=633)      



Triglyceride Reference Range:   Low Risk         &lt;150   Borderline    150-
199   High Risk     200-499   Very High Risk  &gt;=500Cholesterol Reference 
Range:   Low Risk         &lt;200   Borderline 200-239    High Risk        &gt;
240HDL Cholesterol Reference Range:   Low Risk         &gt;=60   High Risk     
    &lt;40LDL Cholesterol Reference Range:   Optimal          &lt;100   Near 
Optimal  100-129   Borderline    130-159   High          160-189   Very High   
    &gt;=190CBC W/PLT COUNT &amp; AUTO [id number] 04:51:00





 Test Item  Value  Reference Range  Comments

 

 WHITE BLOOD CELL COUNT (BEAKER) (test code=775)  4.8 K/ L  4.0-10.0  

 

 RED BLOOD CELL COUNT (BEAKER) (test code=761)  4.01 M/ L  4.00-5.00  

 

 HEMOGLOBIN (BEAKER) (test code=410)  13.4 GM/DL  12.0-15.0  

 

 HEMATOCRIT (BEAKER) (test code=411)  39.0 %  36.0-45.0  

 

 MEAN CORPUSCULAR VOLUME (BEAKER) (test code=753)  97.5 fL  82.0-99.0  

 

 MEAN CORPUSCULAR HEMOGLOBIN (BEAKER) (test  33.5 pg  27.0-33.0  



 code=751)      

 

 MEAN CORPUSCULAR HEMOGLOBIN CONC (BEAKER) (test  34.4 GM/DL  32.0-36.0  



 code=752)      

 

 RED CELL DISTRIBUTION WIDTH (BEAKER) (test  12.8 %  10.3-14.2  



 code=412)      

 

 PLATELET COUNT (BEAKER) (test code=756)  193 K/CU MM  150-430  

 

 MEAN PLATELET VOLUME (BEAKER) (test code=754)  7.0 fL  6.5-10.5  

 

 NUCLEATED RED BLOOD CELLS (BEAKER) (test  0 /100 WBC  0-0  



 code=413)      

 

 NEUTROPHILS RELATIVE PERCENT (BEAKER) (test  50 %    



 code=429)      

 

 LYMPHOCYTES RELATIVE PERCENT (BEAKER) (test  40 %    



 code=430)      

 

 MONOCYTES RELATIVE PERCENT (BEAKER) (test  9 %    



 code=431)      

 

 EOSINOPHILS RELATIVE PERCENT (BEAKER) (test  0 %    



 code=432)      

 

 BASOPHILS RELATIVE PERCENT (BEAKER) (test  0 %    



 code=437)      

 

 NEUTROPHILS ABSOLUTE COUNT (BEAKER) (test  2.38 K/ L  1.80-8.00  



 code=670)      

 

 LYMPHOCYTES ABSOLUTE COUNT (BEAKER) (test  1.93 K/ L  1.48-4.50  



 code=414)      

 

 MONOCYTES ABSOLUTE COUNT (BEAKER) (test  0.44 K/ L  0.00-1.30  



 code=415)      

 

 EOSINOPHILS ABSOLUTE COUNT (BEAKER) (test  0.01 K/ L  0.00-0.50  



 code=416)      

 

 BASOPHILS ABSOLUTE COUNT (BEAKER) (test  0.02 K/ L  0.00-0.20  



 code=417)      



0.00COMPREHENSIVE METABOLIC [id number] 22:47:00





 Test Item  Value  Reference Range  Comments

 

 TOTAL PROTEIN (BEAKER)  5.8 gm/dL  6.0-8.3  



 (test code=770)      

 

 ALBUMIN (BEAKER) (test  3.2 g/dL  3.5-5.0  



 code=1145)      

 

 ALKALINE PHOSPHATASE  56 U/L    



 (BEAKER) (test code=346)      

 

 BILIRUBIN TOTAL (BEAKER)  0.5 mg/dL  0.2-1.2  



 (test code=377)      

 

 SODIUM (BEAKER) (test  140 meq/L  136-145  



 ugop=686)      

 

 POTASSIUM (BEAKER) (test  3.7 meq/L  3.5-5.1  



 code=379)      

 

 CHLORIDE (BEAKER) (test  108 meq/L    



 code=382)      

 

 CO2 (BEAKER) (test  25 meq/L  22-29  



 code=355)      

 

 BLOOD UREA NITROGEN  11 mg/dL  7-21  



 (BEAKER) (test code=354)      

 

 CREATININE (BEAKER) (test  0.83 mg/dL  0.57-1.25  



 code=358)      

 

 GLUCOSE RANDOM (BEAKER)  94 mg/dL    



 (test code=652)      

 

 CALCIUM (BEAKER) (test  8.6 mg/dL  8.4-10.2  



 code=697)      

 

 AST (SGOT) (BEAKER) (test  15 U/L  5-34  



 code=353)      

 

 ALT (SGPT) (BEAKER) (test  7 U/L  6-55  



 code=347)      

 

 EGFR (JOVI) (test  67 mL/min/1.73 sq m    ESTIMATED GFR IS NOT AS



 code=1092)      ACCURATE AS CREATININE



       CLEARANCE IN PREDICTING



       GLOMERULAR FILTRATION



       RATE. ESTIMATED GFR IS



       NOT APPLICABLE FOR



       DIALYSIS PATIENTS.

## 2019-06-11 NOTE — XMS REPORT
Clinical Summary

 Created on:2019



Patient:Jerica Estrada

Sex:Female

:1940

External Reference #:[id number]





Demographics







 Address  [address] [place] [address]

 

 Mobile Phone  [phone]

 

 Home Phone  [phone]

 

 Preferred Language  English

 

 Marital Status  Unknown

 

 Adventism Affiliation  Unknown

 

 Race  White

 

 Ethnic Group  Not  or 









Author







 Organization  [facility]

 

 Address  [address] [place] [address]









Support







 Name  Relationship  Address  Phone

 

 Wan Estrada  Unavailable  [address] [facility]  [phone]



     [place] [address]  

 

 Musa Estrada  Unavailable  [phone]









Care Team Providers







 Name  Role  Phone

 

 Samuel Todd  Primary Care Provider  [phone]









Allergies







 Active Allergy  Reactions  Severity  Noted Date  Comments

 

 Ciprofloxacin  Anaphylaxis  High  2017  

 

 Iodine And Iodide Containing  Other (See Comments)  Medium  2017  Cold 
chills



 Products        

 

 Penicillins  Anaphylaxis  High  2017  

 

 Tetanus Vaccines And Toxoid  Itching  High  2017  







Medications







 Medication  Sig  Dispensed  Refills  Start Date  End Date  Status

 

 CALCIUM  Take by mouth 2    0      Active



 CARBONATE/VITAMIN D3  (two) times          



 (CALCIUM 600 + D,3,  daily.          



 ORAL)            

 

 VIT C/VIT  Take by mouth    0      Active



 E/LUTEIN/MIN/OMEGA-3  daily.          



 (OCUVITE ORAL)            

 

 folic acid (FOLVITE) 400  Take 400 mcg by    0      Active



 MCG tablet  mouth nightly.          

 

 cyanocobalamin 1000 MCG  Take 1,000 mcg    0      Active



 tablet  by mouth daily.          

 

 cranberry extract  Take by mouth 2    0      Active



 (CRANBERRY CONCENTRATE)  (two) times          



 500 mg Cap  daily.          

 

 docusate sodium (COLACE)  Take 100 mg by    0      Active



 100 MG capsule  mouth 2 (two)          



   times daily as          



   needed for          



   Constipation.          







Active Problems







 Problem  Noted Date

 

 Aneurysm  2017

 

 Hypertension  2017

 

 Other specified transient cerebral ischemias  2017

 

 Hypertensive emergency  2017

 

 Syncope  05/15/2017

 

 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

 

 Essential hypertension  2017

 

 TIA (transient ischemic attack)  2017

 

 Hypertension  2017







Encounters







 Date  Type  Specialty  Care Team  Description

 

 04/15/2019  Hospital Encounter  Radiology  Migue Weber  Cerebral aneurysm
Kaushal MD  nonruptured

 

 2019  Outside Orders  Radiology  Migue Weber  Cerebral aneurysm,



       MD Kaushal  nonruptured (Primary Dx)

 

 2018  Hospital Encounter  Radiology  Migue Weber  Cerebral aneurysm



       MD Kaushal  

 

 2018  Outside Orders  Radiology  Migue Weber  Cerebral aneurysm



       MD Kaushal  (Primary Dx)



after 06/10/2018



Social History







 Tobacco Use  Types  Packs/Day  Years Used  Date

 

 Never Smoker        









 Smokeless Tobacco: Never Used      









 Alcohol Use  Drinks/Week  oz/Week  Comments

 

 No      









 Sex Assigned at Birth  Date Recorded

 

 Not on file  









 Job Start Date  Occupation  Industry

 

 Not on file  Not on file  Not on file









 Travel History  Travel Start  Travel End









 No recent travel history available.







Last Filed Vital Signs

Not on file



Plan of Treatment

Not on file



Results

Not on fileafter 06/10/2018



Insurance







 Payer  Benefit Plan / Group  Subscriber ID  Type  Phone  Address

 

 MEDICARE  MEDICARE A B  xxxxxxxxxx  Medicare    

 

 MCR SUPPLEMENT/INDIVIDUAL  [employer]  xxxxxxxxxxx  Medigap    









 Guarantor Name  Account Type  Relation to  Date of  Phone  Billing



     Patient  Birth    Address

 

 Jerica Estrada  Personal/Family  Self  1940  [phone]  [address]        (Home)  [place] [address]







Advance Directives

For more information, please contact:[address] [phone]





 Code Status  Date Activated  Date Inactivated  Comments

 

 Full Code  2017  5:31 PM  2017 10:34 PM  









 This code status was determined by:  Patient  









       

 

 Full Code  2017 10:12 PM  2017  4:57 PM  









 This code status was determined by:  Patient  









       

 

 Full Code  2017  7:37 PM  2017  8:30 PM  









 This code status was determined by:  Patient

## 2019-06-12 PROCEDURE — 0JQ00ZZ REPAIR SCALP SUBCUTANEOUS TISSUE AND FASCIA, OPEN APPROACH: ICD-10-PCS

## 2019-06-12 NOTE — RAD REPORT
EXAM DESCRIPTION:  CT - Head Brain Wo Cont - 6/12/2019 11:35 am

 

CLINICAL HISTORY:  Trauma - Fall

 

TECHNIQUE:  Multiple axial CT images of the brain were performed followed by sagittal and coronal rec
onstructed images. The CT study is performed according to ALARA (as low as reasonably achievable) or 
ALARA/IMAGE GENTLY, with automatic adjustment of mA and/or kV according to patient size.

 

Performed on: 06/12/2019 at 12:18 AM

 

COMPARISON:  None.

 

FINDINGS:  There is no evidence of mass, acute mass effect or midline shift. There are no acute extra
-axial collections. There is no evidence of acute intracranial hemorrhage. There is an aneurysm clip 
or coil in the region of the right posterior communicating artery.

 

The cerebral sulci and ventricles are prominent consistent with mild cerebral volume loss.

 

There are scattered patchy areas of decreased subcortical and periventricular attenuation most consis
tent with mild chronic microangiopathy.

 

There is no significant mucosal thickening of the paranasal sinuses.

 

The mastoid air cells are clear.

 

The orbital contents are grossly unremarkable.

 

No acute osseous abnormalities are identified.

 

There is mild right parietal scalp soft tissue swelling and laceration with associated subcutaneous e
mphysema. There are calcifications along the cavernous carotid arteries and distal left vertebral art
iman.

 

IMPRESSION:  1. There is no evidence of acute intracranial pathology.

2. Prior aneurysm clipping in the region of the right posterior communicating artery.

3. Mild cerebral atrophy with findings compatible with chronic microangiopathy.

4. Mild right parietal scalp soft tissue swelling and laceration with subcutaneous emphysema.

 

Electronically signed by: Fela Huffman DO 06/12/2019 12:54 AM CDT Workstation  [phone]

 

 

Due to temporary technical issues with the PACS/Fluency reporting system, reports are being signed by
 the in house radiologist as a courtesy to ensure prompt reporting. The interpreting radiologist is f
ully responsible for the content of the report.

## 2019-06-12 NOTE — ER
Nurse's Notes                                                                                     

 [facility]                                                                 

Name: Jerica Estrada                                                                           

Age: 79 yrs                                                                                       

Sex: Female                                                                                       

: 1940                                                                                   

MRN: [id number]                                                                                   

Arrival Date: 2019                                                                          

Time: 23:28                                                                                       

Account#: [id number]                                                                            

Bed 16                                                                                            

Private MD:                                                                                       

Diagnosis: Superficial injury of head;Laceration without foreign body of scalp                    

                                                                                                  

Presentation:                                                                                     

                                                                                             

23:18 Presenting complaint: EMS states: PT was at home and fell from standing, hit her head   jb4 

      on a table, denies LOC or dizziness, Reports having a headache.                             

23:18 Transition of care: patient was not received from another setting of care. Onset of     jb4 

      symptoms was 2019. Risk Assessment: Do you want to hurt yourself or someone        

      else? Patient reports no desire to harm self or others. Initial Sepsis Screen: Does the     

      patient meet any 2 criteria? Yes Does the patient have a suspected source of infection?     

      No. Patient's initial sepsis screen is negative. Care prior to arrival: None.               

23:18 Method Of Arrival: EMS: [facility]                                                jb4 

23:18 Acuity: LYNNETTE 2                                                                           jb4 

23:18 Mechanism of Injury: Fall from standing into bedside table. Trauma event details:       jb4 

      Injury occurred: at home.                                                                   

                                                                                                  

Historical:                                                                                       

- Allergies:                                                                                      

23:18 Ciprofloxacin;                                                                          jb4 

23:18 Hydrochlorothiazide;                                                                    jb4 

23:18 Tetanus Vaccines \T\ Toxoid;                                                              jb4

23:18 valsartan;                                                                              jb4 

23:18 Demerol;                                                                                jb4 

23:18 Iodinated Contrast Media - IV Dye;                                                      jb4 

23:18 Diovan HCT;                                                                             jb4 

23:18 Codeine;                                                                                jb4 

23:18 Bactrim;                                                                                jb4 

- Home Meds:                                                                                      

23:18 amlodipine 10 mg tab 1 tab once daily [Active]; atorvastatin 80 mg Oral tab 1 tab once  jb4 

      daily [Active]; Ecotrin 325 mg Oral TbEC 1 tab once daily [Active]; losartan 100 mg         

      Oral tab 1 tab once daily [Active]; metoprolol tartrate 25 mg Oral tab 1 tab 2 times        

      per day [Active]; aspirin 81 mg oral chew [Active]; clopidogrel 75 mg oral tab              

      [Active]; Calcium 600 oral oral [Active];                                                   

- PMHx:                                                                                           

23:18 Aneurysm; Hypertension;                                                                 jb4 

                                                                                                  

- Immunization history:: Adult Immunizations not up to date.                                      

- Social history:: Smoking status: Patient/guardian denies using tobacco,                         

  Patient/guardian denies using alcohol.                                                          

- Ebola Screening: : No symptoms or risks identified at this time.                                

                                                                                                  

                                                                                                  

Screenin:18 Abuse screen: Denies threats or abuse. Nutritional screening: No deficits noted.        jb4 

      Tuberculosis screening: No symptoms or risk factors identified. Fall Risk Fall in past      

      12 months (25 points). Total Dowell Fall Scale indicates Low Risk Score (25-44 pts).         

      Fall prevention measures have been instituted. Side Rails Up X 2 Placed close to            

      Nursing Station Frequent Obs/Assesments occuring Family Present and informed to notify      

      staff if they need to leave bedside As available Patient and Family Educated on Fall        

      Prevention Program and strategies.                                                          

                                                                                                  

Primary Survey:                                                                                   

23:30 NO uncontrolled hemorrhage observed. A: The patient is alert. Breathing/Chest:          cc3 

      Respiratory pattern: regular, Respiratory effort: spontaneous, unlabored, Chest             

      inspection: symmetrical rise and fall of the chest. Circulation: Heart tones present.       

      Skin color: pink. Disability Alert. Exposure/Environment: All clothing and personal         

      items were removed. Forensic evidence collection is not deemed to be indicated at this      

      time. Items placed in patient belonging bag. There is no evidence of uncontrolled           

      external bleeding. Obvious injury(ies) are noted at this time: scalp laceration but not     

      bleeding profusely A warming method has been applied: A warm blanket has been provided      

      to the patient.                                                                             

                                                                                             

00:00 Reassessment Airway Airway Patent Oxygen No O2 Breathing/Chest Respiratory pattern      cc3 

      Regular Respiratory effort Spontaneous Unlabored Breath sounds Clear Chest inspection       

      Symmetrical Circulation Heart tones Present Color Pink Disability Alert.                    

                                                                                                  

Secondary Survey:                                                                                 

                                                                                             

23:30 HEENT: Head Other scalp laceration Face No injury/deformity Eyes: No injury or          cc3 

      deformity noted. to bilateral eyes. Ears: clear bilaterally. Nose: clear to bilateral       

      nares. Throat: No injury or deformity noted. is clear with gag reflex present.              

      Gastrointestinal: Abdomen is soft, Bowel sounds present in all quadrants. : No signs      

      and/or symptoms were reported regarding the genitourinary system. Musculoskeletal:          

      Range of motion: limited in bilateral lower limbs. Injury Description: Laceration           

      sustained to scalp.                                                                         

                                                                                                  

Assessment:                                                                                       

23:18 General: Appears in no apparent distress. uncomfortable, Behavior is calm, cooperative, jb4 

      appropriate for age. Pain: Complains of pain in scalp and left leg, left shoulder. Pain     

      does not radiate. Pain currently is 0 out of 10 on a pain scale. Quality of pain is         

      described as crampy. Neuro: Level of Consciousness is awake, alert, obeys commands,         

      Oriented to person, place, time, situation. Cardiovascular: Patient's skin is warm and      

      dry. Respiratory: Airway is patent Respiratory effort is even, unlabored, Respiratory       

      pattern is regular, symmetrical. GI: No signs and/or symptoms were reported involving       

      the gastrointestinal system. : No signs and/or symptoms were reported regarding the       

      genitourinary system. EENT: No signs and/or symptoms were reported regarding the EENT       

      system. Derm: Skin Laceration noted to the back of the head. Skin is pink, warm \T\ dry.    

      Musculoskeletal: Circulation, motion, and sensation intact. Injury Description:             

      Laceration sustained to scalp.                                                              

                                                                                             

00:15 Reassessment: Patient appears in no apparent distress at this time. Patient and/or      jb4 

      family updated on plan of care and expected duration. Pain level reassessed. Patient is     

      alert, oriented x 3, equal unlabored respirations, skin warm/dry/pink.                      

01:15 Reassessment: Patient appears in no apparent distress at this time. Patient and/or      jb4 

      family updated on plan of care and expected duration. Pain level reassessed. Patient is     

      alert, oriented x 3, equal unlabored respirations, skin warm/dry/pink. Son is helping       

      patient walk around the room.                                                               

01:45 Reassessment: Patient appears in no apparent distress at this time. Patient and/or      jb4 

      family updated on plan of care and expected duration. Pain level reassessed. Patient is     

      alert, oriented x 3, equal unlabored respirations, skin warm/dry/pink. Pt up waiting 15     

      minutes prior to discharge for further monitoring, pt unsure if they have taken Norco       

      in the past, advised to wait in ED to monitor for signs of allergic reaction.               

02:08 Reassessment: Patient appears in no apparent distress at this time. Patient and/or      jb4 

      family updated on plan of care and expected duration. Pain level reassessed. Patient is     

      alert, oriented x 3, equal unlabored respirations, skin warm/dry/pink. No s/s of            

      allergic reaction noted, pt and family educated on possible s/s and to return               

      immediately if any occur.                                                                   

                                                                                                  

Vital Signs:                                                                                      

                                                                                             

23:18  / 74; Pulse 74; Resp 16; Temp 98.4(O); Pulse Ox 100% on R/A; Weight 74.84 kg     jb4 

      (R); Height 5 ft. 5 in. (165.10 cm) (R); Pain 0/10;                                         

23:45  / 90; Pulse 82; Resp 15; Pulse Ox 100% on R/A;                                   jb4 

                                                                                             

00:30  / 66; Pulse 81; Resp 16; Pulse Ox 97% on R/A;                                    jb4 

01:15  / 92; Pulse 81; Resp 16; Pulse Ox 98% on R/A;                                    jb4 

01:45  / 77; Pulse 85; Resp 16; Pulse Ox 98% on R/A;                                    jb4 

                                                                                             

23:18 Body Mass Index 27.46 (74.84 kg, 165.10 cm)                                             jb4 

                                                                                                  

Graciela Coma Score:                                                                               

                                                                                             

23:18 Eye Response: spontaneous(4). Verbal Response: oriented(5). Motor Response: obeys       jb4 

      commands(6). Total: 15.                                                                     

23:45 Eye Response: spontaneous(4). Verbal Response: oriented(5). Motor Response: obeys       jb4 

      commands(6). Total: 15.                                                                     

                                                                                             

00:30 Eye Response: spontaneous(4). Verbal Response: oriented(5). Motor Response: obeys       jb4 

      commands(6). Total: 15.                                                                     

01:15 Eye Response: spontaneous(4). Verbal Response: oriented(5). Motor Response: obeys       jb4 

      commands(6). Total: 15.                                                                     

01:45 Eye Response: spontaneous(4). Verbal Response: oriented(5). Motor Response: obeys       jb4 

      commands(6). Total: 15.                                                                     

                                                                                                  

Trauma Score (Adult):                                                                             

                                                                                             

23:18 Eye Response: spontaneous(1); Verbal Response: oriented(1); Motor Response: obeys       jb4 

      commands(2); Systolic BP: > 89 mm Hg(4); Respiratory Rate: 10 to 29 per min(4); Graciela     

      Score: 15; Trauma Score: 12                                                                 

23:45 Eye Response: spontaneous(1); Verbal Response: oriented(1); Motor Response: obeys       jb4 

      commands(2); Systolic BP: > 89 mm Hg(4); Respiratory Rate: 10 to 29 per min(4); [place]     

      Score: 15; Trauma Score: 12                                                                 

0612                                                                                             

00:30 Eye Response: spontaneous(1); Verbal Response: oriented(1); Motor Response: obeys       jb4 

      commands(2); Systolic BP: > 89 mm Hg(4); Respiratory Rate: 10 to 29 per min(4); [place]     

      Score: 15; Trauma Score: 12                                                                 

01:15 Eye Response: spontaneous(1); Verbal Response: oriented(1); Motor Response: obeys       jb4 

      commands(2); Systolic BP: > 89 mm Hg(4); Respiratory Rate: 10 to 29 per min(4); Grac[place]a     

      Score: 15; Trauma Score: 12                                                                 

01:45 Eye Response: spontaneous(1); Verbal Response: oriented(1); Motor Response: obeys       jb4 

      commands(2); Systolic BP: > 89 mm Hg(4); Respiratory Rate: 10 to 29 per min(4); [place]     

      Score: 15; Trauma Score: 12                                                                 

                                                                                                  

ED Course:                                                                                        

                                                                                             

23:18 Arm band placed on right wrist.                                                         jb4 

23:18 Patient has correct armband on for positive identification. Bed in low position. Call   jb4 

      light in reach. Side rails up X2. Pulse ox on. NIBP on.                                     

23:18 Patient maintains SpO2 saturation greater than 95% on room air. Thermoregulation: warm  jb4 

      blanket given to patient.                                                                   

23:28 Patient arrived in ED.                                                                  jb4 

23:31 Triage completed.                                                                       jb4 

23:33 Kamari Rainey PA is PHCP.                                                               [id number] 

23:33 Efrem Mi MD is Attending Physician.                                                [id number] 

23:58 Romeo Mcarthur RN is Primary Nurse.                                                     jb4 

                                                                                             

00:15 Pelvis In Process Unspecified.                                                          EDMS

01:35 Assist provider with laceration repair on right side of the back of head that was 2.5   jb4 

      cm. or less using alicia. Performed by Kamari MOODY.                                     

02:10 Patient did not have IV access during this emergency room visit.                        jb4 

                                                                                                  

Administered Medications:                                                                         

01:46 Drug: Norco 5 mg-325 mg 1 tabs Route: PO;                                               jb4 

02:11 Follow up: Response: No adverse reaction; Pain is decreased                             jb4 

                                                                                                  

                                                                                                  

Intake:                                                                                           

01:50 PO: 0ml; Total: 0ml.                                                                    jb4 

                                                                                                  

Output:                                                                                           

01:50 Urine: 400ml (Voided); Total: 400ml.                                                    jb4 

                                                                                                  

Outcome:                                                                                          

01:35 Discharge ordered by MD. messer 

02:10 Discharged to home via wheelchair, with family.                                         jb4 

02:10 Condition: stable                                                                           

02:10 Discharge instructions given to patient, family, Instructed on discharge instructions,      

      follow up and referral plans. Demonstrated understanding of instructions, follow-up         

      care.                                                                                       

02:10 Patient's length of stay in the Emergency Department was greater than 2 hours. Pt       jb4 

      discharged homePatient's length of stay extended due to                                     

02:11 Patient left the ED.                                                                    jb4 

                                                                                                  

Signatures:                                                                                       

Dispatcher [employer]                           EDMS                                                 

Kamari Rainey PA PA   [id number]                                                  

Romeo Mcarthur, RN                       RN   jb4                                                  

Inez Navarro                             cc3                                                  

                                                                                                  

Corrections: (The following items were deleted from the chart)                                    

00:24 00:22 In radiology for Pelvis+[id number]. EDMS                                         EDMS

01:10 11 23:18 Derm: Skin is intact, Skin is pink, warm \T\ dry. jb4                         jb4

                                                                                             

01:22 00:02 In radiology for Head Brain Wo Cont. EDMS                                         EDMS

                                                                                                  

**************************************************************************************************

## 2019-06-12 NOTE — EDPHYS
Physician Documentation                                                                           

 [facility]                                                                 

Name: Jerica Estrada                                                                           

Age: 79 yrs                                                                                       

Sex: Female                                                                                       

: 1940                                                                                   

MRN: [id number]                                                                                   

Arrival Date: 2019                                                                          

Time: 23:28                                                                                       

Account#: [id number]                                                                            

Bed 16                                                                                            

Private MD:                                                                                       

ED Physician Efrem Mi                                                                         

HPI:                                                                                              

                                                                                             

00:24 This 79 yrs old  Female presents to ER via EMS with complaints of head injury. jr8 

00:24 Details of fall: The patient fell from an upright position, while standing. Onset: The  jr8 

      symptoms/episode began/occurred acutely, today. Associated injuries: The patient            

      sustained injury to the head. Severity of symptoms: At their worst the symptoms were        

      mild, in the emergency department the symptoms are unchanged. The patient has not           

      experienced similar symptoms in the past. Patient stated that she has daily problems        

      with weakness and cramping in legs. Has had frequent falls. All mechanical in nature.       

      Tonight lost balance and fell. Hit the back of her head. Denies LOC. Complains of mild      

      pain to back of head and left hip pain. Denies any other symptoms currently.                

                                                                                                  

Historical:                                                                                       

- Allergies:                                                                                      

                                                                                             

23:18 Ciprofloxacin;                                                                          jb4 

23:18 Hydrochlorothiazide;                                                                    jb4 

23:18 Tetanus Vaccines \T\ Toxoid;                                                              jb4

23:18 valsartan;                                                                              jb4 

23:18 Demerol;                                                                                jb4 

23:18 Iodinated Contrast Media - IV Dye;                                                      jb4 

23:18 Diovan HCT;                                                                             jb4 

23:18 Codeine;                                                                                jb4 

23:18 Bactrim;                                                                                jb4 

- Home Meds:                                                                                      

23:18 amlodipine 10 mg tab 1 tab once daily [Active]; atorvastatin 80 mg Oral tab 1 tab once  jb4 

      daily [Active]; Ecotrin 325 mg Oral TbEC 1 tab once daily [Active]; losartan 100 mg         

      Oral tab 1 tab once daily [Active]; metoprolol tartrate 25 mg Oral tab 1 tab 2 times        

      per day [Active]; aspirin 81 mg oral chew [Active]; clopidogrel 75 mg oral tab              

      [Active]; Calcium 600 oral oral [Active];                                                   

- PMHx:                                                                                           

23:18 Aneurysm; Hypertension;                                                                 jb4 

                                                                                                  

- Immunization history:: Adult Immunizations not up to date.                                      

- Social history:: Smoking status: Patient/guardian denies using tobacco,                         

  Patient/guardian denies using alcohol.                                                          

- Ebola Screening: : No symptoms or risks identified at this time.                                

                                                                                                  

                                                                                                  

ROS:                                                                                              

                                                                                             

00:24 Eyes: Negative for injury, pain, redness, and discharge, ENT: Negative for injury,      jr8 

      pain, and discharge, Neck: Negative for injury, pain, and swelling, Cardiovascular:         

      Negative for chest pain, palpitations, and edema, Respiratory: Negative for shortness       

      of breath, cough, wheezing, and pleuritic chest pain, Abdomen/GI: Negative for              

      abdominal pain, nausea, vomiting, diarrhea, and constipation, Back: Negative for injury     

      and pain.                                                                                   

      MS/extremity: Positive for pain, tenderness, of the left hip.                               

      Skin: Positive for laceration(s), of the scalp.                                             

      Neuro: Positive for headache, Negative for altered mental status, dizziness, gait           

      disturbance, hearing loss, loss of consciousness, numbness, seizure activity, speech        

      changes, syncope, near syncope, tingling, tinnitus, tremor, visual changes, weakness.       

                                                                                                  

Exam:                                                                                             

00:28 Eyes:  Pupils equal round and reactive to light, extra-ocular motions intact.  Lids and jr8 

      lashes normal.  Conjunctiva and sclera are non-icteric and not injected.  Cornea within     

      normal limits.  Periorbital areas with no swelling, redness, or edema. ENT:  Nares          

      patent. No nasal discharge, no septal abnormalities noted.  Tympanic membranes are          

      normal and external auditory canals are clear.  Oropharynx with no redness, swelling,       

      or masses, exudates, or evidence of obstruction, uvula midline.  Mucous membranes           

      moist. Neck:  Trachea midline, no thyromegaly or masses palpated, and no cervical           

      lymphadenopathy.  Supple, full range of motion without nuchal rigidity, or vertebral        

      point tenderness.  No Meningismus. Chest/axilla:  Normal chest wall appearance and          

      motion.  Nontender with no deformity.  No lesions are appreciated. Cardiovascular:          

      Regular rate and rhythm with a normal S1 and S2.  No gallops, murmurs, or rubs.  Normal     

      PMI, no JVD.  No pulse deficits. Respiratory:  Lungs have equal breath sounds               

      bilaterally, clear to auscultation and percussion.  No rales, rhonchi or wheezes noted.     

       No increased work of breathing, no retractions or nasal flaring. Abdomen/GI:  Soft,        

      non-tender, with normal bowel sounds.  No distension or tympany.  No guarding or            

      rebound.  No evidence of tenderness throughout. Back:  No spinal tenderness.  No            

      costovertebral tenderness.  Full range of motion. Skin:  Warm, dry with normal turgor.      

      Normal color with no rashes, no lesions, and no evidence of cellulitis. MS/ Extremity:      

      Pulses equal, no cyanosis.  Neurovascular intact.  Full, normal range of motion. Neuro:     

       Awake and alert, GCS 15, oriented to person, place, time, and situation.  Cranial          

      nerves II-XII grossly intact.  Motor strength 5/5 in all extremities.  Sensory grossly      

      intact.  Cerebellar exam normal.  Normal gait.                                              

00:28 Head/face: Noted is hematoma, that is mild, of the  right side of the back of head, a       

      laceration(s), that is jagged, 1.5 cm(s), of the  right side of the back of head.           

                                                                                                  

Vital Signs:                                                                                      

                                                                                             

23:18  / 74; Pulse 74; Resp 16; Temp 98.4(O); Pulse Ox 100% on R/A; Weight 74.84 kg     jb4 

      (R); Height 5 ft. 5 in. (165.10 cm) (R); Pain 0/10;                                         

23:45  / 90; Pulse 82; Resp 15; Pulse Ox 100% on R/A;                                   jb4 

                                                                                             

00:30  / 66; Pulse 81; Resp 16; Pulse Ox 97% on R/A;                                    jb4 

01:15  / 92; Pulse 81; Resp 16; Pulse Ox 98% on R/A;                                    jb4 

01:45  / 77; Pulse 85; Resp 16; Pulse Ox 98% on R/A;                                    jb4 

                                                                                             

23:18 Body Mass Index 27.46 (74.84 kg, 165.10 cm)                                             jb4 

                                                                                                  

Graciela Coma Score:                                                                               

                                                                                             

23:18 Eye Response: spontaneous(4). Verbal Response: oriented(5). Motor Response: obeys       jb4 

      commands(6). Total: 15.                                                                     

23:45 Eye Response: spontaneous(4). Verbal Response: oriented(5). Motor Response: obeys       jb4 

      commands(6). Total: 15.                                                                     

                                                                                             

00:30 Eye Response: spontaneous(4). Verbal Response: oriented(5). Motor Response: obeys       jb4 

      commands(6). Total: 15.                                                                     

01:15 Eye Response: spontaneous(4). Verbal Response: oriented(5). Motor Response: obeys       jb4 

      commands(6). Total: 15.                                                                     

01:45 Eye Response: spontaneous(4). Verbal Response: oriented(5). Motor Response: obeys       jb4 

      commands(6). Total: 15.                                                                     

                                                                                                  

Trauma Score (Adult):                                                                             

                                                                                             

23:18 Eye Response: spontaneous(1); Verbal Response: oriented(1); Motor Response: obeys       jb4 

      commands(2); Systolic BP: > 89 mm Hg(4); Respiratory Rate: 10 to 29 per min(4); Graciela     

      Score: 15; Trauma Score: 12                                                                 

23:45 Eye Response: spontaneous(1); Verbal Response: oriented(1); Motor Response: obeys       jb4 

      commands(2); Systolic BP: > 89 mm Hg(4); Respiratory Rate: 10 to 29 per min(4); Fountain     

      Score: 15; Trauma Score: 12                                                                 

                                                                                             

00:30 Eye Response: spontaneous(1); Verbal Response: oriented(1); Motor Response: obeys       jb4 

      commands(2); Systolic BP: > 89 mm Hg(4); Respiratory Rate: 10 to 29 per min(4); Fountain     

      Score: 15; Trauma Score: 12                                                                 

01:15 Eye Response: spontaneous(1); Verbal Response: oriented(1); Motor Response: obeys       jb4 

      commands(2); Systolic BP: > 89 mm Hg(4); Respiratory Rate: 10 to 29 per min(4); Graciela     

      Score: 15; Trauma Score: 12                                                                 

01:45 Eye Response: spontaneous(1); Verbal Response: oriented(1); Motor Response: obeys       jb4 

      commands(2); Systolic BP: > 89 mm Hg(4); Respiratory Rate: 10 to 29 per min(4); Fountain     

      Score: 15; Trauma Score: 12                                                                 

                                                                                                  

Laceration:                                                                                       

01:33 Wound Repair of 1.5cm ( 0.6in ) subcutaneous laceration to right side of the back of    jr8 

      head. Irregularly shaped.. Distal neuro/vascular/tendon intact. Wound prep: Moderate        

      cleansing with hibiclenz, Wound explored moderately. Skin closed with 2 staples Staples     

      using staple gun. Patient tolerated well.                                                   

                                                                                                  

[facility]:                                                                                              

                                                                                             

23:38 Patient medically screened.                                                             [facility] 

                                                                                             

01:33 Data reviewed: vital signs, nurses notes, and as a result, I will discharge patient.    jr8 

      Data interpreted: Pulse oximetry: on room air is 98 %. Interpretation: normal.              

      Counseling: I had a detailed discussion with the patient and/or guardian regarding: the     

      historical points, exam findings, and any diagnostic results supporting the                 

      discharge/admit diagnosis, the need for outpatient follow up, a family practitioner, to     

      return to the emergency department if symptoms worsen or persist or if there are any        

      questions or concerns that arise at home.                                                   

                                                                                                  

                                                                                             

23:56 Order name: Pelvis                                                                      EDMS

                                                                                                  

Administered Medications:                                                                         

01:46 Drug: Norco 5 mg-325 mg 1 tabs Route: PO;                                               jb4 

02:11 Follow up: Response: No adverse reaction; Pain is decreased                             jb4 

                                                                                                  

                                                                                                  

Disposition:                                                                                      

02:16 Co-signature as Attending Physician, Efrem Mi MD.                                    rn  

                                                                                                  

Disposition:                                                                                      

19 01:35 Discharged to Home. Impression: Superficial injury of head, Laceration without     

  foreign body of scalp.                                                                          

- Condition is Stable.                                                                            

- Discharge Instructions: Head Injury, Adult, Hematoma, Laceration Care, Adult.                   

                                                                                                  

- Medication Reconciliation Form, Thank You Letter, Antibiotic Education, Prescription            

  Opioid Use form.                                                                                

- Follow up: Private Physician; When: 1 week; Reason: Wound Recheck, Recheck today's              

  complaints, Continuance of care, Staple/Suture removal, Re-evaluation by your                   

  physician.                                                                                      

- Problem is new.                                                                                 

- Symptoms have improved.                                                                         

                                                                                                  

                                                                                                  

                                                                                                  

Signatures:                                                                                       

Dispatcher [employer]                           EDEfrem Moncada MD MD rn Roszak, Josh, PA PA   jr8                                                  

Romeo Mcarthur RN                       RN   jb4                                                  

                                                                                                  

Corrections: (The following items were deleted from the chart)                                    

00:24 00:19 Pelvis+RAD.RAD.BRZ ordered. EDMS                                                  EDMS

00:33 00:18 Head Brain Wo Cont+CT.RAD.BRZ ordered. EDMS                                       EDMS

01:22 00:01 Head Brain Wo Cont ordered. EDMS                                                  EDMS

01:35 01:35 2019 01:35 Discharged to Home. Impression: Superficial injury of head.      jr8 

      Condition is Stable. Forms are Medication Reconciliation Form, Thank You Letter,            

      Antibiotic Education, Prescription Opioid Use. Follow up: Private Physician; When: 1        

      week; Reason: Wound Recheck, Recheck today's complaints, Continuance of care,               

      Staple/Suture removal, Re-evaluation by your physician. Problem is new. Symptoms have       

      improved. jr8                                                                               

02:11 01:35 2019 01:35 Discharged to Home. Impression: Superficial injury of head;      jb4 

      Laceration without foreign body of scalp. Condition is Stable. Discharge Instructions:      

      Head Injury, Adult, Hematoma. Forms are Medication Reconciliation Form, Thank You           

      Letter, Antibiotic Education, Prescription Opioid Use. Follow up: Private Physician;        

      When: 1 week; Reason: Wound Recheck, Recheck today's complaints, Continuance of care,       

      Staple/Suture removal, Re-evaluation by your physician. Problem is new. Symptoms have       

      improved. jr8                                                                               

                                                                                                  

**************************************************************************************************

## 2019-06-12 NOTE — RAD REPORT
EXAM DESCRIPTION:  RAD - Pelvis - 6/12/2019 12:11 am

 

CLINICAL HISTORY:  Pelvic pain status post injury

 

FINDINGS:  No fracture or dislocation is seen. If the patient continues to have symptoms to suggest a
n occult fracture CT would be recommended

 

Osteoporosis

## 2020-01-27 ENCOUNTER — HOSPITAL ENCOUNTER (EMERGENCY)
Dept: HOSPITAL 97 - ER | Age: 80
Discharge: HOME | End: 2020-01-27
Payer: COMMERCIAL

## 2020-01-27 VITALS — DIASTOLIC BLOOD PRESSURE: 87 MMHG | TEMPERATURE: 97.7 F | SYSTOLIC BLOOD PRESSURE: 162 MMHG | OXYGEN SATURATION: 100 %

## 2020-01-27 DIAGNOSIS — Z88.8: ICD-10-CM
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
## 2020-01-27 DIAGNOSIS — Z88.5: ICD-10-CM

## 2020-01-27 DIAGNOSIS — Z88.7: ICD-10-CM

## 2020-01-27 DIAGNOSIS — S51.012A: ICD-10-CM

## 2020-01-27 DIAGNOSIS — S09.90XA: Primary | ICD-10-CM

## 2020-01-27 DIAGNOSIS — Z79.82: ICD-10-CM

## 2020-01-27 DIAGNOSIS — Z91.041: ICD-10-CM
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
## 2020-01-27 DIAGNOSIS — Z88.1: ICD-10-CM

## 2020-01-27 PROCEDURE — 99283 EMERGENCY DEPT VISIT LOW MDM: CPT

## 2020-01-27 PROCEDURE — 70450 CT HEAD/BRAIN W/O DYE: CPT

## 2020-01-27 PROCEDURE — 72125 CT NECK SPINE W/O DYE: CPT

## 2020-01-27 NOTE — XMS REPORT
Patient Summary Document

 Created on:2020



Patient:SHAHIDA ESTRADA

Sex:Female

:1940

External Reference #:[id number]





Demographics







 Address  [address] [place] [address]

 

 Home Phone  [phone]

 

 Email Address  [place]

 

 Preferred Language  Unknown

 

 Marital Status  Unknown

 

 [employer] Affiliation  Unknown

 

 Race  Unknown

 

 Additional Race(s)  Unavailable

 

 Ethnic Group  Unknown









Author







 Organization  [facility]

 

 Address  [address]



   [place] [address]

 

 Phone  [phone]









Care Team Providers







 Name  Role  Phone

 

 MIGUE AMIN  Unavailable  Unavailable

 

 CHATA CALLE  Unavailable  Unavailable

 

 CIVUNIGUNTA, MEJIA  Unavailable  Unavailable









Problems

This patient has no known problems.



Allergies, Adverse Reactions, Alerts

This patient has no known allergies or adverse reactions.



Medications

This patient has no known medications.



Results







 Test Description  Test Time  Test Comments  Text Results  Atomic Results  
Result Comments









 NV, ANGIOGRAM,  2017  Reason for  FINAL REPORT PATIENT ID:  



 CEREBRAL  11:48:00  Exam:->cerebral  [id number] DATE:  



     aneurysm unruptured  2017NAME: Shahida EstradaATTENDING:  



       Migue Amin MDFIRST  



       ASSISTANT: CESARIO LooREOPERATIVE DIAGNOSIS:  



       Unruptured right posterior  



       communicating artery  



       aneurysm status post stent  



       assisted  



       coilingPOSTOPERATIVE  



       DIAGNOSIS: Unruptured right  



       posterior communicating  



       artery aneurysm status post  



       stent assisted  



       coilingPROCEDURE PERFORMED:  



       Diagnostic Cerebral  



       AngiogramANESTHESIA:  



       MACCOMPLICATIONS:  



       NoneESTIMATED BLOOD LOSS:  



       Less than 15ml ARTERIAL  



       VESSELS STUDIED:RIGHT  



       SUBCLAVIAN ARTERY (x1)RIGHT  



       COMMON CAROTID ARTERY  



       (CERVICAL x2)RIGHT COMMON  



       CAROTID ARTERY (CRANIAL  



       x3)RIGHT COMMON FEMORAL  



       ARTERY (x1) MATERIALS  



       EMPLOYED:1. 5 Argentine short  



       sheath 2. 4 Argentine  



       Berenstein catheter3.  



       Bentson guidewire4. Terumo  



       0.035 LT glidewire5. 5  



       Argentine Mynx device  



       INDICATIONS: 77-year-old  



       female with hypertension  



       who is status post  



       endovascular treatment of  



       an unruptured right  



       posterior communicating  



       artery aneurysm with  



       stent-assisted coiling on  



       May 19, 2017. She presents  



       for follow-up cerebral  



       angiogram. The indications  



       for the procedure as well  



       as the risks, benefits and  



       alternatives were discussed  



       with the patient and the  



       family. The risks discussed  



       included but were not  



       limited to stroke,  



       intracranial hemorrhage,  



       injury to the cervical  



       femoral or aortic vessels,  



       contrast reaction, kidney  



       to toxicity, groin  



       hematoma, weakness  



       paralysis and even death.  



       They demonstrated  



       understanding of the risk  



       benefit profile and agreed  



       to proceed. PROCEDURE:  



       After appropriate consent  



       was obtained, the patient  



       was brought to the  



       angiographic suite and  



       cardiopulmonary monitoring  



       was placed. The anesthesia  



       team performed light  



       sedation. A timeout was  



       performed. Both groins were  



       prepped and draped in the  



       usual sterile fashion.  



       After administration of 10  



       mL of 2% lidocaine, a  



       micropuncture needle was  



       used to perform a single  



       wall puncture of the right  



       common femoral artery, a  



       micropuncture sheath was  



       inserted, and an angled DSA  



       angiogram was performed  



       through the sheath. Once  



       good location of the  



       puncture site was  



       confirmed, a 5 Argentine short  



       sheath was inserted over a  



       [employer] wire and was  



       maintained on heparinized  



       saline flush throughout the  



       remainder of the procedure.  



        Using coaxial technique, a  



       preflushed Berenstein  



       catheter on constant  



       heparinized saline flush  



       was advanced over the  



       Glidewire into the  



       descending aorta, the  



       Glidewire was removed, the  



       catheter back bled, flushed  



       in usual fashion and the  



       catheter was maintained on  



       heparinized flushed  



       throughout duration of the  



       case. Using coaxial  



       technique, the catheter was  



       advanced into the aortic  



       arch and with the aid of  



       roadmapping, digital  



       fluoroscopy, and careful  



       guidewire manipulation, the  



       arteries described below  



       were selectively  



       catheterized. Upon each  



       successive catheterization,  



       digital subtraction  



       angiography using the  



       appropriate rate and volume  



       of contrast in multiple  



       projections was performed.  



       At the conclusion of the  



       procedure, the catheter was  



       retracted into the aorta  



       and the images reviewed at  



       the outside workstation for  



       quality and content.  Then  



       the femoral sheath was  



       removed and hemostasis  



       achieved with a 5 Argentine  



       Mynx device and manual  



       compression. The patient  



       tolerated the procedure  



       well and was transported in  



       unchanged neurological  



       status without groin  



       hematoma and with good  



       distal lower extremity  



       pulses. The patient was  



       transferred to the recovery  



       area to be monitored as per  



       protocol.  FINDINGS: RIGHT  



       SUBCLAVIAN ARTERY (DSA, PA,  



       LATERAL ROADMAP, x1): There  



       is normal course and  



       caliber of the subclavian  



       artery with physiological  



       filling of its distal  



       branches. Limited  



       visualization in this  



       roadmap of the origins of  



       the right vertebral artery,  



       internal mamillary artery,  



       thyrocervical and  



       costocervical trunks.  



       RIGHT COMMON CAROTID ARTERY  



       (DSA, PA, LATERAL, CERVICAL  



       x2): Tortuous course of the  



       right common carotid  



       artery. The distal cervical  



       common carotid as well as  



       the origins of the right  



       internal and external  



       carotid arteries are widely  



       patent without evidence of  



       ulceration or stenosis. The  



       proximal portions of the  



       superficial temporal  



       artery, middle meningeal  



       artery, internal maxillary  



       artery, occipital artery  



       and their branches are  



       visualized and appear  



       normal. RIGHT COMMON  



       CAROTID ARTERY (DSA, PA,  



       LATERAL, MAGNIFIED OBLIQUE,  



       CRANIAL x3): Minimal  



       residual aneurysm neck in  



       the previously treated  



       right posterior  



       communicating artery  



       aneurysm, best visualized  



       on sequence A9. Normal  



       distal cervical, petrous,  



       cavernous and supraclinoid  



       internal carotid artery  



       with physiological filling  



       of the MCA and MAXIMINO  



       branches. Limited  



       visualization of the venous  



       phase. No other aneurysms  



       or other vascular lesions  



       are seen. There is no  



       significant atherosclerosis  



       or stenosis. Normal course  



       and caliber of the external  



       carotid artery and its  



       branches. There is a well  



       visualized superficial  



       temporal artery, middle  



       meningeal artery, internal  



       maxillary artery, occipital  



       artery and their branches.  



       RIGHT COMMON FEMORAL ARTERY  



       (DSA, PA, X 1): Normal  



       location of the puncture  



       site above the bifurcation  



       and below markers of the  



       inguinal ligament.  



       IMPRESSION: Minimal  



       residual aneurysm neck in  



       the previously treated  



       right posterior  



       communicating artery  



       aneurysm. No technical or  



       procedural complications  



       FACULTY ATTESTATION: I,  



       Migue Amin M.D., was  



       present for the entirety of  



       the procedure. I performed  



       or directly supervised all  



       aspects of the procedure. I  



       performed all critical  



       aspects of the case. I  



       interpreted the images and  



       reported the results.  



       Signed: Chaz Cox  



       Verified Date/Time:  



       2017 11:48:56 Reading  



       Location: [facility]  



       Angio Reading Room  



       Electronically signed by:  



       CHAZ COX on  



       2017 11:48 AM  









 BASIC METABOLIC PANEL  2017 11:56:00    









   Test Item  Value  Reference Range  Comments









 SODIUM (BEAKER) (test  136 meq/L  136-145  



 smfp=536)      

 

 POTASSIUM (BEAKER) (test  3.9 meq/L  3.5-5.1  



 code=379)      

 

 CHLORIDE (BEAKER) (test  102 meq/L    



 code=382)      

 

 CO2 (BEAKER) (test code=355)  25 meq/L  22-29  

 

 BLOOD UREA NITROGEN (BEAKER)  20 mg/dL  7-21  



 (test code=354)      

 

 CREATININE (BEAKER) (test  0.82 mg/dL  0.57-1.25  



 code=358)      

 

 GLUCOSE RANDOM (BEAKER)  106 mg/dL    



 (test code=652)      

 

 CALCIUM (BEAKER) (test  9.6 mg/dL  8.4-10.2  



 code=697)      

 

 EGFR (BEAKER) (test  68 mL/min/1.73 sq m    ESTIMATED GFR IS NOT AS



 code=1092)      ACCURATE AS CREATININE



       CLEARANCE IN PREDICTING



       GLOMERULAR FILTRATION RATE.



       ESTIMATED GFR IS NOT



       APPLICABLE FOR DIALYSIS



       PATIENTS.



CBC WITH PLATELET COUNT + MANUAL [id number] 11:33:00





 Test Item  Value  Reference Range  Comments

 

 WHITE BLOOD CELL COUNT  5.0 K/ L  3.5-10.5  



 (BEAKER) (test code=775)      

 

 RED BLOOD CELL COUNT (BEAKER)  3.94 M/ L  3.93-5.22  



 (test code=761)      

 

 HEMOGLOBIN (BEAKER) (test  12.1 GM/DL  11.2-15.7  



 code=410)      

 

 HEMATOCRIT (BEAKER) (test  37.1 %  34.1-44.9  



 code=411)      

 

 MEAN CORPUSCULAR VOLUME  94.2 fL  79.4-94.8  



 (BEAKER) (test code=753)      

 

 MEAN CORPUSCULAR HEMOGLOBIN  30.7 pg  25.6-32.2  



 (BEAKER) (test code=751)      

 

 MEAN CORPUSCULAR HEMOGLOBIN  32.6 GM/DL  32.2-35.5  



 CONC (BEAKER) (test code=752)      

 

 RED CELL DISTRIBUTION WIDTH  12.7 %  11.7-14.4  



 (BEAKER) (test code=412)      

 

 PLATELET COUNT (BEAKER) (test  212 K/CU MM  150-450  



 ptsc=062)      

 

 MEAN PLATELET VOLUME (BEAKER)  9.4 fL  9.4-12.3  



 (test code=754)      

 

 NUCLEATED RED BLOOD CELLS  0 /100 WBC  0-0  



 (BEAKER) (test code=413)      

 

 IMMATURE GRANULOCYTES-RELATIVE  0 %  0-1  



 PERCENT (BEAKER) (test      



 code=2801)      

 

 NEUTROPHILS RELATIVE PERCENT  60 %    This is an appended report.



 (BEAKER) (test code=429)       These results have been



       appended to a previously



       final verified report.

 

 LYMPHOCYTES RELATIVE PERCENT  30 %    This is an appended report.



 (BEAKER) (test code=430)       These results have been



       appended to a previously



       final verified report.

 

 MONOCYTES RELATIVE PERCENT  9 %    This is an appended report.



 (BEAKER) (test code=431)       These results have been



       appended to a previously



       final verified report.

 

 EOSINOPHILS RELATIVE PERCENT  1 %    This is an appended report.



 (BEAKER) (test code=432)       These results have been



       appended to a previously



       final verified report.

 

 BASOPHILS RELATIVE PERCENT  1 %    This is an appended report.



 (BEAKER) (test code=437)       These results have been



       appended to a previously



       final verified report.

 

 NEUTROPHILS ABSOLUTE COUNT  2.97 K/ L  1.56-6.13  This is an appended report.



 (BEAKER) (test code=670)       These results have been



       appended to a previously



       final verified report.

 

 LYMPHOCYTES ABSOLUTE COUNT  1.46 K/ L  1.18-3.74  This is an appended report.



 (BEAKER) (test code=414)       These results have been



       appended to a previously



       final verified report.

 

 MONOCYTES ABSOLUTE COUNT  0.42 K/ L  0.24-0.36  This is an appended report.



 (BEAKER) (test code=415)       These results have been



       appended to a previously



       final verified report.

 

 EOSINOPHILS ABSOLUTE COUNT  0.05 K/ L  0.04-0.36  This is an appended report.



 (BEAKER) (test code=416)       These results have been



       appended to a previously



       final verified report.

 

 BASOPHILS ABSOLUTE COUNT  0.04 K/ L  0.01-0.08  This is an appended report.



 (BEAKER) (test code=417)       These results have been



       appended to a previously



       final verified report.



PT/[id number] 11:30:00





 Test Item  Value  Reference Range  Comments

 

 PROTIME (BEAKER) (test code=759)  15.2 seconds  11.7-14.7  

 

 INR (BEAKER) (test code=370)  1.2  <=5.9  

 

 PARTIAL THROMBOPLASTIN TIME (BEAKER) (test  42.8 seconds  22.5-36.0  



 code=760)      



RECOMMENDED COUMADIN/WARFARIN INR THERAPY RANGESSTANDARD DOSE: 2.0 - 3.0   
Includes: PROPHYLAXIS forvenous thrombosis, systemic embolization; TREATMENT 
for venous thrombosis and/or pulmonary embolus.HIGH RISK: Target INR is 2.5-3.5 
for patients with mechanical heart valves.PLATELET AGGREGATION: FUNCTION 
[id number] 09:23:00





 Test Item  Value  Reference Range  Comments

 

 WEAK ADP RESULT(BEAKER) (test  5 %  60-91  



 code=2130)      

 

 PLATELET FUNCTION SCREEN  0-39% indicates marked platelet    



 INTERP (BEAKER) (test  dysfunction    



 code=2173)      

 

 [id number]  Nelly Zurita MD    



 (BEAKER) (test code=2622)  (electronic signature)    

 

 PLATELET COUNT AGG (BEAKER)  243 K/CU MM  150-430  



 (test code=2656)      



CBC W/PLT COUNT &amp; AUTO [id number] 07:07:00





 Test Item  Value  Reference Range  Comments

 

 WHITE BLOOD CELL COUNT (BEAKER) (test code=775)  5.9 K/ L  4.0-10.0  

 

 RED BLOOD CELL COUNT (BEAKER) (test code=761)  3.88 M/ L  4.00-5.00  

 

 HEMOGLOBIN (BEAKER) (test code=410)  12.5 GM/DL  12.0-15.0  

 

 HEMATOCRIT (BEAKER) (test code=411)  37.8 %  36.0-45.0  

 

 MEAN CORPUSCULAR VOLUME (BEAKER) (test code=753)  97.3 fL  82.0-99.0  

 

 MEAN CORPUSCULAR HEMOGLOBIN (BEAKER) (test  32.1 pg  27.0-33.0  



 code=751)      

 

 MEAN CORPUSCULAR HEMOGLOBIN CONC (BEAKER) (test  33.0 GM/DL  32.0-36.0  



 code=752)      

 

 RED CELL DISTRIBUTION WIDTH (BEAKER) (test  13.4 %  10.3-14.2  



 code=412)      

 

 PLATELET COUNT (BEAKER) (test code=756)  238 K/CU MM  150-430  

 

 MEAN PLATELET VOLUME (BEAKER) (test code=754)  7.2 fL  6.5-10.5  

 

 NUCLEATED RED BLOOD CELLS (BEAKER) (test  0 /100 WBC  0-0  



 code=413)      

 

 NEUTROPHILS RELATIVE PERCENT (BEAKER) (test  58 %    



 code=429)      

 

 LYMPHOCYTES RELATIVE PERCENT (BEAKER) (test  31 %    



 code=430)      

 

 MONOCYTES RELATIVE PERCENT (BEAKER) (test  10 %    



 code=431)      

 

 EOSINOPHILS RELATIVE PERCENT (BEAKER) (test  0 %    



 code=432)      

 

 BASOPHILS RELATIVE PERCENT (BEAKER) (test  1 %    



 code=437)      

 

 NEUTROPHILS ABSOLUTE COUNT (BEAKER) (test  3.36 K/ L  1.80-8.00  



 code=670)      

 

 LYMPHOCYTES ABSOLUTE COUNT (BEAKER) (test  1.84 K/ L  1.48-4.50  



 code=414)      

 

 MONOCYTES ABSOLUTE COUNT (BEAKER) (test  0.58 K/ L  0.00-1.30  



 code=415)      

 

 EOSINOPHILS ABSOLUTE COUNT (BEAKER) (test  0.01 K/ L  0.00-0.50  



 code=416)      

 

 BASOPHILS ABSOLUTE COUNT (BEAKER) (test  0.06 K/ L  0.00-0.20  



 code=417)      



0.00BASIC METABOLIC [id number] 06:36:00





 Test Item  Value  Reference Range  Comments

 

 SODIUM (BEAKER) (test  138 meq/L  136-145  



 wuvl=951)      

 

 POTASSIUM (BEAKER) (test  4.0 meq/L  3.5-5.1  Specimen slightly



 code=379)      hemolyzed

 

 CHLORIDE (BEAKER) (test  106 meq/L    



 code=382)      

 

 CO2 (BEAKER) (test  23 meq/L  22-29  



 code=355)      

 

 BLOOD UREA NITROGEN  13 mg/dL  7-21  



 (BEAKER) (test code=354)      

 

 CREATININE (BEAKER) (test  0.79 mg/dL  0.57-1.25  Specimen slightly



 code=358)      hemolyzed

 

 GLUCOSE RANDOM (BEAKER)  97 mg/dL    



 (test code=652)      

 

 CALCIUM (BEAKER) (test  9.0 mg/dL  8.4-10.2  



 code=697)      

 

 EGFR (BEAKER) (test  71 mL/min/1.73 sq m    ESTIMATED GFR IS NOT AS



 code=1092)      ACCURATE AS CREATININE



       CLEARANCE IN PREDICTING



       GLOMERULAR FILTRATION



       RATE. ESTIMATED GFR IS



       NOT APPLICABLE FOR



       DIALYSIS PATIENTS.



[id number] 06:21:00





 Test Item  Value  Reference Range  Comments

 

 PROTIME (BEAKER) (test code=759)  14.1 seconds  11.7-14.7  

 

 INR (BEAKER) (test code=370)  1.1  <=5.9  

 

 PARTIAL THROMBOPLASTIN TIME (BEAKER) (test  37.0 seconds  22.5-36.0  



 code=760)      



RECOMMENDED COUMADIN/WARFARIN INR THERAPY RANGESSTANDARD DOSE: 2.0 - 3.0   
Includes: PROPHYLAXIS forvenous thrombosis, systemic embolization; TREATMENT 
for venous thrombosis and/or pulmonary embolus.HIGH RISK: Target INR is 2.5-3.5 
for patients with mechanical heart valves.PROTHROMBIN [id number] 06:20:
00





 Test Item  Value  Reference Range  Comments

 

 PROTIME (BEAKER) (test code=759)  14.1 seconds  11.7-14.7  

 

 INR (BEAKER) (test code=370)  1.1  <=5.9  



RECOMMENDED COUMADIN/WARFARIN INR THERAPY RANGESSTANDARD DOSE: 2.0 - 3.0   
Includes: PROPHYLAXIS forvenous thrombosis, systemic embolization; TREATMENT 
for venous thrombosis and/or pulmonary embolus.HIGH RISK: Target INR is 2.5-3.5 
for patients with mechanical heart valves.PT/[id number] 23:38:00





 Test Item  Value  Reference Range  Comments

 

 PROTIME (BEAKER) (test code=759)  15.2 seconds  11.7-14.7  

 

 INR (BEAKER) (test code=370)  1.2  <=5.9  

 

 PARTIAL THROMBOPLASTIN TIME (BEAKER) (test  36.5 seconds  22.5-36.0  



 code=760)      



RECOMMENDED COUMADIN/WARFARIN INR THERAPY RANGESSTANDARD DOSE: 2.0 - 3.0   
Includes: PROPHYLAXIS forvenous thrombosis, systemic embolization; TREATMENT 
for venous thrombosis and/or pulmonary embolus.HIGH RISK: Target INR is 2.5-3.5 
for patients with mechanical heart valves.BASIC METABOLIC [id number] 23:33:
00





 Test Item  Value  Reference Range  Comments

 

 SODIUM (BEAKER) (test  137 meq/L  136-145  



 zvlb=246)      

 

 POTASSIUM (BEAKER) (test  5.7 meq/L  3.5-5.1  Specimen markedly



 code=379)      hemolyzed

 

 CHLORIDE (BEAKER) (test  105 meq/L    



 code=382)      

 

 CO2 (BEAKER) (test  25 meq/L  22-29  



 code=355)      

 

 BLOOD UREA NITROGEN  13 mg/dL  7-21  



 (BEAKER) (test code=354)      

 

 CREATININE (BEAKER) (test  0.84 mg/dL  0.57-1.25  Specimen markedly



 code=358)      hemolyzed

 

 GLUCOSE RANDOM (BEAKER)  86 mg/dL    



 (test code=652)      

 

 CALCIUM (BEAKER) (test  8.9 mg/dL  8.4-10.2  



 code=697)      

 

 EGFR (BEAKER) (test  66 mL/min/1.73 sq m    ESTIMATED GFR IS NOT AS



 code=1092)      ACCURATE AS CREATININE



       CLEARANCE IN PREDICTING



       GLOMERULAR FILTRATION



       RATE. ESTIMATED GFR IS



       NOT APPLICABLE FOR



       DIALYSIS PATIENTS.



CBC W/PLT COUNT &amp; AUTO [id number] 23:19:00





 Test Item  Value  Reference Range  Comments

 

 WHITE BLOOD CELL COUNT (BEAKER) (test code=775)  7.0 K/ L  4.0-10.0  

 

 RED BLOOD CELL COUNT (BEAKER) (test code=761)  3.76 M/ L  4.00-5.00  

 

 HEMOGLOBIN (BEAKER) (test code=410)  12.7 GM/DL  12.0-15.0  

 

 HEMATOCRIT (BEAKER) (test code=411)  36.9 %  36.0-45.0  

 

 MEAN CORPUSCULAR VOLUME (BEAKER) (test code=753)  98.2 fL  82.0-99.0  

 

 MEAN CORPUSCULAR HEMOGLOBIN (BEAKER) (test  33.7 pg  27.0-33.0  



 code=751)      

 

 MEAN CORPUSCULAR HEMOGLOBIN CONC (BEAKER) (test  34.4 GM/DL  32.0-36.0  



 code=752)      

 

 RED CELL DISTRIBUTION WIDTH (BEAKER) (test  12.2 %  10.3-14.2  



 code=412)      

 

 PLATELET COUNT (BEAKER) (test code=756)  241 K/CU MM  150-430  

 

 MEAN PLATELET VOLUME (BEAKER) (test code=754)  7.2 fL  6.5-10.5  

 

 NUCLEATED RED BLOOD CELLS (BEAKER) (test  0 /100 WBC  0-0  



 code=413)      

 

 NEUTROPHILS RELATIVE PERCENT (BEAKER) (test  58 %    



 code=429)      

 

 LYMPHOCYTES RELATIVE PERCENT (BEAKER) (test  34 %    



 code=430)      

 

 MONOCYTES RELATIVE PERCENT (BEAKER) (test  7 %    



 code=431)      

 

 EOSINOPHILS RELATIVE PERCENT (BEAKER) (test  0 %    



 code=432)      

 

 BASOPHILS RELATIVE PERCENT (BEAKER) (test  1 %    



 code=437)      

 

 NEUTROPHILS ABSOLUTE COUNT (BEAKER) (test  4.08 K/ L  1.80-8.00  



 code=670)      

 

 LYMPHOCYTES ABSOLUTE COUNT (BEAKER) (test  2.36 K/ L  1.48-4.50  



 code=414)      

 

 MONOCYTES ABSOLUTE COUNT (BEAKER) (test  0.52 K/ L  0.00-1.30  



 code=415)      

 

 EOSINOPHILS ABSOLUTE COUNT (BEAKER) (test  0.02 K/ L  0.00-0.50  



 code=416)      

 

 BASOPHILS ABSOLUTE COUNT (BEAKER) (test  0.05 K/ L  0.00-0.20  



 code=417)      



0.00POCT-P2Y12 PLATELET [id number] 20:57:00





 Test Item  Value  Reference Range  Comments

 

 POC-P2Y12 PLATELET AGG (BEAKER) (test code=2303)  31 PRU    



RANGE INFORMATION: PRU reference range is 194-418. Post Drug Results: Lower PRU 
levels are associated with expected antiplatelet effect. Values may be below 
the stated reference range above. The post-drug PRU values reported in the 
[employer] P2Y12 package insert are .URINALYSIS W/ [id number] 09
:22:00





 Test Item  Value  Reference Range  Comments

 

 COLOR (BEAKER) (test code=470)  Light Yellow    

 

 CLARITY (BEAKER) (test code=469)  Hazy    

 

 SPECIFIC GRAVITY UA (BEAKER) (test  1.009  1.001-1.035  



 code=468)      

 

 PH UA (BEAKER) (test code=467)  5.5  5.0-8.0  

 

 PROTEIN UA (BEAKER) (test code=464)  Negative  Negative  

 

 GLUCOSE UA (BEAKER) (test code=365)  Negative  Negative  

 

 KETONES UA (BEAKER) (test code=371)  Negative  Negative  

 

 BILIRUBIN UA (BEAKER) (test  Negative  Negative  



 code=462)      

 

 BLOOD UA (BEAKER) (test code=461)  Small  Negative  

 

 NITRITE UA (BEAKER) (test code=465)  Positive  Negative  

 

 LEUKOCYTE ESTERASE UA (BEAKER)  Large  Negative  



 (test code=466)      

 

 UROBILINOGEN UA (BEAKER) (test  0.2 mg/dL  0.2-1.0  



 code=463)      

 

 RBC UA (BEAKER) (test code=519)  12 /HPF    

 

 WBC UA (BEAKER) (test code=520)  92 /HPF    

 

 BACTERIA (BEAKER) (test yivq=486)  Rare    

 

 MUCUS (BEAKER) (test code=1574)  Rare    

 

 SQUAMOUS EPITHELIAL (BEAKER) (test  < /HPF    



 code=516)      

 

 YEAST (BEAKER) (test code=1585)  Occasional    

 

 SOURCE(BEAKER) (test code=4526)  Urine, Straight Catheter    



[id number] 04:55:00





 Test Item  Value  Reference Range  Comments

 

 PHOSPHORUS (BEAKER) (test code=604)  3.3 mg/dL  2.3-4.7  



[id number] 04:55:00





 Test Item  Value  Reference Range  Comments

 

 MAGNESIUM (BEAKER) (test code=627)  1.6 mg/dL  1.6-2.6  



BASIC METABOLIC [id number] 04:55:00





 Test Item  Value  Reference Range  Comments

 

 SODIUM (BEAKER) (test  140 meq/L  136-145  



 xknn=000)      

 

 POTASSIUM (BEAKER) (test  3.4 meq/L  3.5-5.1  



 code=379)      

 

 CHLORIDE (BEAKER) (test  107 meq/L    



 code=382)      

 

 CO2 (BEAKER) (test  22 meq/L  22-29  



 code=355)      

 

 BLOOD UREA NITROGEN  8 mg/dL  7-21  



 (BEAKER) (test code=354)      

 

 CREATININE (BEAKER) (test  0.71 mg/dL  0.57-1.25  



 code=358)      

 

 GLUCOSE RANDOM (BEAKER)  109 mg/dL    



 (test code=652)      

 

 CALCIUM (BEAKER) (test  8.6 mg/dL  8.4-10.2  



 code=697)      

 

 EGFR (BEAKER) (test  80 mL/min/1.73 sq m    ESTIMATED GFR IS NOT AS



 code=1092)      ACCURATE AS CREATININE



       CLEARANCE IN PREDICTING



       GLOMERULAR FILTRATION



       RATE. ESTIMATED GFR IS



       NOT APPLICABLE FOR



       DIALYSIS PATIENTS.



CBC W/PLT COUNT &amp; AUTO [id number] 04:52:00





 Test Item  Value  Reference Range  Comments

 

 WHITE BLOOD CELL COUNT (BEAKER) (test code=775)  7.5 K/ L  4.0-10.0  

 

 RED BLOOD CELL COUNT (BEAKER) (test code=761)  4.04 M/ L  4.00-5.00  

 

 HEMOGLOBIN (BEAKER) (test code=410)  12.9 GM/DL  12.0-15.0  

 

 HEMATOCRIT (BEAKER) (test code=411)  39.7 %  36.0-45.0  

 

 MEAN CORPUSCULAR VOLUME (BEAKER) (test code=753)  98.3 fL  82.0-99.0  

 

 MEAN CORPUSCULAR HEMOGLOBIN (BEAKER) (test  31.9 pg  27.0-33.0  



 code=751)      

 

 MEAN CORPUSCULAR HEMOGLOBIN CONC (BEAKER) (test  32.5 GM/DL  32.0-36.0  



 code=752)      

 

 RED CELL DISTRIBUTION WIDTH (BEAKER) (test  12.0 %  10.3-14.2  



 code=412)      

 

 PLATELET COUNT (BEAKER) (test code=756)  196 K/CU MM  150-430  

 

 MEAN PLATELET VOLUME (BEAKER) (test code=754)  7.2 fL  6.5-10.5  

 

 NUCLEATED RED BLOOD CELLS (BEAKER) (test  0 /100 WBC  0-0  



 code=413)      

 

 NEUTROPHILS RELATIVE PERCENT (BEAKER) (test  81 %    



 code=429)      

 

 LYMPHOCYTES RELATIVE PERCENT (BEAKER) (test  14 %    



 code=430)      

 

 MONOCYTES RELATIVE PERCENT (BEAKER) (test  6 %    



 code=431)      

 

 EOSINOPHILS RELATIVE PERCENT (BEAKER) (test  0 %    



 code=432)      

 

 BASOPHILS RELATIVE PERCENT (BEAKER) (test  0 %    



 code=437)      

 

 NEUTROPHILS ABSOLUTE COUNT (BEAKER) (test  6.03 K/ L  1.80-8.00  



 code=670)      

 

 LYMPHOCYTES ABSOLUTE COUNT (BEAKER) (test  1.01 K/ L  1.48-4.50  



 code=414)      

 

 MONOCYTES ABSOLUTE COUNT (BEAKER) (test  0.42 K/ L  0.00-1.30  



 code=415)      

 

 EOSINOPHILS ABSOLUTE COUNT (BEAKER) (test  0.01 K/ L  0.00-0.50  



 code=416)      

 

 BASOPHILS ABSOLUTE COUNT (BEAKER) (test  0.01 K/ L  0.00-0.20  



 code=417)      



[id number] 16:01:00





 Test Item  Value  Reference Range  Comments

 

 ACTIVATED CLOTTING TIME  245 sec    TESTED AT [address]



 (BEAKER) (test code=441)      [place] [address]



[id number] 15:00:00





 Test Item  Value  Reference Range  Comments

 

 ACTIVATED CLOTTING TIME  255 sec    [facility] [place] [address] [facility]



 ([facility]) (test code=441)      [place] [address]



[id number] 14:44:00





 Test Item  Value  Reference Range  Comments

 

 ACTIVATED CLOTTING TIME  234 sec    [facility] [place] [address] [facility]



 (BE[facility]) (test code=441)      [place] [address]



[id number] 14:31:00





 Test Item  Value  Reference Range  Comments

 

 ACTIVATED CLOTTING TIME  219 sec    [facility] [place] [address] [facility]



 (BE[facility]ER) (test code=441)      [place] [address]



[id number] 14:31:00





 Test Item  Value  Reference Range  Comments

 

 ACTIVATED CLOTTING TIME  183 sec    TESTED AT [facility] [address]



 (BEAKER) (test code=441)      [facility] [address]



[id number] 13:22:00





 Test Item  Value  Reference Range  Comments

 

 ACTIVATED CLOTTING TIME  137 sec    TESTED AT [facility] [address]



 (BEAKER) (test code=441)      [facility] [address]



POCT-P2Y12 PLATELET [id number] 07:24:00





 Test Item  Value  Reference Range  Comments

 

 POC-P2Y12 PLATELET AGG (BEAKER) (test code=2303)  110 PRU    



RANGE INFORMATION: PRU reference range is 194-418. Post Drug Results: Lower PRU 
levels are associated with expected antiplatelet effect. Values may be below 
the stated reference range above. The post-drug PRU values reported in the 
[employer] P2Y12 package insert are .POCT-ASPIRIN PLATELET [id number]
[id number] 07:24:00





 Test Item  Value  Reference Range  Comments

 

 POC-ASPIRIN PLATELET AGG (BEAKER) (test code=2302)  402 ARU    



RANGE INFORMATION: 350-549 ARU Therapeutic range for platelet function.        
           550-700 ARU Non-Therapeutic range for platelet function.PLATELET 
AGGREGATION: FUNCTION [id number] 10:21:00





 Test Item  Value  Reference Range  Comments

 

 WEAK ADP RESULT(BEAKER) (test  92 %  60-91  



 code=2135)      

 

 PLATELET FUNCTION SCREEN  % indicates normal    



 INTERP (BEAKER) (test  platelet function    



 code=2173)      

 

 [id number] (BEAKER)  Meredith Reyes, MD (electronic    



 (test code=2622)  signature)    

 

 PLATELET COUNT AGG (BEAKER)  219 K/CU MM  150-430  



 (test bxcz=5390)      



COMPREHENSIVE METABOLIC [id number] 05:11:00





 Test Item  Value  Reference Range  Comments

 

 TOTAL PROTEIN (BEAKER)  6.5 gm/dL  6.0-8.3  



 (test code=770)      

 

 ALBUMIN (BEAKER) (test  3.6 g/dL  3.5-5.0  



 code=1145)      

 

 ALKALINE PHOSPHATASE  60 U/L    



 (BEAKER) (test code=346)      

 

 BILIRUBIN TOTAL (BEAKER)  1.1 mg/dL  0.2-1.2  



 (test code=377)      

 

 SODIUM (BEAKER) (test  136 meq/L  136-145  



 ntof=517)      

 

 POTASSIUM (BEAKER) (test  3.8 meq/L  3.5-5.1  



 code=379)      

 

 CHLORIDE (BEAKER) (test  104 meq/L    



 code=382)      

 

 CO2 (BEAKER) (test  23 meq/L  22-29  



 code=355)      

 

 BLOOD UREA NITROGEN  13 mg/dL  7-21  



 (BEAKER) (test code=354)      

 

 CREATININE (BEAKER) (test  0.79 mg/dL  0.57-1.25  



 code=358)      

 

 GLUCOSE RANDOM (BEAKER)  89 mg/dL    



 (test code=652)      

 

 CALCIUM (BEAKER) (test  9.2 mg/dL  8.4-10.2  



 code=697)      

 

 AST (SGOT) (BEAKER) (test  19 U/L  5-34  



 code=353)      

 

 ALT (SGPT) (BEAKER) (test  8 U/L  6-55  



 code=347)      

 

 EGFR (BEAKER) (test  71 mL/min/1.73 sq m    ESTIMATED GFR IS NOT AS



 code=1092)      ACCURATE AS CREATININE



       CLEARANCE IN PREDICTING



       GLOMERULAR FILTRATION



       RATE. ESTIMATED GFR IS



       NOT APPLICABLE FOR



       DIALYSIS PATIENTS.



[id number] 05:04:00





 Test Item  Value  Reference Range  Comments

 

 PARTIAL THROMBOPLASTIN TIME (BEAKER) (test  37.9 seconds  22.5-36.0  



 code=760)      



[id number] 16:56:00





 Test Item  Value  Reference Range  Comments

 

 PARTIAL THROMBOPLASTIN TIME (BEAKER) (test  39.4 seconds  22.5-36.0  



 code=760)      



PROTHROMBIN [id number] 16:55:00





 Test Item  Value  Reference Range  Comments

 

 PROTIME (BEAKER) (test code=759)  14.4 seconds  11.7-14.7  

 

 INR (BEAKER) (test code=370)  1.1  <=5.9  



RECOMMENDED COUMADIN/WARFARIN INR THERAPY RANGESSTANDARD DOSE: 2.0 - 3.0   
Includes: PROPHYLAXIS forvenous thrombosis, systemic embolization; TREATMENT 
for venous thrombosis and/or pulmonary embolus.HIGH RISK: Target INR is 2.5-3.5 
for patients with mechanical heart valves.COMPREHENSIVE METABOLIC [id number]
[id number] 02:20:00





 Test Item  Value  Reference Range  Comments

 

 TOTAL PROTEIN (BEAKER)  7.0 gm/dL  6.0-8.3  



 (test code=770)      

 

 ALBUMIN (BEAKER) (test  3.8 g/dL  3.5-5.0  



 code=1145)      

 

 ALKALINE PHOSPHATASE  68 U/L    



 (BEAKER) (test code=346)      

 

 BILIRUBIN TOTAL (BEAKER)  0.9 mg/dL  0.2-1.2  



 (test code=377)      

 

 SODIUM (BEAKER) (test  137 meq/L  136-145  



 neyh=325)      

 

 POTASSIUM (BEAKER) (test  3.5 meq/L  3.5-5.1  



 code=379)      

 

 CHLORIDE (BEAKER) (test  104 meq/L    



 code=382)      

 

 CO2 (BEAKER) (test  23 meq/L  22-29  



 code=355)      

 

 BLOOD UREA NITROGEN  10 mg/dL  7-21  



 (BEAKER) (test code=354)      

 

 CREATININE (BEAKER) (test  0.81 mg/dL  0.57-1.25  



 code=358)      

 

 GLUCOSE RANDOM (BEAKER)  106 mg/dL    



 (test code=652)      

 

 CALCIUM (BEAKER) (test  9.7 mg/dL  8.4-10.2  



 code=697)      

 

 AST (SGOT) (BEAKER) (test  21 U/L  5-34  



 code=353)      

 

 ALT (SGPT) (BEAKER) (test  8 U/L  6-55  



 code=347)      

 

 EGFR (BEAKER) (test  69 mL/min/1.73 sq m    ESTIMATED GFR IS NOT AS



 code=1092)      ACCURATE AS CREATININE



       CLEARANCE IN PREDICTING



       GLOMERULAR FILTRATION



       RATE. ESTIMATED GFR IS



       NOT APPLICABLE FOR



       DIALYSIS PATIENTS.



HEMOGLOBIN [id number] 07:40:00





 Test Item  Value  Reference Range  Comments

 

 HEMOGLOBIN A1C (BEAKER) (test code=368)  4.9 %  4.3-6.1  



VITAMIN [id number] 06:01:00





 Test Item  Value  Reference Range  Comments

 

 VITAMIN B12 (BEAKER) (test code=774)  826 pg/mL  213-816  



TSH/FREE T4 IF [id number] 06:01:00





 Test Item  Value  Reference Range  Comments

 

 THYROID STIMULATING HORMONE (BEAKER) (test  1.37 uIU/mL  0.35-4.94  



 code=772)      



CALCIUM, [id number] 05:48:00





 Test Item  Value  Reference Range  Comments

 

 CALCIUM IONIZED (BEAKER) (test code=698)  1.11 mmol/L  1.12-1.27  

 

 PH, BLOOD (BEAKER) (test code=1810)  7.41    



[id number] 05:33:00





 Test Item  Value  Reference Range  Comments

 

 PHOSPHORUS (BEAKER) (test code=604)  3.3 mg/dL  2.3-4.7  



[id number] 05:33:00





 Test Item  Value  Reference Range  Comments

 

 MAGNESIUM (BEAKER) (test code=627)  2.0 mg/dL  1.6-2.6  



COMPREHENSIVE METABOLIC [id number] 05:33:00





 Test Item  Value  Reference Range  Comments

 

 TOTAL PROTEIN (BEAKER)  5.9 gm/dL  6.0-8.3  



 (test code=770)      

 

 ALBUMIN (BEAKER) (test  3.3 g/dL  3.5-5.0  



 code=1145)      

 

 ALKALINE PHOSPHATASE  56 U/L    



 (BEAKER) (test code=346)      

 

 BILIRUBIN TOTAL (BEAKER)  0.6 mg/dL  0.2-1.2  



 (test code=377)      

 

 SODIUM (BEAKER) (test  140 meq/L  136-145  



 jraa=022)      

 

 POTASSIUM (BEAKER) (test  3.7 meq/L  3.5-5.1  



 code=379)      

 

 CHLORIDE (BEAKER) (test  106 meq/L    



 code=382)      

 

 CO2 (BEAKER) (test  27 meq/L  22-29  



 code=355)      

 

 BLOOD UREA NITROGEN  13 mg/dL  7-21  



 (BEAKER) (test code=354)      

 

 CREATININE (BEAKER) (test  0.79 mg/dL  0.57-1.25  



 code=358)      

 

 GLUCOSE RANDOM (BEAKER)  95 mg/dL    



 (test code=652)      

 

 CALCIUM (BEAKER) (test  8.7 mg/dL  8.4-10.2  



 code=697)      

 

 AST (SGOT) (BEAKER) (test  16 U/L  5-34  



 code=353)      

 

 ALT (SGPT) (BEAKER) (test  7 U/L  6-55  



 code=347)      

 

 EGFR (BEAKER) (test  71 mL/min/1.73 sq m    ESTIMATED GFR IS NOT AS



 code=1092)      ACCURATE AS CREATININE



       CLEARANCE IN PREDICTING



       GLOMERULAR FILTRATION



       RATE. ESTIMATED GFR IS



       NOT APPLICABLE FOR



       DIALYSIS PATIENTS.



LIPID [id number] 05:33:00





 Test Item  Value  Reference Range  Comments

 

 TRIGLYCERIDES (BEAKER) (test code=540)  57 mg/dL    

 

 CHOLESTEROL (BEAKER) (test code=631)  204 mg/dL    

 

 HDL CHOLESTEROL (BEAKER) (test code=976)  62 mg/dL    

 

 LDL CHOLESTEROL CALCULATED (BEAKER) (test  131 mg/dL    



 code=633)      



Triglyceride Reference Range:   Low Risk         &lt;150   Borderline    150-
199   High Risk     200-499   Very High Risk  &gt;=500Cholesterol Reference 
Range:   Low Risk         &lt;200   Borderline 200-239    High Risk        &gt;
240HDL Cholesterol Reference Range:   Low Risk         &gt;=60   High Risk     
    &lt;40LDL Cholesterol Reference Range:   Optimal          &lt;100   Near 
Optimal  100-129   Borderline    130-159   High          160-189   Very High   
    &gt;=190CBC W/PLT COUNT &amp; AUTO [id number] 04:51:00





 Test Item  Value  Reference Range  Comments

 

 WHITE BLOOD CELL COUNT (BEAKER) (test code=775)  4.8 K/ L  4.0-10.0  

 

 RED BLOOD CELL COUNT (BEAKER) (test code=761)  4.01 M/ L  4.00-5.00  

 

 HEMOGLOBIN (BEAKER) (test code=410)  13.4 GM/DL  12.0-15.0  

 

 HEMATOCRIT (BEAKER) (test code=411)  39.0 %  36.0-45.0  

 

 MEAN CORPUSCULAR VOLUME (BEAKER) (test code=753)  97.5 fL  82.0-99.0  

 

 MEAN CORPUSCULAR HEMOGLOBIN (BEAKER) (test  33.5 pg  27.0-33.0  



 code=751)      

 

 MEAN CORPUSCULAR HEMOGLOBIN CONC (BEAKER) (test  34.4 GM/DL  32.0-36.0  



 code=752)      

 

 RED CELL DISTRIBUTION WIDTH (BEAKER) (test  12.8 %  10.3-14.2  



 code=412)      

 

 PLATELET COUNT (BEAKER) (test code=756)  193 K/CU MM  150-430  

 

 MEAN PLATELET VOLUME (BEAKER) (test code=754)  7.0 fL  6.5-10.5  

 

 NUCLEATED RED BLOOD CELLS (BEAKER) (test  0 /100 WBC  0-0  



 code=413)      

 

 NEUTROPHILS RELATIVE PERCENT (BEAKER) (test  50 %    



 code=429)      

 

 LYMPHOCYTES RELATIVE PERCENT (BEAKER) (test  40 %    



 code=430)      

 

 MONOCYTES RELATIVE PERCENT (BEAKER) (test  9 %    



 code=431)      

 

 EOSINOPHILS RELATIVE PERCENT (BEAKER) (test  0 %    



 code=432)      

 

 BASOPHILS RELATIVE PERCENT (BEAKER) (test  0 %    



 code=437)      

 

 NEUTROPHILS ABSOLUTE COUNT (BEAKER) (test  2.38 K/ L  1.80-8.00  



 code=670)      

 

 LYMPHOCYTES ABSOLUTE COUNT (BEAKER) (test  1.93 K/ L  1.48-4.50  



 code=414)      

 

 MONOCYTES ABSOLUTE COUNT (BEAKER) (test  0.44 K/ L  0.00-1.30  



 code=415)      

 

 EOSINOPHILS ABSOLUTE COUNT (BEAKER) (test  0.01 K/ L  0.00-0.50  



 code=416)      

 

 BASOPHILS ABSOLUTE COUNT (BEAKER) (test  0.02 K/ L  0.00-0.20  



 code=417)      



0.00COMPREHENSIVE METABOLIC [id number] 22:47:00





 Test Item  Value  Reference Range  Comments

 

 TOTAL PROTEIN (BEAKER)  5.8 gm/dL  6.0-8.3  



 (test code=770)      

 

 ALBUMIN (BEAKER) (test  3.2 g/dL  3.5-5.0  



 code=1145)      

 

 ALKALINE PHOSPHATASE  56 U/L    



 (BEAKER) (test code=346)      

 

 BILIRUBIN TOTAL (BEAKER)  0.5 mg/dL  0.2-1.2  



 (test code=377)      

 

 SODIUM (BEAKER) (test  140 meq/L  136-145  



 wmrk=435)      

 

 POTASSIUM (BEAKER) (test  3.7 meq/L  3.5-5.1  



 code=379)      

 

 CHLORIDE (BEAKER) (test  108 meq/L    



 code=382)      

 

 CO2 (BEAKER) (test  25 meq/L  22-29  



 code=355)      

 

 BLOOD UREA NITROGEN  11 mg/dL  7-21  



 (BEAKER) (test code=354)      

 

 CREATININE (BEAKER) (test  0.83 mg/dL  0.57-1.25  



 code=358)      

 

 GLUCOSE RANDOM (BEAKER)  94 mg/dL    



 (test code=652)      

 

 CALCIUM (BEAKER) (test  8.6 mg/dL  8.4-10.2  



 code=697)      

 

 AST (SGOT) (BEAKER) (test  15 U/L  5-34  



 code=353)      

 

 ALT (SGPT) (BEAKER) (test  7 U/L  6-55  



 code=347)      

 

 EGFR (JOVI) (test  67 mL/min/1.73 sq m    ESTIMATED GFR IS NOT AS



 code=1092)      ACCURATE AS CREATININE



       CLEARANCE IN PREDICTING



       GLOMERULAR FILTRATION



       RATE. ESTIMATED GFR IS



       NOT APPLICABLE FOR



       DIALYSIS PATIENTS.

## 2020-01-27 NOTE — RAD REPORT
EXAM DESCRIPTION:  CT - CTHCSPWOC - 1/27/2020 12:27 pm

 

CLINICAL HISTORY:  Trauma, head and neck injury.

fall, head injury

 

COMPARISON:  Head C Spine Mpr Wo Con dated 3/24/2019; Neck Angio dated 5/27/2017; Head C Spine Mpr Wo
 Con dated 11/19/2016

 

TECHNIQUE:  Axial 5 mm thick images of the head were obtained.

 

Axial 2 mm thick images of the cervical spine were obtained with sagittal and coronal reconstruction 
images generated and reviewed.

 

All CT scans are performed using dose optimization technique as appropriate and may include automated
 exposure control or mA/KV adjustment according to patient size.

 

FINDINGS:  CT HEAD WITHOUT CONTRAST:

 

No acute hemorrhage, hydrocephalus or extra-axial collection is identified.Mild generalized brain atr
ophy is present with mild periventricular and deep white matter chronic microvascular ischemic change
s.No areas of brain edema or midline shift. Aneurysm clips are seen along the right aspect of the cir
cyndi Grey. Vertebral atherosclerosis noted.

 

The paranasal sinuses and mastoids are clear.The calvarium is intact.

 

CT CERVICAL SPINE WITHOUT CONTRAST:

 

No fracture or subluxation.Moderate lower cervical spondylosis.No prevertebral soft tissues swelling 
is identified. Left carotid atherosclerosis.

 

IMPRESSION:  No acute intracranial or cervical spine findings.

 

Moderate lower lumbar spondylosis.

## 2020-01-27 NOTE — ER
Nurse's Notes                                                                                     

 [facility]                                                                 

Name: Jerica Estrada                                                                           

Age: 79 yrs                                                                                       

Sex: Female                                                                                       

: 1940                                                                                   

MRN: [id number]                                                                                   

Arrival Date: 2020                                                                          

Time: 12:13                                                                                       

Account#: [id number]                                                                            

Bed 19                                                                                            

Private MD:                                                                                       

Diagnosis: Unspecified injury of head;Skin Tear                                                   

                                                                                                  

Presentation:                                                                                     

                                                                                             

12:13 Presenting complaint: EMS states: mechanical fall, pt reports tripped over her own      sg  

      foot, hitting head on a door frame and sustained a skin tear to the left elbow approx       

      4'' in length, pt denies LOC, reports pain to the left elbow and head at this time. pt      

      is observed to have bruising in different stages to the BUE. Care prior to arrival:         

      wound care to the left elbow. Mechanism of Injury: Fall from standing position. Trauma      

      event details: Injury occurred in the [facility], Injury occurred: at home.         

      Injury occurred: 2020 Injury occurred at: 12:16.                                

12:13 Acuity: LYNNETTE 3                                                                           sg  

12:13 Method Of Arrival: EMS: [facility]                                                sg  

                                                                                                  

Historical:                                                                                       

- Allergies:                                                                                      

12:19 Bactrim;                                                                                sg  

12:19 Ciprofloxacin;                                                                          sg  

12:19 Codeine;                                                                                sg  

12:19 Demerol;                                                                                sg  

12:19 Diovan HCT;                                                                             sg  

12:19 Hydrochlorothiazide;                                                                    sg  

12:19 Iodinated Contrast Media - IV Dye;                                                      sg  

12:19 Tetanus Vaccines \T\ Toxoid;                                                              sg

12:19 valsartan;                                                                              sg  

- Home Meds:                                                                                      

12:19 clopidogrel 75 mg Oral tab [Active];                                                    sg  

13:15 carbidopa-levodopa  mg Oral tab 1 tab 3 times per day [Active]; amlodipine 10 mg  sg  

      tab 1 tab once daily [Active]; metoprolol tartrate 25 mg Oral tab 1 tab 2 times per day     

      [Active]; losartan 100 mg Oral tab 1 tab once daily [Active]; aspirin 81 mg Oral chew       

      [Active]; folic acid 400 mcg Oral tab 2 tab once daily [Active]; Calcium 600 Oral           

      [Active]; atorvastatin 40 mg oral tab 1 tab once daily [Active];                            

- PMHx:                                                                                           

12:19 Aneurysm; Hypertension;                                                                 sg  

                                                                                                  

                                                                                                  

                                                                                                  

Vital Signs:                                                                                      

12:15  / 87; Pulse 77; Resp 16; Temp 97.7; Pulse Ox 100% on R/A;                        iw  

                                                                                                  

[facility] Coma Score:                                                                               

12:15 Eye Response: spontaneous(4). Verbal Response: oriented(5). Motor Response: obeys       iw  

      commands(6). Total: 15.                                                                     

                                                                                                  

Trauma Score (Adult):                                                                             

12:15 Eye Response: spontaneous(1); Verbal Response: oriented(1); Motor Response: obeys       iw  

      commands(2); Systolic BP: > 89 mm Hg(4); Respiratory Rate: 10 to 29 per min(4); Graciela     

      Score: 15; Trauma Score: 12                                                                 

                                                                                                  

ED Course:                                                                                        

12:13 Patient arrived in ED.                                                                  sg  

12:13 Reinaldo Pearl PA is PHCP.                                                              jmm 

12:13 Kenney Mcdermott MD is Attending Physician.                                             jmm 

12:16 Triage completed.                                                                       sg  

12:17 Harvinder Hammond, RN is Primary Nurse.                                                       sg  

12:27 CT Head C Spine In Process Unspecified.                                                 EDMS

13:01 Elbow Left 3 View XRAY In Process Unspecified.                                          EDMS

                                                                                                  

Administered Medications:                                                                         

No medications were administered                                                                  

                                                                                                  

                                                                                                  

Output:                                                                                           

12:15 Urine: 0ml; Total: 0ml.                                                                   

                                                                                                  

Outcome:                                                                                          

14:26 Discharge ordered by MD.                                                                [facility] 

14:40 Patient left the ED.                                                                    sg  

                                                                                                  

Signatures:                                                                                       

Dispatcher [employer]                           EDMS                                                 

Harvinder Hammond, RN                         RN                                                      

Reinaldo Pearl PA PA jmm Williams, Irene, RN                     RN                                                      

                                                                                                  

**************************************************************************************************

## 2020-01-27 NOTE — EDPHYS
Physician Documentation                                                                           

 [facility]                                                                 

Name: Jerica Estrada                                                                           

Age: 79 yrs                                                                                       

Sex: Female                                                                                       

: 1940                                                                                   

MRN: [id number]                                                                                   

Arrival Date: 2020                                                                          

Time: 12:13                                                                                       

Account#: [id number]                                                                            

Bed 19                                                                                            

Private MD:                                                                                       

ED Physician Kenney Mcdermott                                                                      

HPI:                                                                                              

                                                                                             

12:18 This 79 yrs old  Female presents to ER via EMS with complaints of Head Injury  jmm 

      Without LOC-Adult, Skin Tear(s).                                                            

12:18 Details of fall: The patient fell from an upright position. Onset: The symptoms/episode jmm 

      began/occurred acutely, just prior to arrival. Associated injuries: The patient             

      sustained injury to the head, left elbow. This is a 79 year old female with a history       

      of htn, that presents to the ED with complaints of headache, left elbow pain after a        

      fall which just occurred just prior to arrival. Patient states she tripped. Hitting her     

      head against a door. Denies LOC, denies chest pain, denies weakness. .                      

                                                                                                  

Historical:                                                                                       

- Allergies:                                                                                      

12:19 Bactrim;                                                                                sg  

12:19 Ciprofloxacin;                                                                          sg  

12:19 Codeine;                                                                                sg  

12:19 Demerol;                                                                                sg  

12:19 Diovan HCT;                                                                             sg  

12:19 Hydrochlorothiazide;                                                                    sg  

12:19 Iodinated Contrast Media - IV Dye;                                                      sg  

12:19 Tetanus Vaccines \T\ Toxoid;                                                              sg

12:19 valsartan;                                                                              sg  

- Home Meds:                                                                                      

12:19 clopidogrel 75 mg Oral tab [Active];                                                    sg  

13:15 carbidopa-levodopa  mg Oral tab 1 tab 3 times per day [Active]; amlodipine 10 mg  sg  

      tab 1 tab once daily [Active]; metoprolol tartrate 25 mg Oral tab 1 tab 2 times per day     

      [Active]; losartan 100 mg Oral tab 1 tab once daily [Active]; aspirin 81 mg Oral chew       

      [Active]; folic acid 400 mcg Oral tab 2 tab once daily [Active]; Calcium 600 Oral           

      [Active]; atorvastatin 40 mg oral tab 1 tab once daily [Active];                            

- PMHx:                                                                                           

12:19 Aneurysm; Hypertension;                                                                 sg  

                                                                                                  

                                                                                                  

                                                                                                  

ROS:                                                                                              

12:18 Constitutional: Negative for fever, chills, and weight loss, Cardiovascular: Negative   jmm 

      for chest pain, palpitations, and edema, Respiratory: Negative for shortness of breath,     

      cough, wheezing, and pleuritic chest pain, Abdomen/GI: Negative for abdominal pain,         

      nausea, vomiting, diarrhea, and constipation.                                               

12:18 MS/extremity: Positive for injury or acute deformity.                                       

12:18 Neuro: Positive for headache, Negative for loss of consciousness.                           

12:18 All other systems are negative.                                                             

                                                                                                  

Exam:                                                                                             

12:18 Constitutional:  This is a well developed, well nourished patient who is awake, alert,  jmm 

      and in no acute distress. Head/Face:  atraumatic. Eyes:  EOMI, no conjunctival erythema     

      appreciated ENT:  Moist Mucus Membranes                                                     

12:18 Chest/axilla:  Normal chest wall appearance and motion.   Cardiovascular:  Regular rate     

      and rhythm.  No edema appreciated Respiratory:  Normal respirations, no respiratory         

      distress appreciated Abdomen/GI:  Non distended, soft Back:  Normal ROM Skin:  General      

      appearance color normal MS/ Extremity:  Moves all extremities, no obvious deformities       

      appreciated, no edema noted to the lower extremities  Neuro:  Awake and alert, normal       

      gait Psych:  Behavior is normal, Mood is normal, Patient is cooperative and pleasant        

12:18 Neck: ROM/movement: is normal.                                                              

12:18 Musculoskeletal/extremity: FROM appreciated ot the left elbow, compartments are soft, 4     

      cm laceration noted, full radial pulse, NVI.                                                

12:18 Skin: 4 cm skin tear noted to the left elbow.                                               

12:18 Neuro: Orientation: is normal, Mentation: is normal, Memory: is normal.                     

12:18 Psych: Behavior/mood is pleasant, cooperative.                                              

                                                                                                  

Vital Signs:                                                                                      

12:15  / 87; Pulse 77; Resp 16; Temp 97.7; Pulse Ox 100% on R/A;                        iw  

                                                                                                  

[facility]ac[facility]a Coma Score:                                                                               

12:15 Eye Response: spontaneous(4). Verbal Response: oriented(5). Motor Response: obeys       iw  

      commands(6). Total: 15.                                                                     

                                                                                                  

Trauma Score (Adult):                                                                             

12:15 Eye Response: spontaneous(1); Verbal Response: oriented(1); Motor Response: obeys       iw  

      commands(2); Systolic BP: > 89 mm Hg(4); Respiratory Rate: 10 to 29 per min(4); Graciela     

      Score: 15; Trauma Score: 12                                                                 

                                                                                                  

MDM:                                                                                              

12:18 Patient medically screened.                                                             [facility] 

14:24 Data reviewed: vital signs, nurses notes. Counseling: I had a detailed discussion with  [facility] 

      the patient and/or guardian regarding: the historical points, exam findings, and any        

      diagnostic results supporting the discharge/admit diagnosis, radiology results, the         

      need for outpatient follow up, to return to the emergency department if symptoms worsen     

      or persist or if there are any questions or concerns that arise at home. ED course:         

      Patient is alert and non toxic in appearance in the ED. Patient is advised to follow up     

      with pcp and otherwise given strict return precautions. patient understood and agrees       

      with the plan of care. .                                                                    

                                                                                                  

                                                                                             

12:14 Order name: CT Head C Spine; Complete Time: 12:54                                       [facility] 

                                                                                             

12:19 Order name: Elbow Left 3 View XRAY; Complete Time: 13:15                                [facility] 

                                                                                             

12:54 Order name: Wound Care; Complete Time: 14:33                                            [facility] 

                                                                                                  

Administered Medications:                                                                         

No medications were administered                                                                  

                                                                                                  

                                                                                                  

Disposition:                                                                                      

                                                                                             

07:01 Co-signature as Attending Physician, Kenney Mcdermott MD I agree with the assessment and  [facility] 

      plan of care.                                                                               

                                                                                                  

Disposition:                                                                                      

20 14:26 Discharged to Home. Impression: Unspecified injury of head, Skin Tear.             

- Condition is Stable.                                                                            

- Discharge Instructions: Head Injury, Adult, Skin Tear Care.                                     

                                                                                                  

- Medication Reconciliation Form, Thank You Letter, Antibiotic Education, Prescription            

  Opioid Use form.                                                                                

- Follow up: Private Physician; When: 2 - 3 days; Reason: Recheck today's complaints,             

  Continuance of care, Re-evaluation by your physician.                                           

                                                                                                  

                                                                                                  

                                                                                                  

Signatures:                                                                                       

Dispatcher [employer]                           EDHarvinder Hogan RN RN sg Anderson, Corey, MD MD cha Mickail, Joel, PA PA   clive                                                  

                                                                                                  

Corrections: (The following items were deleted from the chart)                                    

                                                                                             

14:40 14:26 2020 14:26 Discharged to Home. Impression: Unspecified injury of head; Skin sg  

      Tear. Condition is Stable. Forms are Medication Reconciliation Form, Thank You Letter,      

      Antibiotic Education, Prescription Opioid Use. Follow up: Private Physician; When: 2 -      

      3 days; Reason: Recheck today's complaints, Continuance of care, Re-evaluation by your      

      physician. [facility]                                                                              

                                                                                                  

**************************************************************************************************
14-Jun-2019 14:52

## 2020-01-27 NOTE — RAD REPORT
EXAM DESCRIPTION:  RAD - Elbow Left 3 View - 1/27/2020 1:00 pm

 

CLINICAL HISTORY:  Left elbow pain status post trauma

 

FINDINGS:  No fracture or dislocation is seen. Osteoporosis

## 2020-06-24 ENCOUNTER — HOSPITAL ENCOUNTER (OUTPATIENT)
Dept: HOSPITAL 97 - ER | Age: 80
Setting detail: OBSERVATION
LOS: 7 days | Discharge: NURSING, REHAB OR LONG-TERM CARE | End: 2020-07-01
Attending: FAMILY MEDICINE | Admitting: FAMILY MEDICINE
Payer: COMMERCIAL

## 2020-06-24 VITALS — BODY MASS INDEX: 25 KG/M2

## 2020-06-24 DIAGNOSIS — G93.41: Primary | ICD-10-CM

## 2020-06-24 DIAGNOSIS — Z88.8: ICD-10-CM

## 2020-06-24 DIAGNOSIS — Z88.3: ICD-10-CM

## 2020-06-24 DIAGNOSIS — F02.80: ICD-10-CM

## 2020-06-24 DIAGNOSIS — Z86.73: ICD-10-CM

## 2020-06-24 DIAGNOSIS — Z11.59: ICD-10-CM

## 2020-06-24 DIAGNOSIS — Z79.82: ICD-10-CM

## 2020-06-24 DIAGNOSIS — G20: ICD-10-CM

## 2020-06-24 DIAGNOSIS — E87.6: ICD-10-CM

## 2020-06-24 DIAGNOSIS — Z88.7: ICD-10-CM

## 2020-06-24 DIAGNOSIS — I16.1: ICD-10-CM

## 2020-06-24 LAB
ALBUMIN SERPL BCP-MCNC: 3.3 G/DL (ref 3.4–5)
ALP SERPL-CCNC: 67 U/L (ref 45–117)
ALT SERPL W P-5'-P-CCNC: 14 U/L (ref 12–78)
AST SERPL W P-5'-P-CCNC: 19 U/L (ref 15–37)
BUN BLD-MCNC: 7 MG/DL (ref 7–18)
BUN BLD-MCNC: 7 MG/DL (ref 7–18)
GLUCOSE SERPLBLD-MCNC: 100 MG/DL (ref 74–106)
GLUCOSE SERPLBLD-MCNC: 105 MG/DL (ref 74–106)
HCT VFR BLD CALC: 38.6 % (ref 36–45)
HCT VFR BLD CALC: 41.8 % (ref 36–45)
HDLC SERPL-MCNC: 94 MG/DL (ref 40–60)
INR BLD: 1.03
LDLC SERPL CALC-MCNC: 98 MG/DL (ref ?–130)
LYMPHOCYTES # SPEC AUTO: 1.7 K/UL (ref 0.7–4.9)
LYMPHOCYTES # SPEC AUTO: 2.2 K/UL (ref 0.7–4.9)
MAGNESIUM SERPL-MCNC: 1.9 MG/DL (ref 1.8–2.4)
MAGNESIUM SERPL-MCNC: 2 MG/DL (ref 1.8–2.4)
NT-PROBNP SERPL-MCNC: 853 PG/ML (ref ?–450)
PMV BLD: 7.5 FL (ref 7.6–11.3)
PMV BLD: 7.7 FL (ref 7.6–11.3)
POTASSIUM SERPL-SCNC: 3.4 MMOL/L (ref 3.5–5.1)
POTASSIUM SERPL-SCNC: 3.5 MMOL/L (ref 3.5–5.1)
RBC # BLD: 4.05 M/UL (ref 3.86–4.86)
RBC # BLD: 4.34 M/UL (ref 3.86–4.86)
TROPONIN (EMERG DEPT USE ONLY): < 0.02 NG/ML (ref 0–0.04)
TSH SERPL DL<=0.05 MIU/L-ACNC: 1.06 UIU/ML (ref 0.36–3.74)
UA DIPSTICK W REFLEX MICRO PNL UR: (no result)

## 2020-06-24 PROCEDURE — 93880 EXTRACRANIAL BILAT STUDY: CPT

## 2020-06-24 PROCEDURE — 83735 ASSAY OF MAGNESIUM: CPT

## 2020-06-24 PROCEDURE — 36415 COLL VENOUS BLD VENIPUNCTURE: CPT

## 2020-06-24 PROCEDURE — 93005 ELECTROCARDIOGRAM TRACING: CPT

## 2020-06-24 PROCEDURE — 84484 ASSAY OF TROPONIN QUANT: CPT

## 2020-06-24 PROCEDURE — 80048 BASIC METABOLIC PNL TOTAL CA: CPT

## 2020-06-24 PROCEDURE — 84443 ASSAY THYROID STIM HORMONE: CPT

## 2020-06-24 PROCEDURE — 93306 TTE W/DOPPLER COMPLETE: CPT

## 2020-06-24 PROCEDURE — 84100 ASSAY OF PHOSPHORUS: CPT

## 2020-06-24 PROCEDURE — 85610 PROTHROMBIN TIME: CPT

## 2020-06-24 PROCEDURE — 80061 LIPID PANEL: CPT

## 2020-06-24 PROCEDURE — 99285 EMERGENCY DEPT VISIT HI MDM: CPT

## 2020-06-24 PROCEDURE — 96365 THER/PROPH/DIAG IV INF INIT: CPT

## 2020-06-24 PROCEDURE — 70450 CT HEAD/BRAIN W/O DYE: CPT

## 2020-06-24 PROCEDURE — 97116 GAIT TRAINING THERAPY: CPT

## 2020-06-24 PROCEDURE — 80076 HEPATIC FUNCTION PANEL: CPT

## 2020-06-24 PROCEDURE — 95819 EEG AWAKE AND ASLEEP: CPT

## 2020-06-24 PROCEDURE — 97530 THERAPEUTIC ACTIVITIES: CPT

## 2020-06-24 PROCEDURE — 97161 PT EVAL LOW COMPLEX 20 MIN: CPT

## 2020-06-24 PROCEDURE — 92610 EVALUATE SWALLOWING FUNCTION: CPT

## 2020-06-24 PROCEDURE — 82947 ASSAY GLUCOSE BLOOD QUANT: CPT

## 2020-06-24 PROCEDURE — 81003 URINALYSIS AUTO W/O SCOPE: CPT

## 2020-06-24 PROCEDURE — 84132 ASSAY OF SERUM POTASSIUM: CPT

## 2020-06-24 PROCEDURE — 83880 ASSAY OF NATRIURETIC PEPTIDE: CPT

## 2020-06-24 PROCEDURE — 85025 COMPLETE CBC W/AUTO DIFF WBC: CPT

## 2020-06-24 PROCEDURE — 96361 HYDRATE IV INFUSION ADD-ON: CPT

## 2020-06-24 PROCEDURE — 71045 X-RAY EXAM CHEST 1 VIEW: CPT

## 2020-06-24 RX ADMIN — ENOXAPARIN SODIUM SCH MG: 40 INJECTION SUBCUTANEOUS at 09:28

## 2020-06-24 RX ADMIN — Medication SCH: at 09:00

## 2020-06-24 RX ADMIN — ATORVASTATIN CALCIUM SCH MG: 20 TABLET, FILM COATED ORAL at 20:26

## 2020-06-24 RX ADMIN — I-VITE, TAB 1000-60-2MG (60/BT) SCH TAB: TAB at 09:29

## 2020-06-24 RX ADMIN — CARBIDOPA AND LEVODOPA SCH TAB: 25; 100 TABLET ORAL at 09:26

## 2020-06-24 RX ADMIN — Medication SCH MG: at 20:26

## 2020-06-24 RX ADMIN — FOLIC ACID SCH MG: 1 TABLET ORAL at 09:34

## 2020-06-24 RX ADMIN — Medication SCH MG: at 09:29

## 2020-06-24 RX ADMIN — AMLODIPINE BESYLATE SCH MG: 10 TABLET ORAL at 09:30

## 2020-06-24 RX ADMIN — CARBIDOPA AND LEVODOPA SCH TAB: 25; 100 TABLET ORAL at 13:53

## 2020-06-24 RX ADMIN — METOPROLOL TARTRATE SCH MG: 25 TABLET ORAL at 09:28

## 2020-06-24 RX ADMIN — CALCIUM CARBONATE-VITAMIN D TAB 500 MG-200 UNIT SCH TAB: 500-200 TAB at 09:36

## 2020-06-24 RX ADMIN — SODIUM CHLORIDE SCH MLS: 0.9 INJECTION, SOLUTION INTRAVENOUS at 17:11

## 2020-06-24 RX ADMIN — CARBIDOPA AND LEVODOPA SCH TAB: 25; 100 TABLET ORAL at 20:26

## 2020-06-24 RX ADMIN — SODIUM CHLORIDE SCH MLS: 0.9 INJECTION, SOLUTION INTRAVENOUS at 04:30

## 2020-06-24 RX ADMIN — CLOPIDOGREL BISULFATE SCH MG: 75 TABLET, FILM COATED ORAL at 09:29

## 2020-06-24 RX ADMIN — Medication SCH CAP: at 09:34

## 2020-06-24 NOTE — RAD REPORT
EXAM DESCRIPTION:  CT - Ct Stroke Brain Wo Cont - 6/24/2020 1:09 am

 

CLINICAL HISTORY:  Confusion/alteration of awareness

 

COMPARISON:  2019

 

TECHNIQUE:  Computed axial tomography of the head was obtained.

 

All CT scans are performed using dose optimization technique as appropriate and may include automated
 exposure control or mA/KV adjustment according to patient size.

 

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

 

An acute intracranial bleed is not seen.

 

The ventricles are normal in caliber.

 

No extra-axial fluid collection is noted.

 

Mild to moderate low-density within periventricular, deep and subcortical white matter likely ischemi
c changes secondary to small vessel disease

 

Fluid within the sinuses/ mastoids is not seen.

 

IMPRESSION:  No acute intracranial abnormality is seen.

 

Dr Mcdermott  of the emergency room was notified at 1:10 a.m. June 24, 2020

## 2020-06-24 NOTE — RAD REPORT
EXAM DESCRIPTION:  RAD - Chest Single View - 6/24/2020 1:49 am

 

CLINICAL HISTORY:  CONFUSED, HX OF CVA

Chest pain.

 

COMPARISON:  Chest Single View dated 3/24/2019; Chest Pa And Lat (2 Views) dated 2/12/2018; Chest Sin
gle View dated 2/4/2018; Chest Single View dated 5/27/2017

 

FINDINGS:  Portable technique limits examination quality.

 

The lungs are emphysematous but grossly clear. The heart is normal in size. No displaced fractures.

 

IMPRESSION:  Mild COPD.

## 2020-06-24 NOTE — P.HP
Certification for Inpatient


Patient admitted to: Observation


With expected LOS: <2 Midnights


Practitioner: I am a practitioner with admitting privileges, knowledge of 

patient current condition, hospital course, and medical plan of care.


Services: Services provided to patient in accordance with Admission requirements

found in Title 42 Section 412.3 of the Code of Federal Regulations





Patient History


Date of Service: 06/24/20


Reason for admission: Confusion


History of Present Illness: 


80-year-old woman with a history of Parkinson's disease, cerebral aneurysm 

status post coiling was brought to the emergency department due to an episode of

confusion.  According to the son, patient woke up from sleep and had difficulty 

getting up.   reports she was confused.  According to son, patient has 

been experiencing intermittent confusion over the past couple of weeks, which 

she deems related to her Parkinson's disease.  Patient was brought to the 

emergency department due to concern for stroke.  Her systolic blood pressure in 

the ED is elevated to 210. Head CT did not show any acute disease.  EKG 

demonstrated sinus rhythm. Per son, the patient is not back to baseline neur

ologically.





Allergies





ciprofloxacin [From Cipro] Allergy (Verified 09/19/18 15:05)


   Unknown


clopidogrel [From Plavix] Allergy (Verified 09/19/18 15:05)


   Unknown


hydrochlorothiazide [From Diovan HCT] Allergy (Verified 09/19/18 15:05)


   Unknown


valsartan [From Diovan HCT] Allergy (Verified 09/19/18 15:05)


   Unknown


bautrai Allergy (Uncoded 09/19/18 15:05)


   Unknown


Iodine Allergy (Uncoded 09/19/18 15:05)


   Unknown


Prednisone Allergy (Uncoded 09/19/18 15:05)


   Unknown


Tetanus Vacci Allergy (Uncoded 09/19/18 15:05)


   Unknown


Zithromax Z-P Allergy (Uncoded 09/19/18 15:05)


   Unknown





Home Medications: 








Amlodipine Besylate 1 mg PO DAILY 09/19/18 


Aspirin [Adult Low Dose Aspirin EC] 81 mg PO DAILY 09/19/18 


Atorvastatin Calcium [Lipitor] 40 mg PO BEDTIME 09/19/18 


Calcium Carbonate/Vitamin D3 [Calcium 600 with Vit D Chew Tb] 1 each PO BID 

09/19/18 


Cyanoco/FA/Pyri [Folbic*] 1 tab PO DAILY 09/19/18 


Cyclosporine [Restasis] 1 gtt EACH EYE DAILY 09/19/18 


Estrogens,Conj Cream [Premarin 0.625MG/Gm*] 2 mg VAG WMP 09/19/18 


Losartan Potassium 100 mg PO DAILY 09/19/18 


Metoprolol Tartrate 1 mg PO BID 09/19/18 


Vits A,C,E/Lutein/Minerals [Ocuvite with Lutein Tablet] 1 each PO DAILY 09/19/18










- Past Medical/Surgical History


-: History of cerebral aneurysm


-: History of CVA


-: Parkinson's disease


-: Hypertension


-: Cerebral aneurysm coiling


-: Eye surgery





- Family History


Family History: Reviewed- Non-Contributory





- Social History


Smoking Status: Never smoker


Alcohol use: No


CD- Drugs: No





Review of Systems


Other: 


Except as documented, all other systems reviewed and negative.








Physical Examination





- Physical Exam


General: In no apparent distress, Oriented x2, Other (Awake)


HEENT: PERRLA, Mucous membr. moist/pink, EOMI, Sclerae nonicteric


Neck: Supple, JVD not distended


Respiratory: Clear to auscultation bilaterally, Normal air movement


Cardiovascular: No edema, Regular rate/rhythm, Normal S1 S2, No murmurs


Capillary refill: <2 Seconds


Gastrointestinal: Normal bowel sounds, Soft and benign, No tenderness


Musculoskeletal: No swelling, No erythema


Integumentary: Rash(es) (papular erythematous rash on the back of neck), Other 

(Varicose veins bilateral legs)


Neurological: Normal speech, Normal strength at 5/5 x4 extr





- Studies


Laboratory Data (last 24 hrs)





06/24/20 01:13: PT 12.2, INR 1.03


06/24/20 01:13: WBC 6.3, Hgb 13.6, Hct 38.6, Plt Count 248


06/24/20 01:13: Sodium 138, Potassium 3.4 L, BUN 7, Creatinine 0.70, Glucose 

100, Magnesium 2.0, Total Bilirubin 0.5, AST 19, ALT 14, Alkaline Phosphatase 67








Assessment and Plan





- Problems (Diagnosis)


(1) Altered mental status


Current Visit: Yes   Status: Acute   





(2) Accelerated hypertension


Current Visit: Yes   Status: Acute   





(3) Parkinsons disease


Current Visit: Yes   Status: Acute   





- Plan


Place under observation.


Control blood pressure-with home BP meds


Hydralazine p.r.n. for BP spikes.


TIA workup with echocardiogram and carotid doppler


Obtain UA


Continue Parkinson's medications


Neurology consult





- Advance Directives


Does patient have a Living Will: No


Does patient have a Durable POA for Healthcare: No





- Code Status/Comfort Care


Code Status: Full Code

## 2020-06-24 NOTE — EKG
Test Date:    2020-06-24               Test Time:    01:49:56

Technician:   NALINI                                    

                                                     

MEASUREMENT RESULTS:                                       

Intervals:                                           

Rate:         80                                     

AL:           194                                    

QRSD:         100                                    

QT:           408                                    

QTc:          470                                    

Axis:                                                

P:            78                                     

AL:           194                                    

QRS:          52                                     

T:            77                                     

                                                     

INTERPRETIVE STATEMENTS:                                       

                                                     

Normal sinus rhythm

ST abnormality, possible digitalis effect

Abnormal ECG

Compared to ECG 03/24/2019 11:44:41

ST (T wave) deviation now present



Electronically Signed On 06-24-20 12:22:13 CDT by Tyrell Owens

## 2020-06-24 NOTE — ER
Nurse's Notes                                                                                     

 [facility]                                                                 

Name: Jerica Estrada                                                                           

Age: 80 yrs                                                                                       

Sex: Female                                                                                       

: 1940                                                                                   

MRN: [id number]                                                                                   

Arrival Date: 2020                                                                          

Time: 01:06                                                                                       

Account#: [id number]                                                                            

Bed 2                                                                                             

Private MD:                                                                                       

Diagnosis: Weakness;Transient cerebral ischemic attack, unspecified;Essential (primary)           

  hypertension;Hypokalemia                                                                        

                                                                                                  

Presentation:                                                                                     

                                                                                             

00:55 Chief complaint: EMS states: Called for patient with onset of confusion at 2200 tonight lp1 

      per ; Hx of CVA; Per EMS, patient A/O x1 to name.                                    

00:55 Coronavirus screen: Proceed with normal triage. Onset of symptoms was 2020 at  lp1 

      22:00.                                                                                      

00:55 Method Of Arrival: EMS: [facility]                                                lp1 

00:55 Acuity: LYNNETTE 2                                                                           lp1 

01:05 No acute neurological deficit is noted. Pre-hospital glucose is not applicable to this  jb4 

      patient. Initial Sepsis Screen: Does the patient meet any 2 criteria? No. Patient's         

      initial sepsis screen is negative. Does the patient have a suspected source of              

      infection? No. Patient's initial sepsis screen is negative. Risk Assessment: Do you         

      want to hurt yourself or someone else? Patient reports no desire to harm self or others.    

01:16 Ebola Screen: No symptoms or risks identified at this time.                             ea  

01:22 Note Per patient's son at bedside, patient has been confused "for a few days".          lp1 

01:25 Care prior to arrival: IV initiated. 18 GA, in the right antecubital area, Glucose      lp1 

      check: 93.                                                                                  

                                                                                                  

Stroke Activation: Symtpom onset >3 hours and < 6 hours                                           

 Physician: Stroke Attending; Name: Dr. Mcdermott; Notified At:  00:56;                  

  Arrived At:  00:56                                                                    

 Physician: Chief Stroke Resident; Name: ; Notified At:  00:56; Arrived At:             

 Physician: Stroke Resident; Name: ; Notified At:  00:56; Arrived At:                   

 Physician: ED Attending; Name: ; Notified At:  00:56; Arrived At:                      

 Physician: ED Resident; Name: ; Notified At:  00:56; Arrived At:                       

                                                                                                  

Historical:                                                                                       

- Allergies:                                                                                      

01:15 Bactrim;                                                                                ea  

01:15 Ciprofloxacin;                                                                          ea  

01:15 Codeine;                                                                                ea  

01:15 Demerol;                                                                                ea  

01:15 Diovan HCT;                                                                             ea  

01:15 Hydrochlorothiazide;                                                                    ea  

01:15 Iodinated Contrast Media - IV Dye;                                                      ea  

01:15 Tetanus Vaccines \T\ Toxoid;                                                              ea

01:15 valsartan;                                                                              ea  

- Home Meds:                                                                                      

01:23 carbidopa-levodopa  mg Oral tab 1 tab 3 times per day [Active]; amlodipine 10 mg  ea  

      tab 1 tab once daily [Active]; clopidogrel 75 mg Oral tab 1 tab once daily [Active];        

      atorvastatin 40 mg Oral tab 1 tab once daily [Active]; losartan 100 mg Oral tab 1 tab       

      once daily [Active]; folic acid 400 mcg Oral tab 2 tab once daily [Active];                 

01:49 metoprolol tartrate 25 mg Oral tab 2 tabs once daily [Active]; Ocuvite oral oral daily  lp1 

      [Active]; melatonin 5 mg Oral tab nightly [Active]; magnesium oxide 250 mg Oral tab         

      daily [Active]; aspirin 81 mg oral TbEC once daily [Active]; Vitamin B-12 1,000 mcg         

      Oral tab daily [Active]; cranberry 500 mg oral cap 2 tab daily [Active];                    

- PMHx:                                                                                           

01:15 Aneurysm; Hypertension; Parkinsons; hard of hearing; vericose veins;                    ea  

- PSHx:                                                                                           

01:23 "stent for coil aneurysm"(2017); dov knee replacement;                                  ea  

                                                                                                  

- Immunization history:: Adult Immunizations unknown.                                             

- Social history:: Smoking status: Patient denies any tobacco usage or history of.                

- Family history:: not pertinent.                                                                 

                                                                                                  

                                                                                                  

Screenin:16 Abuse screen: Denies threats or abuse. Nutritional screening: No deficits noted.        ea  

      Tuberculosis screening: No symptoms or risk factors identified. Fall Risk IV access (20     

      points).                                                                                    

                                                                                                  

Assessment:                                                                                       

01:05 VAN Scoring: Arm Drift: Patients demonstrates NO arm weakness. Patient is VAN Negative. jb4 

      The patient has not been NPO before screening. The patient is alert, and able to follow     

      commands. The patient does not exhibit slurred or garbled speech. The patient is not        

      exhibiting difficulty speaking. The patient does not exhibit difficulty understanding       

      words. The patient is able to swallow own secretions with no drooling or need for           

      suction. Patient tolerated one teaspoon of water. No drooling, immediate coughing,          

      gurgling, or clearing of the throat was noted. The patient tolerated 90mL of water. No      

      drooling, immediate coughing, gurgling, or clearing of the throat was noted. The            

      patient passed the bedside swallow screening. Oral medications may be given as ordered.     

      Contact Physician for further diet orders. Provider notified of bedside swallow             

      screening results: Kenney Mcdermott MD. General: Appears in no apparent distress.             

      comfortable, Behavior is calm, cooperative, appropriate for age. Pain: Denies pain.         

      Neuro: Level of Consciousness is awake, alert, obeys commands, Oriented to person,          

      place, time, situation. Cardiovascular: Patient's skin is warm and dry. Respiratory:        

      Airway is patent Respiratory effort is even, unlabored, Respiratory pattern is regular,     

      symmetrical. GI: No signs and/or symptoms were reported involving the gastrointestinal      

      system. : No signs and/or symptoms were reported regarding the genitourinary system.      

      EENT: No signs and/or symptoms were reported regarding the EENT system. Derm: Skin is       

      intact, Skin is pink, warm \T\ dry. Rash noted that is red, raised, on left calf, back of   

      the neck. Musculoskeletal: Circulation, motion, and sensation intact. Range of motion:      

      intact in all extremities.                                                                  

02:05 Reassessment: Patient appears in no apparent distress at this time. Patient and/or      jb4 

      family updated on plan of care and expected duration. Pain level reassessed. Patient is     

      alert, oriented x 3, equal unlabored respirations, skin warm/dry/pink.                      

03:18 Reassessment: Patient appears in no apparent distress at this time. Patient and/or      jb4 

      family updated on plan of care and expected duration. Pain level reassessed. Patient is     

      alert, oriented x 3, equal unlabored respirations, skin warm/dry/pink.                      

03:23 Reassessment: Provider notified of B/p after norvasc administration.                    jb4 

04:29 Reassessment: Patient appears in no apparent distress at this time. Patient and/or      jb4 

      family updated on plan of care and expected duration. Pain level reassessed. Patient is     

      alert, oriented x 3, equal unlabored respirations, skin warm/dry/pink.                      

                                                                                                  

Vital Signs:                                                                                      

01:17 Weight 66.22 kg; Height 5 ft. 5 in. (165.10 cm);                                        ea  

01:23  / 90; Pulse 82; Resp 16; Temp 98.5(TE); Pulse Ox 100% ; Pain 0/10;               jb4 

02:15  / 95; Pulse 84; Resp 16; Pulse Ox 99% on R/A;                                    jb4 

03:15  / 98; Pulse 85; Resp 19; Pulse Ox 100% on R/A;                                   jb4 

01:17 Body Mass Index 24.30 (66.22 kg, 165.10 cm)                                             ea  

                                                                                                  

NIH Stroke Scale Scores:                                                                          

01:05 NIHSS Score: 0                                                                          jb4 

01:38 NIHSS Score: 0                                                                          [facility] 

                                                                                                  

ED Course:                                                                                        

01:06 Patient arrived in ED.                                                                  ds1 

01:12 Kenney Mcdermott MD is Attending Physician.                                             jolie 

01:16 Patient has correct armband on for positive identification. Bed in low position. Call   ea  

      light in reach. Side rails up X2.                                                           

01:21 [place], Romeo, RN is Primary Nurse.                                                     jb4 

01:22 Triage completed.                                                                       lp1 

01:42 Marcelo Anand is Hospitalizing Provider.                                               jolie 

02:09 Chest Single View In Process Unspecified.                                               EDMS

03:22 No provider procedures requiring assistance completed. Patient admitted, IV remains in  jb4 

      place.                                                                                      

                                                                                                  

Administered Medications:                                                                         

02:00 Drug: NS 0.9% 500 ml Route: IV; Rate: bolus; Site: right antecubital;                   mg2 

02:30 Follow up: Response: No adverse reaction; IV Status: Completed infusion; IV Intake:     jb4 

      500ml                                                                                       

02:00 Drug: foLIC Acid 1 mg Route: IVPB; Site: right antecubital;                             mg2 

02:30 Follow up: Response: No adverse reaction; IV Status: Completed infusion                 jb4 

02:44 Drug: NS 0.9% 1000 ml Route: IV; Rate: 125 ml/hr; Site: right antecubital;              mg2 

04:15 Follow up: Response: No adverse reaction; IV Status: Infusion continued upon admission  jb4 

02:44 Drug: Aspirin 81 mg Route: PO;                                                          mg2 

03:29 Follow up: Response: No adverse reaction                                                jb4 

02:44 Drug: Norvasc 5 mg Route: PO;                                                           mg2 

03:29 Follow up: Response: No adverse reaction; Blood pressure is unchanged                   jb4 

                                                                                                  

                                                                                                  

Point of Care Testing:                                                                            

      Blood Glucose:                                                                              

01:20 Blood Glucose: 98 mg/dL;                                                                jb4 

      Ranges:                                                                                     

                                                                                                  

Intake:                                                                                           

02:30 IV: 500ml; Total: 500ml.                                                                jb4 

                                                                                                  

Outcome:                                                                                          

01:43 Decision to Hospitalize by Provider.                                                    jolie 

04:29 Admitted to Med/surg accompanied by nurse, family with patient, via stretcher, room     jb4 

      212, with chart, Report called to  FAYB Cole                                                

04:29 Condition: stable                                                                           

04:29 Discharge instructions given to patient, family, Instructed on the need for admit,          

      Demonstrated understanding of instructions.                                                 

04:32 Patient left the ED.                                                                    [facility] 

                                                                                                  

                                                                                                  

NIH Stroke Scale - NIH Stroke Score                                                               

Date: 2020                                                                                  

Time: 01:05                                                                                       

Total Score = 0                                                                                   

  1a. Level of Consciousness (LOC) - 0(Alert)                                                     

  1b. Level of Consciousness (LOC) (Year \T\ Age) - 0(Both)                                       

  1c. LOC Commands (Open \T\ Closes Eyes/) - 0(Both)                                          

   2. Best Gaze (Lateral Gaze Paresis) - 0(Normal)                                                

   3. Visual Field Loss - 0(No visual loss)                                                       

   4. Facial Palsy - 0(Normal)                                                                    

  5a. Left Arm: Motor (10-second hold) - 0(No drift)                                              

  5b. Right Arm: Motor (10-second hold) - 0(No drift)                                             

  6a. Left Leg: Motor (5-second hold - always test supine) - 0(No drift)                          

  6b. Right Leg: Motor (5-second hold - always test supine) - 0(No drift)                         

   7. Limb Ataxia (finger/nose \T\ heel/shin - test with eyes open) - 0(Absent)                   

   8. Sensory Loss (pinprick arms/legs/face) - 0(Normal)                                          

   9. Best Language: Aphasia (description/naming/reading) - 0(No aphasia)                         

  10. Dysarthria (speech clarity - read or repeat words) - 0(Normal)                              

  11. Extinction and Inattention (visual/tactile/auditory/spatial/personal) - 0(No                

      abnormality)                                                                                

Initials: [facility]                                                                                     

                                                                                                  

                                                                                                  

NIH Stroke Scale - NIH Stroke Score                                                               

Date: 2020                                                                                  

Time: 01:38                                                                                       

Total Score = 0                                                                                   

  1a. Level of Consciousness (LOC) - 0(Alert)                                                     

  1b. Level of Consciousness (LOC) (Year \T\ Age) - 0(Both)                                       

  1c. LOC Commands (Open \T\ Closes Eyes/) - 0(Both)                                          

   2. Best Gaze (Lateral Gaze Paresis) - 0(Normal)                                                

   3. Visual Field Loss - 0(No visual loss)                                                       

   4. Facial Palsy - 0(Normal)                                                                    

  5a. Left Arm: Motor (10-second hold) - 0(No drift)                                              

  5b. Right Arm: Motor (10-second hold) - 0(No drift)                                             

  6a. Left Leg: Motor (5-second hold - always test supine) - 0(No drift)                          

  6b. Right Leg: Motor (5-second hold - always test supine) - 0(No drift)                         

   7. Limb Ataxia (finger/nose \T\ heel/shin - test with eyes open) - 0(Absent)                   

   8. Sensory Loss (pinprick arms/legs/face) - 0(Normal)                                          

   9. Best Language: Aphasia (description/naming/reading) - 0(No aphasia)                         

  10. Dysarthria (speech clarity - read or repeat words) - 0(Normal)                              

  11. Extinction and Inattention (visual/tactile/auditory/spatial/personal) - 0(No                

      abnormality)                                                                                

Initials: jolie                                                                                     

                                                                                                  

Signatures:                                                                                       

Dispatcher [employer]                           EDKenney Villatoro MD MD cha [place], Demi                                ds1                                                  

Denise Perdomo RN                         RN   lp1                                                  

Romeo Mcarthur RN RN   jb4                                                  

Stephanie Oconnell RN RN ea [place], Michele, RN RN   mg2                                                  

                                                                                                  

Corrections: (The following items were deleted from the chart)                                    

 01:23 Home Meds: metoprolol tartrate 25 mg Oral tab 1 tab 2 times per day; ea   lp1         

 01:23 Home Meds: Calcium 600 Oral; ea                                           lp1         

 01:23 Home Meds: Ocuvite oral oral; ea                                          lp1         

 01:23 Home Meds: melatonin 5 mg Oral tab; ea                                    lp1         

 01:23 Home Meds: Iron CR Oral; ea                                               lp1         

 01:23 Home Meds: magnesium oxide 250 mg Oral tab; ea                            lp1         

                                                                                                  

**************************************************************************************************

## 2020-06-24 NOTE — XMS REPORT
Continuity of Care Document

                            Created on:2020



Patient:SHAHIDA ESTRADA

Sex:Female

:1940

External Reference #:[id number]





Demographics







                          Address                   [address] [place] [address]

 

                          Home Phone                [phone]

 

                          Mobile Phone              [phone]

 

                          Email Address             [place]

 

                          Preferred Language        English

 

                          Marital Status            Unknown

 

                          Zoroastrian Affiliation     Unknown

 

                          Race                      Unknown

 

                          Additional Race(s)        Unavailable



                                                    White

 

                          Ethnic Group              Unknown









Author







                          Organization              [facility]

t

 

                          Address                   [address]



                                                    [place] [address]

 

                          Phone                     [phone]









Support







                Name            Relationship    Address         Phone

 

                Sean       Spouse          [address] [facility]  [phone]



                                                [place] [address] 

 

                Sean Gómez      Unavailable     [phone]









Care Team Providers







                    Name                Role                Phone

 

                    Peyton CERVANTES          Primary Care Physician [phone]

 

                    Hamilton CERVANTES           Attending Clinician [phone]

 

                    KIANNA WEBER  Attending Clinician Unavailable

 

                    LOREE CALLE     Attending Clinician Unavailable

 

                    LEE         Attending Clinician Unavailable

 

                    KIANNA WEBER  Admitting Clinician Unavailable

 

                    KAPIL BAER  Admitting Clinician Unavailable

 

                    LEE         Admitting Clinician Unavailable









Payers







           Payer Name Policy     Policy Number Effective  Expiration Source



                      Type                  Date       Date       

 

           MEDICAR[employer]EDICARE PART            xxxxxxxxxx 2007            [place]



           A AND                            00:00:00              [facility]



           [id number]                                             



           [place]                                             



           [place]                                             

 

           [place]              xxxxxxxxxxx 2007            [place]



           SUPPLEMENTxxxxxxxxxxx                       00:00:00              Met

hodist



           3/1/2007-PresentComme                                             



           rcial                                                  







Problems







       Condition Condition Condition Status Onset  Resolution Last   Treating Co

mments 

Source



       Name   Details Category        Date   Date   Treatment Clinician        



                                                 Date                 

 

       Parkinson Parkinson Disease Active                              [facility]

[facility]



       disease disease               6-19                               [facility]



                                   00:00:                             st



                                   00                                 

 

       Low back Low back Disease Active -                             [place]

[facility]



       pain   pain                 5-22                               [facility]



       radiating radiating               00:00:                             st



       to right to right               00                                 



       leg    leg                                                     

 

       Varicose Varicose Disease Active                              [place]

[facility]



       veins of veins of               5-22                               Method

i



       leg with leg with               00:00:                             st



       edema, edema,               00                                 



       bilateral bilateral                                                  

 

       Aneurysm Aneurysm Disease Active 2017                             [facility]

[facility]



                                   2-20                               [facility]



                                   [facility]                             [facility]



                                   00                                 [facility]

 

       Hypertensi Hypertensi Disease Active                              [facility]

[facility]



       on     on                                                  [facility]



                                   00[facility]                             [facility]



                                   00                                 Center

 

       Other  Other  Disease Active                              [facility]



       specified specified               -27                               [facility]

s -



       transient transient               00:00:                             Medi

debbie



       cerebral cerebral               00                                 Center



       ischemias ischemias                                                  

 

       Hypertensi Hypertensi Disease Active                              [facility]

[facility]



       ve     ve                   5-20                               [facility]



       emergency emergency               00:00:                             Medi

debbie



                                   00                                 Center

 

       Syncope Syncope Disease Active                              [facility]



                                   5-15                               [facility]



                                   [facility]                             [facility]



                                   00                                 Center

 

       Carotid Carotid Disease Active                              [facility]



       aneurysm, aneurysm,               5-15                               [facility]

[facility] -



       right  right                00:00:                             Medical



                                   00                                 [facility]

 

       Essential Essential Disease Active                              [facility]



       hypertensi hypertensi               5-14                               [facility]

[facility] -



       on     on                   00:00:                             Medical



                                   00                                 [facility]

 

       TIA    TIA    Disease Active                              [facility]



       (transient (transient               5-13                               [facility]

[facility] -



       ischemic ischemic               00:00:                             Medica

l



       attack) attack)               00                                 Center

 

       Hypertensi Hypertensi Disease Active                              [facility]

[facility]



       on     on                   5-13                               [facility]



                                   00[facility]                             Medical



                                   00                                 Center







Allergies, Adverse Reactions, Alerts







       Allergy Allergy Status Severity Reaction(s) Onset  Inactive Treating Comm

ents 

Source



       Name   Type                        Date   Date   Clinician        

 

       Sulfamet Propensi Active                                     [place]

[facility]



       hoxazole ty to                       3-16                        [facility]



       -Trimeth adverse                      00:00:                      st



       oprim  reaction                      00                          



              s to                                                    



              drug                                                    

 

       Ciproflo Propensi Active                                     [place]

n



       xacin  ty to                       3-16                        [facility]



              adverse                      00:00:                      st



              reaction                      00                          



              s to                                                    



              drug                                                    

 

       Valsarta Propensi Active                                     [place]

n



       n      ty to                       3-16                        [facility]



              adverse                      00:00:                      st



              reaction                      00                          



              s to                                                    



              drug                                                    

 

       Iodine Propensi Active                                     [place]



              ty to                       3-16                        [facility]



              adverse                      00:00:                      st



              reaction                      00                          



              s to                                                    



              drug                                                    

 

       Predniso Propensi Active                                     [place]

n



       ne     ty to                       3-16                        [facility]



              adverse                      00:00:                      st



              reaction                      00                          



              s to                                                    



              drug                                                    

 

       Tetanus Propensi Active                                     [place]



       Vaccines ty to                       3-16                        [facility]



       And    adverse                      00:00:                      st



       Toxoid reaction                      00                          



              s to                                                    



              drug                                                    

 

       Iodine Drug   Active        Other (See 2017               Cold   [facility]



       And    Allergy               Comments) 2-20                 [facility] 

-



       Iodide                             00:00:                      Medical



       Containi                             00                          Center



       ng                                                             



       Products                                                         

 

       Ciproflo Drug   Active        Anaphylaxis                       [facility]



       xacin  Allergy                      5-13                        [facility]



                                          00:00:                      Medical



                                          00                          [facility]

 

       Penicill Drug   Active        Anaphylaxis                       [facility]



       ins    Allergy                      5-13                        [facility]



                                          00:00:                      Medical



                                          00                          [facility]

 

       Tetanus Drug   Active        Itching                       [facility]



       Vaccines Allergy                      5-13                        [facility]



       And                                00:00:                      Medical



       Toxoid                             00                          [facility]







Social History







           Social Habit Start Date Stop Date  Quantity   Comments   Source

 

           History [facility]

odist



           Alcohol Std Drinks                                             

 

           History [facility] Meth

odist



           Alcohol Binge                                             

 

           Sex Assigned At                                             [facility]

ethodist



           Birth                                                  

 

           Alcohol intake 2019 Lifetime              [place] [facility]

thodist



                      00:00:00   00:00:00   non-drinker            



                                            (finding)             

 

           History [facility] 2019 1                     [place] [facility]

odist



           Alcohol Frequency 00:00:00   00:00:00                         









                Smoking Status  Start Date      Stop Date       Source

 

                Never smoker                                    [place] [facility]

t







Medications







       Ordered Filled Start  Stop   Current Ordering Indication Dosage Frequency

 Signature

                    Comments            Components          Source



     Medication Medication Date Date Medication? Clinician                (SIG) 

          



     Name Name                                                   

 

     carbidopa-l            Yes            1{tbl} [id number] Take 1       

    [place]



     evodopa      6-19                          [id number] tablet by           

ethodi



      mg      12:26:                          3D   mouth 3           st



     per       00                                 (three)           



     disintegrat                                         times a           



     ing tablet                                         day.           

 

     aspirin 325            Yes            325mg QD   Take 325           [facility]

[place]



     MG tablet      3-16                               mg by           [facility]



               10:22:                               mouth           st



               52                                 daily.           

 

     amLODIPine            Yes                      TAKE ONE           [facility]

[facility]



     (NORVASC)      3-15                               (1)            [facility]



     10 mg      00:00:                               TABLET(S)           st



     tablet      00                                 BY MOUTH           



                                                  ONCE A           



                                                  DAY.           

 

     metoprolol            Yes                      TAKE ONE           [facility]

[facility]



     tartrate      2-19                               (1)            [facility]



     (LOPRESSOR)      00:00:                               TABLET(S)           s

t



     25 mg      00                                 BY MOUTH           



     tablet                                         TWICE A           



                                                  DAY.           

 

     clopidogrel            Yes                      TAKE ONE           [facility]

[place]



     (PLAVIX) 75      2-09                               (1)            [facility]



     mg tablet      00:00:                               TABLET(S)           st



               00                                 BY MOUTH           



                                                  ONCE A           



                                                  DAY.           

 

     cyanocobala      2017      Yes            1000ug QD   Take 1,000         

  [facility]



     min 1000      2-20                               mcg by           [facility]



     MCG tablet      10:57:                               mouth           [facility]ica

l



               44                                 daily.           [facility]

 

     cranberry      2017      Yes                 Q.5D Take by           [facility]

[facility]



     extract      2-20                               mouth 2           [facility]



     (CRANBERRY      10:57:                               (two)           Medica

l



     CONCENTRATE      44                                 times           Center



     ) 500 mg                                         daily.           



     Cap                                                         

 

     docusate      2017      Yes            100mg      Take 100           [facility]



     sodium      2-20                               mg by           [facility]



     (COLACE)      10:57:                               mouth 2           Medica

l



     100 MG      44                                 (two)           Center



     capsule                                         times           



                                                  daily as           



                                                  needed for           



                                                  Constipati           



                                                  on.            

 

     CALCIUM      2017      Yes                 Q.5D Take by           [facility]



     CARBONATE/V      2-20                               mouth 2           [facility]

 -



     ITAMIN D3      10:57:                               (two)           Medical



     (CALCIUM      43                                 times           Center



     600 + D,3,                                         daily.           



     ORAL)                                                        

 

     VIT C/VIT      2017      Yes                 QD   Take by           [facility]

t



     E/LUTEIN/MI      2-20                               mouth           [facility]



     N/OMEGA-3      10:57:                               daily.           [facility]ica

l



     (OCUVITE      43                                                Center



     ORAL)                                                        

 

     folic acid      2017      Yes            400ug QD   Take 400           [facility]

I [facility]



     (FOLVITE)      2-20                               mcg by           [facility]



     400 MCG      10:57:                               mouth           Medical



     tablet      43                                 nightly.           Center

 

     atorvastati      2017      Yes                      TAKE ONE           [facility]

[place]



     n (LIPITOR)      2-14                               (1)            [facility]



     80 MG      00:00:                               TABLET(S)           st



     tablet      00                                 BY MOUTH           



                                                  ONCE A           



                                                  DAY.           

 

     doxycycline      2016      Yes            100mg Q.5D Take 100           [facility]

[place]



     (VIBRAMYCIN      2-09                               mg by           [facility]



     ) 100 MG      00:00:                               mouth 2           st



     capsule      00                                 (two)           



                                                  times a           



                                                  day.           

 

     losartan      2016      Yes                                     Ja



     (COZAAR) 50      0-31                                              [facility]



     MG tablet      00:00:                                              st



               00                                                

 

     nitrofurant      2016      Yes                                     [facility]



     oin,      0-26                                              [facility]



     macrocrysta      00:00:                                              st



     l-monohydra      00                                                



     te,                                                         



     (MACROBID)                                                        



     100 MG                                                        



     capsule                                                        







Procedures

This patient has no known procedures.



Plan of Care







             Planned Activity Planned Date Details      Comments     Source

 

             Future Scheduled 2020   INFLUENZA VACCINE              [facility]



             Test         00:00:00     [code = INFLUENZA              



                                       VACCINE]                  

 

             Future Scheduled 2007   65+ PNEUMOCOCCAL              Ja

 [facility]



             Test         00:00:00     VACCINE (1 of 2 -              



                                       PCV13) [code = 65+              



                                       PNEUMOCOCCAL VACCINE              



                                       (1 of 2 - PCV13)]              

 

             Future Scheduled 1992   SHINGLES VACCINES (#1)              H

stephanie [facility]



             Test         00:00:00     [code = SHINGLES              



                                       VACCINES (#1)]              







Results







           Test Description Test Time  Test Comments Results    Result     [facility]

e



                                                       Comments   

 

           [place], ANGIOGRAM, 2017  Reason for FINAL REPORT PATIENT ID:        

    



           CEREBRAL   1          Exam:->cerebral  [id number] DATE:            



                      11:48:00   aneurysm   2017NAME: Shahida            



                                 unruptured Elvira EstradaATTENDING:            



                                            Migue Weber MDFIRST            



                                            ASSISTANT: CESARIO LooREOPERATIVE DIAGNOSIS:           

 



                                            Unruptured right            



                                            posterior communicating            



                                            artery aneurysm status            



                                            post stent assisted            



                                            coilingPOSTOPERATIVE            



                                            DIAGNOSIS: Unruptured            



                                            right posterior            



                                            communicating artery            



                                            aneurysm status post            



                                            stent assisted            



                                            coilingPROCEDURE            



                                            PERFORMED: Diagnostic            



                                            Cerebral              



                                            AngiogramANESTHESIA:            



                                            MACCOMPLICATIONS:            



                                            NoneESTIMATED BLOOD LOSS:           

 



                                            Less than 15ml ARTERIAL            



                                            VESSELS STUDIED:RIGHT            



                                            SUBCLAVIAN ARTERY            



                                            (x1)RIGHT COMMON CAROTID            



                                            ARTERY (CERVICAL x2)RIGHT           

 



                                            COMMON CAROTID ARTERY            



                                            (CRANIAL x3)RIGHT COMMON            



                                            FEMORAL ARTERY (x1)            



                                            MATERIALS EMPLOYED:1. 5            



                                            French short sheath 2. 4            



                                            French Berenstein            



                                            catheter3. [place]            



                                            guidewire4. Terumo 0.035            



                                            LT glidewire5. 5 French            



                                            Mynx device INDICATIONS:            



                                            77-year-old female with            



                                            hypertension who is            



                                            status post endovascular            



                                            treatment of an            



                                            unruptured right            



                                            posterior communicating            



                                            artery aneurysm with            



                                            stent-assisted coiling on           

 



                                            May 19, 2017. She            



                                            presents for follow-up            



                                            cerebral angiogram. The            



                                            indications for the            



                                            procedure as well as the            



                                            risks, benefits and            



                                            alternatives were            



                                            discussed with the            



                                            patient and the family.            



                                            The risks discussed            



                                            included but were not            



                                            limited to stroke,            



                                            intracranial hemorrhage,            



                                            injury to the cervical            



                                            femoral or aortic            



                                            vessels, contrast            



                                            reaction, kidney to            



                                            toxicity, groin hematoma,           

 



                                            weakness paralysis and            



                                            even death. They            



                                            demonstrated            



                                            understanding of the risk           

 



                                            benefit profile and            



                                            agreed to proceed.            



                                            PROCEDURE: After            



                                            appropriate consent was            



                                            obtained, the patient was           

 



                                            brought to the            



                                            angiographic suite and            



                                            cardiopulmonary            



                                            monitoring was placed.            



                                            The anesthesia team            



                                            performed light sedation.           

 



                                            A timeout was performed.            



                                            Both groins were prepped            



                                            and draped in the usual            



                                            sterile fashion. After            



                                            administration of 10 mL            



                                            of 2% lidocaine, a            



                                            micropuncture needle was            



                                            used to perform a single            



                                            wall puncture of the            



                                            right common femoral            



                                            artery, a micropuncture            



                                            sheath was inserted, and            



                                            an angled DSA angiogram            



                                            was performed through the           

 



                                            sheath. Once good            



                                            location of the puncture            



                                            site was confirmed, a 5            



                                            French short sheath was            



                                            inserted over a Bentson            



                                            wire and was maintained            



                                            on heparinized saline            



                                            flush throughout the            



                                            remainder of the            



                                            procedure.  Using coaxial           

 



                                            technique, a preflushed            



                                            Berenstein catheter on            



                                            constant heparinized            



                                            saline flush was advanced           

 



                                            over the Glidewire into            



                                            the descending aorta, the           

 



                                            Glidewire was removed,            



                                            the catheter back bled,            



                                            flushed in usual fashion            



                                            and the catheter was            



                                            maintained on heparinized           

 



                                            flushed throughout            



                                            duration of the case.            



                                            Using coaxial technique,            



                                            the catheter was advanced           

 



                                            into the aortic arch and            



                                            with the aid of            



                                            roadmapping, digital            



                                            fluoroscopy, and careful            



                                            guidewire manipulation,            



                                            the arteries described            



                                            below were selectively            



                                            catheterized. Upon each            



                                            successive            



                                            catheterization, digital            



                                            subtraction angiography            



                                            using the appropriate            



                                            rate and volume of            



                                            contrast in multiple            



                                            projections was            



                                            performed. At the            



                                            conclusion of the            



                                            procedure, the catheter            



                                            was retracted into the            



                                            aorta and the images            



                                            reviewed at the outside            



                                            workstation for quality            



                                            and content.  Then the            



                                            femoral sheath was            



                                            removed and hemostasis            



                                            achieved with a 5 French            



                                            Mynx device and manual            



                                            compression. The patient            



                                            tolerated the procedure            



                                            well and was transported            



                                            in unchanged neurological           

 



                                            status without groin            



                                            hematoma and with good            



                                            distal lower extremity            



                                            pulses. The patient was            



                                            transferred to the            



                                            recovery area to be            



                                            monitored as per            



                                            protocol.  FINDINGS:            



                                            RIGHT SUBCLAVIAN ARTERY            



                                            (DSA, PA, LATERAL            



                                            ROADMAP, x1): There is            



                                            normal course and caliber           

 



                                            of the subclavian artery            



                                            with physiological            



                                            filling of its distal            



                                            branches. Limited            



                                            visualization in this            



                                            roadmap of the origins of           

 



                                            the right vertebral            



                                            artery, internal            



                                            mamillary artery,            



                                            thyrocervical and            



                                            costocervical trunks.            



                                            RIGHT COMMON CAROTID            



                                            ARTERY (DSA, PA, LATERAL,           

 



                                            CERVICAL x2): Tortuous            



                                            course of the right            



                                            common carotid artery.            



                                            The distal cervical            



                                            common carotid as well as           

 



                                            the origins of the right            



                                            internal and external            



                                            carotid arteries are            



                                            widely patent without            



                                            evidence of ulceration or           

 



                                            stenosis. The proximal            



                                            portions of the            



                                            superficial temporal            



                                            artery, middle meningeal            



                                            artery, internal            



                                            maxillary artery,            



                                            occipital artery and            



                                            their branches are            



                                            visualized and appear            



                                            normal. RIGHT COMMON            



                                            CAROTID ARTERY (DSA, PA,            



                                            LATERAL, MAGNIFIED            



                                            OBLIQUE, CRANIAL x3):            



                                            Minimal residual aneurysm           

 



                                            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            



                                            treated right posterior            



                                            communicating artery            



                                            aneurysm, best visualized           

 



                                            on sequence A9. Normal            



                                            distal cervical, petrous,           

 



                                            cavernous and            



                                            supraclinoid internal            



                                            carotid artery with            



                                            physiological filling of            



                                            the MCA and MAXIMINO branches.           

 



                                            Limited visualization of            



                                            the venous phase. No            



                                            other aneurysms or other            



                                            vascular lesions are            



                                            seen. There is no            



                                            significant            



                                            atherosclerosis or            



                                            stenosis. Normal course            



                                            and caliber of the            



                                            external carotid artery            



                                            and its branches. There            



                                            is a well visualized            



                                            superficial temporal            



                                            artery, middle meningeal            



                                            artery, internal            



                                            maxillary artery,            



                                            occipital artery and            



                                            their branches. RIGHT            



                                            COMMON FEMORAL ARTERY            



                                            (DSA, PA, X 1): Normal            



                                            location of the puncture            



                                            site above the            



                                            bifurcation and below            



                                            markers of the inguinal            



                                            ligament.  IMPRESSION:            



                                            Minimal residual aneurysm           

 



                                            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            



                                            treated right posterior            



                                            communicating artery            



                                            aneurysm. No technical or           

 



                                            procedural complications            



                                            FACULTY ATTESTATION: I,            



                                            Migue Weber M.D.,            



                                            was present for the            



                                            entirety of the            



                                            procedure. I performed or           

 



                                            directly supervised all            



                                            aspects of the procedure.           

 



                                            I performed all critical            



                                            aspects of the case. I            



                                            interpreted the images            



                                            and reported the results.           

 



                                            Signed: Chaz Cox            



                                            MDReport Verified            



                                            Date/Time:  2017            



                                            11:48:56 Reading            



                                            Location: [facility] Y026            



                                            Neuro Angio Reading Room            



                                                 Electronically            



                                            signed by: CHAZ COX on 2017 11:48            



                                            AM                    









                    BASIC METABOLIC PANEL 2017 11:56:00 









                      Test Item  Value      Reference Range Interpretation Comme

nts









             SODIUM (BEAKER) (test code 136 meq/L    136-145                   



             = 381)                                              

 

             POTASSIUM (BEAKER) (test 3.9 meq/L    3.5-5.1                   



             code = 379)                                         

 

             CHLORIDE (BEAKER) (test 102 meq/L                        



             code = 382)                                         

 

             CO2 (BEAKER) (test code = 25 meq/L     22-29                     



             355)                                                

 

             BLOOD UREA NITROGEN 20 mg/dL     7-21                      



             (BEAKER) (test code = 354)                                        

 

             CREATININE (BEAKER) (test 0.82 mg/dL   0.57-1.25                 



             code = 358)                                         

 

             GLUCOSE RANDOM (BEAKER) 106 mg/dL           H            



             (test code = 652)                                        

 

             CALCIUM (BEAKER) (test code 9.6 mg/dL    8.4-10.2                  



             = 697)                                              

 

             EGFR (BEAKER) (test code = 68 mL/min/1.73 sq m                     

      ESTIMATED GFR IS NOT AS



             1092)                                               ACCURATE AS CRE

ATININE



                                                                 CLEARANCE IN AK

EDICTING



                                                                 GLOMERULAR FILT

RATION



                                                                 RATE. ESTIMATED

 GFR IS NOT



                                                                 APPLICABLE FOR 

DIALYSIS



                                                                 PATIENTS.



CBC WITH PLATELET COUNT + MANUAL [id number] 11:33:00





             Test Item    Value        Reference Range Interpretation Comments

 

             WHITE BLOOD CELL COUNT 5.0 K/ L     3.5-10.5                  



             (BEAKER) (test code =                                        



             775)                                                

 

             RED BLOOD CELL COUNT 3.94 M/ L    3.93-5.22                 



             (BEAKER) (test code =                                        



             761)                                                

 

             HEMOGLOBIN (BEAKER) 12.1 GM/DL   11.2-15.7                 



             (test code = 410)                                        

 

             HEMATOCRIT (BEAKER) 37.1 %       34.1-44.9                 



             (test code = 411)                                        

 

             MEAN CORPUSCULAR 94.2 fL      79.4-94.8                 



             VOLUME (BEAKER) (test                                        



             code = 753)                                         

 

             MEAN CORPUSCULAR 30.7 pg      25.6-32.2                 



             HEMOGLOBIN (BEAKER)                                        



             (test code = 751)                                        

 

             MEAN CORPUSCULAR 32.6 GM/DL   32.2-35.5                 



             HEMOGLOBIN CONC                                        



             (BEAKER) (test code =                                        



             752)                                                

 

             RED CELL DISTRIBUTION 12.7 %       11.7-14.4                 



             WIDTH (BEAKER) (test                                        



             code = 412)                                         

 

             PLATELET COUNT 212 K/CU MM  150-450                   



             (BEAKER) (test code =                                        



             756)                                                

 

             MEAN PLATELET VOLUME 9.4 fL       9.4-12.3                  



             (BEAKER) (test code =                                        



             754)                                                

 

             NUCLEATED RED BLOOD 0 /100 WBC   0-0                       



             CELLS (BEAKER) (test                                        



             code = 413)                                         

 

             IMMATURE     0 %          0-1                       



             GRANULOCYTES-RELATIVE                                        



             PERCENT (BEAKER) (test                                        



             code = 9459)                                        

 

             NEUTROPHILS RELATIVE 60 %                                   This is

 an appended



             PERCENT (BEAKER) (test                                        repor

t.  These



             code = 429)                                         results have be

en



                                                                 appended to a



                                                                 previously sherine

l



                                                                 verified report

.

 

             LYMPHOCYTES RELATIVE 30 %                                   This is

 an appended



             PERCENT (BEAKER) (test                                        repor

t.  These



             code = 430)                                         results have be

en



                                                                 appended to a



                                                                 previously sherine

l



                                                                 verified report

.

 

             MONOCYTES RELATIVE 9 %                                    This is a

n appended



             PERCENT (BEAKER) (test                                        repor

t.  These



             code = 431)                                         results have be

en



                                                                 appended to a



                                                                 previously sherine

l



                                                                 verified report

.

 

             EOSINOPHILS RELATIVE 1 %                                    This is

 an appended



             PERCENT (BEAKER) (test                                        repor

t.  These



             code = 432)                                         results have be

en



                                                                 appended to a



                                                                 previously sherine

l



                                                                 verified report

.

 

             BASOPHILS RELATIVE 1 %                                    This is a

n appended



             PERCENT (BEAKER) (test                                        repor

t.  These



             code = 437)                                         results have be

en



                                                                 appended to a



                                                                 previously sherine

l



                                                                 verified report

.

 

             NEUTROPHILS ABSOLUTE 2.97 K/ L    1.56-6.13                 This is

 an appended



             COUNT (BEAKER) (test                                        report.

  These



             code = 670)                                         results have be

en



                                                                 appended to a



                                                                 previously sherine

l



                                                                 verified report

.

 

             LYMPHOCYTES ABSOLUTE 1.46 K/ L    1.18-3.74                 This is

 an appended



             COUNT (BEAKER) (test                                        report.

  These



             code = 414)                                         results have be

en



                                                                 appended to a



                                                                 previously sherine

l



                                                                 verified report

.

 

             MONOCYTES ABSOLUTE 0.42 K/ L    0.24-0.36    H            This is a

n appended



             COUNT (BEAKER) (test                                        report.

  These



             code = 415)                                         results have be

en



                                                                 appended to a



                                                                 previously sherine

l



                                                                 verified report

.

 

             EOSINOPHILS ABSOLUTE 0.05 K/ L    0.04-0.36                 This is

 an appended



             COUNT (BEAKER) (test                                        report.

  These



             code = 416)                                         results have be

en



                                                                 appended to a



                                                                 previously sherine

l



                                                                 verified report

.

 

             BASOPHILS ABSOLUTE 0.04 K/ L    0.01-0.08                 This is a

n appended



             COUNT (BEAKER) (test                                        report.

  These



             code = 417)                                         results have be

en



                                                                 appended to a



                                                                 previously sherine

l



                                                                 verified report

.



[id number] 11:30:00





             Test Item    Value        Reference Range Interpretation Comments

 

             PROTIME (BEAKER) (test code = 15.2 seconds 11.7-14.7    H          

  



             759)                                                

 

             INR (BEAKER) (test code = 370) 1.2          <=5.9                  

   

 

             PARTIAL THROMBOPLASTIN TIME 42.8 seconds 22.5-36.0    H            



             (BEAKER) (test code = 760)                                        



RECOMMENDED COUMADIN/WARFARIN INR THERAPY RANGESSTANDARD DOSE: 2.0 - 3.0   
Includes: PROPHYLAXIS forvenous thrombosis, systemic embolization; TREATMENT for
venous thrombosis and/or pulmonary embolus.HIGH RISK: Target INR is 2.5-3.5 for 
patients with mechanical heart valves.PLATELET AGGREGATION: FUNCTION SCREEN
2017 09:23:00





             Test Item    Value        Reference Range Interpretation Comments

 

             WEAK ADP     5 %          60-91        L            



             RESULT(BEAKER) (test                                        



             code = 2135)                                        

 

             PLATELET FUNCTION 0-39% indicates marked                           



             SCREEN INTERP platelet dysfunction                           



             (BEAKER) (test code =                                        



             2173)                                               

 

             [id number] Nelly Zurita MD                       

    



             (BEAKER) (test code = (electronic signature)                       

    



             6369)                                               

 

             PLATELET COUNT  K/CU MM  150-430                   



             (BEAKER) (test code =                                        



             2656)                                               



CBC W/PLT COUNT &amp; AUTO [id number] 07:07:00





             Test Item    Value        Reference Range Interpretation Comments

 

             WHITE BLOOD CELL COUNT (BEAKER) 5.9 K/ L     4.0-10.0              

    



             (test code = 775)                                        

 

             RED BLOOD CELL COUNT (BEAKER) 3.88 M/ L    4.00-5.00    L          

  



             (test code = 761)                                        

 

             HEMOGLOBIN (BEAKER) (test code = 12.5 GM/DL   12.0-15.0            

     



             410)                                                

 

             HEMATOCRIT (BEAKER) (test code = 37.8 %       36.0-45.0            

     



             411)                                                

 

             MEAN CORPUSCULAR VOLUME (BEAKER) 97.3 fL      82.0-99.0            

     



             (test code = 753)                                        

 

             MEAN CORPUSCULAR HEMOGLOBIN 32.1 pg      27.0-33.0                 



             (BEAKER) (test code = 751)                                        

 

             MEAN CORPUSCULAR HEMOGLOBIN CONC 33.0 GM/DL   32.0-36.0            

     



             (BEAKER) (test code = 752)                                        

 

             RED CELL DISTRIBUTION WIDTH 13.4 %       10.3-14.2                 



             (BEAKER) (test code = 412)                                        

 

             PLATELET COUNT (BEAKER) (test 238 K/CU MM  150-430                 

  



             code = 756)                                         

 

             MEAN PLATELET VOLUME (BEAKER) 7.2 fL       6.5-10.5                

  



             (test code = 754)                                        

 

             NUCLEATED RED BLOOD CELLS 0 /100 WBC   0-0                       



             (BEAKER) (test code = 413)                                        

 

             NEUTROPHILS RELATIVE PERCENT 58 %                                  

 



             (BEAKER) (test code = 429)                                        

 

             LYMPHOCYTES RELATIVE PERCENT 31 %                                  

 



             (BEAKER) (test code = 430)                                        

 

             MONOCYTES RELATIVE PERCENT 10 %                                   



             (BEAKER) (test code = 431)                                        

 

             EOSINOPHILS RELATIVE PERCENT 0 %                                   

 



             (BEAKER) (test code = 432)                                        

 

             BASOPHILS RELATIVE PERCENT 1 %                                    



             (BEAKER) (test code = 437)                                        

 

             NEUTROPHILS ABSOLUTE COUNT 3.36 K/ L    1.80-8.00                 



             (BEAKER) (test code = 670)                                        

 

             LYMPHOCYTES ABSOLUTE COUNT 1.84 K/ L    1.48-4.50                 



             (BEAKER) (test code = 414)                                        

 

             MONOCYTES ABSOLUTE COUNT (BEAKER) 0.58 K/ L    0.00-1.30           

      



             (test code = 415)                                        

 

             EOSINOPHILS ABSOLUTE COUNT 0.01 K/ L    0.00-0.50                 



             (BEAKER) (test code = 416)                                        

 

             BASOPHILS ABSOLUTE COUNT (BEAKER) 0.06 K/ L    0.00-0.20           

      



             (test code = 417)                                        



0.00BASI METABOLIC [id number] 06:36:00





             Test Item    Value        Reference Range Interpretation Comments

 

             SODIUM (BEAKER) 138 meq/L    136-145                   



             (test code = 381)                                        

 

             POTASSIUM (BEAKER) 4.0 meq/L    3.5-5.1                   Specimen 

slightly



             (test code = 379)                                        hemolyzed

 

             CHLORIDE (BEAKER) 106 meq/L                        



             (test code = 382)                                        

 

             CO2 (BEAKER) (test 23 meq/L     22-29                     



             code = 355)                                         

 

             BLOOD UREA NITROGEN 13 mg/dL     7-21                      



             (BEAKER) (test code                                        



             = 354)                                              

 

             CREATININE (BEAKER) 0.79 mg/dL   0.57-1.25                 Specimen

 slightly



             (test code = 358)                                        hemolyzed

 

             GLUCOSE RANDOM 97 mg/dL                         



             (BEAKER) (test code                                        



             = 652)                                              

 

             CALCIUM (BEAKER) 9.0 mg/dL    8.4-10.2                  



             (test code = 697)                                        

 

             EGFR (BEAKER) (test 71 mL/min/1.73                           ESTIMA

[place] GFR IS



             code = 1092) sq m                                   NOT AS ACCURATE

 AS



                                                                 CREATININE



                                                                 CLEARANCE IN



                                                                 PREDICTING



                                                                 GLOMERULAR



                                                                 FILTRATION RATE

.



                                                                 ESTIMATED GFR I

S



                                                                 NOT APPLICABLE 

FOR



                                                                 DIALYSIS PATIEN

TS.



[id number] 06:21:00





             Test Item    Value        Reference Range Interpretation Comments

 

             PROTIME (BEAKER) (test code = 14.1 seconds 11.7-14.7               

  



             759)                                                

 

             INR (BEAKER) (test code = 370) 1.1          <=5.9                  

   

 

             PARTIAL THROMBOPLASTIN TIME 37.0 seconds 22.5-36.0    H            



             (BEAKER) (test code = 760)                                        



RECOMMENDED COUMADIN/WARFARIN INR THERAPY RANGESSTANDARD DOSE: 2.0 - 3.0   
Includes: PROPHYLAXIS forvenous thrombosis, systemic embolization; TREATMENT for
venous thrombosis and/or pulmonary embolus.HIGH RISK: Target INR is 2.5-3.5 for 
patients with mechanical heart valves.PROTHROMBIN [id number] 06:20:00





             Test Item    Value        Reference Range Interpretation Comments

 

             PROTIME (BEAKER) (test code = 14.1 seconds 11.7-14.7               

  



             759)                                                

 

             INR (BEAKER) (test code = 370) 1.1          <=5.9                  

   



RECOMMENDED COUMADIN/WARFARIN INR THERAPY RANGESSTANDARD DOSE: 2.0 - 3.0   
Includes: PROPHYLAXIS forvenous thrombosis, systemic embolization; TREATMENT for
venous thrombosis and/or pulmonary embolus.HIGH RISK: Target INR is 2.5-3.5 for 
patients with mechanical heart valves.PT/[id number] 23:38:00





             Test Item    Value        Reference Range Interpretation Comments

 

             PROTIME (BEAKER) (test code = 15.2 seconds 11.7-14.7    H          

  



             759)                                                

 

             INR (BEAKER) (test code = 370) 1.2          <=5.9                  

   

 

             PARTIAL THROMBOPLASTIN TIME 36.5 seconds 22.5-36.0    H            



             (BEAKER) (test code = 760)                                        



RECOMMENDED COUMADIN/WARFARIN INR THERAPY RANGESSTANDARD DOSE: 2.0 - 3.0   
Includes: PROPHYLAXIS forvenous thrombosis, systemic embolization; TREATMENT for
venous thrombosis and/or pulmonary embolus.HIGH RISK: Target INR is 2.5-3.5 for 
patients with mechanical heart valves.BASIC METABOLIC [id number] 23:33:00





             Test Item    Value        Reference Range Interpretation Comments

 

             SODIUM (BEAKER) 137 meq/L    136-145                   



             (test code = 381)                                        

 

             POTASSIUM (BEAKER) 5.7 meq/L    3.5-5.1      H            Specimen 

markedly



             (test code = 379)                                        hemolyzed

 

             CHLORIDE (BEAKER) 105 meq/L                        



             (test code = 382)                                        

 

             CO2 (BEAKER) (test 25 meq/L     22-29                     



             code = 355)                                         

 

             BLOOD UREA NITROGEN 13 mg/dL     7-21                      



             (BEAKER) (test code                                        



             = 354)                                              

 

             CREATININE (BEAKER) 0.84 mg/dL   0.57-1.25                 Specimen

 markedly



             (test code = 358)                                        hemolyzed

 

             GLUCOSE RANDOM 86 mg/dL                         



             (BEAKER) (test code                                        



             = 652)                                              

 

             CALCIUM (BEAKER) 8.9 mg/dL    8.4-10.2                  



             (test code = 697)                                        

 

             EGFR (BEAKER) (test 66 mL/min/1.73                           ESTIMA

[place] GFR IS



             code = 1092) sq m                                   NOT AS ACCURATE

 AS



                                                                 CREATININE



                                                                 CLEARANCE IN



                                                                 PREDICTING



                                                                 GLOMERULAR



                                                                 FILTRATION RATE

.



                                                                 ESTIMATED GFR I

S



                                                                 NOT APPLICABLE 

FOR



                                                                 DIALYSIS PATIEN

TS.



CBC W/PLT COUNT &amp; AUTO [id number] 23:19:00





             Test Item    Value        Reference Range Interpretation Comments

 

             WHITE BLOOD CELL COUNT (BEAKER) 7.0 K/ L     4.0-10.0              

    



             (test code = 775)                                        

 

             RED BLOOD CELL COUNT (BEAKER) 3.76 M/ L    4.00-5.00    L          

  



             (test code = 761)                                        

 

             HEMOGLOBIN (BEAKER) (test code = 12.7 GM/DL   12.0-15.0            

     



             410)                                                

 

             HEMATOCRIT (BEAKER) (test code = 36.9 %       36.0-45.0            

     



             411)                                                

 

             MEAN CORPUSCULAR VOLUME (BEAKER) 98.2 fL      82.0-99.0            

     



             (test code = 753)                                        

 

             MEAN CORPUSCULAR HEMOGLOBIN 33.7 pg      27.0-33.0    H            



             (BEAKER) (test code = 751)                                        

 

             MEAN CORPUSCULAR HEMOGLOBIN CONC 34.4 GM/DL   32.0-36.0            

     



             (BEAKER) (test code = 752)                                        

 

             RED CELL DISTRIBUTION WIDTH 12.2 %       10.3-14.2                 



             (BEAKER) (test code = 412)                                        

 

             PLATELET COUNT (BEAKER) (test 241 K/CU MM  150-430                 

  



             code = 756)                                         

 

             MEAN PLATELET VOLUME (BEAKER) 7.2 fL       6.5-10.5                

  



             (test code = 754)                                        

 

             NUCLEATED RED BLOOD CELLS 0 /100 WBC   0-0                       



             (BEAKER) (test code = 413)                                        

 

             NEUTROPHILS RELATIVE PERCENT 58 %                                  

 



             (BEAKER) (test code = 429)                                        

 

             LYMPHOCYTES RELATIVE PERCENT 34 %                                  

 



             (BEAKER) (test code = 430)                                        

 

             MONOCYTES RELATIVE PERCENT 7 %                                    



             (BEAKER) (test code = 431)                                        

 

             EOSINOPHILS RELATIVE PERCENT 0 %                                   

 



             (BEAKER) (test code = 432)                                        

 

             BASOPHILS RELATIVE PERCENT 1 %                                    



             (BEAKER) (test code = 437)                                        

 

             NEUTROPHILS ABSOLUTE COUNT 4.08 K/ L    1.80-8.00                 



             (BEAKER) (test code = 670)                                        

 

             LYMPHOCYTES ABSOLUTE COUNT 2.36 K/ L    1.48-4.50                 



             (BEAKER) (test code = 414)                                        

 

             MONOCYTES ABSOLUTE COUNT (BEAKER) 0.52 K/ L    0.00-1.30           

      



             (test code = 415)                                        

 

             EOSINOPHILS ABSOLUTE COUNT 0.02 K/ L    0.00-0.50                 



             (BEAKER) (test code = 416)                                        

 

             BASOPHILS ABSOLUTE COUNT (BEAKER) 0.05 K/ L    0.00-0.20           

      



             (test code = 417)                                        



0.00POCT-P2Y12 PLATELET [id number] 20:57:00





             Test Item    Value        Reference Range Interpretation Comments

 

             POC-P2Y12 PLATELET AGG (BEAKER) (test 31 PRU                       

          



             code = 9930)                                        



RANGE INFORMATION: PRU reference range is 194-418. Post Drug Results: Lower PRU 
levels are associated with expected antiplatelet effect. Values may be below the
stated reference range above. The post-drug PRU values reported in the [employer]
P2Y12 package insert are .URINALYSIS W/ [id number] 09:22:00





             Test Item    Value        Reference Range Interpretation Comments

 

             COLOR (BEAKER) (test Light Yellow                           



             code = 470)                                         

 

             CLARITY (BEAKER) (test Hazy                                   



             code = 469)                                         

 

             SPECIFIC GRAVITY UA 1.009        1.001-1.035               



             (BEAKER) (test code =                                        



             468)                                                

 

             PH UA (BEAKER) (test 5.5          5.0-8.0                   



             code = 467)                                         

 

             PROTEIN UA (BEAKER) Negative     Negative                  



             (test code = 464)                                        

 

             GLUCOSE UA (BEAKER) Negative     Negative                  



             (test code = 365)                                        

 

             KETONES UA (BEAKER) Negative     Negative                  



             (test code = 371)                                        

 

             BILIRUBIN UA (BEAKER) Negative     Negative                  



             (test code = 462)                                        

 

             BLOOD UA (BEAKER) (test Small        Negative     A            



             code = 461)                                         

 

             NITRITE UA (BEAKER) Positive     Negative     A            



             (test code = 465)                                        

 

             LEUKOCYTE ESTERASE UA Large        Negative     A            



             (BEAKER) (test code =                                        



             466)                                                

 

             UROBILINOGEN UA (BEAKER) 0.2 mg/dL    0.2-1.0                   



             (test code = 463)                                        

 

             RBC UA (BEAKER) (test 12 /HPF                                



             code = 519)                                         

 

             WBC UA (BEAKER) (test 92 /HPF                                



             code = 520)                                         

 

             BACTERIA (BEAKER) (test Rare                                   



             code = 517)                                         

 

             MUCUS (BEAKER) (test Rare                                   



             code = 1574)                                        

 

             SQUAMOUS EPITHELIAL < /HPF                                 



             (BEAKER) (test code =                                        



             516)                                                

 

             YEAST (BEAKER) (test Occasional                             



             code = 1585)                                        

 

             SOURCE(BEAKER) (test Urine, Straight                           



             code = 2795) Catheter                               



[id number] 04:55:00





             Test Item    Value        Reference Range Interpretation Comments

 

             PHOSPHORUS (BEAKER) (test code = 3.3 mg/dL    2.3-4.7              

     



             604)                                                



[id number] 04:55:00





             Test Item    Value        Reference Range Interpretation Comments

 

             MAGNESIUM (BEAKER) (test code = 1.6 mg/dL    1.6-2.6               

    



             627)                                                



BASIC METABOLIC [id number] 04:55:00





             Test Item    Value        Reference Range Interpretation Comments

 

             SODIUM (BEAKER) 140 meq/L    136-145                   



             (test code = 381)                                        

 

             POTASSIUM (BEAKER) 3.4 meq/L    3.5-5.1      L            



             (test code = 379)                                        

 

             CHLORIDE (BEAKER) 107 meq/L                        



             (test code = 382)                                        

 

             CO2 (BEAKER) (test 22 meq/L     22-29                     



             code = 355)                                         

 

             BLOOD UREA NITROGEN 8 mg/dL      7-21                      



             (BEAKER) (test code                                        



             = 354)                                              

 

             CREATININE (BEAKER) 0.71 mg/dL   0.57-1.25                 



             (test code = 358)                                        

 

             GLUCOSE RANDOM 109 mg/dL           H            



             (BEAKER) (test code                                        



             = 652)                                              

 

             CALCIUM (BEAKER) 8.6 mg/dL    8.4-10.2                  



             (test code = 697)                                        

 

             EGFR (BEAKER) (test 80 mL/min/1.73                           ESTIMA

[facility] GFR IS



             code = 1092) sq m                                   NOT AS ACCURATE

 AS



                                                                 CREATININE



                                                                 CLEARANCE IN



                                                                 PREDICTING



                                                                 GLOMERULAR



                                                                 FILTRATION RATE

.



                                                                 ESTIMATED GFR I

S



                                                                 NOT APPLICABLE 

FOR



                                                                 DIALYSIS PATIEN

TS.



CBC W/PLT COUNT &amp; AUTO [id number] 04:52:00





             Test Item    Value        Reference Range Interpretation Comments

 

             WHITE BLOOD CELL COUNT (BEAKER) 7.5 K/ L     4.0-10.0              

    



             (test code = 775)                                        

 

             RED BLOOD CELL COUNT (BEAKER) 4.04 M/ L    4.00-5.00               

  



             (test code = 761)                                        

 

             HEMOGLOBIN (BEAKER) (test code = 12.9 GM/DL   12.0-15.0            

     



             410)                                                

 

             HEMATOCRIT (BEAKER) (test code = 39.7 %       36.0-45.0            

     



             411)                                                

 

             MEAN CORPUSCULAR VOLUME (BEAKER) 98.3 fL      82.0-99.0            

     



             (test code = 753)                                        

 

             MEAN CORPUSCULAR HEMOGLOBIN 31.9 pg      27.0-33.0                 



             (BEAKER) (test code = 751)                                        

 

             MEAN CORPUSCULAR HEMOGLOBIN CONC 32.5 GM/DL   32.0-36.0            

     



             (BEAKER) (test code = 752)                                        

 

             RED CELL DISTRIBUTION WIDTH 12.0 %       10.3-14.2                 



             (BEAKER) (test code = 412)                                        

 

             PLATELET COUNT (BEAKER) (test 196 K/CU MM  150-430                 

  



             code = 756)                                         

 

             MEAN PLATELET VOLUME (BEAKER) 7.2 fL       6.5-10.5                

  



             (test code = 754)                                        

 

             NUCLEATED RED BLOOD CELLS 0 /100 WBC   0-0                       



             (BEAKER) (test code = 413)                                        

 

             NEUTROPHILS RELATIVE PERCENT 81 %                                  

 



             (BEAKER) (test code = 429)                                        

 

             LYMPHOCYTES RELATIVE PERCENT 14 %                                  

 



             (BEAKER) (test code = 430)                                        

 

             MONOCYTES RELATIVE PERCENT 6 %                                    



             (BEAKER) (test code = 431)                                        

 

             EOSINOPHILS RELATIVE PERCENT 0 %                                   

 



             (BEAKER) (test code = 432)                                        

 

             BASOPHILS RELATIVE PERCENT 0 %                                    



             (BEAKER) (test code = 437)                                        

 

             NEUTROPHILS ABSOLUTE COUNT 6.03 K/ L    1.80-8.00                 



             (BEAKER) (test code = 670)                                        

 

             LYMPHOCYTES ABSOLUTE COUNT 1.01 K/ L    1.48-4.50    L            



             (BEAKER) (test code = 414)                                        

 

             MONOCYTES ABSOLUTE COUNT (BEAKER) 0.42 K/ L    0.00-1.30           

      



             (test code = 415)                                        

 

             EOSINOPHILS ABSOLUTE COUNT 0.01 K/ L    0.00-0.50                 



             (BEAKER) (test code = 416)                                        

 

             BASOPHILS ABSOLUTE COUNT (BEAKER) 0.01 K/ L    0.00-0.20           

      



             (test code = 417)                                        



[id number] 16:01:00





             Test Item    Value        Reference Range Interpretation Comments

 

             ACTIVATED CLOTTING TIME 245 sec                                TEST

ED AT [place] [address]



             ([facility]) (test code =                                        [address] [place] [address])                                                [id number]



[id number] 15:00:00





             Test Item    Value        Reference Range Interpretation Comments

 

             ACTIVATED CLOTTING TIME 255 sec                                TEST

ED AT [place] [address]



             ([facility]) (test code =                                        [place] [address])                                                [address]



[id number] 14:44:00





             Test Item    Value        Reference Range Interpretation Comments

 

             ACTIVATED CLOTTING TIME 234 sec                                TEST

ED AT [place] [address]



             ([facility]) (test code =                                        [address] [place] [address])                                                [address]



[id number] 14:31:00





             Test Item    Value        Reference Range Interpretation Comments

 

             ACTIVATED CLOTTING TIME 219 sec                                TEST

ED AT [place] [address]



             ([facility]) (test code =                                        [address] [place] [address])                                                [address]



[id number] 14:31:00





             Test Item    Value        Reference Range Interpretation Comments

 

             ACTIVATED CLOTTING TIME 183 sec                                TEST

ED AT [place] [address]



             ([facility]) (test code =                                        [address] [place] [address])                                                [address]



[id number] 13:22:00





             Test Item    Value        Reference Range Interpretation Comments

 

             ACTIVATED CLOTTING TIME 137 sec                                TEST

ED AT [facility] [address]



             (BEAKER) (test code =                                        [place]



             [address])                                                [address]



POCT-P2Y12 PLATELET [id number] 07:24:00





             Test Item    Value        Reference Range Interpretation Comments

 

             POC-P2Y12 PLATELET AGG (BEAKER) (test 110 PRU                      

          



             code = 2303)                                        



RANGE INFORMATION: PRU reference range is 194-418. Post Drug Results: Lower PRU 
levels are associated with expected antiplatelet effect. Values may be below the
stated reference range above. The post-drug PRU values reported in the [employer]
P2Y12 package insert are .POCT-ASPIRIN PLATELET [id number] 
07:24:00





             Test Item    Value        Reference Range Interpretation Comments

 

             POC-ASPIRIN PLATELET AGG (BEAKER) 402 ARU                          

      



             (test code = 2302)                                        



RANGE INFORMATION: 350-549 ARU Therapeutic range for platelet function.         
         550-700 ARU Non-Therapeutic range for platelet function.PLATELET 
AGGREGATION: FUNCTION [id number] 10:21:00





             Test Item    Value        Reference Range Interpretation Comments

 

             WEAK ADP     92 %         60-91        H            



             RESULT(BEAKER) (test                                        



             code = 2135)                                        

 

             PLATELET FUNCTION % indicates                           



             SCREEN INTERP (BEAKER) normal platelet                           



             (test code = 2173) function                               

 

             [id number] Meredith Reyes, MD                           



             (BEAKER) (test code = (electronic signature)                       

    



             9375)                                               

 

             PLATELET COUNT  K/CU MM  150-430                   



             (BEAKER) (test code =                                        



             2656)                                               



COMPREHENSIVE METABOLIC [id number] 05:11:00





             Test Item    Value        Reference Range Interpretation Comments

 

             TOTAL PROTEIN 6.5 gm/dL    6.0-8.3                   



             (BEAKER) (test code =                                        



             770)                                                

 

             ALBUMIN (BEAKER) 3.6 g/dL     3.5-5.0                   



             (test code = 1145)                                        

 

             ALKALINE PHOSPHATASE 60 U/L                           



             (BEAKER) (test code =                                        



             346)                                                

 

             BILIRUBIN TOTAL 1.1 mg/dL    0.2-1.2                   



             (BEAKER) (test code =                                        



             377)                                                

 

             SODIUM (BEAKER) (test 136 meq/L    136-145                   



             code = 381)                                         

 

             POTASSIUM (BEAKER) 3.8 meq/L    3.5-5.1                   



             (test code = 379)                                        

 

             CHLORIDE (BEAKER) 104 meq/L                        



             (test code = 382)                                        

 

             CO2 (BEAKER) (test 23 meq/L     22-29                     



             code = 355)                                         

 

             BLOOD UREA NITROGEN 13 mg/dL     7-21                      



             (BEAKER) (test code =                                        



             354)                                                

 

             CREATININE (BEAKER) 0.79 mg/dL   0.57-1.25                 



             (test code = 358)                                        

 

             GLUCOSE RANDOM 89 mg/dL                         



             (BEAKER) (test code =                                        



             652)                                                

 

             CALCIUM (BEAKER) 9.2 mg/dL    8.4-10.2                  



             (test code = 697)                                        

 

             AST (SGOT) (BEAKER) 19 U/L       5-34                      



             (test code = 353)                                        

 

             ALT (SGPT) (BEAKER) 8 U/L        6-55                      



             (test code = 347)                                        

 

             EGFR (BEAKER) (test 71 mL/min/1.73                           ESTIMA

[facility] GFR IS



             code = 1092) sq m                                   NOT AS ACCURATE

 AS



                                                                 CREATININE



                                                                 CLEARANCE IN



                                                                 PREDICTING



                                                                 GLOMERULAR



                                                                 FILTRATION RATE

.



                                                                 ESTIMATED GFR I

S



                                                                 NOT APPLICABLE 

FOR



                                                                 DIALYSIS PATIEN

TS.



[id number] 05:04:00





             Test Item    Value        Reference Range Interpretation Comments

 

             PARTIAL THROMBOPLASTIN TIME 37.9 seconds 22.5-36.0    H            



             (BEAKER) (test code = 760)                                        



[id number] 16:56:00





             Test Item    Value        Reference Range Interpretation Comments

 

             PARTIAL THROMBOPLASTIN TIME 39.4 seconds 22.5-36.0    H            



             (BEAKER) (test code = 760)                                        



PROTHROMBIN [id number]/[id number] 16:55:00





             Test Item    Value        Reference Range Interpretation Comments

 

             PROTIME (BEAKER) (test code = 14.4 seconds 11.7-14.7               

  



             759)                                                

 

             INR (BEAKER) (test code = 370) 1.1          <=5.9                  

   



RECOMMENDED COUMADIN/WARFARIN INR THERAPY RANGESSTANDARD DOSE: 2.0 - 3.0   
Includes: PROPHYLAXIS forvenous thrombosis, systemic embolization; TREATMENT for
venous thrombosis and/or pulmonary embolus.HIGH RISK: Target INR is 2.5-3.5 for 
patients with mechanical heart valves.COMPREHENSIVE METABOLIC [id number] 
02:20:00





             Test Item    Value        Reference Range Interpretation Comments

 

             TOTAL PROTEIN 7.0 gm/dL    6.0-8.3                   



             (BEAKER) (test code =                                        



             770)                                                

 

             ALBUMIN (BEAKER) 3.8 g/dL     3.5-5.0                   



             (test code = 1145)                                        

 

             ALKALINE PHOSPHATASE 68 U/L                           



             (BEAKER) (test code =                                        



             346)                                                

 

             BILIRUBIN TOTAL 0.9 mg/dL    0.2-1.2                   



             (BEAKER) (test code =                                        



             377)                                                

 

             SODIUM (BEAKER) (test 137 meq/L    136-145                   



             code = 381)                                         

 

             POTASSIUM (BEAKER) 3.5 meq/L    3.5-5.1                   



             (test code = 379)                                        

 

             CHLORIDE (BEAKER) 104 meq/L                        



             (test code = 382)                                        

 

             CO2 (BEAKER) (test 23 meq/L     22-29                     



             code = 355)                                         

 

             BLOOD UREA NITROGEN 10 mg/dL     7-21                      



             (BEAKER) (test code =                                        



             354)                                                

 

             CREATININE (BEAKER) 0.81 mg/dL   0.57-1.25                 



             (test code = 358)                                        

 

             GLUCOSE RANDOM 106 mg/dL           H            



             (BEAKER) (test code =                                        



             652)                                                

 

             CALCIUM (BEAKER) 9.7 mg/dL    8.4-10.2                  



             (test code = 697)                                        

 

             AST (SGOT) (BEAKER) 21 U/L       5-34                      



             (test code = 353)                                        

 

             ALT (SGPT) (BEAKER) 8 U/L        6-55                      



             (test code = 347)                                        

 

             EGFR (BEAKER) (test 69 mL/min/1.73                           ESTIMA

[facility] GFR IS



             code = 1092) sq m                                   NOT AS ACCURATE

 AS



                                                                 CREATININE



                                                                 CLEARANCE IN



                                                                 PREDICTING



                                                                 GLOMERULAR



                                                                 FILTRATION RATE

.



                                                                 ESTIMATED GFR I

S



                                                                 NOT APPLICABLE 

FOR



                                                                 DIALYSIS PATIEN

TS.



HEMOGLOBIN [id number] 07:40:00





             Test Item    Value        Reference Range Interpretation Comments

 

             HEMOGLOBIN A1C (BEAKER) (test code = 4.9 %        4.3-6.1          

         



             368)                                                



VITAMIN [id number] 06:01:00





             Test Item    Value        Reference Range Interpretation Comments

 

             VITAMIN B12 (BEAKER) (test code = 826 pg/mL    213-816      H      

      



             774)                                                



TSH/FREE T4 IF [id number] 06:01:00





             Test Item    Value        Reference Range Interpretation Comments

 

             THYROID STIMULATING HORMONE 1.37 uIU/mL  0.35-4.94                 



             (BEAKER) (test code = 772)                                        



CALCIUM, [id number] 05:48:00





             Test Item    Value        Reference Range Interpretation Comments

 

             CALCIUM IONIZED (BEAKER) (test 1.11 mmol/L  1.12-1.27    L         

   



             code = 698)                                         

 

             PH, BLOOD (BEAKER) (test code = 7.41                               

    



             1810)                                               



[id number] 05:33:00





             Test Item    Value        Reference Range Interpretation Comments

 

             PHOSPHORUS (BEAKER) (test code = 3.3 mg/dL    2.3-4.7              

     



             604)                                                



[id number] 05:33:00





             Test Item    Value        Reference Range Interpretation Comments

 

             MAGNESIUM (BEAKER) (test code = 2.0 mg/dL    1.6-2.6               

    



             627)                                                



COMPREHENSIVE METABOLIC [id number] 05:33:00





             Test Item    Value        Reference Range Interpretation Comments

 

             TOTAL PROTEIN 5.9 gm/dL    6.0-8.3      L            



             (BEAKER) (test code =                                        



             770)                                                

 

             ALBUMIN (BEAKER) 3.3 g/dL     3.5-5.0      L            



             (test code = 1145)                                        

 

             ALKALINE PHOSPHATASE 56 U/L                           



             (BEAKER) (test code =                                        



             346)                                                

 

             BILIRUBIN TOTAL 0.6 mg/dL    0.2-1.2                   



             (BEAKER) (test code =                                        



             377)                                                

 

             SODIUM (BEAKER) (test 140 meq/L    136-145                   



             code = 381)                                         

 

             POTASSIUM (BEAKER) 3.7 meq/L    3.5-5.1                   



             (test code = 379)                                        

 

             CHLORIDE (BEAKER) 106 meq/L                        



             (test code = 382)                                        

 

             CO2 (BEAKER) (test 27 meq/L     22-29                     



             code = 355)                                         

 

             BLOOD UREA NITROGEN 13 mg/dL     7-21                      



             (BEAKER) (test code =                                        



             354)                                                

 

             CREATININE (BEAKER) 0.79 mg/dL   0.57-1.25                 



             (test code = 358)                                        

 

             GLUCOSE RANDOM 95 mg/dL                         



             (BEAKER) (test code =                                        



             652)                                                

 

             CALCIUM (BEAKER) 8.7 mg/dL    8.4-10.2                  



             (test code = 697)                                        

 

             AST (SGOT) (BEAKER) 16 U/L       5-34                      



             (test code = 353)                                        

 

             ALT (SGPT) (BEAKER) 7 U/L        6-55                      



             (test code = 347)                                        

 

             EGFR (BEAKER) (test 71 mL/min/1.73                           ESTIMA

[facility] GFR IS



             code = 1092) sq m                                   NOT AS ACCURATE

 AS



                                                                 CREATININE



                                                                 CLEARANCE IN



                                                                 PREDICTING



                                                                 GLOMERULAR



                                                                 FILTRATION RATE

.



                                                                 ESTIMATED GFR I

S



                                                                 NOT APPLICABLE 

FOR



                                                                 DIALYSIS PATIEN

TS.



LIPID [id number] 05:33:00





             Test Item    Value        Reference Range Interpretation Comments

 

             TRIGLYCERIDES (BEAKER) (test code = 57 mg/dL                       

        



             540)                                                

 

             CHOLESTEROL (BEAKER) (test code = 204 mg/dL                        

      



             631)                                                

 

             HDL CHOLESTEROL (BEAKER) (test code 62 mg/dL                       

        



             = 976)                                              

 

             LDL CHOLESTEROL CALCULATED (BEAKER) 131 mg/dL                      

        



             (test code = 633)                                        



Triglyceride Reference Range:   Low Risk         &lt;150   Borderline    150-199
  High Risk     200-499   Very High Risk  &gt;=500Cholesterol Reference Range:  
Low Risk         &lt;200   Borderline 200-239    High Risk        &gt;240HDL 
Cholesterol Reference Range:   Low Risk         &gt;=60   High Risk         
&lt;40LDL Cholesterol Reference Range:   Optimal          &lt;100   Near Optimal
 100-129   Borderline    130-159   High          160-189   Very High       
&gt;=190CBC W/PLT COUNT &amp; AUTO [id number] 04:51:00





             Test Item    Value        Reference Range Interpretation Comments

 

             WHITE BLOOD CELL COUNT (BEAKER) 4.8 K/ L     4.0-10.0              

    



             (test code = 775)                                        

 

             RED BLOOD CELL COUNT (BEAKER) 4.01 M/ L    4.00-5.00               

  



             (test code = 761)                                        

 

             HEMOGLOBIN (BEAKER) (test code = 13.4 GM/DL   12.0-15.0            

     



             410)                                                

 

             HEMATOCRIT (BEAKER) (test code = 39.0 %       36.0-45.0            

     



             411)                                                

 

             MEAN CORPUSCULAR VOLUME (BEAKER) 97.5 fL      82.0-99.0            

     



             (test code = 753)                                        

 

             MEAN CORPUSCULAR HEMOGLOBIN 33.5 pg      27.0-33.0    H            



             (BEAKER) (test code = 751)                                        

 

             MEAN CORPUSCULAR HEMOGLOBIN CONC 34.4 GM/DL   32.0-36.0            

     



             (BEAKER) (test code = 752)                                        

 

             RED CELL DISTRIBUTION WIDTH 12.8 %       10.3-14.2                 



             (BEAKER) (test code = 412)                                        

 

             PLATELET COUNT (BEAKER) (test 193 K/CU MM  150-430                 

  



             code = 756)                                         

 

             MEAN PLATELET VOLUME (BEAKER) 7.0 fL       6.5-10.5                

  



             (test code = 754)                                        

 

             NUCLEATED RED BLOOD CELLS 0 /100 WBC   0-0                       



             (BEAKER) (test code = 413)                                        

 

             NEUTROPHILS RELATIVE PERCENT 50 %                                  

 



             (BEAKER) (test code = 429)                                        

 

             LYMPHOCYTES RELATIVE PERCENT 40 %                                  

 



             (BEAKER) (test code = 430)                                        

 

             MONOCYTES RELATIVE PERCENT 9 %                                    



             (BEAKER) (test code = 431)                                        

 

             EOSINOPHILS RELATIVE PERCENT 0 %                                   

 



             (BEAKER) (test code = 432)                                        

 

             BASOPHILS RELATIVE PERCENT 0 %                                    



             (BEAKER) (test code = 437)                                        

 

             NEUTROPHILS ABSOLUTE COUNT 2.38 K/ L    1.80-8.00                 



             (BEAKER) (test code = 670)                                        

 

             LYMPHOCYTES ABSOLUTE COUNT 1.93 K/ L    1.48-4.50                 



             (BEAKER) (test code = 414)                                        

 

             MONOCYTES ABSOLUTE COUNT (BEAKER) 0.44 K/ L    0.00-1.30           

      



             (test code = 415)                                        

 

             EOSINOPHILS ABSOLUTE COUNT 0.01 K/ L    0.00-0.50                 



             (BEAKER) (test code = 416)                                        

 

             BASOPHILS ABSOLUTE COUNT (BEAKER) 0.02 K/ L    0.00-0.20           

      



             (test code = 417)                                        



0.00COMPREHENSIVE METABOLIC [id number] 22:47:00





             Test Item    Value        Reference Range Interpretation Comments

 

             TOTAL PROTEIN 5.8 gm/dL    6.0-8.3      L            



             (BEAKER) (test code =                                        



             770)                                                

 

             ALBUMIN (BEAKER) 3.2 g/dL     3.5-5.0      L            



             (test code = 1145)                                        

 

             ALKALINE PHOSPHATASE 56 U/L                           



             (BEAKER) (test code =                                        



             346)                                                

 

             BILIRUBIN TOTAL 0.5 mg/dL    0.2-1.2                   



             (BEAKER) (test code =                                        



             377)                                                

 

             SODIUM (BEAKER) (test 140 meq/L    136-145                   



             code = 381)                                         

 

             POTASSIUM (BEAKER) 3.7 meq/L    3.5-5.1                   



             (test code = 379)                                        

 

             CHLORIDE (BEAKER) 108 meq/L           H            



             (test code = 382)                                        

 

             CO2 (BEAKER) (test 25 meq/L     22-29                     



             code = 355)                                         

 

             BLOOD UREA NITROGEN 11 mg/dL     7-21                      



             (BEAKER) (test code =                                        



             354)                                                

 

             CREATININE (BEAKER) 0.83 mg/dL   0.57-1.25                 



             (test code = 358)                                        

 

             GLUCOSE RANDOM 94 mg/dL                         



             (BEAKER) (test code =                                        



             652)                                                

 

             CALCIUM (BEAKER) 8.6 mg/dL    8.4-10.2                  



             (test code = 697)                                        

 

             AST (SGOT) (BEAKER) 15 U/L       5-34                      



             (test code = 353)                                        

 

             ALT (SGPT) (BEAKER) 7 U/L        6-55                      



             (test code = 347)                                        

 

             EGFR (BEAKER) (test 67 mL/min/1.73                           ESTIMA

[facility] GFR IS



             code = 1092) sq m                                   NOT AS ACCURATE

 AS



                                                                 CREATININE



                                                                 CLEARANCE IN



                                                                 PREDICTING



                                                                 GLOMERULAR



                                                                 FILTRATION RATE

.



                                                                 ESTIMATED GFR I

S



                                                                 NOT APPLICABLE 

FOR



                                                                 DIALYSIS PATITRINO CARLTON.

## 2020-06-24 NOTE — XMS REPORT
Clinical Summary

                            Created on:2020



Patient:Jerica Estrada

Sex:Female

:1940

External Reference #:[id number]





Demographics







                          Address                   [address] [place] [address]

 

                          Mobile Phone              [phone]

 

                          Home Phone                [phone]

 

                          Preferred Language        English

 

                          Marital Status            Unknown

 

                          Jehovah's witness Affiliation     Unknown

 

                          Race                      White

 

                          Ethnic Group              Not  or 









Author







                          Organization              [facility]

 

                          Address                   [address] [place] [address]









Support







                Name            Relationship    Address         Phone

 

                Wan Estrada Unavailable     [address]  [phone]



                                                [place] [address] 

 

                Musa Estrada      Unavailable     [phone]









Care Team Providers







                    Name                Role                Phone

 

                    MD Peyton        Primary Care Provider [phone]









Allergies







             Active Allergy Reactions    Severity     Noted Date   Comments

 

             Ciprofloxacin Anaphylaxis  High         2017   

 

             Iodine And Iodide Containing Other (See Comments) Medium          Cold chills



             Products                                            

 

             Penicillins  Anaphylaxis  High         2017   

 

             Tetanus Vaccines And Toxoid Itching      High         2017   







Medications







          Medication Sig       Dispensed Refills   Start Date End Date  Status

 

          CALCIUM   Take by mouth 2           0                             Acti

ve



          CARBONATE/VITAMIN D3 (two) times                                      

   



          (CALCIUM 600 + D,3, daily.                                            



          ORAL)                                                       

 

          VIT C/VIT Take by mouth           0                             Active



          E/LUTEIN/MIN/OMEGA-3 daily.                                           

 



          (OCUVITE ORAL)                                                   

 

          folic acid (FOLVITE) 400 Take 400 mcg by           0                  

           Active



          MCG tablet mouth nightly.                                         

 

          cyanocobalamin 1000 MCG Take 1,000 mcg           0                    

         Active



          tablet    by mouth daily.                                         

 

          cranberry extract Take by mouth 2           0                         

    Active



          (CRANBERRY CONCENTRATE) (two) times                                   

      



          500 mg Cap daily.                                            

 

          docusate sodium (COLACE) Take 100 mg by           0                   

          Active



          100 MG capsule mouth 2 (two)                                         



                    times daily as                                         



                    needed for                                         



                    Constipation.                                         







Active Problems







                          Problem                   Noted Date

 

                          Aneurysm                  2017

 

                          Hypertension              2017

 

                          Other specified transient cerebral ischemias 

7

 

                          Hypertensive emergency    2017

 

                          Syncope                   05/15/2017

 

                          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

 

                          Essential hypertension    2017

 

                          TIA (transient ischemic attack) 2017

 

                          Hypertension              2017







Social History







             Tobacco Use  Types        Packs/Day    Years Used   Date

 

             Never Smoker                                        









                Smokeless Tobacco: Never Used                                 









                Alcohol Use     Drinks/Week     oz/Week         Comments

 

                No                                              









                          Sex Assigned at Birth     Date Recorded

 

                          Not on file               









                    Job Start Date      Occupation          Industry

 

                    Not on file         Not on file         Not on file









                    Travel History      Travel Start        Travel End









                                        No recent travel history available.







Last Filed Vital Signs

Not on file



Plan of Treatment

Not on file



Results

Not on fileafter 2019



Insurance







           Payer      Benefit Plan / Group Subscriber ID Type       Phone      A

ddress

 

           MEDICARE   MEDICARE A B xxxxxxxxxx Medicare              

 

           MCR SUPPLEMENT/INDIVIDUAL [employer] x[place]xx [place] 

              









           Guarantor Name Account Type Relation to Date of    Phone      Billing



                                 Patient    Birth                 Address

 

           Jerica Estrada Personal/Family Self       1940 [phone] [address]                                        (Home)     [place] [address]







Advance Directives

For more information, please contact:[facility] [place], [place] [phone]





                Code Status     Date Activated  Date Inactivated Comments

 

                Full Code       2017  5:31 PM 2017 10:34 PM 









                    This code status was determined by: Patient             









                                                                

 

                Full Code       2017 10:12 PM 2017  4:57 PM 









                    This code status was determined by: Patient             









                                                                

 

                Full Code       2017  7:37 PM 2017  8:30 PM 









                    This code status was determined by: Patient

## 2020-06-24 NOTE — ECHO
HEIGHT: 5 ft 5 in   WEIGHT: 150 lb 1.6 oz   DATE OF STUDY: 6/24/2020   REFER DR: 
bradly ames

2-DIMENSIONAL: YES

     M.MODE: YES

 DOPPLER: YES

COLOR FLOW: YES



                    TDS:  NO

PORTABLE: NO

 DEFINITY:  NO

BUBBLE STUDY: NO





DIAGNOSIS:  TRANSIENT ISCHEMIC ATTACK



CARDIAC HISTORY:  

CATHERIZATION: NO

SURGERY: NO

PROSTHETIC VALVE: NO

PACEMAKER: NO





MEASUREMENTS (cm)

    DIASTOLIC (NORMALS)                 SYSTOLIC (NORMALS)

IVSd                 1.2 (0.6-1.2)                    LA Diam 3.5 (1.9-4.0)     LVEF       
  46%  

LVIDd               3.8 (3.5-5.7)                        LVIDs      3.0 (2.0-3.5)     %FS  
        22%

LVPWd             1.2 (0.6-1.2)

Ao Diam           2.8 (2.0-3.7)



2 DIMENSIONAL ASSESSMENT:

RIGHT ATRIUM:                   NORMAL

LEFT ATRIUM:       NORMAL



RIGHT VENTRICLE:            NORMAL

LEFT VENTRICLE: NORMAL



TRICUSPID VALVE:             NORMAL

MITRAL VALVE:     MITRAL ANNULAR CALCIFICATION



PULMONIC VALVE:             NORMAL

AORTIC VALVE:     SCLEROSIS



PERICARDIAL EFFUSION: NONE

AORTIC ROOT:      NORMAL





LEFT VENTRICULAR WALL MOTION:     PARADOXICAL SEPTUM.



DOPPLER/COLOR FLOW:     MILD TRICUSPID REGURGITATION.



COMMENTS:      PARADOXICAL SEPTUM. EJECTION FRACTION 46%. MITRAL ANNULAR CALCIFICATION. NO 
VEGETATION. NO THROMBUS. AORTIC SCLEROSIS



TECHNOLOGIST:   DAVID IQBAL

## 2020-06-24 NOTE — RAD REPORT
EXAM DESCRIPTION:   - CP - 6/24/2020 8:44 am

 

CLINICAL HISTORY:  TIA

Headache, drowsiness

 

COMPARISON:  Head C Spine Mpr Wo Con dated 1/27/2020

 

TECHNIQUE:  Real-time sonographic evaluation of both carotid systems was performed. Doppler interroga
tion was performed with waveform tracing bilaterally.

 

FINDINGS:  Normal high resistance waveforms are noted in both external carotid arteries. The common c
arotid arteries and internal carotid arteries show normal low resistance waveforms.

 

Mild hard plaque is seen in both carotid bulbs. Peak systolic and end diastolic velocity values and t
he ICA/CCA ratios are in the non-hemodynamically significant range.

 

Antegrade flow seen in both vertebral arteries.

 

IMPRESSION:  Mild hard plaque is seen in both carotid bulbs.

 

No evidence of a hemodynamically significant stenosis.

## 2020-06-24 NOTE — PN
Patient is still somewhat disorientated and confused.  However, according to the nursing staff it is 
not as bad as this morning.  Her vital signs are stable.  She has good movement of her arms and legs.
  She is seen by Neurology.  Her vital signs are basically stable probability of this being a TIA as 
the most likely etiology, although, in fact, patient's general condition according to the family has 
deteriorated somewhat over the past few weeks, is undergoing physical therapy.  Depending on what hap
pens in the next 24 to 48 hours placement can be discussed with the family.





HR/MODL

DD:  06/24/2020 15:52:37Voice ID:  [id number]

DT:  06/24/2020 21:35:17Report ID:  [id number]

## 2020-06-24 NOTE — EDPHYS
Physician Documentation                                                                           

 [facility]                                                                 

Name: Jerica Estrada                                                                           

Age: 80 yrs                                                                                       

Sex: Female                                                                                       

: 1940                                                                                   

MRN: [id number]                                                                                   

Arrival Date: 2020                                                                          

Time: 01:06                                                                                       

Account#: [id number]                                                                            

Bed 2                                                                                             

Private MD:                                                                                       

ED Physician Kenney Mcdermott                                                                      

HPI:                                                                                              

                                                                                             

01:38 This 80 yrs old  Female presents to ER via EMS with complaints of S/S of       jolie 

      Possible Stroke.                                                                            

01:38 The patient's problem is reported as altered mental status, weakness. Onset: The        jolie 

      symptoms/episode began/occurred just prior to arrival. Duration: lasting unknown.           

      Context: the episode(s) was witnessed, by family, , symptoms became apparent         

      upon waking. The symptoms are alleviated by nothing. The symptoms are aggravated by         

      nothing. Associated signs and symptoms: The patient has no apparent associated signs or     

      symptoms. Severity of symptoms: At their worst the symptoms were mild in the emergency      

      department the symptoms have improved moderately. Patient's baseline: Neuro: alert and      

      fully oriented. The patient has experienced similar episodes in the past, a few times.      

                                                                                                  

Historical:                                                                                       

- Allergies:                                                                                      

01:15 Bactrim;                                                                                ea  

01:15 Ciprofloxacin;                                                                          ea  

01:15 Codeine;                                                                                ea  

01:15 Demerol;                                                                                ea  

01:15 Diovan HCT;                                                                             ea  

01:15 Hydrochlorothiazide;                                                                    ea  

01:15 Iodinated Contrast Media - IV Dye;                                                      ea  

01:15 Tetanus Vaccines \T\ Toxoid;                                                              ea

01:15 valsartan;                                                                              ea  

- Home Meds:                                                                                      

01:23 carbidopa-levodopa  mg Oral tab 1 tab 3 times per day [Active]; amlodipine 10 mg  ea  

      tab 1 tab once daily [Active]; clopidogrel 75 mg Oral tab 1 tab once daily [Active];        

      atorvastatin 40 mg Oral tab 1 tab once daily [Active]; losartan 100 mg Oral tab 1 tab       

      once daily [Active]; folic acid 400 mcg Oral tab 2 tab once daily [Active];                 

01:49 metoprolol tartrate 25 mg Oral tab 2 tabs once daily [Active]; Ocuvite oral oral daily  lp1 

      [Active]; melatonin 5 mg Oral tab nightly [Active]; magnesium oxide 250 mg Oral tab         

      daily [Active]; aspirin 81 mg oral TbEC once daily [Active]; Vitamin B-12 1,000 mcg         

      Oral tab daily [Active]; cranberry 500 mg oral cap 2 tab daily [Active];                    

- PMHx:                                                                                           

01:15 Aneurysm; Hypertension; Parkinsons; hard of hearing; vericose veins;                    ea  

- PSHx:                                                                                           

01:23 "stent for coil aneurysm"(2017); dov knee replacement;                                  ea  

                                                                                                  

- Immunization history:: Adult Immunizations unknown.                                             

- Social history:: Smoking status: Patient denies any tobacco usage or history of.                

- Family history:: not pertinent.                                                                 

                                                                                                  

                                                                                                  

ROS:                                                                                              

01:38 Constitutional: Negative for fever, chills, and weight loss, Eyes: Negative for injury, jolie 

      pain, redness, and discharge, ENT: Negative for injury, pain, and discharge, Neck:          

      Negative for injury, pain, and swelling, Cardiovascular: Negative for chest pain,           

      palpitations, and edema, Respiratory: Negative for shortness of breath, cough,              

      wheezing, and pleuritic chest pain, Abdomen/GI: Negative for abdominal pain, nausea,        

      vomiting, diarrhea, and constipation, Back: Negative for injury and pain, : Negative      

      for injury, bleeding, discharge, and swelling, MS/Extremity: Negative for injury and        

      deformity, Skin: Negative for injury, rash, and discoloration, Psych: Negative for          

      depression, anxiety, suicide ideation, homicidal ideation, and hallucinations,              

      Allergy/Immunology: Negative for hives, rash, and allergies, Endocrine: Negative for        

      neck swelling, polydipsia, polyuria, polyphagia, and marked weight changes,                 

      Hematologic/Lymphatic: Negative for swollen nodes, abnormal bleeding, and unusual           

      bruising.                                                                                   

01:38 Neuro: Positive for weakness.                                                               

                                                                                                  

Exam:                                                                                             

01:38 Radiologist reports: see report                                                         jolie 

01:38 Constitutional:  This is a well developed, well nourished patient who is awake, alert,      

      and in no acute distress. Head/Face:  Normocephalic, atraumatic. Eyes:  Pupils equal        

      round and reactive to light, extra-ocular motions intact.  Lids and lashes normal.          

      Conjunctiva and sclera are non-icteric and not injected.  Cornea within normal limits.      

      Periorbital areas with no swelling, redness, or edema. ENT:  Nares patent. No nasal         

      discharge, no septal abnormalities noted.  Tympanic membranes are normal and external       

      auditory canals are clear.  Oropharynx with no redness, swelling, or masses, exudates,      

      or evidence of obstruction, uvula midline.  Mucous membranes moist. Neck:  Trachea          

      midline, no thyromegaly or masses palpated, and no cervical lymphadenopathy.  Supple,       

      full range of motion without nuchal rigidity, or vertebral point tenderness.  No            

      Meningismus. Chest/axilla:  Normal chest wall appearance and motion.  Nontender with no     

      deformity.  No lesions are appreciated. Cardiovascular:  Regular rate and rhythm with a     

      normal S1 and S2.  No gallops, murmurs, or rubs.  Normal PMI, no JVD.  No pulse             

      deficits. Respiratory:  Lungs have equal breath sounds bilaterally, clear to                

      auscultation and percussion.  No rales, rhonchi or wheezes noted.  No increased work of     

      breathing, no retractions or nasal flaring. Abdomen/GI:  Soft, non-tender, with normal      

      bowel sounds.  No distension or tympany.  No guarding or rebound.  No evidence of           

      tenderness throughout. Back:  No spinal tenderness.  No costovertebral tenderness.          

      Full range of motion. Female :  Normal external genitalia. Skin:  Warm, dry with          

      normal turgor.  Normal color with no rashes, no lesions, and no evidence of cellulitis.     

      MS/ Extremity:  Pulses equal, no cyanosis.  Neurovascular intact.  Full, normal range       

      of motion. Neuro:  Awake and alert, GCS 15, oriented to person, place, time, and            

      situation.  Cranial nerves II-XII grossly intact.  Motor strength 5/5 in all                

      extremities.  Sensory grossly intact.  Cerebellar exam normal.  Normal gait. Psych:         

      Awake, alert, with orientation to person, place and time.  Behavior, mood, and affect       

      are within normal limits.                                                                   

01:58 ECG was reviewed by the Attending Physician.                                            [facility] 

                                                                                                  

Vital Signs:                                                                                      

01:17 Weight 66.22 kg; Height 5 ft. 5 in. (165.10 cm);                                        ea  

01:23  / 90; Pulse 82; Resp 16; Temp 98.5(TE); Pulse Ox 100% ; Pain 0/10;               jb4 

02:15  / 95; Pulse 84; Resp 16; Pulse Ox 99% on R/A;                                    jb4 

03:15  / 98; Pulse 85; Resp 19; Pulse Ox 100% on R/A;                                   jb4 

01:17 Body Mass Index 24.30 (66.22 kg, 165.10 cm)                                               

                                                                                                  

NIH Stroke Scale Scores:                                                                          

01:05 NIHSS Score: 0                                                                          jb4 

01:38 NIHSS Score: 0                                                                          jolie 

                                                                                                  

MDM:                                                                                              

01:12 Patient medically screened.                                                             jolie 

01:52 Differential diagnosis: CVA, TIA, Dementia, metabolic disorder. Data reviewed: vital    jolie 

      signs, nurses notes, EMS record, lab test result(s), EKG, radiologic studies, CT scan,      

      plain films. Data interpreted: Cardiac monitor: rate is 75 beats/min, Pulse oximetry:       

      on room air is 98 %. Test interpretation: by ED physician or midlevel provider: ECG,        

      plain radiologic studies. Counseling: I had a detailed discussion with the patient          

      and/or guardian regarding: the historical points, exam findings, and any diagnostic         

      results supporting the discharge/admit diagnosis, the presence of at least one elevated     

      blood pressure reading (>120/80) during this emergency department visit, lab results,       

      radiology results, the need for further work-up and treatment in the hospital. ED           

      course: pt at baseline, not a tpa candidate , dr anand to observe.                         

                                                                                                  

                                                                                             

01:14 Order name: Basic Metabolic Panel                                                       [facility] 

                                                                                             

01:14 Order name: CBC with Diff                                                               [facility] 

                                                                                             

01:14 Order name: LFT's                                                                       [facility] 

                                                                                             

01:14 Order name: Magnesium                                                                   [facility] 

                                                                                             

01:14 Order name: NT PRO-BNP                                                                  [facility] 

                                                                                             

01:14 Order name: PT-INR                                                                      [facility] 

                                                                                             

01:14 Order name: Troponin (emerg Dept Use Only)                                              [facility] 

                                                                                             

01:28 Order name: CBC with Automated Diff; Complete Time: 01:36                               [facility]

                                                                                             

01:28 Order name: Protime (+INR); Complete Time: 01:36                                        [facility]

                                                                                             

01:47 Order name: Basic Metabolic Panel; Complete Time: 01:50                                 [facility]

                                                                                             

01:47 Order name: Liver (Hepatic) Function; Complete Time: 01:50                              [facility]

                                                                                             

01:47 Order name: Troponin (Emerg Dept Use Only); Complete Time: 01:50                        [facility]

                                                                                             

01:47 Order name: NT PRO-BNP; Complete Time: 01:50                                            [facility]

                                                                                             

01:47 Order name: Magnesium; Complete Time: 01:50                                             [facility]

                                                                                             

01:14 Order name: XRAY Chest (1 view)                                                         [facility] 

                                                                                             

01:14 Order name: EKG; Complete Time: 11:34                                                   [facility] 

                                                                                             

01:14 Order name: Cardiac monitoring; Complete Time: 01:22                                    [facility] 

                                                                                             

01:14 Order name: EKG - Nurse/Tech; Complete Time: :                                      [facility] 

                                                                                             

01:14 Order name: IV Saline Lock; Complete Time:                                         [facility] 

                                                                                             

01:14 Order name: Labs collected and sent; Complete Time: :                               [facility] 

                                                                                             

01:14 Order name: CT Stroke Brain w/o Contrast                                                [facility] 

                                                                                             

01:44 Order name: CT; Complete Time: 01:50                                                    [facility]

                                                                                             

01:52 Order name: Urine Culture                                                               [facility] 

                                                                                             

02:08 Order name: Chest Single View                                                           [facility]

                                                                                             

03:16 Order name: Urine Dipstick--Ancillary (enter results)                                   [facility] 

                                                                                             

01:14 Order name: O2 Per Protocol; Complete Time:                                        [facility] 

                                                                                             

01:14 Order name: O2 Sat Monitoring; Complete Time:                                      [facility] 

                                                                                             

01:52 Order name: Urine Dipstick-Ancillary (obtain specimen); Complete Time: 03:16            [facility] 

                                                                                             

01:54 Order name: PO challenge: juice; Complete Time: 02:                                   [facility] 

                                                                                                  

EC:58 Rate is 80 beats/min. Rhythm is regular. QRS Axis is Normal. KY interval is normal. QRS jolie 

      interval is normal. QT interval is normal. No Q waves. T waves are Normal. No ST            

      changes noted. Clinical impression: NSR w/ Non-specific ST/T Changes and No evidence of     

      ischemia. Interpreted by me. Reviewed by me.                                                

                                                                                                  

Administered Medications:                                                                         

02:00 Drug: NS 0.9% 500 ml Route: IV; Rate: bolus; Site: right antecubital;                   mg2 

02:30 Follow up: Response: No adverse reaction; IV Status: Completed infusion; IV Intake:     jb4 

      500ml                                                                                       

02:00 Drug: foLIC Acid 1 mg Route: IVPB; Site: right antecubital;                             mg2 

02:30 Follow up: Response: No adverse reaction; IV Status: Completed infusion                 4 

02:44 Drug: NS 0.9% 1000 ml Route: IV; Rate: 125 ml/hr; Site: right antecubital;              mg2 

04:15 Follow up: Response: No adverse reaction; IV Status: Infusion continued upon admission  4 

02:44 Drug: Aspirin 81 mg Route: PO;                                                          mg2 

03:29 Follow up: Response: No adverse reaction                                                4 

02:44 Drug: Norvasc 5 mg Route: PO;                                                           mg2 

03:29 Follow up: Response: No adverse reaction; Blood pressure is unchanged                   jb4 

                                                                                                  

                                                                                                  

Point of Care Testing:                                                                            

      Blood Glucose:                                                                              

01:20 Blood Glucose: 98 mg/dL;                                                                jb4 

      Ranges:                                                                                     

      Critical Glucose Levels:Adult <50 mg/dl or >400 mg/dl  <40 mg/dl or >180 mg/dl       

Disposition:                                                                                      

20 01:43 Hospitalization ordered by Marcelo Anand for Observation. Preliminary             

  diagnosis are Weakness, Transient cerebral ischemic attack, unspecified,                        

  Essential (primary) hypertension, Hypokalemia.                                                  

- Bed requested for Telemetry/MedSurg (observation).                                              

- Status is Observation.                                                                      jb4 

- Condition is Fair.                                                                              

- Problem is new.                                                                                 

- Symptoms have improved.                                                                         

                                                                                                  

                                                                                                  

                                                                                                  

                                                                                                  

NIH Stroke Scale - NIH Stroke Score                                                               

Date: 2020                                                                                  

Time: 01:05                                                                                       

Total Score = 0                                                                                   

  1a. Level of Consciousness (LOC) - 0(Alert)                                                     

  1b. Level of Consciousness (LOC) (Year \T\ Age) - 0(Both)                                       

  1c. LOC Commands (Open \T\ Closes Eyes/) - 0(Both)                                          

   2. Best Gaze (Lateral Gaze Paresis) - 0(Normal)                                                

   3. Visual Field Loss - 0(No visual loss)                                                       

   4. Facial Palsy - 0(Normal)                                                                    

  5a. Left Arm: Motor (10-second hold) - 0(No drift)                                              

  5b. Right Arm: Motor (10-second hold) - 0(No drift)                                             

  6a. Left Leg: Motor (5-second hold - always test supine) - 0(No drift)                          

  6b. Right Leg: Motor (5-second hold - always test supine) - 0(No drift)                         

   7. Limb Ataxia (finger/nose \T\ heel/shin - test with eyes open) - 0(Absent)                   

   8. Sensory Loss (pinprick arms/legs/face) - 0(Normal)                                          

   9. Best Language: Aphasia (description/naming/reading) - 0(No aphasia)                         

  10. Dysarthria (speech clarity - read or repeat words) - 0(Normal)                              

  11. Extinction and Inattention (visual/tactile/auditory/spatial/personal) - 0(No                

      abnormality)                                                                                

Initials: jb4                                                                                     

                                                                                                  

                                                                                                  

NIH Stroke Scale - NIH Stroke Score                                                               

Date: 2020                                                                                  

Time: 01:38                                                                                       

Total Score = 0                                                                                   

  1a. Level of Consciousness (LOC) - 0(Alert)                                                     

  1b. Level of Consciousness (LOC) (Year \T\ Age) - 0(Both)                                       

  1c. LOC Commands (Open \T\ Closes Eyes/) - 0(Both)                                          

   2. Best Gaze (Lateral Gaze Paresis) - 0(Normal)                                                

   3. Visual Field Loss - 0(No visual loss)                                                       

   4. Facial Palsy - 0(Normal)                                                                    

  5a. Left Arm: Motor (10-second hold) - 0(No drift)                                              

  5b. Right Arm: Motor (10-second hold) - 0(No drift)                                             

  6a. Left Leg: Motor (5-second hold - always test supine) - 0(No drift)                          

  6b. Right Leg: Motor (5-second hold - always test supine) - 0(No drift)                         

   7. Limb Ataxia (finger/nose \T\ heel/shin - test with eyes open) - 0(Absent)                   

   8. Sensory Loss (pinprick arms/legs/face) - 0(Normal)                                          

   9. Best Language: Aphasia (description/naming/reading) - 0(No aphasia)                         

  10. Dysarthria (speech clarity - read or repeat words) - 0(Normal)                              

  11. Extinction and Inattention (visual/tactile/auditory/spatial/personal) - 0(No                

      abnormality)                                                                                

Initials: jolie                                                                                     

                                                                                                  

Signatures:                                                                                       

Dispatcher [employer]                           EDKenney Villatoro MD MD cha Pena, Laura RN                         RN   lp1                                                  

Cynthia Ramirez RN                      RN   tl1                                                  

Romeo Mcarthur RN                       RN   jb4                                                  

Stephanie Oconnell RN RN ea Gardose, Michele, RN                    RN   mg2                                                  

                                                                                                  

Corrections: (The following items were deleted from the chart)                                    

 01: Home Meds: metoprolol tartrate 25 mg Oral tab 1 tab 2 times per day; eve   lp1         

 01:23 Home Meds: Calcium 600 Oral; eve                                           lp1         

 01:23 Home Meds: Ocuvite oral oral; eve                                          lp1         

 01:23 Home Meds: melatonin 5 mg Oral tab; eve                                    lp1         

 01:23 Home Meds: Iron CR Oral; eve                                               lp1         

 01:23 Home Meds: magnesium oxide 250 mg Oral tab; eve                            1         

: 01:43 Hospitalization Ordered by Marcelo Anand for Observation. Preliminary     jolie         

      diagnosis is Weakness; Transient cerebral ischemic attack, unspecified;                     

      Essential (primary) hypertension. Bed requested for Telemetry/MedSurg                       

      (observation). Status is Observation. Condition is Fair. Problem is new.                    

      Symptoms have improved. jolie                                                                 

03:08 01:53 2020 01:43 Hospitalization Ordered by Marcelo Anand for             tl1         

      Observation. Preliminary diagnosis is Weakness; Transient cerebral ischemic                 

      attack, unspecified; Essential (primary) hypertension; Hypokalemia. Bed                     

      requested for Telemetry/MedSurg (observation). Status is Observation. Condition             

      is Fair. Problem is new. Symptoms have improved. jolie                                        

04:32 03:08 2020 01:43 Hospitalization Ordered by Marcelo Anand for             jb4         

      Observation. Preliminary diagnosis is Weakness; Transient cerebral ischemic                 

      attack, unspecified; Essential (primary) hypertension; Hypokalemia. Bed                     

      requested for Telemetry/MedSurg (observation). Status is Observation. Condition             

      is Fair. Problem is new. Symptoms have improved. tl1                                        

                                                                                                  

**************************************************************************************************

## 2020-06-24 NOTE — XMS REPORT
Clinical Summary

                            Created on:2020



Patient:Jerica Estrada

Sex:Female

:1940

External Reference #:[id number]





Demographics







                          Address                   [address] [place] [address]

 

                          Mobile Phone              [phone]

 

                          Home Phone                [phone]

 

                          Email Address             [email or web address]

 

                          Preferred Language        English

 

                          Marital Status            

 

                          Latter day Affiliation     Unknown

 

                          Race                      White

 

                          Ethnic Group              Not  or 









Author







                          Organization              [employer]

 

                          Address                   [address] [place] [address]









Support







                Name            Relationship    Address         Phone

 

                Wan Estrada Spouse          [address]  [phone]



                                                [place] [address] 









Care Team Providers







                    Name                Role                Phone

 

                    Samuel Todd MD  Primary Care Provider [phone]









Allergies







             Active Allergy Reactions    Severity     Noted Date   Comments

 

             Sulfamethoxazole-Trimethoprim                           2018 

  

 

             Ciprofloxacin                           2018   

 

             Valsartan                              2018   

 

             Iodine                                 2018   

 

             No Known Drug Allergies                           2016   

 

             Prednisone                             2018   

 

             Tetanus Vaccines And Toxoid                           2018   







Medications







          Medication Sig       Dispensed Refills   Start Date End Date  Status

 

          doxycycline (VIBRAMYCIN) Take 100 mg by           0         2016

           Active



          100 MG capsule mouth 2 (two)                                         



                    times a day.                                         

 

          losartan (COZAAR) 50 MG                     0         10/31/2016      

     Active



          tablet                                                      

 

          nitrofurantoin,                     0         10/26/2016           Act

cody



          macrocrystal-monohydrate                                              

     



          , (MACROBID) 100 MG                                                   



          capsule                                                     

 

          amLODIPine (NORVASC) 10 TAKE ONE (1)           1         03/15/2018   

        Active



          mg tablet TABLET(S) BY                                         



                    MOUTH ONCE A                                         



                    DAY.                                              

 

          atorvastatin (LIPITOR) TAKE ONE (1)           0         2017    

       Active



          80 MG tablet TABLET(S) BY                                         



                    MOUTH ONCE A                                         



                    DAY.                                              

 

          clopidogrel (PLAVIX) 75 TAKE ONE (1)           1         2018   

        Active



          mg tablet TABLET(S) BY                                         



                    MOUTH ONCE A                                         



                    DAY.                                              

 

          metoprolol tartrate TAKE ONE (1)           1         2018       

    Active



          (LOPRESSOR) 25 mg tablet TABLET(S) BY                                 

        



                    MOUTH TWICE A                                         



                    DAY.                                              

 

          aspirin 325 MG tablet Take 325 mg by           0                      

       Active



                    mouth daily.                                         

 

          carbidopa-levodopa Take 1 tablet by           0                       

      Active



           mg per mouth 3 (three)                                         



          disintegrating tablet times a day.                                    

     







Active Problems







                          Problem                   Noted Date

 

                          Parkinson disease         2019

 

                          Low back pain radiating to right leg 2019

 

                          Varicose veins of leg with edema, bilateral 2019







Encounters







             Date         Type         Specialty    Care Team    Description

 

             2020   Transcribe Orders Physical Therapy Soraya Villasenor [place] inson's disease



                                                    MD           ([facility]) (Primary 

Dx)



after 2019



Social History







             Tobacco Use  Types        Packs/Day    Years Used   Date

 

             Never Smoker                                        









                Smokeless Tobacco: Never Used                                 









                Alcohol Use     Drinks/Week     oz/Week         Comments

 

                Never                                           









                    Alcohol Habits      Answer              Date Recorded

 

                    How often do you have a drink containing alcohol? Never     

          2019

 

                    How many drinks containing alcohol do you have on a typical 

Not asked           



                    day when you are drinking?                     

 

                    How often do you have six or more drinks on one occasion? No

t asked           









                          Sex Assigned at Birth     Date Recorded

 

                          Not on file               









                    Job Start Date      Occupation          Industry

 

                    Not on file         Not on file         Not on file









                    Travel History      Travel Start        Travel End









                                        No recent travel history available.







Last Filed Vital Signs

Not on file



Plan of Treatment







                Health Maintenance Due Date        Last Done       Comments

 

                SHINGLES VACCINES (#1) 1992                      

 

                65+ PNEUMOCOCCAL VACCINE (1 of 2 - PCV13) 2007            

          

 

                INFLUENZA VACCINE 2020                      







Results

Not on fileafter 2019



Insurance







          Payer     Benefit Plan / Subscriber ID Effective Dates Phone     Addre

ss   Type



                    Group                                             

 

          MEDICARE  MEDICARE PART A xxxxxxxxxx 3/1/2007-Present           [employer]



                    AND B                                             

 

          AARP      AARP SUPPLEMENT xxxxxxxxxxx 3/1/2007-Present                

     Commercial









           Guarantor Name Account Type Relation to Date of    Phone      Billing



                                 Patient    Birth                 Address

 

           Jerica Estrada Personal/Family Self       1940 [phone] [address]







                                                       (Home)     [place] [address]







Advance Directives

For more information, please contact: [phone]





                Type            Date Recorded   Patient Representative Explanati

on

 

                Advance Directives, Living Will and                             

    i



                Medical Power of

## 2020-06-25 LAB
BUN BLD-MCNC: 6 MG/DL (ref 7–18)
GLUCOSE SERPLBLD-MCNC: 103 MG/DL (ref 74–106)
POTASSIUM SERPL-SCNC: 3.3 MMOL/L (ref 3.5–5.1)

## 2020-06-25 RX ADMIN — I-VITE, TAB 1000-60-2MG (60/BT) SCH TAB: TAB at 09:21

## 2020-06-25 RX ADMIN — Medication SCH CAP: at 09:22

## 2020-06-25 RX ADMIN — METOPROLOL TARTRATE SCH MG: 25 TABLET ORAL at 20:15

## 2020-06-25 RX ADMIN — Medication SCH MG: at 09:22

## 2020-06-25 RX ADMIN — AMLODIPINE BESYLATE SCH MG: 10 TABLET ORAL at 09:22

## 2020-06-25 RX ADMIN — ENOXAPARIN SODIUM SCH MG: 40 INJECTION SUBCUTANEOUS at 09:22

## 2020-06-25 RX ADMIN — FOLIC ACID SCH MG: 1 TABLET ORAL at 09:21

## 2020-06-25 RX ADMIN — SODIUM CHLORIDE SCH MLS: 0.9 INJECTION, SOLUTION INTRAVENOUS at 05:05

## 2020-06-25 RX ADMIN — METOPROLOL TARTRATE SCH MG: 25 TABLET ORAL at 09:22

## 2020-06-25 RX ADMIN — CARBIDOPA AND LEVODOPA SCH TAB: 25; 100 TABLET ORAL at 13:16

## 2020-06-25 RX ADMIN — CARBIDOPA AND LEVODOPA SCH TAB: 25; 100 TABLET ORAL at 20:12

## 2020-06-25 RX ADMIN — Medication SCH MG: at 20:12

## 2020-06-25 RX ADMIN — ATORVASTATIN CALCIUM SCH MG: 20 TABLET, FILM COATED ORAL at 20:12

## 2020-06-25 RX ADMIN — CARBIDOPA AND LEVODOPA SCH TAB: 25; 100 TABLET ORAL at 09:22

## 2020-06-25 RX ADMIN — CALCIUM CARBONATE-VITAMIN D TAB 500 MG-200 UNIT SCH TAB: 500-200 TAB at 09:21

## 2020-06-25 RX ADMIN — SODIUM CHLORIDE SCH MLS: 0.9 INJECTION, SOLUTION INTRAVENOUS at 17:43

## 2020-06-25 RX ADMIN — Medication SCH: at 09:00

## 2020-06-25 RX ADMIN — CLOPIDOGREL BISULFATE SCH MG: 75 TABLET, FILM COATED ORAL at 09:22

## 2020-06-25 NOTE — CON
Reason For Consultation:  Consultation called because of confusion that is intermittent.



History Of Present Illness:  Ms. Estrada is a 80-year-old right-handed  patient with a his
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 
weeks of intermittent confusion per the patient's son.  [facility] after waking up in the morning on the 24th and was difficult to maintain arousa
l.  The patient's  also noted confusion and wanted her evaluated.  The evaluation by head CT s
can showed no acute ischemic or hemorrhagic findings.  This study was remarkable for moderate low-den
sity periventricular deep and subcortical white matter small vessel ischemic disease.  No acute findi
ngs identified.  Her complete blood count with differential was normal.  Coagulation panel normal.  C
hemistries remarkable for mildly low potassium and otherwise unremarkable liver function studies.  Th
yroid function normal.  Cholesterol mildly elevated at 202, HDL cholesterol was very good at 94.  Uri
nalysis essentially unremarkable.  No urinary tract infection on repeat.  She did have initially trac
e esterase, but repeat UA was negative.



Past Medical History:  Positive for Parkinson disease, central nervous system aneurysm, stroke with g
ood recovery, hypertension.



Surgical History:  Cerebral aneurysm coiling surgery.



Family History:  Noncontributory.



Allergies:  CIPROFLOXACIN, CLOPIDOGREL, HYDROCHLOROTHIAZIDE, VALSARTAN, IODINE, PREDNISONE, TETANUS V
ACCINE, ZITHROMAX, VALSARTAN.



Medications:  Aspirin 81 mg daily, Lipitor 40 mg at bedtime, calcium plus D twice daily, Folbic 1 tab
let daily, Restasis 1 drop each eye daily, Premarin 0.625 mg daily, losartan 100 mg daily, metoprolol
 100 mg twice daily.



Review of Systems:

Not very reliable.  The patient herself denies any recent fevers or chills, nausea, vomiting, myalgia
s, arthralgias.



Social History:  No alcohol, tobacco, or IV drug use.



Physical Examination:

Vital Signs:  Blood pressure 132/73, pulse 82, respiratory rate of 15, temperature 97.8, oxygen satur
ation 96%.  It should be noted earlier today blood pressure is 192/87.  Weight 150 pounds, height 5 f
eet 5 inches, BMI 25. 

General:  Ms. Estrada is resting in bed.  She is in no acute distress. 

HEENT:  She is normocephalic, atraumatic.  Sclerae anicteric.  Oropharynx is moist and pink. 

Neck:  Supple. 

Chest:  Clear. 

Heart:  Regular. 

Extremities:  Show no edema or cyanosis. 

Neurological:  She is alert and oriented to person, but not to place, not to the year, the month or d
ay of the week.  She does follow simple commands without difficulty.  She is not following complex co
mmands.  Cranial nerve examination revealed no focal deficits on 2 through 12.  Motor examination, chad [place] is symmetric in upper and lower extremities with normal tone and at least 4/5 strength.  Her coordi
nation is slow, but intact in upper and lower extremities.  Sensation intact in upper and lower extre
mities except for stocking-glove loss.  Reflexes are depressed and symmetric.  She was ambulated with
 physical therapy and actually did very well.  Walks with a gait belt with minimum to contact guard a
ssistance.



Assessment:  Ms. Estrada is a 80-year-old patient with intermittent confusion.  She had a central n
ervous system aneurysm clipping and has had stroke in the past and also a diagnosis of Parkinson dise
ase, although she does not have obvious Parkinson's with tremors or bradykinesia at this point.  She 
will likely benefit, however from physical therapy to improve her balance, gait, coordination and to 
improve cognitive functioning.



Plan:  

1.She may be admitted to the inpatient rehabilitation unit for physical, occupational, and speech th
erapy.

2.May consider adjusting medications use for Parkinson disease.  It is not clear if this is appropri
ate diagnosis, but we will continue those medications for now. 

3.May consider EEG given the possibility that seizures may be part of her etiology for her fluctuati
ng, worsening cognitive functioning.  However, she likely has a chronic vascular dementia, especially
 with findings on the brain imaging.

4.Continue Plavix 75 mg daily.  Continue the Norvasc, Lipitor, and folic acid and with management of
 hypertension using the Lopressor and Apresoline.





LB/MODL

DD:  06/25/2020 17:12:58Voice ID:  [id number]

DT:  06/25/2020 21:19:38Report ID:  [id number]

## 2020-06-26 RX ADMIN — FOLIC ACID SCH MG: 1 TABLET ORAL at 09:31

## 2020-06-26 RX ADMIN — CARBIDOPA AND LEVODOPA SCH TAB: 25; 100 TABLET ORAL at 12:38

## 2020-06-26 RX ADMIN — Medication SCH CAP: at 09:30

## 2020-06-26 RX ADMIN — HYDRALAZINE HYDROCHLORIDE PRN MG: 20 INJECTION INTRAMUSCULAR; INTRAVENOUS at 04:52

## 2020-06-26 RX ADMIN — CLOPIDOGREL BISULFATE SCH MG: 75 TABLET, FILM COATED ORAL at 09:32

## 2020-06-26 RX ADMIN — Medication SCH MG: at 20:25

## 2020-06-26 RX ADMIN — SODIUM CHLORIDE SCH MLS: 0.9 INJECTION, SOLUTION INTRAVENOUS at 17:45

## 2020-06-26 RX ADMIN — SODIUM CHLORIDE SCH MLS: 0.9 INJECTION, SOLUTION INTRAVENOUS at 04:52

## 2020-06-26 RX ADMIN — ENOXAPARIN SODIUM SCH MG: 40 INJECTION SUBCUTANEOUS at 09:30

## 2020-06-26 RX ADMIN — Medication SCH MG: at 09:31

## 2020-06-26 RX ADMIN — CARBIDOPA AND LEVODOPA SCH TAB: 25; 100 TABLET ORAL at 09:32

## 2020-06-26 RX ADMIN — METOPROLOL TARTRATE SCH MG: 25 TABLET ORAL at 09:31

## 2020-06-26 RX ADMIN — SODIUM CHLORIDE SCH: 0.9 INJECTION, SOLUTION INTRAVENOUS at 22:16

## 2020-06-26 RX ADMIN — Medication SCH: at 09:00

## 2020-06-26 RX ADMIN — I-VITE, TAB 1000-60-2MG (60/BT) SCH TAB: TAB at 09:31

## 2020-06-26 RX ADMIN — CARBIDOPA AND LEVODOPA SCH TAB: 25; 100 TABLET ORAL at 20:25

## 2020-06-26 RX ADMIN — CALCIUM CARBONATE-VITAMIN D TAB 500 MG-200 UNIT SCH TAB: 500-200 TAB at 09:00

## 2020-06-26 RX ADMIN — AMLODIPINE BESYLATE SCH MG: 10 TABLET ORAL at 09:31

## 2020-06-26 RX ADMIN — METOPROLOL TARTRATE SCH MG: 25 TABLET ORAL at 20:26

## 2020-06-26 RX ADMIN — ATORVASTATIN CALCIUM SCH MG: 20 TABLET, FILM COATED ORAL at 20:25

## 2020-06-26 NOTE — PN
Date of Progress Note:  06/26/2020



Patient continues to improve mentally and physically, although her mental status is still not quite b
ack to normal.  She is slightly more orientated, but still quite confused.  Discussion with the famil
y in regard to placement and decided to go to [facility] and she has been accepted awaiting the 
COVID results.  In general, I think most of her symptomatology is related to progressive dementia, pe
rhaps aggravated by her parkinsonism with no focal evidence of TIA and/or CVA.  We will continue on t
he same medication to control her hypertension.





HR/MODL

DD:  06/26/2020 14:03:25Voice ID:  [id number]

DT:  06/26/2020 19:19:32Report ID:  [id number]

## 2020-06-26 NOTE — PN
Date of Progress Note:  06/25/2020



Patient basically is status quo.  She has tolerated physically the therapy.  However, her mental stat
us has not improved significantly, still disorientated, possibility of her going to a rehab will be c
onsidered.  However, if this does not come to fruition, I think the best placement would be a SNF.  TESS [place] will discuss further with the family and Neurology.





HR/MODL

DD:  06/26/2020 13:23:46Voice ID:  [id number]

DT:  06/26/2020 19:10:35Report ID:  [id number]

## 2020-06-27 RX ADMIN — CARBIDOPA AND LEVODOPA SCH TAB: 25; 100 TABLET ORAL at 09:35

## 2020-06-27 RX ADMIN — I-VITE, TAB 1000-60-2MG (60/BT) SCH TAB: TAB at 09:34

## 2020-06-27 RX ADMIN — SODIUM CHLORIDE SCH MLS: 0.9 INJECTION, SOLUTION INTRAVENOUS at 05:22

## 2020-06-27 RX ADMIN — CLOPIDOGREL BISULFATE SCH MG: 75 TABLET, FILM COATED ORAL at 09:34

## 2020-06-27 RX ADMIN — HYDRALAZINE HYDROCHLORIDE PRN MG: 20 INJECTION INTRAMUSCULAR; INTRAVENOUS at 05:21

## 2020-06-27 RX ADMIN — AMLODIPINE BESYLATE SCH MG: 10 TABLET ORAL at 09:35

## 2020-06-27 RX ADMIN — CALCIUM CARBONATE-VITAMIN D TAB 500 MG-200 UNIT SCH TAB: 500-200 TAB at 09:34

## 2020-06-27 RX ADMIN — ATORVASTATIN CALCIUM SCH MG: 20 TABLET, FILM COATED ORAL at 20:41

## 2020-06-27 RX ADMIN — Medication SCH CAP: at 09:35

## 2020-06-27 RX ADMIN — CARBIDOPA AND LEVODOPA SCH TAB: 25; 100 TABLET ORAL at 13:26

## 2020-06-27 RX ADMIN — SODIUM CHLORIDE SCH: 0.9 INJECTION, SOLUTION INTRAVENOUS at 12:15

## 2020-06-27 RX ADMIN — Medication SCH: at 09:00

## 2020-06-27 RX ADMIN — METOPROLOL TARTRATE SCH MG: 25 TABLET ORAL at 20:41

## 2020-06-27 RX ADMIN — Medication SCH MG: at 20:40

## 2020-06-27 RX ADMIN — Medication SCH MG: at 09:36

## 2020-06-27 RX ADMIN — FOLIC ACID SCH MG: 1 TABLET ORAL at 09:35

## 2020-06-27 RX ADMIN — METOPROLOL TARTRATE SCH MG: 25 TABLET ORAL at 09:35

## 2020-06-27 RX ADMIN — CARBIDOPA AND LEVODOPA SCH TAB: 25; 100 TABLET ORAL at 20:40

## 2020-06-27 RX ADMIN — ENOXAPARIN SODIUM SCH MG: 40 INJECTION SUBCUTANEOUS at 09:34

## 2020-06-27 NOTE — P.PN
Subjective


Date of Service: 06/27/20


Chief Complaint: Confusion


Subjective: Improving (No acute events Overnite.  Patient is alert and oriented 

at least x2.  She is comfortable and her mental status is at baseline.  She has 

been accepted to [facility] and she is pending her coronavirus

test results prior to discharge)





Physical Examination





- Vital Signs


Temperature: 98.8 F


Blood Pressure: 107/70


Pulse: 97


Respirations: 20


Pulse Ox (%): 94





- Physical Exam


General: Alert, In no apparent distress, Oriented x2


HEENT: Atraumatic, Normocephalic, EOMI


Neck: Supple


Respiratory: Clear to auscultation bilaterally, Normal air movement


Cardiovascular: No edema, Normal pulses, Regular rate/rhythm, Normal S1 S2


Gastrointestinal: Normal bowel sounds, Soft and benign, Non-distended, No 

tenderness


Musculoskeletal: No clubbing, No swelling, No contractures, No erythema, No 

tenderness, No warmth


Neurological: Normal speech, Normal tone, Normal affect, Dementia





Assessment & Plan





- Problems (Diagnosis)


(1) Acute metabolic encephalopathy


Current Visit: Yes   Status: Acute   





(2) Hypertensive emergency


Current Visit: Yes   Status: Acute   





(3) Parkinsons disease


Current Visit: Yes   Status: Acute   


Physician Review Additional Text: 





Patient is a 80-year-old  male with a known past medical history of 

Parkinson's disease who presented to the hospital with confusion.  She was found

to have a SBP of more than 212. Her symptoms improved with normalization of 

blood pressure.  She is currently at baseline.  She is a patient of Dr. Todd's.





Parkinson's disease


Metabolic encephalopathy


Hypertensive emergency





Plan:


Discharged to [facility] once coronavirus test is available


Continue home antihypertensive agents:  Metoprolol, Norvasc and hydralazine


Continue Plavix

## 2020-06-28 RX ADMIN — HYDRALAZINE HYDROCHLORIDE SCH MG: 25 TABLET, FILM COATED ORAL at 14:05

## 2020-06-28 RX ADMIN — I-VITE, TAB 1000-60-2MG (60/BT) SCH TAB: TAB at 09:15

## 2020-06-28 RX ADMIN — CARBIDOPA AND LEVODOPA SCH TAB: 25; 100 TABLET ORAL at 14:05

## 2020-06-28 RX ADMIN — Medication SCH MG: at 09:16

## 2020-06-28 RX ADMIN — SODIUM CHLORIDE SCH: 0.9 INJECTION, SOLUTION INTRAVENOUS at 01:35

## 2020-06-28 RX ADMIN — AMLODIPINE BESYLATE SCH MG: 10 TABLET ORAL at 09:16

## 2020-06-28 RX ADMIN — CLOPIDOGREL BISULFATE SCH MG: 75 TABLET, FILM COATED ORAL at 09:16

## 2020-06-28 RX ADMIN — METOPROLOL TARTRATE SCH MG: 25 TABLET ORAL at 09:16

## 2020-06-28 RX ADMIN — FOLIC ACID SCH MG: 1 TABLET ORAL at 09:16

## 2020-06-28 RX ADMIN — CARBIDOPA AND LEVODOPA SCH TAB: 25; 100 TABLET ORAL at 21:13

## 2020-06-28 RX ADMIN — HYDRALAZINE HYDROCHLORIDE SCH: 25 TABLET, FILM COATED ORAL at 14:00

## 2020-06-28 RX ADMIN — ENOXAPARIN SODIUM SCH MG: 40 INJECTION SUBCUTANEOUS at 09:15

## 2020-06-28 RX ADMIN — CARBIDOPA AND LEVODOPA SCH TAB: 25; 100 TABLET ORAL at 09:15

## 2020-06-28 RX ADMIN — Medication SCH CAP: at 09:15

## 2020-06-28 RX ADMIN — CARBIDOPA AND LEVODOPA SCH: 25; 100 TABLET ORAL at 14:00

## 2020-06-28 RX ADMIN — Medication SCH MG: at 21:13

## 2020-06-28 RX ADMIN — CALCIUM CARBONATE-VITAMIN D TAB 500 MG-200 UNIT SCH TAB: 500-200 TAB at 09:15

## 2020-06-28 RX ADMIN — Medication SCH: at 09:00

## 2020-06-28 RX ADMIN — HYDRALAZINE HYDROCHLORIDE SCH MG: 25 TABLET, FILM COATED ORAL at 21:13

## 2020-06-28 RX ADMIN — ATORVASTATIN CALCIUM SCH MG: 20 TABLET, FILM COATED ORAL at 21:12

## 2020-06-28 NOTE — P.PN
Subjective


Date of Service: 06/28/20


Chief Complaint: Confusion


Subjective: No new changes, Working w/ PT (- seen earlier today , ambulating , 

denies any c/o)





Physical Examination





- Vital Signs


Temperature: 98.7 F


Blood Pressure: 138/71


Pulse: 79


Respirations: 20


Pulse Ox (%): 97





- Physical Exam


General: Alert, In no apparent distress, Oriented x3


HEENT: Atraumatic


Neck: Supple, 2+ carotid pulse no bruit, JVD not distended


Respiratory: Clear to auscultation bilaterally, Normal air movement


Cardiovascular: No edema, Normal pulses, Regular rate/rhythm, Normal S1 S2


Gastrointestinal: Normal bowel sounds, Soft and benign, Non-distended


Musculoskeletal: No clubbing, No swelling


Integumentary: No breakdown


Neurological: Normal speech, Normal strength at 5/5 x4 extr





Assessment And Plan





- Current Problems (Diagnosis)


(1) Accelerated hypertension


Current Visit: Yes   Status: Acute   





(2) Acute metabolic encephalopathy


Current Visit: Yes   Status: Acute   





(3) Altered mental status


Current Visit: Yes   Status: Acute   





(4) Hypertensive emergency


Current Visit: Yes   Status: Acute   





(5) Parkinsons disease


Current Visit: Yes   Status: Acute   


Physician Review: Patient Assessed, Agree with Above Assessment and Plan


Physician Review Additional Text: 





Patient is a 80-year-old  male with a known past medical history of 

Parkinson's disease who presented to the hospital with confusion.  She was found

to have a SBP of more than 212. Her symptoms improved with normalization of 

blood pressure.  She is currently at baseline.  She is a patient of Dr. Rutledges.


- patient initially alert this am but later developed confusion with repeat BP 

now 187/91 





-Impression 


-Recurrent Metabolic Encephalopathy -possible due to HTN urgency 


-Parkinson's disease


-Hypertensive emergency





Plan:


-will dose labetalol 20 mg xq now 


-will do Geodon prn 


-will repeat Head CT if non improving with BP control now 


-Adjust BP meds , titrate hydralazine to 100 mg bid 


-c/w Norvasc and Metoprolol


-Continue Plavix

## 2020-06-29 RX ADMIN — METOPROLOL TARTRATE SCH MG: 25 TABLET ORAL at 17:06

## 2020-06-29 RX ADMIN — HYDRALAZINE HYDROCHLORIDE SCH: 25 TABLET, FILM COATED ORAL at 13:56

## 2020-06-29 RX ADMIN — Medication SCH: at 09:00

## 2020-06-29 RX ADMIN — CARBIDOPA AND LEVODOPA SCH TAB: 25; 100 TABLET ORAL at 20:58

## 2020-06-29 RX ADMIN — ENOXAPARIN SODIUM SCH MG: 40 INJECTION SUBCUTANEOUS at 10:01

## 2020-06-29 RX ADMIN — AMLODIPINE BESYLATE SCH MG: 10 TABLET ORAL at 10:01

## 2020-06-29 RX ADMIN — ATORVASTATIN CALCIUM SCH MG: 20 TABLET, FILM COATED ORAL at 20:58

## 2020-06-29 RX ADMIN — Medication SCH MG: at 10:01

## 2020-06-29 RX ADMIN — Medication SCH MG: at 20:58

## 2020-06-29 RX ADMIN — CARBIDOPA AND LEVODOPA SCH TAB: 25; 100 TABLET ORAL at 13:56

## 2020-06-29 RX ADMIN — FOLIC ACID SCH MG: 1 TABLET ORAL at 10:00

## 2020-06-29 RX ADMIN — I-VITE, TAB 1000-60-2MG (60/BT) SCH TAB: TAB at 10:00

## 2020-06-29 RX ADMIN — HYDRALAZINE HYDROCHLORIDE SCH MG: 25 TABLET, FILM COATED ORAL at 20:58

## 2020-06-29 RX ADMIN — HYDRALAZINE HYDROCHLORIDE SCH MG: 25 TABLET, FILM COATED ORAL at 10:00

## 2020-06-29 RX ADMIN — Medication SCH CAP: at 10:00

## 2020-06-29 RX ADMIN — CLOPIDOGREL BISULFATE SCH MG: 75 TABLET, FILM COATED ORAL at 10:01

## 2020-06-29 RX ADMIN — METOPROLOL TARTRATE SCH MG: 25 TABLET ORAL at 05:44

## 2020-06-29 RX ADMIN — CALCIUM CARBONATE-VITAMIN D TAB 500 MG-200 UNIT SCH TAB: 500-200 TAB at 10:00

## 2020-06-29 RX ADMIN — CARBIDOPA AND LEVODOPA SCH TAB: 25; 100 TABLET ORAL at 10:01

## 2020-06-30 VITALS — OXYGEN SATURATION: 95 %

## 2020-06-30 RX ADMIN — HYDRALAZINE HYDROCHLORIDE SCH MG: 25 TABLET, FILM COATED ORAL at 14:18

## 2020-06-30 RX ADMIN — CARBIDOPA AND LEVODOPA SCH TAB: 25; 100 TABLET ORAL at 08:41

## 2020-06-30 RX ADMIN — ENOXAPARIN SODIUM SCH MG: 40 INJECTION SUBCUTANEOUS at 08:41

## 2020-06-30 RX ADMIN — HYDRALAZINE HYDROCHLORIDE SCH MG: 25 TABLET, FILM COATED ORAL at 08:41

## 2020-06-30 RX ADMIN — ATORVASTATIN CALCIUM SCH MG: 20 TABLET, FILM COATED ORAL at 20:53

## 2020-06-30 RX ADMIN — Medication SCH MG: at 08:41

## 2020-06-30 RX ADMIN — AMLODIPINE BESYLATE SCH MG: 10 TABLET ORAL at 08:42

## 2020-06-30 RX ADMIN — METOPROLOL TARTRATE SCH MG: 25 TABLET ORAL at 05:45

## 2020-06-30 RX ADMIN — CALCIUM CARBONATE-VITAMIN D TAB 500 MG-200 UNIT SCH TAB: 500-200 TAB at 08:42

## 2020-06-30 RX ADMIN — FOLIC ACID SCH MG: 1 TABLET ORAL at 08:43

## 2020-06-30 RX ADMIN — I-VITE, TAB 1000-60-2MG (60/BT) SCH TAB: TAB at 08:42

## 2020-06-30 RX ADMIN — HYDRALAZINE HYDROCHLORIDE SCH: 25 TABLET, FILM COATED ORAL at 20:54

## 2020-06-30 RX ADMIN — Medication SCH: at 08:43

## 2020-06-30 RX ADMIN — CARBIDOPA AND LEVODOPA SCH TAB: 25; 100 TABLET ORAL at 20:53

## 2020-06-30 RX ADMIN — Medication SCH MG: at 20:53

## 2020-06-30 RX ADMIN — CARBIDOPA AND LEVODOPA SCH TAB: 25; 100 TABLET ORAL at 14:18

## 2020-06-30 RX ADMIN — Medication SCH CAP: at 08:42

## 2020-06-30 RX ADMIN — METOPROLOL TARTRATE SCH MG: 25 TABLET ORAL at 18:20

## 2020-06-30 RX ADMIN — CLOPIDOGREL BISULFATE SCH MG: 75 TABLET, FILM COATED ORAL at 08:43

## 2020-06-30 NOTE — PN
Date of Progress Note:  06/30/2020



Patient is basically stable, otherwise couple days awaiting the COVID results in order to complete th
e transfer.  Basic scenario is consistent with progressive dementia, although she had one episode of 
significant muscular neurological contractions, witnessed by the nurse, not sure the etiology here.  
Cardiovascular condition was ruled out as a most likely problem associated with her overall condition
.  The rapid COVID test was ordered today so that further transferring could be made as the nursing h
ome will not accept them until they have a negative.





HR/MODL

DD:  06/30/2020 14:40:02Voice ID:  [id number]

DT:  06/30/2020 20:17:46Report ID:  [id number]

## 2020-06-30 NOTE — EEG
CHART:  [id number]

TEST ID#:  [id number]

DATE OF STUDY:  06/25/2020



THE EEG WAS RECORDED PORTABLE IN THE PATIENT'S ROOM ON A 17 CHANNEL MACHINE.  ELECTRODES 
WERE APPLIED IN THE USUAL MANNER USING THE INTERNATIONAL 10-20 SYSTEM.



THE WAKING BACKGROUND RHYTHM IN THIS RECORD CONSISTS OF FAIRLY WELL DEVELOPED AND FAIRLY 
WELL ORGANIZED WAVES OF 7.5 HZ., IN A WIDE DISTRIBUTION WHICH ATTENUATE POORLY WITH EYE 
OPENING.  MODERATE VOLTAGE 1.5-3 HZ IS EXPRESSED INTERMITTENTLY IN THE FRONTAL REGIONS.



THERE ARE NO FOCAL OR LATERALIZING FEATURES.  NO EPILEPTIFORM ACTIVITY APPEARS.  

SLEEP DID NOT OCCUR.  



HYPERVENTILATION WAS NOT PERFORMED.



PHOTIC STIMULATION PRODUCED NO DRIVING BILATERALLY.



IMPRESSION:  THIS IS A MODERATELY ABNORMAL ROUTINE EEG DUE TO A MODERATELY SLOW 
BACKGROUND.  THIS IS A NON-SPECIFIC FINDING INDICATING THE PRESENCE OF A MODERATE DIFFUSE 
DISTURBANCE IN CEREBRAL FUNCTION.

## 2020-07-01 VITALS — TEMPERATURE: 97 F | SYSTOLIC BLOOD PRESSURE: 125 MMHG | DIASTOLIC BLOOD PRESSURE: 59 MMHG

## 2020-07-01 RX ADMIN — CARBIDOPA AND LEVODOPA SCH TAB: 25; 100 TABLET ORAL at 09:06

## 2020-07-01 RX ADMIN — HYDRALAZINE HYDROCHLORIDE SCH MG: 25 TABLET, FILM COATED ORAL at 13:39

## 2020-07-01 RX ADMIN — METOPROLOL TARTRATE SCH: 25 TABLET ORAL at 05:46

## 2020-07-01 RX ADMIN — HYDRALAZINE HYDROCHLORIDE SCH MG: 25 TABLET, FILM COATED ORAL at 09:07

## 2020-07-01 RX ADMIN — FOLIC ACID SCH MG: 1 TABLET ORAL at 09:07

## 2020-07-01 RX ADMIN — I-VITE, TAB 1000-60-2MG (60/BT) SCH TAB: TAB at 09:06

## 2020-07-01 RX ADMIN — CLOPIDOGREL BISULFATE SCH MG: 75 TABLET, FILM COATED ORAL at 09:06

## 2020-07-01 RX ADMIN — AMLODIPINE BESYLATE SCH MG: 10 TABLET ORAL at 09:07

## 2020-07-01 RX ADMIN — Medication SCH CAP: at 09:06

## 2020-07-01 RX ADMIN — CALCIUM CARBONATE-VITAMIN D TAB 500 MG-200 UNIT SCH TAB: 500-200 TAB at 09:06

## 2020-07-01 RX ADMIN — Medication SCH: at 09:00

## 2020-07-01 RX ADMIN — Medication SCH MG: at 09:06

## 2020-07-01 RX ADMIN — ENOXAPARIN SODIUM SCH MG: 40 INJECTION SUBCUTANEOUS at 09:05

## 2020-07-01 RX ADMIN — CARBIDOPA AND LEVODOPA SCH TAB: 25; 100 TABLET ORAL at 13:40

## 2021-03-31 ENCOUNTER — HOSPITAL ENCOUNTER (INPATIENT)
Dept: HOSPITAL 97 - ER | Age: 81
LOS: 9 days | Discharge: NURSING, REHAB OR LONG-TERM CARE | DRG: 57 | End: 2021-04-09
Attending: FAMILY MEDICINE | Admitting: FAMILY MEDICINE
Payer: COMMERCIAL

## 2021-03-31 VITALS — BODY MASS INDEX: 23.7 KG/M2

## 2021-03-31 DIAGNOSIS — Z20.822: ICD-10-CM

## 2021-03-31 DIAGNOSIS — Z88.1: ICD-10-CM

## 2021-03-31 DIAGNOSIS — Z91.041: ICD-10-CM

## 2021-03-31 DIAGNOSIS — E87.6: ICD-10-CM

## 2021-03-31 DIAGNOSIS — Z88.7: ICD-10-CM

## 2021-03-31 DIAGNOSIS — Z88.8: ICD-10-CM

## 2021-03-31 DIAGNOSIS — G20: Primary | ICD-10-CM

## 2021-03-31 DIAGNOSIS — Z96.653: ICD-10-CM

## 2021-03-31 DIAGNOSIS — Z79.02: ICD-10-CM

## 2021-03-31 DIAGNOSIS — E86.0: ICD-10-CM

## 2021-03-31 DIAGNOSIS — G31.84: ICD-10-CM

## 2021-03-31 DIAGNOSIS — Z88.5: ICD-10-CM

## 2021-03-31 DIAGNOSIS — I10: ICD-10-CM

## 2021-03-31 DIAGNOSIS — Z79.899: ICD-10-CM

## 2021-03-31 LAB
ALBUMIN SERPL BCP-MCNC: 3.6 G/DL (ref 3.4–5)
ALP SERPL-CCNC: 75 U/L (ref 45–117)
ALT SERPL W P-5'-P-CCNC: 8 U/L (ref 12–78)
AST SERPL W P-5'-P-CCNC: 16 U/L (ref 15–37)
BUN BLD-MCNC: 10 MG/DL (ref 7–18)
GLUCOSE SERPLBLD-MCNC: 126 MG/DL (ref 74–106)
HCT VFR BLD CALC: 41.7 % (ref 36–45)
INR BLD: 1.08
LYMPHOCYTES # SPEC AUTO: 1.2 K/UL (ref 0.7–4.9)
MAGNESIUM SERPL-MCNC: 2.2 MG/DL (ref 1.8–2.4)
NT-PROBNP SERPL-MCNC: 1037 PG/ML (ref ?–450)
PMV BLD: 7.5 FL (ref 7.6–11.3)
POTASSIUM SERPL-SCNC: 3.9 MMOL/L (ref 3.5–5.1)
RBC # BLD: 4.4 M/UL (ref 3.86–4.86)
TROPONIN (EMERG DEPT USE ONLY): < 0.02 NG/ML (ref 0–0.04)

## 2021-03-31 PROCEDURE — 97116 GAIT TRAINING THERAPY: CPT

## 2021-03-31 PROCEDURE — 81003 URINALYSIS AUTO W/O SCOPE: CPT

## 2021-03-31 PROCEDURE — 97161 PT EVAL LOW COMPLEX 20 MIN: CPT

## 2021-03-31 PROCEDURE — 97112 NEUROMUSCULAR REEDUCATION: CPT

## 2021-03-31 PROCEDURE — 96360 HYDRATION IV INFUSION INIT: CPT

## 2021-03-31 PROCEDURE — 97110 THERAPEUTIC EXERCISES: CPT

## 2021-03-31 PROCEDURE — 99285 EMERGENCY DEPT VISIT HI MDM: CPT

## 2021-03-31 PROCEDURE — 93005 ELECTROCARDIOGRAM TRACING: CPT

## 2021-03-31 PROCEDURE — 80076 HEPATIC FUNCTION PANEL: CPT

## 2021-03-31 PROCEDURE — 93880 EXTRACRANIAL BILAT STUDY: CPT

## 2021-03-31 PROCEDURE — 71045 X-RAY EXAM CHEST 1 VIEW: CPT

## 2021-03-31 PROCEDURE — 84484 ASSAY OF TROPONIN QUANT: CPT

## 2021-03-31 PROCEDURE — 51702 INSERT TEMP BLADDER CATH: CPT

## 2021-03-31 PROCEDURE — 84443 ASSAY THYROID STIM HORMONE: CPT

## 2021-03-31 PROCEDURE — 92610 EVALUATE SWALLOWING FUNCTION: CPT

## 2021-03-31 PROCEDURE — 97530 THERAPEUTIC ACTIVITIES: CPT

## 2021-03-31 PROCEDURE — 36415 COLL VENOUS BLD VENIPUNCTURE: CPT

## 2021-03-31 PROCEDURE — 83880 ASSAY OF NATRIURETIC PEPTIDE: CPT

## 2021-03-31 PROCEDURE — 96361 HYDRATE IV INFUSION ADD-ON: CPT

## 2021-03-31 PROCEDURE — 70450 CT HEAD/BRAIN W/O DYE: CPT

## 2021-03-31 PROCEDURE — 93306 TTE W/DOPPLER COMPLETE: CPT

## 2021-03-31 PROCEDURE — 80048 BASIC METABOLIC PNL TOTAL CA: CPT

## 2021-03-31 PROCEDURE — 93970 EXTREMITY STUDY: CPT

## 2021-03-31 PROCEDURE — 95816 EEG AWAKE AND DROWSY: CPT

## 2021-03-31 PROCEDURE — 87086 URINE CULTURE/COLONY COUNT: CPT

## 2021-03-31 PROCEDURE — 85610 PROTHROMBIN TIME: CPT

## 2021-03-31 PROCEDURE — 85025 COMPLETE CBC W/AUTO DIFF WBC: CPT

## 2021-03-31 PROCEDURE — 82533 TOTAL CORTISOL: CPT

## 2021-03-31 PROCEDURE — 87088 URINE BACTERIA CULTURE: CPT

## 2021-03-31 PROCEDURE — 83735 ASSAY OF MAGNESIUM: CPT

## 2021-03-31 PROCEDURE — 82947 ASSAY GLUCOSE BLOOD QUANT: CPT

## 2021-03-31 PROCEDURE — 70553 MRI BRAIN STEM W/O & W/DYE: CPT

## 2021-03-31 RX ADMIN — FAMOTIDINE SCH: 10 INJECTION, SOLUTION INTRAVENOUS at 20:23

## 2021-03-31 RX ADMIN — METOPROLOL TARTRATE SCH MG: 25 TABLET ORAL at 21:49

## 2021-03-31 RX ADMIN — Medication SCH ML: at 20:22

## 2021-03-31 NOTE — XMS REPORT
Continuity of Care Document

                            Created on:2021



Patient:SHAHIDA CHEN

Sex:Female

:1940

External Reference #:[id number]





Demographics







                          Address                   [address] [place] [address]

 

                          Home Phone                [phone]

 

                          Mobile Phone              [phone]

 

                          Email Address             [place]

 

                          Preferred Language        English

 

                          Marital Status            Unknown

 

                          Orthodoxy Affiliation     Unknown

 

                          Race                      Unknown

 

                          Additional Race(s)        Unavailable



                                                    White

 

                          Ethnic Group              Unknown









Author







                          Organization              [facility]

t

 

                          Address                   [address]



                                                    [place] [address]

 

                          Phone                     [phone]









Support







                Name            Relationship    Address         Phone

 

                Sean       Spouse          [address] [facility]  [phone]



                                                [place] [address] 

 

                Sean Gómez      Unavailable     [phone]









Care Team Providers







                    Name                Role                Phone

 

                    Peyton CERVANTES          Primary Care Physician [phone]

 

                    KIANNA AMIN  Attending Clinician Unavailable

 

                    LOREE CALLE     Attending Clinician Unavailable

 

                    LEE         Attending Clinician Unavailable

 

                    KIANNA AMIN  Admitting Clinician Unavailable

 

                    KAPIL BAER  Admitting Clinician Unavailable

 

                    LEE         Admitting Clinician Unavailable









Problems







       Condition Condition Condition Status Onset  Resolution Last   Treating Co

mments 

Source



       Name   Details Category        Date   Date   Treatment Clinician        



                                                 Date                 

 

       Aneurysm Aneurysm Disease Active 2017                             [facility]

[facility]



                                   [facility]                               [facility]



                                   [facility]                             [facility]



                                   00                                 [facility]

 

       Hypertensi Hypertensi Disease Active                              [facility]

[facility]



       on     on                                                  [facility]



                                   [facility]                             [facility]



                                   00                                 Center

 

       Other  Other  Disease Active                              [facility]



       specified specified               -27                               [facility]

[facility] -



       transient transient               00:00:                             Medi

debbie



       cerebral cerebral               00                                 Center



       ischemias ischemias                                                  

 

       Hypertensi Hypertensi Disease Active                              [facility]

[facility]



       ve     ve                   5-20                               [facility]



       emergency emergency               00:00:                             Medi

debbie



                                   00                                 Center

 

       Syncope Syncope Disease Active                              [facility]



                                   5-15                               [facility]



                                   [facility]                             [facility]



                                   00                                 Center

 

       Carotid Carotid Disease Active                              [facility]



       aneurysm, aneurysm,               5-15                               [facility]

[facility] -



       right  right                00:00:                             Medical



                                   00                                 Center

 

       Essential Essential Disease Active                              [facility]



       hypertensi hypertensi               5-14                               [facility]

[facility] -



       on     on                   00:00:                             Medical



                                   00                                 Center

 

       TIA    TIA    Disease Active                              [facility]



       (transient (transient               5-13                               [facility]

[facility] -



       ischemic ischemic               00:00:                             Medica

l



       attack) attack)               00                                 Center

 

       Hypertensi Hypertensi Disease Active                              [facility]

[facility]



       on     on                                                  [facility]



                                   00[facility]:                             [facility]



                                   00                                 [place]







Allergies, Adverse Reactions, Alerts







       Allergy Allergy Status Severity Reaction(s) Onset  Inactive Treating Comm

ents 

Source



       Name   Type                        Date   Date   Clinician        

 

       Iodine Drug   Active        Other (See 2017               Cold   [facility]



       And    Allergy               Comments) 2-20                 [facility] 

-



       Iodide                             00:00:                      Medical



       Containi                             00                          [place]



       ng                                                             



       Products                                                         

 

       Ciproflo Drug   Active        Anaphylaxis                       [facility]



       xacin  Allergy                                              [facility]



                                          00:00:                      [facility]



                                          00                          [place]

 

       Penicill Drug   Active        Anaphylaxis                       [facility]



       ins    Allergy                                              [facility]



                                          00[facility]00:                      [facility]



                                          00                          [place]

 

       Tetanus Drug   Active        Itching                       [facility]



       Vaccines Allergy                                              [facility]



       And                                00:00:                      Medical



       Toxoid                             00                          [facility]







Social History







           Social Habit Start Date Stop Date  Quantity   Comments   Source

 

           Sex Assigned At                                             [facility]

 

           Tobacco use and 2017 Never used            [facility] -



           exposure   00:00:00   00:00:00                         [facility]

 

           Alcohol intake 2017 Current               [facility]

es -



                      00:00:00   00:00:00   non-drinker of            Medical 

nter



                                            alcohol (finding)            









                Smoking Status  Start Date      Stop Date       Source

 

                Never smoker                                    [facility]

edical [place]







Medications







       Ordered Filled Start  Stop   Current Ordering Indication Dosage Frequency

 Signature

                    Comments            Components          Source



     Medication Medication Date Date Medication? Clinician                (SIG) 

          



     Name Name                                                   

 

     CALCIUM      2017      Yes                 Q.5D Take by           [facility]



     CARBONATE/V      2-20                               mouth 2           [facility]

 -



     ITAMIN D3      20:34:                               (two)           Medical



     (CALCIUM      26                                 times           [facility]



     600 + D,3,                                         daily.           



     ORAL)                                                        

 

     VIT C/VIT      2017      Yes                 QD   Take by           [facility]

t



     E/LUTEIN/MI      2-20                               mouth           [facility]



     N/OMEGA-3      20:34:                               daily.           [facility]ica

l



     (OCUVITE      26                                                Center



     ORAL)                                                        

 

     folic acid      2017      Yes            400ug QD   Take 400           [facility]

[facility]



     (FOLVITE)      2-20                               mcg by           [facility]



     400 MCG      20:34:                               mouth           Medical



     tablet      26                                 nightly.           [facility]

 

     cyanocobala      2017      Yes            1000ug QD   Take 1,000         

  [facility]



     min 1000      2-20                               mcg by           [facility]



     MCG tablet      20:34:                               mouth           [facility]

l



               26                                 daily.           [place]

 

     cranberry      2017      Yes                 Q.5D Take by           [facility]

t



     extract      2-20                               mouth 2           [facility]



     (CRANBERRY      20:34:                               (two)           Medica

l



     CONCENTRATE      26                                 times           Center



     ) 500 mg                                         daily.           



     Cap                                                         

 

     docusate      2017      Yes            100mg      Take 100           [facility]



     sodium      2-20                               mg by           [facility]



     (COLACE)      20:34:                               mouth 2           [facility]ica

l



     100 MG      26                                 (two)           Center



     capsule                                         times           



                                                  daily as           



                                                  needed for           



                                                  Constipati           



                                                  on.            







Procedures

This patient has no known procedures.



Plan of Care







             Planned Activity Planned Date Details      Comments     Source

 

             Future Scheduled 2020   INFLUENZA VACCINE (#1)              [facility]

[facility]



             Test         00:00:00     [code = INFLUENZA              Medical Ce

nter



                                       VACCINE (#1)]              

 

             Future Scheduled 2008   MEDICARE ANNUAL              [facility]

[facility]



             Test         00:00:00     WELLNESS (YEAR 2 or              Medical 

Center



                                       FIRST YEAR if no IPPE)              



                                       [code = MEDICARE              



                                       ANNUAL WELLNESS (YEAR              



                                       2 or FIRST YEAR if no              



                                       IPPE)]                    

 

             Future Scheduled 2007   PNEUMOCOCCAL 65+ YRS              [facility]



             Test         00:00:00     (1 of 1 -                 Medical Center



                                       [id number])              



                                       [code = PNEUMOCOCCAL              



                                       65+ YRS (1 of 1 -              



                                       [id number] PCV13)]              







Results







           Test Description Test Time  Test Comments Results    Result     [facility]

e



                                                       Comments   

 

           NV, ANGIOGRAM, 2017  Reason for FINAL REPORT PATIENT ID:        

    



           CEREBRAL   1          Exam:->cerebral  [id number] DATE:            



                      11:48:00   aneurysm   2017NAME: Shahida            



                                 unruptured Elvira ValorieickATTENDING:            



                                            Migue Amin MDFIRST            



                                            ASSISTANT: CESARIO LooREOPERATIVE DIAGNOSIS:           

 



                                            Unruptured right            



                                            posterior communicating            



                                            artery aneurysm status            



                                            post stent assisted            



                                            coilingPOSTOPERATIVE            



                                            DIAGNOSIS: Unruptured            



                                            right posterior            



                                            communicating artery            



                                            aneurysm status post            



                                            stent assisted            



                                            coilingPROCEDURE            



                                            PERFORMED: Diagnostic            



                                            Cerebral              



                                            AngiogramANESTHESIA:            



                                            MACCOMPLICATIONS:            



                                            NoneESTIMATED BLOOD LOSS:           

 



                                            Less than 15ml ARTERIAL            



                                            VESSELS STUDIED:RIGHT            



                                            SUBCLAVIAN ARTERY            



                                            (x1)RIGHT COMMON CAROTID            



                                            ARTERY (CERVICAL x2)RIGHT           

 



                                            COMMON CAROTID ARTERY            



                                            (CRANIAL x3)RIGHT COMMON            



                                            FEMORAL ARTERY (x1)            



                                            MATERIALS EMPLOYED:1. 5            



                                            French short sheath 2. 4            



                                            French Berenstein            



                                            catheter3. Bentson            



                                            guidewire4. Terumo 0.035            



                                            LT glidewire5. 5 French            



                                            Mynx device INDICATIONS:            



                                            77-year-old female with            



                                            hypertension who is            



                                            status post endovascular            



                                            treatment of an            



                                            unruptured right            



                                            posterior communicating            



                                            artery aneurysm with            



                                            stent-assisted coiling on           

 



                                            May 19, 2017. She            



                                            presents for follow-up            



                                            cerebral angiogram. The            



                                            indications for the            



                                            procedure as well as the            



                                            risks, benefits and            



                                            alternatives were            



                                            discussed with the            



                                            patient and the family.            



                                            The risks discussed            



                                            included but were not            



                                            limited to stroke,            



                                            intracranial hemorrhage,            



                                            injury to the cervical            



                                            femoral or aortic            



                                            vessels, contrast            



                                            reaction, kidney to            



                                            toxicity, groin hematoma,           

 



                                            weakness paralysis and            



                                            even death. They            



                                            demonstrated            



                                            understanding of the risk           

 



                                            benefit profile and            



                                            agreed to proceed.            



                                            PROCEDURE: After            



                                            appropriate consent was            



                                            obtained, the patient was           

 



                                            brought to the            



                                            angiographic suite and            



                                            cardiopulmonary            



                                            monitoring was placed.            



                                            The anesthesia team            



                                            performed light sedation.           

 



                                            A timeout was performed.            



                                            Both groins were prepped            



                                            and draped in the usual            



                                            sterile fashion. After            



                                            administration of 10 mL            



                                            of 2% lidocaine, a            



                                            micropuncture needle was            



                                            used to perform a single            



                                            wall puncture of the            



                                            right common femoral            



                                            artery, a micropuncture            



                                            sheath was inserted, and            



                                            an angled DSA angiogram            



                                            was performed through the           

 



                                            sheath. Once good            



                                            location of the puncture            



                                            site was confirmed, a 5            



                                            French short sheath was            



                                            inserted over a Bentson            



                                            wire and was maintained            



                                            on heparinized saline            



                                            flush throughout the            



                                            remainder of the            



                                            procedure.  Using coaxial           

 



                                            technique, a preflushed            



                                            Berenstein catheter on            



                                            constant heparinized            



                                            saline flush was advanced           

 



                                            over the Glidewire into            



                                            the descending aorta, the           

 



                                            Glidewire was removed,            



                                            the catheter back bled,            



                                            flushed in usual fashion            



                                            and the catheter was            



                                            maintained on heparinized           

 



                                            flushed throughout            



                                            duration of the case.            



                                            Using coaxial technique,            



                                            the catheter was advanced           

 



                                            into the aortic arch and            



                                            with the aid of            



                                            roadmapping, digital            



                                            fluoroscopy, and careful            



                                            guidewire manipulation,            



                                            the arteries described            



                                            below were selectively            



                                            catheterized. Upon each            



                                            successive            



                                            catheterization, digital            



                                            subtraction angiography            



                                            using the appropriate            



                                            rate and volume of            



                                            contrast in multiple            



                                            projections was            



                                            performed. At the            



                                            conclusion of the            



                                            procedure, the catheter            



                                            was retracted into the            



                                            aorta and the images            



                                            reviewed at the outside            



                                            workstation for quality            



                                            and content.  Then the            



                                            femoral sheath was            



                                            removed and hemostasis            



                                            achieved with a 5 French            



                                            Mynx device and manual            



                                            compression. The patient            



                                            tolerated the procedure            



                                            well and was transported            



                                            in unchanged neurological           

 



                                            status without groin            



                                            hematoma and with good            



                                            distal lower extremity            



                                            pulses. The patient was            



                                            transferred to the            



                                            recovery area to be            



                                            monitored as per            



                                            protocol.  FINDINGS:            



                                            RIGHT SUBCLAVIAN ARTERY            



                                            (DSA, PA, LATERAL            



                                            ROADMAP, x1): There is            



                                            normal course and caliber           

 



                                            of the subclavian artery            



                                            with physiological            



                                            filling of its distal            



                                            branches. Limited            



                                            visualization in this            



                                            roadmap of the origins of           

 



                                            the right vertebral            



                                            artery, internal            



                                            mamillary artery,            



                                            thyrocervical and            



                                            costocervical trunks.            



                                            RIGHT COMMON CAROTID            



                                            ARTERY (DSA, PA, LATERAL,           

 



                                            CERVICAL x2): Tortuous            



                                            course of the right            



                                            common carotid artery.            



                                            The distal cervical            



                                            common carotid as well as           

 



                                            the origins of the right            



                                            internal and external            



                                            carotid arteries are            



                                            widely patent without            



                                            evidence of ulceration or           

 



                                            stenosis. The proximal            



                                            portions of the            



                                            superficial temporal            



                                            artery, middle meningeal            



                                            artery, internal            



                                            maxillary artery,            



                                            occipital artery and            



                                            their branches are            



                                            visualized and appear            



                                            normal. RIGHT COMMON            



                                            CAROTID ARTERY (DSA, PA,            



                                            LATERAL, MAGNIFIED            



                                            OBLIQUE, CRANIAL x3):            



                                            Minimal residual aneurysm           

 



                                            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            



                                            treated right posterior            



                                            communicating artery            



                                            aneurysm, best visualized           

 



                                            on sequence A9. Normal            



                                            distal cervical, petrous,           

 



                                            cavernous and            



                                            supraclinoid internal            



                                            carotid artery with            



                                            physiological filling of            



                                            the MCA and MAXIMINO branches.           

 



                                            Limited visualization of            



                                            the venous phase. No            



                                            other aneurysms or other            



                                            vascular lesions are            



                                            seen. There is no            



                                            significant            



                                            atherosclerosis or            



                                            stenosis. Normal course            



                                            and caliber of the            



                                            external carotid artery            



                                            and its branches. There            



                                            is a well visualized            



                                            superficial temporal            



                                            artery, middle meningeal            



                                            artery, internal            



                                            maxillary artery,            



                                            occipital artery and            



                                            their branches. RIGHT            



                                            COMMON FEMORAL ARTERY            



                                            (DSA, PA, X 1): Normal            



                                            location of the puncture            



                                            site above the            



                                            bifurcation and below            



                                            markers of the inguinal            



                                            ligament.  IMPRESSION:            



                                            Minimal residual aneurysm           

 



                                            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            



                                            treated right posterior            



                                            communicating artery            



                                            aneurysm. No technical or           

 



                                            procedural complications            



                                            FACULTY ATTESTATION: Migue FERNANDEZ M.D.,            



                                            was present for the            



                                            entirety of the            



                                            procedure. I performed or           

 



                                            directly supervised all            



                                            aspects of the procedure.           

 



                                            I performed all critical            



                                            aspects of the case. I            



                                            interpreted the images            



                                            and reported the results.           

 



                                            Signed: Chaz Cox            



                                            MDReport Verified            



                                            Date/Time:  2017            



                                            11:48:56 Reading            



                                            Location: [facility] Y026            



                                            Neuro Angio Reading Room            



                                                 Electronically            



                                            signed by: CHAZ COX on 2017 11:48            



                                            AM                    









                    BASIC METABOLIC PANEL 2017 11:56:00 









                      Test Item  Value      Reference Range Interpretation Comme

nts









             SODIUM (BEAKER) (test code 136 meq/L    136-145                   



             = 381)                                              

 

             POTASSIUM (BEAKER) (test 3.9 meq/L    3.5-5.1                   



             code = 379)                                         

 

             CHLORIDE (BEAKER) (test 102 meq/L                        



             code = 382)                                         

 

             CO2 (BEAKER) (test code = 25 meq/L     22-29                     



             355)                                                

 

             BLOOD UREA NITROGEN 20 mg/dL     7-21                      



             (BEAKER) (test code = 354)                                        

 

             CREATININE (BEAKER) (test 0.82 mg/dL   0.57-1.25                 



             code = 358)                                         

 

             GLUCOSE RANDOM (BEAKER) 106 mg/dL           H            



             (test code = 652)                                        

 

             CALCIUM (BEAKER) (test code 9.6 mg/dL    8.4-10.2                  



             = 697)                                              

 

             EGFR (BEAKER) (test code = 68 mL/min/1.73 sq m                     

      ESTIMATED GFR IS NOT AS



             1092)                                               ACCURATE AS CRE

ATININE



                                                                 CLEARANCE IN DE

EDICTING



                                                                 GLOMERULAR FILT

RATION



                                                                 RATE. ESTIMATED

 GFR IS NOT



                                                                 APPLICABLE FOR 

DIALYSIS



                                                                 PATIENTS.



CBC WITH PLATELET COUNT + MANUAL [id number] 11:33:00





             Test Item    Value        Reference Range Interpretation Comments

 

             WHITE BLOOD CELL COUNT 5.0 K/ L     3.5-10.5                  



             (BEAKER) (test code =                                        



             775)                                                

 

             RED BLOOD CELL COUNT 3.94 M/ L    3.93-5.22                 



             (BEAKER) (test code =                                        



             761)                                                

 

             HEMOGLOBIN (BEAKER) 12.1 GM/DL   11.2-15.7                 



             (test code = 410)                                        

 

             HEMATOCRIT (BEAKER) 37.1 %       34.1-44.9                 



             (test code = 411)                                        

 

             MEAN CORPUSCULAR 94.2 fL      79.4-94.8                 



             VOLUME (BEAKER) (test                                        



             code = 753)                                         

 

             MEAN CORPUSCULAR 30.7 pg      25.6-32.2                 



             HEMOGLOBIN (BEAKER)                                        



             (test code = 751)                                        

 

             MEAN CORPUSCULAR 32.6 GM/DL   32.2-35.5                 



             HEMOGLOBIN CONC                                        



             (BEAKER) (test code =                                        



             752)                                                

 

             RED CELL DISTRIBUTION 12.7 %       11.7-14.4                 



             WIDTH (BEAKER) (test                                        



             code = 412)                                         

 

             PLATELET COUNT 212 K/CU MM  150-450                   



             (BEAKER) (test code =                                        



             756)                                                

 

             MEAN PLATELET VOLUME 9.4 fL       9.4-12.3                  



             (BEAKER) (test code =                                        



             754)                                                

 

             NUCLEATED RED BLOOD 0 /100 WBC   0-0                       



             CELLS (BEAKER) (test                                        



             code = 413)                                         

 

             IMMATURE     0 %          0-1                       



             GRANULOCYTES-RELATIVE                                        



             PERCENT (BEAKER) (test                                        



             code = 2801)                                        

 

             NEUTROPHILS RELATIVE 60 %                                   This is

 an appended



             PERCENT (BEAKER) (test                                        repor

t.  These



             code = 429)                                         results have be

en



                                                                 appended to a



                                                                 previously sherine

l



                                                                 verified report

.

 

             LYMPHOCYTES RELATIVE 30 %                                   This is

 an appended



             PERCENT (BEAKER) (test                                        repor

t.  These



             code = 430)                                         results have be

en



                                                                 appended to a



                                                                 previously sherine

l



                                                                 verified report

.

 

             MONOCYTES RELATIVE 9 %                                    This is a

n appended



             PERCENT (BEAKER) (test                                        repor

t.  These



             code = 431)                                         results have be

en



                                                                 appended to a



                                                                 previously sherine

l



                                                                 verified report

.

 

             EOSINOPHILS RELATIVE 1 %                                    This is

 an appended



             PERCENT (BEAKER) (test                                        repor

t.  These



             code = 432)                                         results have be

en



                                                                 appended to a



                                                                 previously sherine

l



                                                                 verified report

.

 

             BASOPHILS RELATIVE 1 %                                    This is a

n appended



             PERCENT (BEAKER) (test                                        repor

t.  These



             code = 437)                                         results have be

en



                                                                 appended to a



                                                                 previously sherine

l



                                                                 verified report

.

 

             NEUTROPHILS ABSOLUTE 2.97 K/ L    1.56-6.13                 This is

 an appended



             COUNT (BEAKER) (test                                        report.

  These



             code = 670)                                         results have be

en



                                                                 appended to a



                                                                 previously sherine

l



                                                                 verified report

.

 

             LYMPHOCYTES ABSOLUTE 1.46 K/ L    1.18-3.74                 This is

 an appended



             COUNT (BEAKER) (test                                        report.

  These



             code = 414)                                         results have be

en



                                                                 appended to a



                                                                 previously sherine

l



                                                                 verified report

.

 

             MONOCYTES ABSOLUTE 0.42 K/ L    0.24-0.36    H            This is a

n appended



             COUNT (BEAKER) (test                                        report.

  These



             code = 415)                                         results have be

en



                                                                 appended to a



                                                                 previously sherine

l



                                                                 verified report

.

 

             EOSINOPHILS ABSOLUTE 0.05 K/ L    0.04-0.36                 This is

 an appended



             COUNT (BEAKER) (test                                        report.

  These



             code = 416)                                         results have be

en



                                                                 appended to a



                                                                 previously sherine

l



                                                                 verified report

.

 

             BASOPHILS ABSOLUTE 0.04 K/ L    0.01-0.08                 This is a

n appended



             COUNT (BEAKER) (test                                        report.

  These



             code = 417)                                         results have be

en



                                                                 appended to a



                                                                 previously sherine

l



                                                                 verified report

.



[id number] 11:30:00





             Test Item    Value        Reference Range Interpretation Comments

 

             PROTIME (BEAKER) (test code = 15.2 seconds 11.7-14.7    H          

  



             759)                                                

 

             INR (BEAKER) (test code = 370) 1.2          <=5.9                  

   

 

             PARTIAL THROMBOPLASTIN TIME 42.8 seconds 22.5-36.0    H            



             (BEAKER) (test code = 760)                                        



RECOMMENDED COUMADIN/WARFARIN INR THERAPY RANGESSTANDARD DOSE: 2.0 - 3.0   
Includes: PROPHYLAXIS forvenous thrombosis, systemic embolization; TREATMENT for
venous thrombosis and/or pulmonary embolus.HIGH RISK: Target INR is 2.5-3.5 for 
patients with mechanical heart valves.PLATELET AGGREGATION: FUNCTION SCREEN
2017 09:23:00





             Test Item    Value        Reference Range Interpretation Comments

 

             WEAK ADP     5 %          60-91        L            



             RESULT(BEAKER) (test                                        



             code = 2135)                                        

 

             PLATELET FUNCTION 0-39% indicates marked                           



             SCREEN INTERP platelet dysfunction                           



             (BEAKER) (test code =                                        



             2173)                                               

 

             [id number] Nelly Zurita MD                       

    



             (BEAKER) (test code = (electronic signature)                       

    



             5932)                                               

 

             PLATELET COUNT  K/CU MM  150-430                   



             (BEAKER) (test code =                                        



             2656)                                               



CBC W/PLT COUNT &amp; AUTO [id number] 07:07:00





             Test Item    Value        Reference Range Interpretation Comments

 

             WHITE BLOOD CELL COUNT (BEAKER) 5.9 K/ L     4.0-10.0              

    



             (test code = 775)                                        

 

             RED BLOOD CELL COUNT (BEAKER) 3.88 M/ L    4.00-5.00    L          

  



             (test code = 761)                                        

 

             HEMOGLOBIN (BEAKER) (test code = 12.5 GM/DL   12.0-15.0            

     



             410)                                                

 

             HEMATOCRIT (BEAKER) (test code = 37.8 %       36.0-45.0            

     



             411)                                                

 

             MEAN CORPUSCULAR VOLUME (BEAKER) 97.3 fL      82.0-99.0            

     



             (test code = 753)                                        

 

             MEAN CORPUSCULAR HEMOGLOBIN 32.1 pg      27.0-33.0                 



             (BEAKER) (test code = 751)                                        

 

             MEAN CORPUSCULAR HEMOGLOBIN CONC 33.0 GM/DL   32.0-36.0            

     



             (BEAKER) (test code = 752)                                        

 

             RED CELL DISTRIBUTION WIDTH 13.4 %       10.3-14.2                 



             (BEAKER) (test code = 412)                                        

 

             PLATELET COUNT (BEAKER) (test 238 K/CU MM  150-430                 

  



             code = 756)                                         

 

             MEAN PLATELET VOLUME (BEAKER) 7.2 fL       6.5-10.5                

  



             (test code = 754)                                        

 

             NUCLEATED RED BLOOD CELLS 0 /100 WBC   0-0                       



             (BEAKER) (test code = 413)                                        

 

             NEUTROPHILS RELATIVE PERCENT 58 %                                  

 



             (BEAKER) (test code = 429)                                        

 

             LYMPHOCYTES RELATIVE PERCENT 31 %                                  

 



             (BEAKER) (test code = 430)                                        

 

             MONOCYTES RELATIVE PERCENT 10 %                                   



             (BEAKER) (test code = 431)                                        

 

             EOSINOPHILS RELATIVE PERCENT 0 %                                   

 



             (BEAKER) (test code = 432)                                        

 

             BASOPHILS RELATIVE PERCENT 1 %                                    



             (BEAKER) (test code = 437)                                        

 

             NEUTROPHILS ABSOLUTE COUNT 3.36 K/ L    1.80-8.00                 



             (BEAKER) (test code = 670)                                        

 

             LYMPHOCYTES ABSOLUTE COUNT 1.84 K/ L    1.48-4.50                 



             (BEAKER) (test code = 414)                                        

 

             MONOCYTES ABSOLUTE COUNT (BEAKER) 0.58 K/ L    0.00-1.30           

      



             (test code = 415)                                        

 

             EOSINOPHILS ABSOLUTE COUNT 0.01 K/ L    0.00-0.50                 



             (BEAKER) (test code = 416)                                        

 

             BASOPHILS ABSOLUTE COUNT (BEAKER) 0.06 K/ L    0.00-0.20           

      



             (test code = 417)                                        



0.00BASIC METABOLIC [id number] 06:36:00





             Test Item    Value        Reference Range Interpretation Comments

 

             SODIUM (BEAKER) 138 meq/L    136-145                   



             (test code = 381)                                        

 

             POTASSIUM (BEAKER) 4.0 meq/L    3.5-5.1                   Specimen 

slightly



             (test code = 379)                                        hemolyzed

 

             CHLORIDE (BEAKER) 106 meq/L                        



             (test code = 382)                                        

 

             CO2 (BEAKER) (test 23 meq/L     22-29                     



             code = 355)                                         

 

             BLOOD UREA NITROGEN 13 mg/dL     7-21                      



             (BEAKER) (test code                                        



             = 354)                                              

 

             CREATININE (BEAKER) 0.79 mg/dL   0.57-1.25                 Specimen

 slightly



             (test code = 358)                                        hemolyzed

 

             GLUCOSE RANDOM 97 mg/dL                         



             (BEAKER) (test code                                        



             = 652)                                              

 

             CALCIUM (BEAKER) 9.0 mg/dL    8.4-10.2                  



             (test code = 697)                                        

 

             EGFR (BEAKER) (test 71 mL/min/1.73                           ESTIMA

[facility] GFR IS



             code = 1092) sq m                                   NOT AS ACCURATE

 AS



                                                                 CREATININE



                                                                 CLEARANCE IN



                                                                 PREDICTING



                                                                 GLOMERULAR



                                                                 FILTRATION RATE

.



                                                                 ESTIMATED GFR I

S



                                                                 NOT APPLICABLE 

FOR



                                                                 DIALYSIS PATIEN

TS.



[id number] 06:21:00





             Test Item    Value        Reference Range Interpretation Comments

 

             PROTIME (BEAKER) (test code = 14.1 seconds 11.7-14.7               

  



             759)                                                

 

             INR (BEAKER) (test code = 370) 1.1          <=5.9                  

   

 

             PARTIAL THROMBOPLASTIN TIME 37.0 seconds 22.5-36.0    H            



             (BEAKER) (test code = 760)                                        



RECOMMENDED COUMADIN/WARFARIN INR THERAPY RANGESSTANDARD DOSE: 2.0 - 3.0   
Includes: PROPHYLAXIS forvenous thrombosis, systemic embolization; TREATMENT for
venous thrombosis and/or pulmonary embolus.HIGH RISK: Target INR is 2.5-3.5 for 
patients with mechanical heart valves.PROTHROMBIN [id number] 06:20:00





             Test Item    Value        Reference Range Interpretation Comments

 

             PROTIME (BEAKER) (test code = 14.1 seconds 11.7-14.7               

  



             759)                                                

 

             INR (BEAKER) (test code = 370) 1.1          <=5.9                  

   



RECOMMENDED COUMADIN/WARFARIN INR THERAPY RANGESSTANDARD DOSE: 2.0 - 3.0   
Includes: PROPHYLAXIS forvenous thrombosis, systemic embolization; TREATMENT for
venous thrombosis and/or pulmonary embolus.HIGH RISK: Target INR is 2.5-3.5 for 
patients with mechanical heart valves.PT/[id number] 23:38:00





             Test Item    Value        Reference Range Interpretation Comments

 

             PROTIME (BEAKER) (test code = 15.2 seconds 11.7-14.7    H          

  



             759)                                                

 

             INR (BEAKER) (test code = 370) 1.2          <=5.9                  

   

 

             PARTIAL THROMBOPLASTIN TIME 36.5 seconds 22.5-36.0    H            



             (BEAKER) (test code = 760)                                        



RECOMMENDED COUMADIN/WARFARIN INR THERAPY RANGESSTANDARD DOSE: 2.0 - 3.0   
Includes: PROPHYLAXIS forvenous thrombosis, systemic embolization; TREATMENT for
venous thrombosis and/or pulmonary embolus.HIGH RISK: Target INR is 2.5-3.5 for 
patients with mechanical heart valves.BASIC METABOLIC [id number] 23:33:00





             Test Item    Value        Reference Range Interpretation Comments

 

             SODIUM (BEAKER) 137 meq/L    136-145                   



             (test code = 381)                                        

 

             POTASSIUM (BEAKER) 5.7 meq/L    3.5-5.1      H            Specimen 

markedly



             (test code = 379)                                        hemolyzed

 

             CHLORIDE (BEAKER) 105 meq/L                        



             (test code = 382)                                        

 

             CO2 (BEAKER) (test 25 meq/L     22-29                     



             code = 355)                                         

 

             BLOOD UREA NITROGEN 13 mg/dL     7-21                      



             (BEAKER) (test code                                        



             = 354)                                              

 

             CREATININE (BEAKER) 0.84 mg/dL   0.57-1.25                 Specimen

 markedly



             (test code = 358)                                        hemolyzed

 

             GLUCOSE RANDOM 86 mg/dL                         



             (BEAKER) (test code                                        



             = 652)                                              

 

             CALCIUM (BEAKER) 8.9 mg/dL    8.4-10.2                  



             (test code = 697)                                        

 

             EGFR (BEAKER) (test 66 mL/min/1.73                           ESTIMA

[facility] GFR IS



             code = 1092) sq m                                   NOT AS ACCURATE

 AS



                                                                 CREATININE



                                                                 CLEARANCE IN



                                                                 PREDICTING



                                                                 GLOMERULAR



                                                                 FILTRATION RATE

.



                                                                 ESTIMATED GFR I

S



                                                                 NOT APPLICABLE 

FOR



                                                                 DIALYSIS PATIEN

TS.



CBC W/PLT COUNT &amp; AUTO [id number] 23:19:00





             Test Item    Value        Reference Range Interpretation Comments

 

             WHITE BLOOD CELL COUNT (BEAKER) 7.0 K/ L     4.0-10.0              

    



             (test code = 775)                                        

 

             RED BLOOD CELL COUNT (BEAKER) 3.76 M/ L    4.00-5.00    L          

  



             (test code = 761)                                        

 

             HEMOGLOBIN (BEAKER) (test code = 12.7 GM/DL   12.0-15.0            

     



             410)                                                

 

             HEMATOCRIT (BEAKER) (test code = 36.9 %       36.0-45.0            

     



             411)                                                

 

             MEAN CORPUSCULAR VOLUME (BEAKER) 98.2 fL      82.0-99.0            

     



             (test code = 753)                                        

 

             MEAN CORPUSCULAR HEMOGLOBIN 33.7 pg      27.0-33.0    H            



             (BEAKER) (test code = 751)                                        

 

             MEAN CORPUSCULAR HEMOGLOBIN CONC 34.4 GM/DL   32.0-36.0            

     



             (BEAKER) (test code = 752)                                        

 

             RED CELL DISTRIBUTION WIDTH 12.2 %       10.3-14.2                 



             (BEAKER) (test code = 412)                                        

 

             PLATELET COUNT (BEAKER) (test 241 K/CU MM  150-430                 

  



             code = 756)                                         

 

             MEAN PLATELET VOLUME (BEAKER) 7.2 fL       6.5-10.5                

  



             (test code = 754)                                        

 

             NUCLEATED RED BLOOD CELLS 0 /100 WBC   0-0                       



             (BEAKER) (test code = 413)                                        

 

             NEUTROPHILS RELATIVE PERCENT 58 %                                  

 



             (BEAKER) (test code = 429)                                        

 

             LYMPHOCYTES RELATIVE PERCENT 34 %                                  

 



             (BEAKER) (test code = 430)                                        

 

             MONOCYTES RELATIVE PERCENT 7 %                                    



             (BEAKER) (test code = 431)                                        

 

             EOSINOPHILS RELATIVE PERCENT 0 %                                   

 



             (BEAKER) (test code = 432)                                        

 

             BASOPHILS RELATIVE PERCENT 1 %                                    



             (BEAKER) (test code = 437)                                        

 

             NEUTROPHILS ABSOLUTE COUNT 4.08 K/ L    1.80-8.00                 



             (BEAKER) (test code = 670)                                        

 

             LYMPHOCYTES ABSOLUTE COUNT 2.36 K/ L    1.48-4.50                 



             (BEAKER) (test code = 414)                                        

 

             MONOCYTES ABSOLUTE COUNT (BEAKER) 0.52 K/ L    0.00-1.30           

      



             (test code = 415)                                        

 

             EOSINOPHILS ABSOLUTE COUNT 0.02 K/ L    0.00-0.50                 



             (BEAKER) (test code = 416)                                        

 

             BASOPHILS ABSOLUTE COUNT (BEAKER) 0.05 K/ L    0.00-0.20           

      



             (test code = 417)                                        



0.00POCT-P2Y12 PLATELET [id number] 20:57:00





             Test Item    Value        Reference Range Interpretation Comments

 

             POC-P2Y12 PLATELET AGG (BEAKER) (test 31 PRU                       

          



             code = 2303)                                        



RANGE INFORMATION: PRU reference range is 194-418. Post Drug Results: Lower PRU 
levels are associated with expected antiplatelet effect. Values may be below the
stated reference range above. The post-drug PRU values reported in the [employer]
P2Y12 package insert are .URINALYSIS W/ [id number] 09:22:00





             Test Item    Value        Reference Range Interpretation Comments

 

             COLOR (BEAKER) (test Light Yellow                           



             code = 470)                                         

 

             CLARITY (BEAKER) (test Hazy                                   



             code = 469)                                         

 

             SPECIFIC GRAVITY UA 1.009        1.001-1.035               



             (BEAKER) (test code =                                        



             468)                                                

 

             PH UA (BEAKER) (test 5.5          5.0-8.0                   



             code = 467)                                         

 

             PROTEIN UA (BEAKER) Negative     Negative                  



             (test code = 464)                                        

 

             GLUCOSE UA (BEAKER) Negative     Negative                  



             (test code = 365)                                        

 

             KETONES UA (BEAKER) Negative     Negative                  



             (test code = 371)                                        

 

             BILIRUBIN UA (BEAKER) Negative     Negative                  



             (test code = 462)                                        

 

             BLOOD UA (BEAKER) (test Small        Negative     A            



             code = 461)                                         

 

             NITRITE UA (BEAKER) Positive     Negative     A            



             (test code = 465)                                        

 

             LEUKOCYTE ESTERASE UA Large        Negative     A            



             (BEAKER) (test code =                                        



             466)                                                

 

             UROBILINOGEN UA (BEAKER) 0.2 mg/dL    0.2-1.0                   



             (test code = 463)                                        

 

             RBC UA (BEAKER) (test 12 /HPF                                



             code = 519)                                         

 

             WBC UA (BEAKER) (test 92 /HPF                                



             code = 520)                                         

 

             BACTERIA (BEAKER) (test Rare                                   



             code = 517)                                         

 

             MUCUS (BEAKER) (test Rare                                   



             code = 1574)                                        

 

             SQUAMOUS EPITHELIAL < /HPF                                 



             (BEAKER) (test code =                                        



             516)                                                

 

             YEAST (BEAKER) (test Occasional                             



             code = 1585)                                        

 

             SOURCE(BEAKER) (test Urine, Straight                           



             code = 2795) Catheter                               



[id number] 04:55:00





             Test Item    Value        Reference Range Interpretation Comments

 

             PHOSPHORUS (BEAKER) (test code = 3.3 mg/dL    2.3-4.7              

     



             604)                                                



[id number] 04:55:00





             Test Item    Value        Reference Range Interpretation Comments

 

             MAGNESIUM (BEAKER) (test code = 1.6 mg/dL    1.6-2.6               

    



             627)                                                



BASIC METABOLIC [id number] 04:55:00





             Test Item    Value        Reference Range Interpretation Comments

 

             SODIUM (BEAKER) 140 meq/L    136-145                   



             (test code = 381)                                        

 

             POTASSIUM (BEAKER) 3.4 meq/L    3.5-5.1      L            



             (test code = 379)                                        

 

             CHLORIDE (BEAKER) 107 meq/L                        



             (test code = 382)                                        

 

             CO2 (BEAKER) (test 22 meq/L     22-29                     



             code = 355)                                         

 

             BLOOD UREA NITROGEN 8 mg/dL      7-21                      



             (BEAKER) (test code                                        



             = 354)                                              

 

             CREATININE (BEAKER) 0.71 mg/dL   0.57-1.25                 



             (test code = 358)                                        

 

             GLUCOSE RANDOM 109 mg/dL           H            



             (BEAKER) (test code                                        



             = 652)                                              

 

             CALCIUM (BEAKER) 8.6 mg/dL    8.4-10.2                  



             (test code = 697)                                        

 

             EGFR (BEAKER) (test 80 mL/min/1.73                           ESTIMA

[facility] GFR IS



             code = 1092) sq m                                   NOT AS ACCURATE

 AS



                                                                 CREATININE



                                                                 CLEARANCE IN



                                                                 PREDICTING



                                                                 GLOMERULAR



                                                                 FILTRATION RATE

.



                                                                 ESTIMATED GFR I

S



                                                                 NOT APPLICABLE 

FOR



                                                                 DIALYSIS PATIEN

TS.



CBC W/PLT COUNT &amp; AUTO [id number] 04:52:00





             Test Item    Value        Reference Range Interpretation Comments

 

             WHITE BLOOD CELL COUNT (BEAKER) 7.5 K/ L     4.0-10.0              

    



             (test code = 775)                                        

 

             RED BLOOD CELL COUNT (BEAKER) 4.04 M/ L    4.00-5.00               

  



             (test code = 761)                                        

 

             HEMOGLOBIN (BEAKER) (test code = 12.9 GM/DL   12.0-15.0            

     



             410)                                                

 

             HEMATOCRIT (BEAKER) (test code = 39.7 %       36.0-45.0            

     



             411)                                                

 

             MEAN CORPUSCULAR VOLUME (BEAKER) 98.3 fL      82.0-99.0            

     



             (test code = 753)                                        

 

             MEAN CORPUSCULAR HEMOGLOBIN 31.9 pg      27.0-33.0                 



             (BEAKER) (test code = 751)                                        

 

             MEAN CORPUSCULAR HEMOGLOBIN CONC 32.5 GM/DL   32.0-36.0            

     



             (BEAKER) (test code = 752)                                        

 

             RED CELL DISTRIBUTION WIDTH 12.0 %       10.3-14.2                 



             (BEAKER) (test code = 412)                                        

 

             PLATELET COUNT (BEAKER) (test 196 K/CU MM  150-430                 

  



             code = 756)                                         

 

             MEAN PLATELET VOLUME (BEAKER) 7.2 fL       6.5-10.5                

  



             (test code = 754)                                        

 

             NUCLEATED RED BLOOD CELLS 0 /100 WBC   0-0                       



             (BEAKER) (test code = 413)                                        

 

             NEUTROPHILS RELATIVE PERCENT 81 %                                  

 



             (BEAKER) (test code = 429)                                        

 

             LYMPHOCYTES RELATIVE PERCENT 14 %                                  

 



             (BEAKER) (test code = 430)                                        

 

             MONOCYTES RELATIVE PERCENT 6 %                                    



             (BEAKER) (test code = 431)                                        

 

             EOSINOPHILS RELATIVE PERCENT 0 %                                   

 



             (BEAKER) (test code = 432)                                        

 

             BASOPHILS RELATIVE PERCENT 0 %                                    



             (BEAKER) (test code = 437)                                        

 

             NEUTROPHILS ABSOLUTE COUNT 6.03 K/ L    1.80-8.00                 



             (BEAKER) (test code = 670)                                        

 

             LYMPHOCYTES ABSOLUTE COUNT 1.01 K/ L    1.48-4.50    L            



             (BEAKER) (test code = 414)                                        

 

             MONOCYTES ABSOLUTE COUNT (BEAKER) 0.42 K/ L    0.00-1.30           

      



             (test code = 415)                                        

 

             EOSINOPHILS ABSOLUTE COUNT 0.01 K/ L    0.00-0.50                 



             (BEAKER) (test code = 416)                                        

 

             BASOPHILS ABSOLUTE COUNT (BEAKER) 0.01 K/ L    0.00-0.20           

      



             (test code = 417)                                        



[id number] 16:01:00





             Test Item    Value        Reference Range Interpretation Comments

 

             ACTIVATED CLOTTING TIME 245 sec                                TEST

ED AT [facility] [address]



             (BEAKER) (test code =                                        [place]



             441)                                                [address]



[id number] 15:00:00





             Test Item    Value        Reference Range Interpretation Comments

 

             ACTIVATED CLOTTING TIME 255 sec                                TEST

ED AT [place] [address]



             ([facility]) (test code =                                        [place]



             441)                                                [address]



[id number] 14:44:00





             Test Item    Value        Reference Range Interpretation Comments

 

             ACTIVATED CLOTTING TIME 234 sec                                TEST

ED AT [place] [address]



             ([facility]) (test code =                                        [place]



             441)                                                [address]



[id number] 14:31:00





             Test Item    Value        Reference Range Interpretation Comments

 

             ACTIVATED CLOTTING TIME 219 sec                                TEST

ED AT [place] [address]



             ([facility]) (test code =                                        [place]



             441)                                                [address]



[id number] 14:31:00





             Test Item    Value        Reference Range Interpretation Comments

 

             ACTIVATED CLOTTING TIME 183 sec                                TEST

ED AT [place] [address]



             ([facility]) (test code =                                        [place]



             441)                                                [address]



[id number] 13:22:00





             Test Item    Value        Reference Range Interpretation Comments

 

             ACTIVATED CLOTTING TIME 137 sec                                TEST

ED AT [place] [address]



             ([facility]) (test code =                                        [place]



             441)                                                [address]



POCT-P2Y12 PLATELET [id number] 07:24:00





             Test Item    Value        Reference Range Interpretation Comments

 

             POC-P2Y12 PLATELET AGG ([facility]) (test 110 PRU                      

          



             code = 2303)                                        



RANGE INFORMATION: PRU reference range is 194-418. Post Drug Results: Lower PRU 
levels are associated with expected antiplatelet effect. Values may be below the
stated reference range above. The post-drug PRU values reported in the [employer]
P2Y12 package insert are .POCT-ASPIRIN PLATELET [id number] 
07:24:00





             Test Item    Value        Reference Range Interpretation Comments

 

             POC-ASPIRIN PLATELET AGG ([facility]) 402 ARU                          

      



             (test code = 2302)                                        



RANGE INFORMATION: 350-549 ARU Therapeutic range for platelet function.         
         550-700 ARU Non-Therapeutic range for platelet function.PLATELET 
AGGREGATION: FUNCTION [id number] 10:21:00





             Test Item    Value        Reference Range Interpretation Comments

 

             WEAK ADP     92 %         60-91        H            



             RESULT([facility]) (test                                        



             code = 2135)                                        

 

             PLATELET FUNCTION % indicates                           



             SCREEN INTERP (BEAKER) normal platelet                           



             (test code = 2173) function                               

 

             [id number] Meredith Reyes, MD                           



             (BEAKER) (test code = (electronic signature)                       

    



             5186)                                               

 

             PLATELET COUNT  K/CU MM  150-430                   



             (BEAKER) (test code =                                        



             1170)                                               



COMPREHENSIVE METABOLIC [id number] 05:11:00





             Test Item    Value        Reference Range Interpretation Comments

 

             TOTAL PROTEIN 6.5 gm/dL    6.0-8.3                   



             (BEAKER) (test code =                                        



             770)                                                

 

             ALBUMIN (BEAKER) 3.6 g/dL     3.5-5.0                   



             (test code = 1145)                                        

 

             ALKALINE PHOSPHATASE 60 U/L                           



             (BEAKER) (test code =                                        



             346)                                                

 

             BILIRUBIN TOTAL 1.1 mg/dL    0.2-1.2                   



             (BEAKER) (test code =                                        



             377)                                                

 

             SODIUM (BEAKER) (test 136 meq/L    136-145                   



             code = 381)                                         

 

             POTASSIUM (BEAKER) 3.8 meq/L    3.5-5.1                   



             (test code = 379)                                        

 

             CHLORIDE (BEAKER) 104 meq/L                        



             (test code = 382)                                        

 

             CO2 (BEAKER) (test 23 meq/L     22-29                     



             code = 355)                                         

 

             BLOOD UREA NITROGEN 13 mg/dL     7-21                      



             (BEAKER) (test code =                                        



             354)                                                

 

             CREATININE (BEAKER) 0.79 mg/dL   0.57-1.25                 



             (test code = 358)                                        

 

             GLUCOSE RANDOM 89 mg/dL                         



             (BEAKER) (test code =                                        



             652)                                                

 

             CALCIUM (BEAKER) 9.2 mg/dL    8.4-10.2                  



             (test code = 697)                                        

 

             AST (SGOT) (BEAKER) 19 U/L       5-34                      



             (test code = 353)                                        

 

             ALT (SGPT) (BEAKER) 8 U/L        6-55                      



             (test code = 347)                                        

 

             EGFR (BEAKER) (test 71 mL/min/1.73                           ESTIMA

[place] GFR IS



             code = 1092) sq m                                   NOT AS ACCURATE

 AS



                                                                 CREATININE



                                                                 CLEARANCE IN



                                                                 PREDICTING



                                                                 GLOMERULAR



                                                                 FILTRATION RATE

.



                                                                 ESTIMATED GFR I

S



                                                                 NOT APPLICABLE 

FOR



                                                                 DIALYSIS PATIEN

TS.



[id number] 05:04:00





             Test Item    Value        Reference Range Interpretation Comments

 

             PARTIAL THROMBOPLASTIN TIME 37.9 seconds 22.5-36.0    H            



             (BEAKER) (test code = 760)                                        



[id number] 16:56:00





             Test Item    Value        Reference Range Interpretation Comments

 

             PARTIAL THROMBOPLASTIN TIME 39.4 seconds 22.5-36.0    H            



             (BEAKER) (test code = 760)                                        



PROTHROMBIN TIME/[id number] 16:55:00





             Test Item    Value        Reference Range Interpretation Comments

 

             PROTIME (BEAKER) (test code = 14.4 seconds 11.7-14.7               

  



             759)                                                

 

             INR (BEAKER) (test code = 370) 1.1          <=5.9                  

   



RECOMMENDED COUMADIN/WARFARIN INR THERAPY RANGESSTANDARD DOSE: 2.0 - 3.0   
Includes: PROPHYLAXIS forvenous thrombosis, systemic embolization; TREATMENT for
venous thrombosis and/or pulmonary embolus.HIGH RISK: Target INR is 2.5-3.5 for 
patients with mechanical heart valves.COMPREHENSIVE METABOLIC [id number] 
02:20:00





             Test Item    Value        Reference Range Interpretation Comments

 

             TOTAL PROTEIN 7.0 gm/dL    6.0-8.3                   



             (BEAKER) (test code =                                        



             770)                                                

 

             ALBUMIN (BEAKER) 3.8 g/dL     3.5-5.0                   



             (test code = 1145)                                        

 

             ALKALINE PHOSPHATASE 68 U/L                           



             (BEAKER) (test code =                                        



             346)                                                

 

             BILIRUBIN TOTAL 0.9 mg/dL    0.2-1.2                   



             (BEAKER) (test code =                                        



             377)                                                

 

             SODIUM (BEAKER) (test 137 meq/L    136-145                   



             code = 381)                                         

 

             POTASSIUM (BEAKER) 3.5 meq/L    3.5-5.1                   



             (test code = 379)                                        

 

             CHLORIDE (BEAKER) 104 meq/L                        



             (test code = 382)                                        

 

             CO2 (BEAKER) (test 23 meq/L     22-29                     



             code = 355)                                         

 

             BLOOD UREA NITROGEN 10 mg/dL     7-21                      



             (BEAKER) (test code =                                        



             354)                                                

 

             CREATININE (BEAKER) 0.81 mg/dL   0.57-1.25                 



             (test code = 358)                                        

 

             GLUCOSE RANDOM 106 mg/dL           H            



             (BEAKER) (test code =                                        



             652)                                                

 

             CALCIUM (BEAKER) 9.7 mg/dL    8.4-10.2                  



             (test code = 697)                                        

 

             AST (SGOT) (BEAKER) 21 U/L       5-34                      



             (test code = 353)                                        

 

             ALT (SGPT) (BEAKER) 8 U/L        6-55                      



             (test code = 347)                                        

 

             EGFR (BEAKER) (test 69 mL/min/1.73                           ESTIMA

[place] GFR IS



             code = 1092) sq m                                   NOT AS ACCURATE

 AS



                                                                 CREATININE



                                                                 CLEARANCE IN



                                                                 PREDICTING



                                                                 GLOMERULAR



                                                                 FILTRATION RATE

.



                                                                 ESTIMATED GFR I

S



                                                                 NOT APPLICABLE 

FOR



                                                                 DIALYSIS PATIEN

TS.



HEMOGLOBIN [id number] 07:40:00





             Test Item    Value        Reference Range Interpretation Comments

 

             HEMOGLOBIN A1C (BEAKER) (test code = 4.9 %        4.3-6.1          

         



             368)                                                



VITAMIN [id number] 06:01:00





             Test Item    Value        Reference Range Interpretation Comments

 

             VITAMIN B12 (BEAKER) (test code = 826 pg/mL    213-816      H      

      



             774)                                                



TSH/FREE T4 IF [id number] 06:01:00





             Test Item    Value        Reference Range Interpretation Comments

 

             THYROID STIMULATING HORMONE 1.37 uIU/mL  0.35-4.94                 



             (BEAKER) (test code = 772)                                        



CALCIUM, [id number] 05:48:00





             Test Item    Value        Reference Range Interpretation Comments

 

             CALCIUM IONIZED (BEAKER) (test 1.11 mmol/L  1.12-1.27    L         

   



             code = 698)                                         

 

             PH, BLOOD (BEAKER) (test code = 7.41                               

    



             1810)                                               



LIPID [id number] 05:33:00





             Test Item    Value        Reference Range Interpretation Comments

 

             TRIGLYCERIDES (BEAKER) (test code = 57 mg/dL                       

        



             540)                                                

 

             CHOLESTEROL (BEAKER) (test code = 204 mg/dL                        

      



             631)                                                

 

             HDL CHOLESTEROL (BEAKER) (test code 62 mg/dL                       

        



             = 976)                                              

 

             LDL CHOLESTEROL CALCULATED (BEAKER) 131 mg/dL                      

        



             (test code = 633)                                        



Triglyceride Reference Range:   Low Risk         &lt;150   Borderline    150-199
  High Risk     200-499   Very High Risk  &gt;=500Cholesterol Reference Range:  
Low Risk         &lt;200   Borderline 200-239    High Risk        &gt;240HDL 
Cholesterol Reference Range:   Low Risk         &gt;=60   High Risk         
&lt;40LDL Cholesterol Reference Range:   Optimal          &lt;100   Near Optimal
 100-129   Borderline    130-159   High          160-189   Very High       
&gt;=[id number] 05:33:00





             Test Item    Value        Reference Range Interpretation Comments

 

             PHOSPHORUS (BEAKER) (test code = 3.3 mg/dL    2.3-4.7              

     



             604)                                                



[id number] 05:33:00





             Test Item    Value        Reference Range Interpretation Comments

 

             MAGNESIUM (BEAKER) (test code = 2.0 mg/dL    1.6-2.6               

    



             627)                                                



COMPREHENSIVE METABOLIC [id number] 05:33:00





             Test Item    Value        Reference Range Interpretation Comments

 

             TOTAL PROTEIN 5.9 gm/dL    6.0-8.3      L            



             (BEAKER) (test code =                                        



             770)                                                

 

             ALBUMIN (BEAKER) 3.3 g/dL     3.5-5.0      L            



             (test code = 1145)                                        

 

             ALKALINE PHOSPHATASE 56 U/L                           



             (BEAKER) (test code =                                        



             346)                                                

 

             BILIRUBIN TOTAL 0.6 mg/dL    0.2-1.2                   



             (BEAKER) (test code =                                        



             377)                                                

 

             SODIUM (BEAKER) (test 140 meq/L    136-145                   



             code = 381)                                         

 

             POTASSIUM (BEAKER) 3.7 meq/L    3.5-5.1                   



             (test code = 379)                                        

 

             CHLORIDE (BEAKER) 106 meq/L                        



             (test code = 382)                                        

 

             CO2 (BEAKER) (test 27 meq/L     22-29                     



             code = 355)                                         

 

             BLOOD UREA NITROGEN 13 mg/dL     7-21                      



             (BEAKER) (test code =                                        



             354)                                                

 

             CREATININE (BEAKER) 0.79 mg/dL   0.57-1.25                 



             (test code = 358)                                        

 

             GLUCOSE RANDOM 95 mg/dL                         



             (BEAKER) (test code =                                        



             652)                                                

 

             CALCIUM (BEAKER) 8.7 mg/dL    8.4-10.2                  



             (test code = 697)                                        

 

             AST (SGOT) (BEAKER) 16 U/L       5-34                      



             (test code = 353)                                        

 

             ALT (SGPT) (BEAKER) 7 U/L        6-55                      



             (test code = 347)                                        

 

             EGFR (BEAKER) (test 71 mL/min/1.73                           ESTIMA

[place] GFR IS



             code = 1092) sq m                                   NOT AS ACCURATE

 AS



                                                                 CREATININE



                                                                 CLEARANCE IN



                                                                 PREDICTING



                                                                 GLOMERULAR



                                                                 FILTRATION RATE

.



                                                                 ESTIMATED GFR I

S



                                                                 NOT APPLICABLE 

FOR



                                                                 DIALYSIS PATIEN

TS.



CBC W/PLT COUNT &amp; AUTO [id number] 04:51:00





             Test Item    Value        Reference Range Interpretation Comments

 

             WHITE BLOOD CELL COUNT (BEAKER) 4.8 K/ L     4.0-10.0              

    



             (test code = 775)                                        

 

             RED BLOOD CELL COUNT (BEAKER) 4.01 M/ L    4.00-5.00               

  



             (test code = 761)                                        

 

             HEMOGLOBIN (BEAKER) (test code = 13.4 GM/DL   12.0-15.0            

     



             410)                                                

 

             HEMATOCRIT (BEAKER) (test code = 39.0 %       36.0-45.0            

     



             411)                                                

 

             MEAN CORPUSCULAR VOLUME (BEAKER) 97.5 fL      82.0-99.0            

     



             (test code = 753)                                        

 

             MEAN CORPUSCULAR HEMOGLOBIN 33.5 pg      27.0-33.0    H            



             (BEAKER) (test code = 751)                                        

 

             MEAN CORPUSCULAR HEMOGLOBIN CONC 34.4 GM/DL   32.0-36.0            

     



             (BEAKER) (test code = 752)                                        

 

             RED CELL DISTRIBUTION WIDTH 12.8 %       10.3-14.2                 



             (BEAKER) (test code = 412)                                        

 

             PLATELET COUNT (BEAKER) (test 193 K/CU MM  150-430                 

  



             code = 756)                                         

 

             MEAN PLATELET VOLUME (BEAKER) 7.0 fL       6.5-10.5                

  



             (test code = 754)                                        

 

             NUCLEATED RED BLOOD CELLS 0 /100 WBC   0-0                       



             (BEAKER) (test code = 413)                                        

 

             NEUTROPHILS RELATIVE PERCENT 50 %                                  

 



             (BEAKER) (test code = 429)                                        

 

             LYMPHOCYTES RELATIVE PERCENT 40 %                                  

 



             (BEAKER) (test code = 430)                                        

 

             MONOCYTES RELATIVE PERCENT 9 %                                    



             (BEAKER) (test code = 431)                                        

 

             EOSINOPHILS RELATIVE PERCENT 0 %                                   

 



             (BEAKER) (test code = 432)                                        

 

             BASOPHILS RELATIVE PERCENT 0 %                                    



             (BEAKER) (test code = 437)                                        

 

             NEUTROPHILS ABSOLUTE COUNT 2.38 K/ L    1.80-8.00                 



             (BEAKER) (test code = 670)                                        

 

             LYMPHOCYTES ABSOLUTE COUNT 1.93 K/ L    1.48-4.50                 



             (BEAKER) (test code = 414)                                        

 

             MONOCYTES ABSOLUTE COUNT (BEAKER) 0.44 K/ L    0.00-1.30           

      



             (test code = 415)                                        

 

             EOSINOPHILS ABSOLUTE COUNT 0.01 K/ L    0.00-0.50                 



             (BEAKER) (test code = 416)                                        

 

             BASOPHILS ABSOLUTE COUNT (BEAKER) 0.02 K/ L    0.00-0.20           

      



             (test code = 417)                                        



0.00COMPREHENSIVE METABOLIC [id number] 22:47:00





             Test Item    Value        Reference Range Interpretation Comments

 

             TOTAL PROTEIN 5.8 gm/dL    6.0-8.3      L            



             (BEAKER) (test code =                                        



             770)                                                

 

             ALBUMIN (BEAKER) 3.2 g/dL     3.5-5.0      L            



             (test code = 1145)                                        

 

             ALKALINE PHOSPHATASE 56 U/L                           



             (BEAKER) (test code =                                        



             346)                                                

 

             BILIRUBIN TOTAL 0.5 mg/dL    0.2-1.2                   



             (BEAKER) (test code =                                        



             377)                                                

 

             SODIUM (BEAKER) (test 140 meq/L    136-145                   



             code = 381)                                         

 

             POTASSIUM (BEAKER) 3.7 meq/L    3.5-5.1                   



             (test code = 379)                                        

 

             CHLORIDE (BEAKER) 108 meq/L           H            



             (test code = 382)                                        

 

             CO2 (BEAKER) (test 25 meq/L     22-29                     



             code = 355)                                         

 

             BLOOD UREA NITROGEN 11 mg/dL     7-21                      



             (BEAKER) (test code =                                        



             354)                                                

 

             CREATININE (BEAKER) 0.83 mg/dL   0.57-1.25                 



             (test code = 358)                                        

 

             GLUCOSE RANDOM 94 mg/dL                         



             (BEAKER) (test code =                                        



             652)                                                

 

             CALCIUM (BEAKER) 8.6 mg/dL    8.4-10.2                  



             (test code = 697)                                        

 

             AST (SGOT) (BEAKER) 15 U/L       5-34                      



             (test code = 353)                                        

 

             ALT (SGPT) (BEAKER) 7 U/L        6-55                      



             (test code = 347)                                        

 

             EGFR (BEAKER) (test 67 mL/min/1.73                           ESTIMA

[place] GFR IS



             code = 1092) sq m                                   NOT AS ACCURATE

 AS



                                                                 CREATININE



                                                                 CLEARANCE IN



                                                                 PREDICTING



                                                                 GLOMERULAR



                                                                 FILTRATION RATE

.



                                                                 ESTIMATED GFR I

S



                                                                 NOT APPLICABLE 

FOR



                                                                 DIALYSIS PATIEN

TS.

## 2021-03-31 NOTE — EDPHYS
Physician Documentation                                                                           

 [facility]                                                                 

Name: Jerica Estrada                                                                           

Age: 81 yrs                                                                                       

Sex: Female                                                                                       

: 1940                                                                                   

MRN: [id number]                                                                                   

Arrival Date: 2021                                                                          

Time: 11:25                                                                                       

Account#: [id number]                                                                            

Bed 7                                                                                             

Private MD:                                                                                       

[facility] Physician Kenney Mcdermott                                                                      

HPI:                                                                                              

                                                                                             

14:55 This 81 yrs old  Female presents to ER via Wheelchair with complaints of High  jolie 

      Blood Pressure.                                                                             

14:55 The patient has elevated blood pressure and discovered this at home.                    jolie 

                                                                                                  

Historical:                                                                                       

- Allergies:                                                                                      

12:09 Bactrim;                                                                                ss  

12:09 Ciprofloxacin;                                                                          ss  

12:09 Codeine;                                                                                ss  

12:09 Demerol;                                                                                ss  

12:09 Diovan HCT;                                                                             ss  

12:09 Hydrochlorothiazide;                                                                    ss  

12:09 Iodinated Contrast Media - IV Dye;                                                      ss  

12:09 Tetanus Vaccines \T\ Toxoid;                                                              ss

12:09 valsartan;                                                                              ss  

- Home Meds:                                                                                      

15:10 carbidopa-levodopa  mg Oral tab 1 tab 3 times per day [Active]; clopidogrel 75 mg hb  

      Oral tab 1 tab once daily [Active]; metoprolol tartrate 25 mg Oral tab 2 tabs once          

      daily [Active]; atorvastatin 40 mg Oral tab 1 tab once daily [Active]; magnesium oxide      

      250 mg Oral tab daily [Active]; folic acid 400 mcg Oral tab 2 tab once daily [Active];      

      Vitamin B-12 1,000 mcg Oral tab daily [Active]; Ocuvite Oral daily [Active]; cranberry      

      500 mg Oral cap 2 tab daily [Active]; melatonin 5 mg Oral tab nightly [Active];             

- PMHx:                                                                                           

12:09 Aneurysm; HARD OF HEARING; Hypertension; Parkinsons; vericose veins;                    ss  

- PSHx:                                                                                           

12:09 dov knee replacement; coil stent;                                                       ss  

15:10 "stent for coil aneurysm";                                                              hb  

                                                                                                  

- Immunization history:: Flu vaccine is not up to date.                                           

- Social history:: Smoking status: Patient denies any tobacco usage or history of.                

- Family history:: not pertinent.                                                                 

                                                                                                  

                                                                                                  

ROS:                                                                                              

14:57 Constitutional: Negative for fever, chills, and weight loss, Eyes: Negative for injury, jolie 

      pain, redness, and discharge, ENT: Negative for injury, pain, and discharge, Neck:          

      Negative for injury, pain, and swelling, Cardiovascular: Negative for chest pain,           

      palpitations, and edema, Respiratory: Negative for shortness of breath, cough,              

      wheezing, and pleuritic chest pain, Abdomen/GI: Negative for abdominal pain, nausea,        

      vomiting, diarrhea, and constipation, Back: Negative for injury and pain, : Negative      

      for injury, bleeding, discharge, and swelling, MS/Extremity: Negative for injury and        

      deformity, Skin: Negative for injury, rash, and discoloration, Psych: Negative for          

      depression, anxiety, suicide ideation, homicidal ideation, and hallucinations,              

      Allergy/Immunology: Negative for hives, rash, and allergies, Endocrine: Negative for        

      neck swelling, polydipsia, polyuria, polyphagia, and marked weight changes,                 

      Hematologic/Lymphatic: Negative for swollen nodes, abnormal bleeding, and unusual           

      bruising.                                                                                   

14:57 Neuro: Positive for weakness.                                                               

                                                                                                  

Exam:                                                                                             

14:57 Constitutional:  This is a well developed, well nourished patient who is awake, alert,  jolie 

      and in no acute distress. Head/Face:  Normocephalic, atraumatic. Eyes:  Pupils equal        

      round and reactive to light, extra-ocular motions intact.  Lids and lashes normal.          

      Conjunctiva and sclera are non-icteric and not injected.  Cornea within normal limits.      

      Periorbital areas with no swelling, redness, or edema. ENT:  Nares patent. No nasal         

      discharge, no septal abnormalities noted.  Tympanic membranes are normal and external       

      auditory canals are clear.  Oropharynx with no redness, swelling, or masses, exudates,      

      or evidence of obstruction, uvula midline.  Mucous membranes moist. Neck:  Trachea          

      midline, no thyromegaly or masses palpated, and no cervical lymphadenopathy.  Supple,       

      full range of motion without nuchal rigidity, or vertebral point tenderness.  No            

      Meningismus. Chest/axilla:  Normal chest wall appearance and motion.  Nontender with no     

      deformity.  No lesions are appreciated. Cardiovascular:  Regular rate and rhythm with a     

      normal S1 and S2.  No gallops, murmurs, or rubs.  Normal PMI, no JVD.  No pulse             

      deficits. Respiratory:  Lungs have equal breath sounds bilaterally, clear to                

      auscultation and percussion.  No rales, rhonchi or wheezes noted.  No increased work of     

      breathing, no retractions or nasal flaring. Abdomen/GI:  Soft, non-tender, with normal      

      bowel sounds.  No distension or tympany.  No guarding or rebound.  No evidence of           

      tenderness throughout. Back:  No spinal tenderness.  No costovertebral tenderness.          

      Full range of motion. Female :  Normal external genitalia. Skin:  Warm, dry with          

      normal turgor.  Normal color with no rashes, no lesions, and no evidence of cellulitis.     

      MS/ Extremity:  Pulses equal, no cyanosis.  Neurovascular intact.  Full, normal range       

      of motion. Neuro:  Awake and alert, GCS 15, oriented to person, place, time, and            

      situation.  Cranial nerves II-XII grossly intact.  Motor strength 5/5 in all                

      extremities.  Sensory grossly intact.  Cerebellar exam normal.  Normal gait. Psych:         

      Awake, alert, with orientation to person, place and time.  Behavior, mood, and affect       

      are within normal limits.                                                                   

15:22 ECG was reviewed by the Attending Physician.                                            [facility] 

                                                                                                  

Vital Signs:                                                                                      

12:06 BP 96 / 64; Pulse 73; Resp 16; Temp 97.0(TE); Pulse Ox 99% on R/A; Weight 65.77 kg;     ss  

      Pain 0/10;                                                                                  

12:55  / 79; Pulse 69; Resp 17; Pulse Ox 100% on R/A;                                   sv  

13:30  / 92 LA; Pulse 74; Resp 15; Pulse Ox 98% on R/A;                                 hb  

13:45  / 85 RA; Pulse 73; Resp 14; Pulse Ox 99% ;                                       hb  

14:00  / 77; Pulse 82; Resp 15; Pulse Ox 98% on R/A;                                    sv  

15:00  / 97; Pulse 84; Resp 22; Pulse Ox 99% ;                                          sv  

16:00  / 99; Pulse 89; Resp 20; Pulse Ox 98% on R/A;                                    sv  

17:00  / 87; Pulse 87; Resp 17; Pulse Ox 100% on R/A;                                   sv  

17:50  / 89; Pulse 93; Resp 16; Pulse Ox 99% on R/A;                                    sv  

18:30  / 82; Pulse 77; Resp 17; Pulse Ox 98% on R/A;                                    hb  

                                                                                                  

NIH Stroke Scale Scores:                                                                          

14:57 NIHSS Score: 0                                                                          jolie 

                                                                                                  

MDM:                                                                                              

12:11 Patient medically screened.                                                             jolie 

14:59 Differential diagnosis:. Differential Diagnosis altered mental status, sepsis.          jolie 

      Differential Diagnosis: CVA, hypoglycemia, pneumonia, TIA, UTI, volume depletion. Data      

      reviewed: vital signs, nurses notes, lab test result(s), EKG, radiologic studies, CT        

      scan, plain films. Data interpreted: Cardiac monitor: rate is 82 beats/min, rhythm is       

      regular, Pulse oximetry: on room air is 98 %. Test interpretation: by ED physician or       

      midlevel provider: ECG, plain radiologic studies. Counseling: I had a detailed              

      discussion with the patient and/or guardian regarding: the historical points, exam          

      findings, and any diagnostic results supporting the discharge/admit diagnosis, the          

      presence of at least one elevated blood pressure reading (>120/80) during this              

      emergency department visit, lab results, radiology results, the need for further            

      work-up and treatment in the hospital.                                                      

                                                                                                  

                                                                                             

12:30 Order name: Basic Metabolic Panel; Complete Time: 13:27                                 [facility] 

                                                                                             

12:30 Order name: CBC with Diff; Complete Time: 13:27                                         [facility] 

                                                                                             

12:30 Order name: LFT's; Complete Time: 13:27                                                 [facility] 

                                                                                             

12:30 Order name: Magnesium; Complete Time: 13:27                                             [facility] 

                                                                                             

12:30 Order name: NT PRO-BNP; Complete Time: 13:27                                            [facility] 

                                                                                             

12:30 Order name: PT-INR; Complete Time: 13:27                                                [facility] 

                                                                                             

12:30 Order name: Troponin (emerg Dept Use Only); Complete Time: 13:27                        [facility] 

                                                                                             

12:30 Order name: XRAY Chest (1 view); Complete Time: 14:17                                   [facility] 

                                                                                             

13:25 Order name: CT Head Brain wo Cont; Complete Time: 15:28                                 [facility] 

                                                                                             

13:25 Order name: Urine Culture                                                               [facility] 

                                                                                             

13:25 Order name: Hip Right 2 View XRAY; Complete Time: 14:17                                 [facility] 

                                                                                             

14:36 Order name: Urine Dipstick--Ancillary (enter results)                                     

                                                                                             

15:00 Order name: COVID-19 : Document "Date of Symptom Onset" if Symptomatic.                   

                                                                                             

17:17 Order name: SARS-COV-2 RT PCR                                                           [facility]

                                                                                             

12:30 Order name: EKG; Complete Time: 12:31                                                   [facility] 

                                                                                             

12:30 Order name: Cardiac monitoring; Complete Time: 12:53                                    [facility] 

                                                                                             

12:30 Order name: EKG - Nurse/Tech; Complete Time: 12:53                                      [facility] 

                                                                                             

12:30 Order name: IV Saline Lock; Complete Time: 12:53                                        [facility] 

                                                                                             

12:30 Order name: Labs collected and sent; Complete Time: 12:53                               [facility] 

                                                                                             

12:30 Order name: O2 Per Protocol; Complete Time: 12:53                                       [facility] 

                                                                                             

12:30 Order name: O2 Sat Monitoring; Complete Time: 12:54                                     [facility] 

                                                                                             

12:30 Order name: Urine Dipstick-Ancillary (obtain specimen); Complete Time: 13:53            [facility] 

                                                                                             

13:25 Order name: US Extremity Venous W Compression Dov                                       jolie 

                                                                                                  

ECG:                                                                                              

15:22 Rate is 66 beats/min. Rhythm is regular. QRS Axis is Normal. MN interval is shortened   jolie 

      at 210 msec. QRS interval is normal. QT interval is normal. No Q waves. T waves are         

      Normal. No ST changes noted. Clinical impression: NSR w/ Non-specific ST/T Changes and      

      No evidence of ischemia. Interpreted by me. Reviewed by me.                                 

                                                                                                  

Administered Medications:                                                                         

12:53 Drug: NS 0.9% 1000 ml Route: IV; Rate: 125 ml/hr; Site: right antecubital;              sv  

19:08 Follow up: Response: No adverse reaction; IV Status: Infusion continued upon admission  sv  

13:50 Drug: NS 0.9% 1000 ml Route: IV; Rate: 1 bolus; Site: right antecubital;                hb  

15:05 Follow up: Response: No adverse reaction; IV Status: Completed infusion; IV Intake:     hb  

      1000ml                                                                                      

                                                                                                  

                                                                                                  

Disposition:                                                                                      

21 15:05 Hospitalization ordered by Samuel Todd for Observation. Preliminary            

  diagnosis are Weakness, Hypotension.                                                            

- Bed requested for Telemetry/MedSurg (observation).                                              

- Status is Observation.                                                                      sv  

- Condition is Fair.                                                                              

- Problem is new.                                                                                 

- Symptoms have improved.                                                                         

                                                                                                  

                                                                                                  

                                                                                                  

                                                                                                  

NIH Stroke Scale - NIH Stroke Score                                                               

Date: 2021                                                                                  

Time: 14:57                                                                                       

Total Score = 0                                                                                   

  1a. Level of Consciousness (LOC) - 0(Alert)                                                     

  1b. Level of Consciousness (LOC) (Year \T\ Age) - 0(Both)                                       

  1c. LOC Commands (Open \T\ Closes Eyes/) - 0(Both)                                          

   2. Best Gaze (Lateral Gaze Paresis) - 0(Normal)                                                

   3. Visual Field Loss - 0(No visual loss)                                                       

   4. Facial Palsy - 0(Normal)                                                                    

  5a. Left Arm: Motor (10-second hold) - 0(No drift)                                              

  5b. Right Arm: Motor (10-second hold) - 0(No drift)                                             

  6a. Left Leg: Motor (5-second hold - always test supine) - 0(No drift)                          

  6b. Right Leg: Motor (5-second hold - always test supine) - 0(No drift)                         

   7. Limb Ataxia (finger/nose \T\ heel/shin - test with eyes open) - 0(Absent)                   

   8. Sensory Loss (pinprick arms/legs/face) - 0(Normal)                                          

   9. Best Language: Aphasia (description/naming/reading) - 0(No aphasia)                         

  10. Dysarthria (speech clarity - read or repeat words) - 0(Normal)                              

  11. Extinction and Inattention (visual/tactile/auditory/spatial/personal) - 0(No                

      abnormality)                                                                                

Initials: [facility]                                                                                     

                                                                                                  

Signatures:                                                                                       

Dispatcher [employer]                           EDMS                                                 

Opal Velez Stephanie, RN                    Kenney Cowan MD MD cha Smirch, Shelby, RN RN                                                      

Immanuel Fernandes, FNP-C                      FNP-Cla1                                                  

Lida Bhatia RN                     RN                                                      

                                                                                                  

Corrections: (The following items were deleted from the chart)                                    

18:00 15:05 Hospitalization Ordered by Samuel Todd MD for Observation. Preliminary bd          

      diagnosis is Weakness; Hypotension. Bed requested for Telemetry/MedSurg                     

      (observation). Status is Observation. Condition is Fair. Problem is new.                    

      Symptoms have improved. [facility]                                                                 

18:59 18:00 2021 15:05 Hospitalization Ordered by Samuel Todd MD for         sv          

      Observation. Preliminary diagnosis is Weakness; Hypotension. Bed requested for              

      Telemetry/MedSurg (observation). Status is Observation. Condition is Fair.                  

      Problem is new. Symptoms have improved. bd                                                  

                                                                                                  

**************************************************************************************************

## 2021-03-31 NOTE — RAD REPORT
EXAM DESCRIPTION:  CT - Head Brain Wo Cont - 3/31/2021 2:25 pm

 

CLINICAL HISTORY:  MENTAL STATUS CHANGE

 

COMPARISON:  Ct Stroke Brain Wo Cont dated 6/28/2020

 

TECHNIQUE:  Axial 5 mm thick images of the head were obtained without IV contrast.

 

All CT scans are performed using dose optimization technique as appropriate and may include automated
 exposure control or mA/KV adjustment according to patient size.

 

FINDINGS:  No intracranial hemorrhage, mass, edema or shift of mid-line structures. No acute infarcti
on changes seen. Mild atrophy changes are present for age. No cortical edema or sulcal effacement. Mo
derate severity chronic ischemic change seen in the cerebral white matter. Right-sided [place]
is aneurysm clip is in place. Ventricles are in proportion to the minimal volume loss. Intracranial f
indings are similar to comparison. Dense arterial tree calcifications are present.

 

Mastoid air cells and visualized portions of the paranasal sinuses are clear.

 

No acute bony findings.

 

 

IMPRESSION:  No acute intracranial finding identifiable.

 

Above detailed findings are not significantly different from June 2020 comparison.

## 2021-03-31 NOTE — ER
Nurse's Notes                                                                                     

 [facility]                                                                 

Name: Jerica Estrada                                                                           

Age: 81 yrs                                                                                       

Sex: Female                                                                                       

: 1940                                                                                   

MRN: [id number]                                                                                   

Arrival Date: 2021                                                                          

Time: 11:25                                                                                       

Account#: [id number]                                                                            

Bed 7                                                                                             

Private MD:                                                                                       

Diagnosis: Weakness;Hypotension                                                                   

                                                                                                  

Presentation:                                                                                     

                                                                                             

12:06 Chief complaint: Patient's son or daughter states: "We were at Dr. Todd's office and   

      her BP was 70/40. She was a little disoriented this morning, so they wanted us to come      

      over. She is feeling a little better now.". Coronavirus screen: Client denies travel        

      out of the [place] in the last 14 days. Ebola Screen: Patient denies exposure to               

      infectious person. Patient denies travel to an Ebola-affected area in the 21 days           

      before illness onset. Initial Sepsis Screen: Does the patient meet any 2 criteria? No.      

      Patient's initial sepsis screen is negative. Does the patient have a suspected source       

      of infection? No. Patient's initial sepsis screen is negative. Risk Assessment: Do you      

      want to hurt yourself or someone else? Patient reports no desire to harm self or            

      others. Onset of symptoms was 2021.                                               

12:06 Method Of Arrival: Wheelchair                                                           ss  

12:06 Acuity: LYNNETTE 3                                                                           ss  

                                                                                                  

Historical:                                                                                       

- Allergies:                                                                                      

12:09 Bactrim;                                                                                ss  

12:09 Ciprofloxacin;                                                                          ss  

12:09 Codeine;                                                                                ss  

12:09 Demerol;                                                                                ss  

12:09 Diovan HCT;                                                                             ss  

12:09 Hydrochlorothiazide;                                                                    ss  

12:09 Iodinated Contrast Media - IV Dye;                                                      ss  

12:09 Tetanus Vaccines \T\ Toxoid;                                                              ss

12:09 valsartan;                                                                              ss  

- Home Meds:                                                                                      

15:10 carbidopa-levodopa  mg Oral tab 1 tab 3 times per day [Active]; clopidogrel 75 mg hb  

      Oral tab 1 tab once daily [Active]; metoprolol tartrate 25 mg Oral tab 2 tabs once          

      daily [Active]; atorvastatin 40 mg Oral tab 1 tab once daily [Active]; magnesium oxide      

      250 mg Oral tab daily [Active]; folic acid 400 mcg Oral tab 2 tab once daily [Active];      

      Vitamin B-12 1,000 mcg Oral tab daily [Active]; Ocuvite Oral daily [Active]; cranberry      

      500 mg Oral cap 2 tab daily [Active]; melatonin 5 mg Oral tab nightly [Active];             

- PMHx:                                                                                           

12:09 Aneurysm; HARD OF HEARING; Hypertension; Parkinsons; vericose veins;                    ss  

- PSHx:                                                                                           

12:09 dov knee replacement; coil stent;                                                       ss  

15:10 "stent for coil aneurysm";                                                              hb  

                                                                                                  

- Immunization history:: Flu vaccine is not up to date.                                           

- Social history:: Smoking status: Patient denies any tobacco usage or history of.                

- Family history:: not pertinent.                                                                 

                                                                                                  

                                                                                                  

Screenin:45 Abuse screen: Denies threats or abuse. Denies injuries from another. Nutritional        sv  

      screening: No deficits noted. Tuberculosis screening: No symptoms or risk factors           

      identified. Fall Risk None identified.                                                      

                                                                                                  

Assessment:                                                                                       

12:45 General: Appears in no apparent distress. comfortable, slender, well groomed, well      sv  

      developed, Behavior is calm, cooperative, appropriate for age. Pain: Denies pain.           

      Neuro: Level of Consciousness is awake, alert, obeys commands, Oriented to person,          

      place, time, situation, Moves all extremities. Full function Gait is steady, with her       

      walker. Reports dizziness. Cardiovascular: Patient's skin is warm and dry. Pulses are       

      palpable in right brachial artery and left brachial artery Rhythm is sinus rhythm.          

      Respiratory: Airway is patent Respiratory effort is even, unlabored, Respiratory            

      pattern is regular, symmetrical. Derm: Skin is normal.                                      

13:45 Reassessment: Patient appears in no apparent distress at this time. No changes from     hb  

      previously documented assessment. Patient and/or family updated on plan of care and         

      expected duration. Pain level reassessed.                                                   

15:30 Reassessment: Patient appears in no apparent distress at this time. No changes from     hb  

      previously documented assessment. Patient and/or family updated on plan of care and         

      expected duration. Pain level reassessed.                                                   

16:30 Reassessment: Patient appears in no apparent distress at this time. No changes from     hb  

      previously documented assessment. Patient and/or family updated on plan of care and         

      expected duration. Pain level reassessed.                                                   

17:30 Reassessment: Patient appears in no apparent distress at this time. No changes from     hb  

      previously documented assessment. Patient and/or family updated on plan of care and         

      expected duration. Pain level reassessed.                                                   

18:30 Reassessment: Patient appears in no apparent distress at this time. No changes from     hb  

      previously documented assessment. Patient and/or family updated on plan of care and         

      expected duration. Pain level reassessed.                                                   

                                                                                                  

Vital Signs:                                                                                      

12:06 BP 96 / 64; Pulse 73; Resp 16; Temp 97.0(TE); Pulse Ox 99% on R/A; Weight 65.77 kg;     ss  

      Pain 0/10;                                                                                  

12:55  / 79; Pulse 69; Resp 17; Pulse Ox 100% on R/A;                                   sv  

13:30  / 92 LA; Pulse 74; Resp 15; Pulse Ox 98% on R/A;                                 hb  

13:45  / 85 RA; Pulse 73; Resp 14; Pulse Ox 99% ;                                       hb  

14:00  / 77; Pulse 82; Resp 15; Pulse Ox 98% on R/A;                                    sv  

15:00  / 97; Pulse 84; Resp 22; Pulse Ox 99% ;                                          sv  

16:00  / 99; Pulse 89; Resp 20; Pulse Ox 98% on R/A;                                    sv  

17:00  / 87; Pulse 87; Resp 17; Pulse Ox 100% on R/A;                                   sv  

17:50  / 89; Pulse 93; Resp 16; Pulse Ox 99% on R/A;                                    sv  

18:30  / 82; Pulse 77; Resp 17; Pulse Ox 98% on R/A;                                    hb  

                                                                                                  

NIH Stroke Scale Scores:                                                                          

14:57 NIHSS Score: 0                                                                          jolie 

                                                                                                  

ED Course:                                                                                        

11:25 Patient arrived in ED.                                                                  ds1 

12:08 Triage completed.                                                                       ss  

12:09 Arm band placed on right wrist.                                                         ss  

12:11 Kenney Mcdermott MD is Attending Physician.                                             jolie 

12:25 Rosetta Snyder, RN is Primary Nurse.                                                  sv  

12:45 Patient has correct armband on for positive identification. Placed in gown. Bed in low  sv  

      position. Call light in reach. Side rails up X2. Adult w/ patient. Cardiac monitor on.      

      Pulse ox on. NIBP on. Door closed. Warm blanket given. Head of bed elevated.                

12:45 Inserted saline lock: 20 gauge in right antecubital area, using aseptic technique.      sv  

      Blood collected. Flushed right antecubital with 5 ml normal saline.                         

13:05 XRAY Chest (1 view) Sent.                                                               sv  

13:31 Note: cat scan will give pt. to ultrasound when finished--- hr.                         hr  

13:32 X-ray(s) taken.                                                                         sv  

13:40 XRAY Chest (1 view) In Process Unspecified.                                             EDMS

13:53 Straight cath inserted, using sterile technique, 16 Fr. Specimen obtained. Returned     ss  

      clear yellow urine. Patient tolerated well.                                                 

13:55 Hip Right 2 View XRAY In Process Unspecified.                                           EDMS

14:25 CT Head Brain wo Cont In Process Unspecified.                                           EDMS

15:01 Samuel Todd MD is Hospitalizing Provider.                                          jolie 

15:21 US Extremity Venous W Compression Dov In Process Unspecified.                           EDMS

18:24 No provider procedures requiring assistance completed. Patient admitted, IV remains in  sv  

      place. intact.                                                                              

                                                                                                  

Administered Medications:                                                                         

12:53 Drug: NS 0.9% 1000 ml Route: IV; Rate: 125 ml/hr; Site: right antecubital;              sv  

19:08 Follow up: Response: No adverse reaction; IV Status: Infusion continued upon admission  sv  

13:50 Drug: NS 0.9% 1000 ml Route: IV; Rate: 1 bolus; Site: right antecubital;                hb  

15:05 Follow up: Response: No adverse reaction; IV Status: Completed infusion; IV Intake:     hb  

      1000ml                                                                                      

                                                                                                  

                                                                                                  

Intake:                                                                                           

15:05 IV: 1000ml; Total: 1000ml.                                                              hb  

                                                                                                  

Outcome:                                                                                          

15:05 Decision to Hospitalize by Provider.                                                    jolie 

18:24 Admitted to Tele accompanied by tech, via wheelchair, room 228, with chart, Report      sv  

      called to  Мария HOBBS                                                                       

18:24 Condition: stable                                                                           

18:24 Instructed on the need for admit.                                                           

18:59 Patient left the ED.                                                                    sv  

                                                                                                  

                                                                                                  

NIH Stroke Scale - NIH Stroke Score                                                               

Date: 2021                                                                                  

Time: 14:57                                                                                       

Total Score = 0                                                                                   

  1a. Level of Consciousness (LOC) - 0(Alert)                                                     

  1b. Level of Consciousness (LOC) (Year \T\ Age) - 0(Both)                                       

  1c. LOC Commands (Open \T\ Closes Eyes/) - 0(Both)                                          

   2. Best Gaze (Lateral Gaze Paresis) - 0(Normal)                                                

   3. Visual Field Loss - 0(No visual loss)                                                       

   4. Facial Palsy - 0(Normal)                                                                    

  5a. Left Arm: Motor (10-second hold) - 0(No drift)                                              

  5b. Right Arm: Motor (10-second hold) - 0(No drift)                                             

  6a. Left Leg: Motor (5-second hold - always test supine) - 0(No drift)                          

  6b. Right Leg: Motor (5-second hold - always test supine) - 0(No drift)                         

   7. Limb Ataxia (finger/nose \T\ heel/shin - test with eyes open) - 0(Absent)                   

   8. Sensory Loss (pinprick arms/legs/face) - 0(Normal)                                          

   9. Best Language: Aphasia (description/naming/reading) - 0(No aphasia)                         

  10. Dysarthria (speech clarity - read or repeat words) - 0(Normal)                              

  11. Extinction and Inattention (visual/tactile/auditory/spatial/personal) - 0(No                

      abnormality)                                                                                

Initials: [facility]                                                                                     

                                                                                                  

Signatures:                                                                                       

Dispatcher [employer]                           Rosetta Ham RN RN sv Anderson, Corey, MD MD cha Rod, Haley hr Sanford, Renata                                ds1                                                  

Raven Arias RN RN                                                      

Lida Bhatia RN                     RN                                                      

                                                                                                  

Corrections: (The following items were deleted from the chart)                                    

17:50 17:00  / 99; Pulse 89bpm; Resp 20bpm; Pulse Ox 98% RA; sv                 sv          

                                                                                                  

**************************************************************************************************

## 2021-03-31 NOTE — RAD REPORT
EXAM DESCRIPTION:  RAD - Hip Right 2 View - 3/31/2021 1:55 pm

 

CLINICAL HISTORY:  PAIN

 

COMPARISON:  Hip Right 2 View dated 3/24/2019

 

FINDINGS:  AP and frog-leg views of the right hip were obtained.

 

There is no fracture or dislocation. No AVN or focal head abnormality. No acute or destructive bony p
rocess seen. Degenerative changes are present at the hip joint, mild in severity and nonprogressive. 
SI joint degenerative change matches comparison. No suspicious soft tissue finding.

 

Patient has advanced degenerative change in the lumbar spine is only partially imaged.

 

IMPRESSION:  Mild hip joint degenerative change similar to comparison study 2019. No acute hip joint 
finding.

 

Advanced degenerative change in the lumbar spine present and could be potential etiology for referred
 hip pain.

## 2021-03-31 NOTE — RAD REPORT
EXAM DESCRIPTION:  US - Extrem Venous W Compress Mikal - 3/31/2021 3:31 pm

 

CLINICAL HISTORY:  PAIN, bilateral leg pain

 

COMPARISON:  DVT study December 2018

 

TECHNIQUE:  Real-time sonographic evaluation of the bilateral lower extremity common femoral, superfi
cial femoral, popliteal and posterior tibial veins was performed.

 

FINDINGS:  Normal compressibility, flow augmentation, phasic flow and spontaneous flow are identified
 in the left and right lower extremity common femoral, superficial femoral, popliteal and posterior t
ibial veins. No intraluminal filling defects seen.

 

IMPRESSION:  No DVT in either lower extremity.

## 2021-03-31 NOTE — RAD REPORT
EXAM DESCRIPTION:  RAD - Chest Single View - 3/31/2021 1:40 pm

 

CLINICAL HISTORY:  COUGH, altered mental status, hypotension

 

COMPARISON:  Portable June 2020

 

TECHNIQUE:  AP portable chest image was obtained 3/31/2021 1:40 pm .

 

FINDINGS:  Lung volumes are low accentuating an already prominent baseline interstitial pattern. Roseann
rity of chronic disease can mask edema and infiltrate. No peripheral consolidation or mass lesions se
en.

 

 Heart and vasculature are normal. No measurable pleural effusion and no pneumothorax. No acute bony 
abnormality seen. No acute aortic findings suspected. Mediastinal/hilar granulomatous type calcificat
ions are seen.

 

IMPRESSION:  Prominent chronic interstitial lung pattern accentuated by low lung volumes.

 

No focal mass or consolidations seen. Interstitial edema or infiltrate can be masked by the low lung 
volumes.

## 2021-04-01 LAB
BUN BLD-MCNC: 11 MG/DL (ref 7–18)
GLUCOSE SERPLBLD-MCNC: 100 MG/DL (ref 74–106)
HCT VFR BLD CALC: 43.8 % (ref 36–45)
LYMPHOCYTES # SPEC AUTO: 2 K/UL (ref 0.7–4.9)
NT-PROBNP SERPL-MCNC: 1127 PG/ML (ref ?–450)
PMV BLD: 7.9 FL (ref 7.6–11.3)
POTASSIUM SERPL-SCNC: 3.3 MMOL/L (ref 3.5–5.1)
RBC # BLD: 4.57 M/UL (ref 3.86–4.86)

## 2021-04-01 RX ADMIN — FAMOTIDINE SCH MG: 10 INJECTION, SOLUTION INTRAVENOUS at 21:29

## 2021-04-01 RX ADMIN — CARBIDOPA AND LEVODOPA SCH: 25; 100 TABLET ORAL at 14:00

## 2021-04-01 RX ADMIN — MAGNESIUM OXIDE TAB 400 MG (241.3 MG ELEMENTAL MG) SCH MG: 400 (241.3 MG) TAB at 17:00

## 2021-04-01 RX ADMIN — FAMOTIDINE SCH MG: 10 INJECTION, SOLUTION INTRAVENOUS at 07:55

## 2021-04-01 RX ADMIN — CARBIDOPA AND LEVODOPA SCH: 25; 100 TABLET ORAL at 19:20

## 2021-04-01 RX ADMIN — POTASSIUM CHLORIDE SCH MEQ: 10 TABLET, FILM COATED, EXTENDED RELEASE ORAL at 21:29

## 2021-04-01 RX ADMIN — METOPROLOL TARTRATE SCH MG: 25 TABLET ORAL at 07:52

## 2021-04-01 RX ADMIN — METOPROLOL TARTRATE SCH MG: 25 TABLET ORAL at 07:45

## 2021-04-01 RX ADMIN — Medication SCH ML: at 07:54

## 2021-04-01 RX ADMIN — METOPROLOL SUCCINATE SCH MG: 25 TABLET, EXTENDED RELEASE ORAL at 21:30

## 2021-04-01 RX ADMIN — I-VITE, TAB 1000-60-2MG (60/BT) SCH: TAB at 11:34

## 2021-04-01 RX ADMIN — ACETAMINOPHEN PRN MG: 500 TABLET, FILM COATED ORAL at 07:53

## 2021-04-01 RX ADMIN — ACETAMINOPHEN PRN MG: 500 TABLET, FILM COATED ORAL at 02:45

## 2021-04-01 RX ADMIN — Medication SCH ML: at 21:00

## 2021-04-01 NOTE — RAD REPORT
EXAM DESCRIPTION:  MRI - Brain W/Wo Cont - 4/1/2021 8:21 pm

 

CLINICAL HISTORY:  orthostatic hypotension, leg weakness, altered mental status

 

COMPARISON:  Head Brain Wo Cont dated 3/31/2021

 

TECHNIQUE:  Sagittal and axial T1-weighted images were obtained. Axial PD/heavily T2-weighted and T2-
FLAIR images were obtained along with axial DWI/ADC mapping sequences.  Coronal heavily T2 weighted s
equence obtained.  Axial and coronal post-contrast T1-weighted images were also obtained. A 15 ml Mul
tihance contrast following utilized.

 

FINDINGS:  No intracranial hemorrhage, mass or acute infarction.  There is no edema or shift of midli
ne structures. No extra-axial fluid collections. Gray-matter/white matter junction is preserved.  Sig
nal voids are seen as a normal finding in the major intracranial vessels. Atrophy changes are mild fo
r age. Ventricles are in proportion to the volume loss. T2/IR white matter signal abnormalities are p
resent throughout both cerebral hemispheres most likely chronic ischemic change. Thalamus, basal gang
samuel and brainstem tissues are generally spared any chronic ischemic change.

 

Post-contrast images show normal enhancement. No dural thickening.

 

Mastoid air cells and paranasal sinuses are clear.

 

 

IMPRESSION:  No infarction, mass or acute intracranial finding.

 

Atrophy changes are mild. Moderate chronic ischemic changes are present.

## 2021-04-01 NOTE — RAD REPORT
EXAM DESCRIPTION:  Bl[place] Single View4/1/2021 6:20 am

 

CLINICAL HISTORY:  Chest pain

 

COMPARISON:  March 31st

 

FINDINGS:   The lungs appear clear of acute infiltrate. The heart is borderline enlarged

 

IMPRESSION:   No acute abnormalities displayed

## 2021-04-01 NOTE — CON
Reason For Consultation:  Consultation called by Dr. Todd because of weakness, high blood pressure
 with low blood pressure.



History Of Present Illness:  Ms. Estrada is a 81-year-old patient, who is right-handed and has Park
inson disease with autonomic dysfunction, hypertension, and mild cognitive impairment.  Her  s
aid she would have episodes of being very weak and not being able to maintain an upright position whe
n changing from sitting to standing or lying from sitting to standing, that would result in her eithe
r sitting back down or bending her head forward and downward.  She had apparently very elevated blood
 pressures at home and was brought into [facility] on 03/31/2021 for evaluation because of w
eakness and the blood pressure.  Her brain CT scan revealed no acute ischemic or hemorrhagic changes.
  The study was remarkable for small-vessel ischemic disease that was moderate in severity.  There wa
s a right-sided [place] aneurysm clip in place.  Findings were similar to previous study don
e on 06/28/2020.  Her venous Doppler studies of the lower extremities show no DVTs.  Chest x-ray show
ed no acute abnormalities.  Since hospitalization, the patient has been somewhat sleepy while in bed,
 but can be aroused and follows simple commands with no difficulty.  Orthostatics were done.  While l
alina in bed, her blood pressure was 194/87 with a pulse of 70; while sitting up, blood pressure 175/7
6 with a pulse of 74; and while standing, blood pressure was 120/73, pulse of 84.  She had a 74 point
 drop in systolic blood pressure from lying to standing and had some dizziness.



Past Medical History:  As indicated.



Allergies:  CIPROFLOXACIN, CODEINE, HYDROCHLOROTHIAZIDE, VALSARTAN, PREDNISONE, IODINE, TETANUS SHOT,
 ZITHROMAX.



Home Medications:  Tylenol 1000 mg every 4 hours as needed, albuterol nebulizer 2.5 mg every 4 hours 
as needed, carbidopa-levodopa 25/100 1.5 3 times daily, clonidine 0.1 mg every 6 hours as needed, Reed
vix 75 mg daily, Pepcid 20 mg twice daily, folic acid 1 mg daily, magnesium oxide 400 mg daily, Topro
l-XL 25 mg twice daily, Ocuvite 1 tablet at noon, Zofran 4 mg as needed, potassium 20 mEq daily, cran
berry 400 mg daily, folic acid 2 mg daily.



Past Surgical History:  Bilateral knee replacement, coiling of aneurysm, and clipping of aneurysm.



Social History:  No alcohol, tobacco, or IV drug use.  The patient lives with her .



Review of Systems:

As indicated, she has had diffuse weakness, orthostatic symptoms with a tendency to become very weak 
and to lie back down to avert falling.  No recent fevers or chills.  No nausea or vomiting.  There ar
e lower extremities arthralgias, however, no other positives such as psychiatric issues, genitourinar
y issues, or dermatological issues.



Physical Examination:

Vital Signs:  Blood pressure as indicated when lying 194/87, pulse 70, temperature 98, oxygen saturat
ion 98%, respiratory rate 15.  Weight 147 pounds, height 5 feet 6 inches, BMI 22.7. 

General:  Ms. Estrada is resting in bed.  She is somewhat sleepy, but easily arousable. 

HEENT:  She is normocephalic, atraumatic.  Sclerae anicteric.  Oropharynx is moist and pink. 

Neck:  Supple. 

Chest:  Clear. 

Heart:  Regular. 

Extremities:  No significant edema, cyanosis, or clubbing. 

Neurologic:  She is alert and oriented to person, situation.  Follows simple commands without any def
icits.  Cranial nerves show no focal deficits.  Motor examination, diffusely weak in upper and lower 
extremities, 4/5 proximally and distally.  Sensory exam, decreased to light touch and temperature in 
a stocking-glove fashion.  Reflexes depressed in the upper and lower extremities.  Coordination is sl
ow, but intact in the upper extremities.  In terms of gait, she only evaluated and ambulated with [facility] therapy.  There was an acute evaluation by physical therapy.  The patient was found to have jaleel
e significant difficulty with range of motion, right more than left in the lower extremities, for lef
t hip and right hip strength, for knee strength bilaterally.  Bed mobility required total assistance 
in the standard hospital bed.  She had poor balance capability.  She required maximal assistance for 
transfers with a rolling walker.  She was unable to perform forward gait due to significant fatigabil
ity, unable to support herself fully and required help from the physical therapist.  She took 2 steps
 at the head of the bed, but had to sit down.  These were small steps.  The physical therapist did re
commend that the patient have a longer-term rehabilitation at a skilled nursing facility given her in
ability to withstand at least 3 hours of physical therapy as is required for acute inpatient rehabili
tation and she would require perhaps a longer-term care.



Laboratory Studies:  Complete blood count with differential is normal.  Coagulation panel is normal. 
 Chemistries show slightly low potassium after hydration of 3.3, initially 3.9, glucose 100.  Liver f
unction studies essentially unremarkable.  Urinalysis pending.  Chest x-ray shows no acute abnormalit
ies.



Assessment:  Ms. Estrada is a 81-year-old patient with significant debility.  She has Parkinson dis
ease with autonomic dysfunction where the blood pressure drops significantly resulting in more weakne
ss and high tendency to fall.  She is unable to withstand 3 hours of acute inpatient rehabilitation a
t rehabilitation unit, but may benefit from a skilled nursing facility.



Plan:  

1.She should have LAMONT hose to at least above the knee, thigh-high, and abdominal binder when ambulat
ing to minimize the affects of the autonomic dysfunction, which can result in syncope.

2.Continue with medications including Sinemet as directed and all other medications for her multiple
 comorbid conditions, which include hypertension and plan as indicated skilled nursing.

3.Abdominal binders and LAMONT hose to above the knee when ambulating, may take off while in bed.

4.Hydration is stressed with the patient's , who was in the room, and the patient.

5.She may follow up in Dr. Castaneda's clinic 1 month later.





GUY/TEO

DD:  04/01/2021 19:23:38Voice ID:  [id number]

DT:  04/01/2021 21:00:32Report ID:  [id number]

## 2021-04-01 NOTE — EKG
Test Date:    2021-03-31               Test Time:    12:50:55

Technician:   ASHLYN                                    

                                                     

MEASUREMENT RESULTS:                                       

Intervals:                                           

Rate:         65                                     

IA:           210                                    

QRSD:         100                                    

QT:           460                                    

QTc:          478                                    

Axis:                                                

P:            87                                     

IA:           210                                    

QRS:          11                                     

T:            36                                     

                                                     

INTERPRETIVE STATEMENTS:                                       

                                                     

Sinus rhythm with 1st degree AV block

Cannot rule out Anterior infarct, age undetermined

Abnormal ECG

Compared to ECG 06/28/2020 14:31:31

First degree AV block now present

Atrial premature complex(es) no longer present

Left ventricular hypertrophy no longer present

Myocardial infarct finding still present



Electronically Signed On 04-01-21 06:51:31 CDT by Tyrell Owens

## 2021-04-02 LAB
BUN BLD-MCNC: 17 MG/DL (ref 7–18)
GLUCOSE SERPLBLD-MCNC: 96 MG/DL (ref 74–106)
MAGNESIUM SERPL-MCNC: 2.3 MG/DL (ref 1.8–2.4)
POTASSIUM SERPL-SCNC: 4.1 MMOL/L (ref 3.5–5.1)
TSH SERPL DL<=0.05 MIU/L-ACNC: 1.35 UIU/ML (ref 0.36–3.74)

## 2021-04-02 RX ADMIN — CARBIDOPA AND LEVODOPA SCH TAB: 25; 100 TABLET ORAL at 20:42

## 2021-04-02 RX ADMIN — FAMOTIDINE SCH MG: 10 INJECTION, SOLUTION INTRAVENOUS at 08:28

## 2021-04-02 RX ADMIN — CARBIDOPA AND LEVODOPA SCH: 25; 100 TABLET ORAL at 08:28

## 2021-04-02 RX ADMIN — METOPROLOL SUCCINATE SCH MG: 25 TABLET, EXTENDED RELEASE ORAL at 08:27

## 2021-04-02 RX ADMIN — I-VITE, TAB 1000-60-2MG (60/BT) SCH TAB: TAB at 12:00

## 2021-04-02 RX ADMIN — Medication SCH ML: at 21:00

## 2021-04-02 RX ADMIN — POTASSIUM CHLORIDE SCH MEQ: 10 TABLET, FILM COATED, EXTENDED RELEASE ORAL at 20:42

## 2021-04-02 RX ADMIN — FOLIC ACID SCH MG: 1 TABLET ORAL at 08:28

## 2021-04-02 RX ADMIN — FAMOTIDINE SCH MG: 10 INJECTION, SOLUTION INTRAVENOUS at 20:42

## 2021-04-02 RX ADMIN — METOPROLOL SUCCINATE SCH MG: 25 TABLET, EXTENDED RELEASE ORAL at 20:42

## 2021-04-02 RX ADMIN — Medication SCH ML: at 08:28

## 2021-04-02 RX ADMIN — ACETAMINOPHEN PRN MG: 500 TABLET, FILM COATED ORAL at 18:51

## 2021-04-02 RX ADMIN — CARBIDOPA AND LEVODOPA SCH: 25; 100 TABLET ORAL at 13:57

## 2021-04-02 RX ADMIN — CLOPIDOGREL BISULFATE SCH MG: 75 TABLET, FILM COATED ORAL at 08:28

## 2021-04-02 RX ADMIN — MAGNESIUM OXIDE TAB 400 MG (241.3 MG ELEMENTAL MG) SCH MG: 400 (241.3 MG) TAB at 17:00

## 2021-04-03 RX ADMIN — METOPROLOL SUCCINATE SCH MG: 25 TABLET, EXTENDED RELEASE ORAL at 10:00

## 2021-04-03 RX ADMIN — CARBIDOPA AND LEVODOPA SCH: 25; 100 TABLET ORAL at 14:00

## 2021-04-03 RX ADMIN — Medication SCH ML: at 20:17

## 2021-04-03 RX ADMIN — FOLIC ACID SCH MG: 1 TABLET ORAL at 09:59

## 2021-04-03 RX ADMIN — Medication SCH ML: at 10:01

## 2021-04-03 RX ADMIN — CLOPIDOGREL BISULFATE SCH MG: 75 TABLET, FILM COATED ORAL at 10:00

## 2021-04-03 RX ADMIN — POTASSIUM CHLORIDE SCH MEQ: 10 TABLET, FILM COATED, EXTENDED RELEASE ORAL at 20:16

## 2021-04-03 RX ADMIN — I-VITE, TAB 1000-60-2MG (60/BT) SCH TAB: TAB at 13:08

## 2021-04-03 RX ADMIN — FAMOTIDINE SCH MG: 10 INJECTION, SOLUTION INTRAVENOUS at 10:01

## 2021-04-03 RX ADMIN — CARBIDOPA AND LEVODOPA SCH TAB: 25; 100 TABLET ORAL at 20:15

## 2021-04-03 RX ADMIN — MAGNESIUM OXIDE TAB 400 MG (241.3 MG ELEMENTAL MG) SCH: 400 (241.3 MG) TAB at 17:00

## 2021-04-03 RX ADMIN — CARBIDOPA AND LEVODOPA SCH TAB: 25; 100 TABLET ORAL at 09:59

## 2021-04-03 RX ADMIN — FAMOTIDINE SCH MG: 10 INJECTION, SOLUTION INTRAVENOUS at 20:16

## 2021-04-03 RX ADMIN — METOPROLOL SUCCINATE SCH MG: 25 TABLET, EXTENDED RELEASE ORAL at 20:16

## 2021-04-04 LAB
BUN BLD-MCNC: 22 MG/DL (ref 7–18)
GLUCOSE SERPLBLD-MCNC: 98 MG/DL (ref 74–106)
POTASSIUM SERPL-SCNC: 4.7 MMOL/L (ref 3.5–5.1)

## 2021-04-04 RX ADMIN — CARBIDOPA AND LEVODOPA SCH: 25; 100 TABLET ORAL at 14:00

## 2021-04-04 RX ADMIN — MAGNESIUM OXIDE TAB 400 MG (241.3 MG ELEMENTAL MG) SCH: 400 (241.3 MG) TAB at 16:51

## 2021-04-04 RX ADMIN — Medication SCH ML: at 22:09

## 2021-04-04 RX ADMIN — CARBIDOPA AND LEVODOPA SCH TAB: 25; 100 TABLET ORAL at 10:21

## 2021-04-04 RX ADMIN — FAMOTIDINE SCH MG: 10 INJECTION, SOLUTION INTRAVENOUS at 10:21

## 2021-04-04 RX ADMIN — FOLIC ACID SCH MG: 1 TABLET ORAL at 10:21

## 2021-04-04 RX ADMIN — I-VITE, TAB 1000-60-2MG (60/BT) SCH: TAB at 12:00

## 2021-04-04 RX ADMIN — Medication SCH ML: at 10:22

## 2021-04-04 RX ADMIN — POTASSIUM CHLORIDE SCH: 10 TABLET, FILM COATED, EXTENDED RELEASE ORAL at 21:00

## 2021-04-04 RX ADMIN — METOPROLOL SUCCINATE SCH: 25 TABLET, EXTENDED RELEASE ORAL at 21:00

## 2021-04-04 RX ADMIN — METOPROLOL SUCCINATE SCH MG: 25 TABLET, EXTENDED RELEASE ORAL at 10:22

## 2021-04-04 RX ADMIN — AMLODIPINE BESYLATE SCH MG: 5 TABLET ORAL at 10:22

## 2021-04-04 RX ADMIN — CLOPIDOGREL BISULFATE SCH MG: 75 TABLET, FILM COATED ORAL at 10:22

## 2021-04-04 RX ADMIN — FAMOTIDINE SCH: 20 TABLET, FILM COATED ORAL at 21:00

## 2021-04-04 RX ADMIN — CARBIDOPA AND LEVODOPA SCH: 25; 100 TABLET ORAL at 21:00

## 2021-04-04 NOTE — CON
Date of Consultation:  04/02/2021



Reason For Consultation:  Low blood pressure.



History Of Present Illness:  A 81-year-old with history of Parkinson disease, very poor historian, au
tonomic dysfunction, hypertension, and cognitive impairment as per chart.  The patient becomes very w
eak and able to maintain upright position or standing because of blood pressure dropping.  No syncope
.  The patient is very weak, sleepy, unable to arouse or get a good history.



Past Medical History:  As above.



Medications:  Refer to reconciliation sheet for detailed list.



Allergies:  ALLERGY LIST WAS REVIEWED.



Social History:  Does not smoke or drink.  Does not use any drugs.



Family History:  No premature coronary artery disease or cancer.



Review of Systems:

All systems reviewed are negative except what is mentioned in the HPI.



Physical Examination:

Vital Signs:  Temperature is 97.7, pulse 61, breathing 16, blood pressure 137/67, saturating 96%.  

General:  This is an elderly female, in no distress. 

Head and Neck:  Pupils are reactive to light.  No JVD.  No cervical lymphadenopathy. 

Neck:  Supple.  Thyroid is not enlarged. 

Lungs:  Decreased breathing sounds.  No accessory muscle use or muscle retraction. 

Heart:  Regular rate and rhythm.  No extra sounds. 

Abdomen:  Soft, nontender.  Bowel sounds positive.  No organomegaly.  No masses or hernia.  No rigidi
ty or rebound.  

Extremities:  No clubbing, cyanosis.  Intact pulses. 

Skin:  No rash was noted.  

Neurologic:  Alert, awake.  No acute focal deficit appreciated. 

Lymph nodes:  No cervical adenopathy.



Investigations:  Creatinine 0.86.  Troponin is negative.  Hemoglobin is 15.6.



Assessment And Plan:  Labile hypertension.  At the present time, blood pressure is controlled.  I agr
ee with the current regimen of medications and we will recommend gentle hydration and close monitorin
g.  Plan for echocardiogram on outpatient basis.  



Thank you for the consult.





/TEO

DD:  04/04/2021 14:57:16Voice ID:  [id number]

DT:  04/04/2021 19:49:07Report ID:  [id number]

## 2021-04-05 LAB
BUN BLD-MCNC: 22 MG/DL (ref 7–18)
GLUCOSE SERPLBLD-MCNC: 107 MG/DL (ref 74–106)
POTASSIUM SERPL-SCNC: 4.1 MMOL/L (ref 3.5–5.1)

## 2021-04-05 RX ADMIN — CLOPIDOGREL BISULFATE SCH: 75 TABLET, FILM COATED ORAL at 09:00

## 2021-04-05 RX ADMIN — Medication SCH: at 09:00

## 2021-04-05 RX ADMIN — METOPROLOL SUCCINATE SCH: 25 TABLET, EXTENDED RELEASE ORAL at 09:00

## 2021-04-05 RX ADMIN — MAGNESIUM OXIDE TAB 400 MG (241.3 MG ELEMENTAL MG) SCH: 400 (241.3 MG) TAB at 16:11

## 2021-04-05 RX ADMIN — Medication SCH ML: at 21:41

## 2021-04-05 RX ADMIN — CARBIDOPA AND LEVODOPA SCH: 25; 100 TABLET ORAL at 09:00

## 2021-04-05 RX ADMIN — FOLIC ACID SCH: 1 TABLET ORAL at 09:00

## 2021-04-05 RX ADMIN — AMLODIPINE BESYLATE SCH: 5 TABLET ORAL at 09:00

## 2021-04-05 RX ADMIN — FAMOTIDINE SCH: 20 TABLET, FILM COATED ORAL at 09:00

## 2021-04-05 RX ADMIN — METOPROLOL SUCCINATE SCH MG: 25 TABLET, EXTENDED RELEASE ORAL at 21:41

## 2021-04-05 RX ADMIN — CLOPIDOGREL BISULFATE SCH MG: 75 TABLET, FILM COATED ORAL at 13:24

## 2021-04-05 RX ADMIN — FAMOTIDINE SCH MG: 20 TABLET, FILM COATED ORAL at 21:41

## 2021-04-05 RX ADMIN — AMLODIPINE BESYLATE SCH MG: 5 TABLET ORAL at 13:23

## 2021-04-05 RX ADMIN — SODIUM CHLORIDE SCH MLS: 0.9 INJECTION, SOLUTION INTRAVENOUS at 15:15

## 2021-04-05 RX ADMIN — I-VITE, TAB 1000-60-2MG (60/BT) SCH TAB: TAB at 13:22

## 2021-04-05 RX ADMIN — POTASSIUM CHLORIDE SCH MEQ: 10 TABLET, FILM COATED, EXTENDED RELEASE ORAL at 21:46

## 2021-04-05 RX ADMIN — METOPROLOL SUCCINATE SCH MG: 25 TABLET, EXTENDED RELEASE ORAL at 13:24

## 2021-04-05 NOTE — RAD REPORT
EXAM DESCRIPTION:  CT - Head Brain Wo Cont - 4/5/2021 3:27 pm

 

CLINICAL HISTORY:  lethargy, ?stroke

Headache, drowsiness, CVA symptomology

 

COMPARISON:  Head Brain Wo Cont dated 3/31/2021; Ct Stroke Brain Wo Cont dated 6/28/2020

 

TECHNIQUE:  All CT scans are performed using dose optimization technique as appropriate and may inclu
de automated exposure control or mA/KV adjustment according to patient size.

 

FINDINGS:  No intracranial hemorrhage, hydrocephalus or extra-axial fluid collection.Mild periventric
ular and deep white matter chronic microvascular ischemic changes.No areas of brain edema or evidence
 of midline shift. Right-sided [place] aneurysm coil noted. Heavy vascular atherosclerosis s
een.

 

The paranasal sinuses and mastoids are clear. The calvarium is intact.

 

IMPRESSION:  No acute intracranial abnormality.

## 2021-04-05 NOTE — RAD REPORT
EXAM DESCRIPTION:   - CP - 4/5/2021 7:16 pm

 

CLINICAL HISTORY:  lethargy. Change LOC

Headache, drowsiness

 

COMPARISON:  Carotid Artery Bilateral dated 6/24/2020

 

TECHNIQUE:  Real-time sonographic evaluation of both carotid systems was performed. Doppler interroga
tion was performed with waveform tracing bilaterally.

 

FINDINGS:  Normal high resistance waveforms are noted in both external carotid arteries. The common c
arotid arteries and internal carotid arteries show normal low resistance waveforms.

 

No significant plaque formation is seen. Peak systolic and end diastolic velocity values and the ICA/
CCA ratios are in the non-hemodynamically significant range.

 

Antegrade flow seen in both vertebral arteries.

 

IMPRESSION:  Mild hard plaque is seen in both carotid bulbs.

 

No evidence of a hemodynamically significant stenosis.

## 2021-04-06 RX ADMIN — POTASSIUM CHLORIDE SCH: 10 TABLET, FILM COATED, EXTENDED RELEASE ORAL at 21:00

## 2021-04-06 RX ADMIN — CLOPIDOGREL BISULFATE SCH MG: 75 TABLET, FILM COATED ORAL at 09:44

## 2021-04-06 RX ADMIN — FOLIC ACID SCH MG: 1 TABLET ORAL at 09:43

## 2021-04-06 RX ADMIN — FAMOTIDINE SCH MG: 20 TABLET, FILM COATED ORAL at 21:21

## 2021-04-06 RX ADMIN — I-VITE, TAB 1000-60-2MG (60/BT) SCH TAB: TAB at 12:00

## 2021-04-06 RX ADMIN — Medication SCH ML: at 21:22

## 2021-04-06 RX ADMIN — POTASSIUM BICARBONATE SCH MEQ: 25 TABLET, EFFERVESCENT ORAL at 21:39

## 2021-04-06 RX ADMIN — MAGNESIUM OXIDE TAB 400 MG (241.3 MG ELEMENTAL MG) SCH MG: 400 (241.3 MG) TAB at 17:00

## 2021-04-06 RX ADMIN — AMLODIPINE BESYLATE SCH MG: 5 TABLET ORAL at 09:43

## 2021-04-06 RX ADMIN — Medication SCH ML: at 09:00

## 2021-04-06 RX ADMIN — METOPROLOL SUCCINATE SCH MG: 25 TABLET, EXTENDED RELEASE ORAL at 09:43

## 2021-04-06 RX ADMIN — FAMOTIDINE SCH MG: 20 TABLET, FILM COATED ORAL at 09:43

## 2021-04-06 RX ADMIN — METOPROLOL SUCCINATE SCH MG: 25 TABLET, EXTENDED RELEASE ORAL at 21:21

## 2021-04-06 NOTE — CON
Reason For Consultation:  Consultation called by Dr. Todd because of diffuse weakness with hypoten
jina and Parkinson disease.



History Of Present Illness:  Mrs. Estrada is a 81-year-old right-handed  patient with Park
inson disease with autonomic dysfunction, hypertension, who per her  has not been exercising a
nd moving around much for about 14 days prior to coming to the hospital on March 31, 2021.  They were
 at Dr. Todd's office when her blood pressure was found to be 70/40.  She was somewhat disoriented
 and was then sent over to the emergency room.  At that point, she did have some increase in her bloo
d pressure.  She was evaluated in the emergency room with a head CT scan, which was unremarkable for 
any acute ischemic or hemorrhagic change.  She was treated with IV fluids.  Her admission was also do
ne for further workup.  Brain MRI ruled out presence of any acute ischemic or hemorrhagic stroke.  [facility]
bsequent EEG showed a slightly slow background, but was an otherwise unremarkable study.  Echocardiog
james showed an ejection fraction of 62% with mild diastolic dysfunction.  Carotid artery ultrasound sh
owed no evidence of hemodynamically significant stenosis, but there was mild hard plaque in both parham
tid bulbs.  She did have a repeat head CT scan on the 5th, that is 4 days after the initial scan, and
 again it showed no acute ischemic or hemorrhagic change. 



Since hospitalization, the patient has been mostly in bed and has not been up and ambulating.  She ha
s not sat up in bed most of the time.  Today, as I was evaluating the patient, physical therapist was
 in and she had actually sat at the side of the bed at least 5 minutes at that time, but tended to le
an to the right or left with her head down. 



Blood work did not reveal any abnormalities as complete blood count with differential was normal, coa
gulation panel was normal, electrolyte panel consistent with slight dehydration, otherwise normal and
 liver function panel essentially unremarkable.  Thyroid study and cortisol were normal.  Urinalysis 
did not show any abnormalities.



Past Medical History:  As indicated.



Allergies:  BACTRIM, CIPROFLOXACIN, CODEINE, DEMEROL, DIOVAN, HYDROCHLOROTHIAZIDE, AND IODINE ALONG W
ITH TETANUS AND VALSARTAN.



Medications:  At home, carbidopa-levodopa 25/100 3 times daily, Plavix 75 mg daily, metoprolol 25 mg 
2 daily, atorvastatin 40 mg at night, magnesium oxide 250 mg daily, folic acid 400 mcg 2 daily, vitam
in B12 1000 mcg oral daily, Ocuvite daily, cranberry pills 500 mg 2 tablets daily, melatonin 500 mg a
t night.



Past Surgical History:  Bilateral knee replacement and stenting of coil of an aneurysm.



Family History:  Noncontributory.



Social History:  No alcohol, tobacco, or IV drug use.



Review of Systems:

The patient does have significant debility, difficulty getting around and weakness in all extremities
, stiffness of movement.  No recent cough, fever, chills, nausea, or vomiting.  Otherwise negative in
 terms of her mood.



Physical Examination:

Vital Signs:  Blood pressure 138/66, pulse 80, respiratory rate 18, temperature 98.6, O2 saturation 9
5% with orthostatics while lying, blood pressure 194/87, pulse 70; while sitting, blood pressure 125/
76, pulse 74; while standing, blood pressure 120/73, pulse 84.  She was not symptomatic.  Weight 147 
pounds, height 5 feet 6 inches, BMI 23.7. 

General:  Mrs. Estrada is resting in bed.  She is slow to respond to questions, but is appropriate 
in all her answers. 

HEENT:  She is normocephalic, atraumatic.  Sclerae anicteric.  Oropharynx is moist and pink. 

Neck:  Supple. 

Chest:  Clear. 

Heart:  Regular. 

Extremities:  No significant edema, cyanosis, or clubbing. 

Neurological:  She is alert, oriented to situation, place, and time.  Follows simple commands with no
 difficulty.  Cranial nerves 2 through 12 show no focal deficits.  Motor, she is diffusely weak aroun
d 4/5 proximally and distally in the upper and lower extremities.  Sensation intact with mild stockin
g-glove loss to light touch and temperature in legs and arms.  Coordination is slow, but intact in th
e upper and lower extremities.  She is not able to stand and ambulate on her own at this point.



Assessment:  Mrs. Estrada is a 81-year-old patient with significant debility, requiring a lot of ag
gressive physical therapy to get her out of her debility in addition to help her with mobility relate
d to her problems with Parkinson disease.  She does not have an infection, stroke, or electrolyte abn
ormality or metabolic derangement.



Plan:  She will benefit from significant physical and occupational therapy, but requires extended per
iod of time such as in skilled nursing.  She is not likely to qualify for acute inpatient rehabilitat
ion.  Prior to coming to the hospital about 2 weeks ago, she was able to ambulate with a walker indep
endently and it will be good to get her back to that level to reduce the risk of falling, injury, fra
cture of bone, or any other complications such as pneumonia or infection.  She should continue with h
er medications including Sinemet and her comorbid medicines as directed.  She should follow up with [facility] Castaneda's clinic 1 month after her discharge.





AYO

DD:  04/06/2021 19:56:48Voice ID:  [id number]

DT:  04/06/2021 22:38:27Report ID:  [id number]

## 2021-04-07 RX ADMIN — MAGNESIUM OXIDE TAB 400 MG (241.3 MG ELEMENTAL MG) SCH MG: 400 (241.3 MG) TAB at 17:00

## 2021-04-07 RX ADMIN — FAMOTIDINE SCH MG: 20 TABLET, FILM COATED ORAL at 22:21

## 2021-04-07 RX ADMIN — METOPROLOL SUCCINATE SCH MG: 25 TABLET, EXTENDED RELEASE ORAL at 09:41

## 2021-04-07 RX ADMIN — AMLODIPINE BESYLATE SCH MG: 5 TABLET ORAL at 09:00

## 2021-04-07 RX ADMIN — FOLIC ACID SCH MG: 1 TABLET ORAL at 09:41

## 2021-04-07 RX ADMIN — Medication SCH: at 21:00

## 2021-04-07 RX ADMIN — Medication SCH ML: at 09:00

## 2021-04-07 RX ADMIN — I-VITE, TAB 1000-60-2MG (60/BT) SCH TAB: TAB at 12:00

## 2021-04-07 RX ADMIN — METOPROLOL SUCCINATE SCH MG: 25 TABLET, EXTENDED RELEASE ORAL at 22:21

## 2021-04-07 RX ADMIN — POTASSIUM BICARBONATE SCH MEQ: 25 TABLET, EFFERVESCENT ORAL at 09:41

## 2021-04-07 RX ADMIN — CLOPIDOGREL BISULFATE SCH MG: 75 TABLET, FILM COATED ORAL at 09:40

## 2021-04-07 RX ADMIN — FAMOTIDINE SCH MG: 20 TABLET, FILM COATED ORAL at 09:41

## 2021-04-08 RX ADMIN — SODIUM CHLORIDE SCH MLS: 0.9 INJECTION, SOLUTION INTRAVENOUS at 09:35

## 2021-04-08 RX ADMIN — METOPROLOL SUCCINATE SCH MG: 25 TABLET, EXTENDED RELEASE ORAL at 09:29

## 2021-04-08 RX ADMIN — Medication SCH ML: at 20:35

## 2021-04-08 RX ADMIN — AMLODIPINE BESYLATE SCH MG: 5 TABLET ORAL at 09:28

## 2021-04-08 RX ADMIN — I-VITE, TAB 1000-60-2MG (60/BT) SCH TAB: TAB at 11:45

## 2021-04-08 RX ADMIN — Medication SCH ML: at 09:29

## 2021-04-08 RX ADMIN — MAGNESIUM OXIDE TAB 400 MG (241.3 MG ELEMENTAL MG) SCH MG: 400 (241.3 MG) TAB at 16:04

## 2021-04-08 RX ADMIN — CARBIDOPA AND LEVODOPA SCH TAB: 25; 100 TABLET ORAL at 20:33

## 2021-04-08 RX ADMIN — FOLIC ACID SCH MG: 1 TABLET ORAL at 09:28

## 2021-04-08 RX ADMIN — POTASSIUM BICARBONATE SCH MEQ: 25 TABLET, EFFERVESCENT ORAL at 09:27

## 2021-04-08 RX ADMIN — Medication SCH ML: at 20:31

## 2021-04-08 RX ADMIN — FAMOTIDINE SCH MG: 20 TABLET, FILM COATED ORAL at 09:28

## 2021-04-08 RX ADMIN — METOPROLOL SUCCINATE SCH MG: 25 TABLET, EXTENDED RELEASE ORAL at 20:31

## 2021-04-08 RX ADMIN — FAMOTIDINE SCH MG: 20 TABLET, FILM COATED ORAL at 20:31

## 2021-04-08 RX ADMIN — CLOPIDOGREL BISULFATE SCH MG: 75 TABLET, FILM COATED ORAL at 09:29

## 2021-04-08 NOTE — EEG
CHART:  [id number]

TEST ID#:  [id number]

DATE OF STUDY:  04/06/2021



THE EEG WAS RECORDED PORTABLE IN THE PATIENT'S ROOM ON A 17 CHANNEL MACHINE.  ELECTRODES 
WERE APPLIED IN THE USUAL MANNER USING THE INTERNATIONAL 10-20 SYSTEM.



THE WAKING BACKGROUND RHYTHM IN THIS RECORD CONSISTS OF FAIRLY WELL DEVELOPED AND FAIRLY 
WELL ORGANIZED WAVES OF 9 HZ., MAXIMAL IN THE POSTERIOR HEAD REGIONS WHICH ATTENUATE 
NORMALLY WITH EYE OPENING.  LOW-VOLTAGE 18-22 HZ ACTIIVTY IS EXPRESSED IN THE FRONTAL 
REGIONS.  MODERATE VOLTAGE 1.5-3 HZ ACTIVITY IS OCCASIONALLY EXPRESSED IN THE FRONTAL 
REGIONS.



THERE ARE NO FOCAL OR LATERALIZING FEATURES.  NO EPILEPTIFORM ACTIVITY APPEARS.  

SLEEP OCCURRED NATURALLY.  IN ADDITION NORMAL SLEEP PATTERNS ARE PRESENT.



HYPERVENTILATION WAS NOT PERFORMED.



PHOTIC STIMULATION PRODUCED POOR DRIVING BILATERALLY.



IMPRESSION:  THIS IS A MILDLY ABNORMAL EEG DUE TO INTERMITTENT FRONTAL SLOW (DELTA) 
ACTIVITY.  THE EEG BACKGROUNDIS OTHERWISE NORMAL.  THIS IS A NON-SPECIFIC FINDING 
INDICATING THE PRESENCE OF A MILD DIFFUSE DISTURBANCE IN CEREBRAL FUNCTION.

## 2021-04-09 VITALS — OXYGEN SATURATION: 95 %

## 2021-04-09 VITALS — TEMPERATURE: 98 F | SYSTOLIC BLOOD PRESSURE: 164 MMHG | DIASTOLIC BLOOD PRESSURE: 70 MMHG

## 2021-04-09 LAB
BUN BLD-MCNC: 19 MG/DL (ref 7–18)
GLUCOSE SERPLBLD-MCNC: 93 MG/DL (ref 74–106)
HCT VFR BLD CALC: 41.4 % (ref 36–45)
LYMPHOCYTES # SPEC AUTO: 1.9 K/UL (ref 0.7–4.9)
PMV BLD: 8.1 FL (ref 7.6–11.3)
POTASSIUM SERPL-SCNC: 3.8 MMOL/L (ref 3.5–5.1)
RBC # BLD: 4.29 M/UL (ref 3.86–4.86)

## 2021-04-09 RX ADMIN — FOLIC ACID SCH MG: 1 TABLET ORAL at 08:59

## 2021-04-09 RX ADMIN — Medication SCH ML: at 09:00

## 2021-04-09 RX ADMIN — METOPROLOL SUCCINATE SCH MG: 25 TABLET, EXTENDED RELEASE ORAL at 08:59

## 2021-04-09 RX ADMIN — CLOPIDOGREL BISULFATE SCH MG: 75 TABLET, FILM COATED ORAL at 08:58

## 2021-04-09 RX ADMIN — FAMOTIDINE SCH MG: 20 TABLET, FILM COATED ORAL at 08:59

## 2021-04-09 RX ADMIN — POTASSIUM BICARBONATE SCH MEQ: 25 TABLET, EFFERVESCENT ORAL at 09:00

## 2021-04-09 RX ADMIN — CARBIDOPA AND LEVODOPA SCH TAB: 25; 100 TABLET ORAL at 08:59

## 2021-04-09 RX ADMIN — AMLODIPINE BESYLATE SCH MG: 5 TABLET ORAL at 08:59

## 2021-04-09 RX ADMIN — POTASSIUM BICARBONATE SCH MEQ: 25 TABLET, EFFERVESCENT ORAL at 08:59

## 2021-05-24 NOTE — HP
Date of Admission:  04/02/2021



Entrance Complaint:  Altered mental status and generalized weakness.



History Of Present Illness:  The patient presented to the office with the above outlined symptoms.  S
he has apparently been somewhat progressive as far as the altered mental status is concerned which ha
s been variable, however, the weakness became profound according to her family.  In the office, she h
ad significant altered mental status.  Had a blood pressure of 90/60 and she was referred to [facility] room for further treatment and evaluation.



Past History:  The patient has a history of parkinsonism, seen by Neurology and also has hypertension
.  She had a cerebral aneurysm treated in [place] as well and has had some problems with her blood pr
essure in the past.  The aneurysm was treated with a coil stent.



Social History:  Nonsmoker, nondrinker.



Family History:  Noncontributory.



Physical Examination:

General:  The patient is a confused elderly, very with weak appearing female. 

Vital Signs:  Blood pressure 90/60, pulse of 97, respiratory rate of 20. 

Head and neck:  Normocephalic.  Pupils equal and reactive to light and accommodation.  Fundi negative
.  Trachea midline.  Thyroid not palpable. 

ENT:  negative.  

Chest:  Clear to P and A. 

Cardiovascular:  PMI in midclavicular line.  

Heart:  Sounds normal.  Peripheral pulses present and equal bilaterally. 

Abdomen:  No organomegaly.  Bowel sounds present. 

Extremities:  Good tone and movement bilaterally.  Reflexes physiologic. 

Rectal:  Deferred. 

Pelvic:  deferred.



Impression:  Hypotensive, possibly secondary to medication and/or parkinsonism effect, altered mental
 status.



Plan:  The patient will be admitted.  She will be bolused with fluids.  Cardiology and Neurology will
 be consulted and rule out any significant CNS lesion.





HR/MODL

DD:  05/24/2021 10:05:08Voice ID:  [id number]

## 2021-07-24 NOTE — ECHO
HEIGHT: 5 ft 6 in   WEIGHT: 147 lb 0 oz   DATE OF STUDY: 04/02/2021   REFER DR: Samuel Todd MD

2-DIMENSIONAL: YES

     M.MODE: YES

 DOPPLER: YES

COLOR FLOW: YES



                    TDS:  NO

PORTABLE: NO

 DEFINITY:  NO

BUBBLE STUDY: NO





DIAGNOSIS:  HYPOTENSION



CARDIAC HISTORY:  

CATHERIZATION: NO

SURGERY: NO

PROSTHETIC VALVE: NO

PACEMAKER: NO





MEASUREMENTS (cm)

    DIASTOLIC (NORMALS)                 SYSTOLIC (NORMALS)

IVSd                 1.1 (0.6-1.2)                    LA Diam 3.2 (1.9-4.0)     LVEF       
  62%  

LVIDd               3.9 (3.5-5.7)                        LVIDs      2.6 (2.0-3.5)     %FS  
        33%

LVPWd             1.1 (0.6-1.2)

Ao Diam           2.3 (2.0-3.7)



2 DIMENSIONAL ASSESSMENT:

RIGHT ATRIUM:                   NORMAL

LEFT ATRIUM:       NORMAL



RIGHT VENTRICLE:            NORMAL

LEFT VENTRICLE: NORMAL



TRICUSPID VALVE:             NORMAL

MITRAL VALVE:     NORMAL



PULMONIC VALVE:             NORMAL

AORTIC VALVE:     NORMAL



PERICARDIAL EFFUSION: NONE

AORTIC ROOT:      NORMAL





LEFT VENTRICULAR WALL MOTION:     NORMAL



DOPPLER/COLOR FLOW:     SEE BELOW.



COMMENTS:      NORMAL LEFT VENTRICULAR EJECTION FRACTION 55-60% WITH WALL MOTION. MILD 
DIASTOLIC DYSFUNCTION.



TECHNOLOGIST:   JANUSZ MATOS
[place]

## 2022-03-12 ENCOUNTER — HOSPITAL ENCOUNTER (EMERGENCY)
Dept: HOSPITAL 97 - ER | Age: 82
Discharge: HOME | End: 2022-03-12
Payer: COMMERCIAL

## 2022-03-12 VITALS — SYSTOLIC BLOOD PRESSURE: 148 MMHG | DIASTOLIC BLOOD PRESSURE: 76 MMHG | TEMPERATURE: 98.5 F

## 2022-03-12 VITALS — OXYGEN SATURATION: 100 %

## 2022-03-12 DIAGNOSIS — Z88.7: ICD-10-CM

## 2022-03-12 DIAGNOSIS — Z20.822: ICD-10-CM

## 2022-03-12 DIAGNOSIS — E86.0: Primary | ICD-10-CM

## 2022-03-12 DIAGNOSIS — Z88.8: ICD-10-CM

## 2022-03-12 DIAGNOSIS — G20: ICD-10-CM

## 2022-03-12 DIAGNOSIS — I10: ICD-10-CM

## 2022-03-12 DIAGNOSIS — Z88.1: ICD-10-CM

## 2022-03-12 DIAGNOSIS — Z91.041: ICD-10-CM

## 2022-03-12 DIAGNOSIS — Z88.5: ICD-10-CM

## 2022-03-12 LAB
ALBUMIN SERPL BCP-MCNC: 3.6 G/DL (ref 3.4–5)
ALP SERPL-CCNC: 87 U/L (ref 45–117)
ALT SERPL W P-5'-P-CCNC: < 10 U/L (ref 12–78)
AST SERPL W P-5'-P-CCNC: 19 U/L (ref 15–37)
BUN BLD-MCNC: 21 MG/DL (ref 7–18)
GLUCOSE SERPLBLD-MCNC: 102 MG/DL (ref 74–106)
HCT VFR BLD CALC: 40.9 % (ref 36–45)
LYMPHOCYTES # SPEC AUTO: 2 K/UL (ref 0.7–4.9)
PMV BLD: 7.4 FL (ref 7.6–11.3)
POTASSIUM SERPL-SCNC: 4.1 MMOL/L (ref 3.5–5.1)
RBC # BLD: 4.29 M/UL (ref 3.86–4.86)
SARS-COV-2 RNA RESP QL NAA+PROBE: NEGATIVE

## 2022-03-12 PROCEDURE — 81015 MICROSCOPIC EXAM OF URINE: CPT

## 2022-03-12 PROCEDURE — 80048 BASIC METABOLIC PNL TOTAL CA: CPT

## 2022-03-12 PROCEDURE — 87088 URINE BACTERIA CULTURE: CPT

## 2022-03-12 PROCEDURE — 0240U: CPT

## 2022-03-12 PROCEDURE — 71045 X-RAY EXAM CHEST 1 VIEW: CPT

## 2022-03-12 PROCEDURE — 87040 BLOOD CULTURE FOR BACTERIA: CPT

## 2022-03-12 PROCEDURE — 99284 EMERGENCY DEPT VISIT MOD MDM: CPT

## 2022-03-12 PROCEDURE — 84145 PROCALCITONIN (PCT): CPT

## 2022-03-12 PROCEDURE — 36415 COLL VENOUS BLD VENIPUNCTURE: CPT

## 2022-03-12 PROCEDURE — 85025 COMPLETE CBC W/AUTO DIFF WBC: CPT

## 2022-03-12 PROCEDURE — 87086 URINE CULTURE/COLONY COUNT: CPT

## 2022-03-12 PROCEDURE — 70450 CT HEAD/BRAIN W/O DYE: CPT

## 2022-03-12 PROCEDURE — 80076 HEPATIC FUNCTION PANEL: CPT

## 2022-03-12 PROCEDURE — 93005 ELECTROCARDIOGRAM TRACING: CPT

## 2022-03-12 NOTE — XMS REPORT
Continuity of Care Document

                            Created on:2022



Patient:SHAHIDA ESTRADA

Sex:Female

:1940

External Reference #:[id number]





Demographics







                          Address                   [address] [place] [address]

 

                          Home Phone                [phone]

 

                          Preferred Language        English

 

                          Marital Status            Unknown

 

                          Jain Affiliation     Unknown

 

                          Race                      Unknown

 

                          Additional Race(s)        Unavailable

 

                          Ethnic Group              Unknown









Author







                          Organization              [facility]

 

                          Address                   [address]



                                                    [place] [address]

 

                          Phone                     [phone]









Care Team Providers







                    Name                Role                Phone

 

                    KIANNA AMIN  Attending Clinician Unavailable

 

                    LOREE CALLE     Attending Clinician Unavailable

 

                    LEE         Attending Clinician Unavailable

 

                    KIANNA AMIN  Admitting Clinician Unavailable

 

                    KAPIL BAER  Admitting Clinician Unavailable

 

                    LEE         Admitting Clinician Unavailable









Problems

This patient has no known problems.



Allergies, Adverse Reactions, Alerts

This patient has no known allergies or adverse reactions.



Medications

This patient has no known medications.



Procedures

This patient has no known procedures.



Results







           Test Description Test Time  Test Comments Results    Result     [facility]

e



                                                       Comments   

 

           [place], ANGIOGRAM, 2017  Reason for FINAL REPORT PATIENT ID:        

    



           CEREBRAL   1          Exam:->cerebral  [id number] DATE:            



                      11:48:00   aneurysm   2017NAME: Shahida            



                                 uncynthia EstradaATTENDING:            



                                            Migue Amin MDFIRST            



                                            ASSISTANT: CESARIO LooREOPERATIVE DIAGNOSIS:           

 



                                            Unruptured right            



                                            posterior communicating            



                                            artery aneurysm status            



                                            post stent assisted            



                                            coilingPOSTOPERATIVE            



                                            DIAGNOSIS: Unruptured            



                                            right posterior            



                                            communicating artery            



                                            aneurysm status post            



                                            stent assisted            



                                            coilingPROCEDURE            



                                            PERFORMED: Diagnostic            



                                            Cerebral              



                                            AngiogramANESTHESIA:            



                                            MACCOMPLICATIONS:            



                                            NoneESTIMATED BLOOD LOSS:           

 



                                            Less than 15ml ARTERIAL            



                                            VESSELS STUDIED:RIGHT            



                                            SUBCLAVIAN ARTERY            



                                            (x1)RIGHT COMMON CAROTID            



                                            ARTERY (CERVICAL x2)RIGHT           

 



                                            COMMON CAROTID ARTERY            



                                            (CRANIAL x3)RIGHT COMMON            



                                            FEMORAL ARTERY (x1)            



                                            MATERIALS EMPLOYED:1. 5            



                                            Bhutanese short sheath 2. 4            



                                            Bhutanese Berenstein            



                                            catheter3. Bentson            



                                            guidewire4. Terumo 0.035            



                                            LT glidewire5. 5 Bhutanese            



                                            Mynx device INDICATIONS:            



                                            77-year-old female with            



                                            hypertension who is            



                                            status post endovascular            



                                            treatment of an            



                                            unruptured right            



                                            posterior communicating            



                                            artery aneurysm with            



                                            stent-assisted coiling on           

 



                                            May 19, 2017. She            



                                            presents for follow-up            



                                            cerebral angiogram. The            



                                            indications for the            



                                            procedure as well as the            



                                            risks, benefits and            



                                            alternatives were            



                                            discussed with the            



                                            patient and the family.            



                                            The risks discussed            



                                            included but were not            



                                            limited to stroke,            



                                            intracranial hemorrhage,            



                                            injury to the cervical            



                                            femoral or aortic            



                                            vessels, contrast            



                                            reaction, kidney to            



                                            toxicity, groin hematoma,           

 



                                            weakness paralysis and            



                                            even death. They            



                                            demonstrated            



                                            understanding of the risk           

 



                                            benefit profile and            



                                            agreed to proceed.            



                                            PROCEDURE: After            



                                            appropriate consent was            



                                            obtained, the patient was           

 



                                            brought to the            



                                            angiographic suite and            



                                            cardiopulmonary            



                                            monitoring was placed.            



                                            The anesthesia team            



                                            performed light sedation.           

 



                                            A timeout was performed.            



                                            Both groins were prepped            



                                            and draped in the usual            



                                            sterile fashion. After            



                                            administration of 10 mL            



                                            of 2% lidocaine, a            



                                            micropuncture needle was            



                                            used to perform a single            



                                            wall puncture of the            



                                            right common femoral            



                                            artery, a micropuncture            



                                            sheath was inserted, and            



                                            an angled DSA angiogram            



                                            was performed through the           

 



                                            sheath. Once good            



                                            location of the puncture            



                                            site was confirmed, a 5            



                                            Bhutanese short sheath was            



                                            inserted over a Bentson            



                                            wire and was maintained            



                                            on heparinized saline            



                                            flush throughout the            



                                            remainder of the            



                                            procedure.  Using coaxial           

 



                                            technique, a preflushed            



                                            Berenstein catheter on            



                                            constant heparinized            



                                            saline flush was advanced           

 



                                            over the Glidewire into            



                                            the descending aorta, the           

 



                                            Glidewire was removed,            



                                            the catheter back bled,            



                                            flushed in usual fashion            



                                            and the catheter was            



                                            maintained on heparinized           

 



                                            flushed throughout            



                                            duration of the case.            



                                            Using coaxial technique,            



                                            the catheter was advanced           

 



                                            into the aortic arch and            



                                            with the aid of            



                                            roadmapping, digital            



                                            fluoroscopy, and careful            



                                            guidewire manipulation,            



                                            the arteries described            



                                            below were selectively            



                                            catheterized. Upon each            



                                            successive            



                                            catheterization, digital            



                                            subtraction angiography            



                                            using the appropriate            



                                            rate and volume of            



                                            contrast in multiple            



                                            projections was            



                                            performed. At the            



                                            conclusion of the            



                                            procedure, the catheter            



                                            was retracted into the            



                                            aorta and the images            



                                            reviewed at the outside            



                                            workstation for quality            



                                            and content.  Then the            



                                            femoral sheath was            



                                            removed and hemostasis            



                                            achieved with a 5 Bhutanese            



                                            Mynx device and manual            



                                            compression. The patient            



                                            tolerated the procedure            



                                            well and was transported            



                                            in unchanged neurological           

 



                                            status without groin            



                                            hematoma and with good            



                                            distal lower extremity            



                                            pulses. The patient was            



                                            transferred to the            



                                            recovery area to be            



                                            monitored as per            



                                            protocol.  FINDINGS:            



                                            RIGHT SUBCLAVIAN ARTERY            



                                            (DSA, PA, LATERAL            



                                            ROADMAP, x1): There is            



                                            normal course and caliber           

 



                                            of the subclavian artery            



                                            with physiological            



                                            filling of its distal            



                                            branches. Limited            



                                            visualization in this            



                                            roadmap of the origins of           

 



                                            the right vertebral            



                                            artery, internal            



                                            mamillary artery,            



                                            thyrocervical and            



                                            costocervical trunks.            



                                            RIGHT COMMON CAROTID            



                                            ARTERY (DSA, PA, LATERAL,           

 



                                            CERVICAL x2): Tortuous            



                                            course of the right            



                                            common carotid artery.            



                                            The distal cervical            



                                            common carotid as well as           

 



                                            the origins of the right            



                                            internal and external            



                                            carotid arteries are            



                                            widely patent without            



                                            evidence of ulceration or           

 



                                            stenosis. The proximal            



                                            portions of the            



                                            superficial temporal            



                                            artery, middle meningeal            



                                            artery, internal            



                                            maxillary artery,            



                                            occipital artery and            



                                            their branches are            



                                            visualized and appear            



                                            normal. RIGHT COMMON            



                                            CAROTID ARTERY (DSA, PA,            



                                            LATERAL, MAGNIFIED            



                                            OBLIQUE, CRANIAL x3):            



                                            Minimal residual aneurysm           

 



                                            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            



                                            treated right posterior            



                                            communicating artery            



                                            aneurysm, best visualized           

 



                                            on sequence A9. Normal            



                                            distal cervical, petrous,           

 



                                            cavernous and            



                                            supraclinoid internal            



                                            carotid artery with            



                                            physiological filling of            



                                            the MCA and MAXIMINO branches.           

 



                                            Limited visualization of            



                                            the venous phase. No            



                                            other aneurysms or other            



                                            vascular lesions are            



                                            seen. There is no            



                                            significant            



                                            atherosclerosis or            



                                            stenosis. Normal course            



                                            and caliber of the            



                                            external carotid artery            



                                            and its branches. There            



                                            is a well visualized            



                                            superficial temporal            



                                            artery, middle meningeal            



                                            artery, internal            



                                            maxillary artery,            



                                            occipital artery and            



                                            their branches. RIGHT            



                                            COMMON FEMORAL ARTERY            



                                            (DSA, PA, X 1): Normal            



                                            location of the puncture            



                                            site above the            



                                            bifurcation and below            



                                            markers of the inguinal            



                                            ligament.  IMPRESSION:            



                                            Minimal residual aneurysm           

 



                                            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            



                                            treated right posterior            



                                            communicating artery            



                                            aneurysm. No technical or           

 



                                            procedural complications            



                                            FACULTY ATTESTATION: I,            



                                            Migue Amin M.D.,            



                                            was present for the            



                                            entirety of the            



                                            procedure. I performed or           

 



                                            directly supervised all            



                                            aspects of the procedure.           

 



                                            I performed all critical            



                                            aspects of the case. I            



                                            interpreted the images            



                                            and reported the results.           

 



                                            Signed: Chaz Cox            



                                            MDReport Verified            



                                            Date/Time:  2017            



                                            11:48:56 Reading            



                                            [facility]            



                                            Neuro Angio Reading Room            



                                                 Electronically            



                                            signed by: CHAZ COX on 2017 11:48            



                                            AM                    









                    BASIC METABOLIC PANEL 2017 11:56:00 









                      Test Item  Value      Reference Range Interpretation Comme

nts









             SODIUM (BEAKER) (test code 136 meq/L    136-145                   



             = 381)                                              

 

             POTASSIUM (BEAKER) (test 3.9 meq/L    3.5-5.1                   



             code = 379)                                         

 

             CHLORIDE (BEAKER) (test 102 meq/L                        



             code = 382)                                         

 

             CO2 (BEAKER) (test code = 25 meq/L     22-29                     



             355)                                                

 

             BLOOD UREA NITROGEN 20 mg/dL     7-21                      



             (BEAKER) (test code = 354)                                        

 

             CREATININE (BEAKER) (test 0.82 mg/dL   0.57-1.25                 



             code = 358)                                         

 

             GLUCOSE RANDOM (BEAKER) 106 mg/dL           H            



             (test code = 652)                                        

 

             CALCIUM (BEAKER) (test code 9.6 mg/dL    8.4-10.2                  



             = 697)                                              

 

             EGFR (BEAKER) (test code = 68 mL/min/1.73 sq m                     

      ESTIMATED GFR IS NOT AS



             1092)                                               ACCURATE AS CRE

ATININE



                                                                 CLEARANCE IN [place]

EDICTING



                                                                 GLOMERULAR FILT

RATION



                                                                 RATE. ESTIMATED

 GFR IS NOT



                                                                 APPLICABLE FOR 

DIALYSIS



                                                                 PATIENTS.



CBC WITH PLATELET COUNT + MANUAL [id number] 11:33:00





             Test Item    Value        Reference Range Interpretation Comments

 

             WHITE BLOOD CELL COUNT 5.0 K/ L     3.5-10.5                  



             (BEAKER) (test code =                                        



             775)                                                

 

             RED BLOOD CELL COUNT 3.94 M/ L    3.93-5.22                 



             (BEAKER) (test code =                                        



             761)                                                

 

             HEMOGLOBIN (BEAKER) 12.1 GM/DL   11.2-15.7                 



             (test code = 410)                                        

 

             HEMATOCRIT (BEAKER) 37.1 %       34.1-44.9                 



             (test code = 411)                                        

 

             MEAN CORPUSCULAR 94.2 fL      79.4-94.8                 



             VOLUME (BEAKER) (test                                        



             code = 753)                                         

 

             MEAN CORPUSCULAR 30.7 pg      25.6-32.2                 



             HEMOGLOBIN (BEAKER)                                        



             (test code = 751)                                        

 

             MEAN CORPUSCULAR 32.6 GM/DL   32.2-35.5                 



             HEMOGLOBIN CONC                                        



             (BEAKER) (test code =                                        



             752)                                                

 

             RED CELL DISTRIBUTION 12.7 %       11.7-14.4                 



             WIDTH (BEAKER) (test                                        



             code = 412)                                         

 

             PLATELET COUNT 212 K/CU MM  150-450                   



             (BEAKER) (test code =                                        



             756)                                                

 

             MEAN PLATELET VOLUME 9.4 fL       9.4-12.3                  



             (BEAKER) (test code =                                        



             754)                                                

 

             NUCLEATED RED BLOOD 0 /100 WBC   0-0                       



             CELLS (BEAKER) (test                                        



             code = 413)                                         

 

             IMMATURE     0 %          0-1                       



             GRANULOCYTES-RELATIVE                                        



             PERCENT (BEAKER) (test                                        



             code = 0251)                                        

 

             NEUTROPHILS RELATIVE 60 %                                   This is

 an appended



             PERCENT (BEAKER) (test                                        repor

t.  These



             code = 429)                                         results have be

en



                                                                 appended to a



                                                                 previously sherine

l



                                                                 verified report

.

 

             LYMPHOCYTES RELATIVE 30 %                                   This is

 an appended



             PERCENT (BEAKER) (test                                        repor

t.  These



             code = 430)                                         results have be

en



                                                                 appended to a



                                                                 previously sherine

l



                                                                 verified report

.

 

             MONOCYTES RELATIVE 9 %                                    This is a

n appended



             PERCENT (BEAKER) (test                                        repor

t.  These



             code = 431)                                         results have be

en



                                                                 appended to a



                                                                 previously sherine

l



                                                                 verified report

.

 

             EOSINOPHILS RELATIVE 1 %                                    This is

 an appended



             PERCENT (BEAKER) (test                                        repor

t.  These



             code = 432)                                         results have be

en



                                                                 appended to a



                                                                 previously sherine

l



                                                                 verified report

.

 

             BASOPHILS RELATIVE 1 %                                    This is a

n appended



             PERCENT (BEAKER) (test                                        repor

t.  These



             code = 437)                                         results have be

en



                                                                 appended to a



                                                                 previously sherine

l



                                                                 verified report

.

 

             NEUTROPHILS ABSOLUTE 2.97 K/ L    1.56-6.13                 This is

 an appended



             COUNT (BEAKER) (test                                        report.

  These



             code = 670)                                         results have be

en



                                                                 appended to a



                                                                 previously sherine

l



                                                                 verified report

.

 

             LYMPHOCYTES ABSOLUTE 1.46 K/ L    1.18-3.74                 This is

 an appended



             COUNT (BEAKER) (test                                        report.

  These



             code = 414)                                         results have be

en



                                                                 appended to a



                                                                 previously sherine

l



                                                                 verified report

.

 

             MONOCYTES ABSOLUTE 0.42 K/ L    0.24-0.36    H            This is a

n appended



             COUNT (BEAKER) (test                                        report.

  These



             code = 415)                                         results have be

en



                                                                 appended to a



                                                                 previously sherine

l



                                                                 verified report

.

 

             EOSINOPHILS ABSOLUTE 0.05 K/ L    0.04-0.36                 This is

 an appended



             COUNT (BEAKER) (test                                        report.

  These



             code = 416)                                         results have be

en



                                                                 appended to a



                                                                 previously sherine

l



                                                                 verified report

.

 

             BASOPHILS ABSOLUTE 0.04 K/ L    0.01-0.08                 This is a

n appended



             COUNT (BEAKER) (test                                        report.

  These



             code = 417)                                         results have be

en



                                                                 appended to a



                                                                 previously sherine

l



                                                                 verified report

.



[id number] 11:30:00





             Test Item    Value        Reference Range Interpretation Comments

 

             PROTIME (BEAKER) (test code = 15.2 seconds 11.7-14.7    H          

  



             759)                                                

 

             INR (BEAKER) (test code = 370) 1.2          <=5.9                  

   

 

             PARTIAL THROMBOPLASTIN TIME 42.8 seconds 22.5-36.0    H            



             (BEAKER) (test code = 760)                                        



RECOMMENDED COUMADIN/WARFARIN INR THERAPY RANGESSTANDARD DOSE: 2.0 - 3.0   
Includes: PROPHYLAXIS forvenous thrombosis, systemic embolization; TREATMENT for
venous thrombosis and/or pulmonary embolus.HIGH RISK: Target INR is 2.5-3.5 for 
patients with mechanical heart valves.PLATELET AGGREGATION: FUNCTION SCREEN
2017 09:23:00





             Test Item    Value        Reference Range Interpretation Comments

 

             WEAK ADP     5 %          60-91        L            



             RESULT(BEAKER) (test                                        



             code = 2135)                                        

 

             PLATELET FUNCTION 0-39% indicates marked                           



             SCREEN INTERP platelet dysfunction                           



             (BEAKER) (test code =                                        



             2173)                                               

 

             [id number] Nelly Zurita MD                       

    



             (BEAKER) (test code = (electronic signature)                       

    



             7663)                                               

 

             PLATELET COUNT  K/CU MM  150-430                   



             (BEAKER) (test code =                                        



             2656)                                               



CBC W/PLT COUNT &amp; AUTO [id number] 07:07:00





             Test Item    Value        Reference Range Interpretation Comments

 

             WHITE BLOOD CELL COUNT (BEAKER) 5.9 K/ L     4.0-10.0              

    



             (test code = 775)                                        

 

             RED BLOOD CELL COUNT (BEAKER) 3.88 M/ L    4.00-5.00    L          

  



             (test code = 761)                                        

 

             HEMOGLOBIN (BEAKER) (test code = 12.5 GM/DL   12.0-15.0            

     



             410)                                                

 

             HEMATOCRIT (BEAKER) (test code = 37.8 %       36.0-45.0            

     



             411)                                                

 

             MEAN CORPUSCULAR VOLUME (BEAKER) 97.3 fL      82.0-99.0            

     



             (test code = 753)                                        

 

             MEAN CORPUSCULAR HEMOGLOBIN 32.1 pg      27.0-33.0                 



             (BEAKER) (test code = 751)                                        

 

             MEAN CORPUSCULAR HEMOGLOBIN CONC 33.0 GM/DL   32.0-36.0            

     



             (BEAKER) (test code = 752)                                        

 

             RED CELL DISTRIBUTION WIDTH 13.4 %       10.3-14.2                 



             (BEAKER) (test code = 412)                                        

 

             PLATELET COUNT (BEAKER) (test 238 K/CU MM  150-430                 

  



             code = 756)                                         

 

             MEAN PLATELET VOLUME (BEAKER) 7.2 fL       6.5-10.5                

  



             (test code = 754)                                        

 

             NUCLEATED RED BLOOD CELLS 0 /100 WBC   0-0                       



             (BEAKER) (test code = 413)                                        

 

             NEUTROPHILS RELATIVE PERCENT 58 %                                  

 



             (BEAKER) (test code = 429)                                        

 

             LYMPHOCYTES RELATIVE PERCENT 31 %                                  

 



             (BEAKER) (test code = 430)                                        

 

             MONOCYTES RELATIVE PERCENT 10 %                                   



             (BEAKER) (test code = 431)                                        

 

             EOSINOPHILS RELATIVE PERCENT 0 %                                   

 



             (BEAKER) (test code = 432)                                        

 

             BASOPHILS RELATIVE PERCENT 1 %                                    



             (BEAKER) (test code = 437)                                        

 

             NEUTROPHILS ABSOLUTE COUNT 3.36 K/ L    1.80-8.00                 



             (BEAKER) (test code = 670)                                        

 

             LYMPHOCYTES ABSOLUTE COUNT 1.84 K/ L    1.48-4.50                 



             (BEAKER) (test code = 414)                                        

 

             MONOCYTES ABSOLUTE COUNT (BEAKER) 0.58 K/ L    0.00-1.30           

      



             (test code = 415)                                        

 

             EOSINOPHILS ABSOLUTE COUNT 0.01 K/ L    0.00-0.50                 



             (BEAKER) (test code = 416)                                        

 

             BASOPHILS ABSOLUTE COUNT (BEAKER) 0.06 K/ L    0.00-0.20           

      



             (test code = 417)                                        



0.00BASIC METABOLIC [id number] 06:36:00





             Test Item    Value        Reference Range Interpretation Comments

 

             SODIUM (BEAKER) 138 meq/L    136-145                   



             (test code = 381)                                        

 

             POTASSIUM (BEAKER) 4.0 meq/L    3.5-5.1                   Specimen 

slightly



             (test code = 379)                                        hemolyzed

 

             CHLORIDE (BEAKER) 106 meq/L                        



             (test code = 382)                                        

 

             CO2 (BEAKER) (test 23 meq/L     22-29                     



             code = 355)                                         

 

             BLOOD UREA NITROGEN 13 mg/dL     7-21                      



             (BEAKER) (test code                                        



             = 354)                                              

 

             CREATININE (BEAKER) 0.79 mg/dL   0.57-1.25                 Specimen

 slightly



             (test code = 358)                                        hemolyzed

 

             GLUCOSE RANDOM 97 mg/dL                         



             (BEAKER) (test code                                        



             = 652)                                              

 

             CALCIUM (BEAKER) 9.0 mg/dL    8.4-10.2                  



             (test code = 697)                                        

 

             EGFR (BEAKER) (test 71 mL/min/1.73                           ESTIMA

[place] GFR IS



             code = 1092) sq m                                   NOT AS ACCURATE

 AS



                                                                 CREATININE



                                                                 CLEARANCE IN



                                                                 PREDICTING



                                                                 GLOMERULAR



                                                                 FILTRATION RATE

.



                                                                 ESTIMATED GFR I

S



                                                                 NOT APPLICABLE 

FOR



                                                                 DIALYSIS PATIEN

TS.



[id number] 06:21:00





             Test Item    Value        Reference Range Interpretation Comments

 

             PROTIME (BEAKER) (test code = 14.1 seconds 11.7-14.7               

  



             759)                                                

 

             INR (BEAKER) (test code = 370) 1.1          <=5.9                  

   

 

             PARTIAL THROMBOPLASTIN TIME 37.0 seconds 22.5-36.0    H            



             (BEAKER) (test code = 760)                                        



RECOMMENDED COUMADIN/WARFARIN INR THERAPY RANGESSTANDARD DOSE: 2.0 - 3.0   
Includes: PROPHYLAXIS forvenous thrombosis, systemic embolization; TREATMENT for
venous thrombosis and/or pulmonary embolus.HIGH RISK: Target INR is 2.5-3.5 for 
patients with mechanical heart valves.PROTHROMBIN [id number] 06:20:00





             Test Item    Value        Reference Range Interpretation Comments

 

             PROTIME (BEAKER) (test code = 14.1 seconds 11.7-14.7               

  



             759)                                                

 

             INR (BEAKER) (test code = 370) 1.1          <=5.9                  

   



RECOMMENDED COUMADIN/WARFARIN INR THERAPY RANGESSTANDARD DOSE: 2.0 - 3.0   
Includes: PROPHYLAXIS forvenous thrombosis, systemic embolization; TREATMENT for
venous thrombosis and/or pulmonary embolus.HIGH RISK: Target INR is 2.5-3.5 for 
patients with mechanical heart valves.PT/[id number] 23:38:00





             Test Item    Value        Reference Range Interpretation Comments

 

             PROTIME (BEAKER) (test code = 15.2 seconds 11.7-14.7    H          

  



             759)                                                

 

             INR (BEAKER) (test code = 370) 1.2          <=5.9                  

   

 

             PARTIAL THROMBOPLASTIN TIME 36.5 seconds 22.5-36.0    H            



             (BEAKER) (test code = 760)                                        



RECOMMENDED COUMADIN/WARFARIN INR THERAPY RANGESSTANDARD DOSE: 2.0 - 3.0   
Includes: PROPHYLAXIS forvenous thrombosis, systemic embolization; TREATMENT for
venous thrombosis and/or pulmonary embolus.HIGH RISK: Target INR is 2.5-3.5 for 
patients with mechanical heart valves.BASIC METABOLIC [id number] 23:33:00





             Test Item    Value        Reference Range Interpretation Comments

 

             SODIUM (BEAKER) 137 meq/L    136-145                   



             (test code = 381)                                        

 

             POTASSIUM (BEAKER) 5.7 meq/L    3.5-5.1      H            Specimen 

markedly



             (test code = 379)                                        hemolyzed

 

             CHLORIDE (BEAKER) 105 meq/L                        



             (test code = 382)                                        

 

             CO2 (BEAKER) (test 25 meq/L     22-29                     



             code = 355)                                         

 

             BLOOD UREA NITROGEN 13 mg/dL     7-21                      



             (BEAKER) (test code                                        



             = 354)                                              

 

             CREATININE (BEAKER) 0.84 mg/dL   0.57-1.25                 Specimen

 markedly



             (test code = 358)                                        hemolyzed

 

             GLUCOSE RANDOM 86 mg/dL                         



             (BEAKER) (test code                                        



             = 652)                                              

 

             CALCIUM (BEAKER) 8.9 mg/dL    8.4-10.2                  



             (test code = 697)                                        

 

             EGFR (BEAKER) (test 66 mL/min/1.73                           ESTIMA

[place] GFR IS



             code = 1092) sq m                                   NOT AS ACCURATE

 AS



                                                                 CREATININE



                                                                 CLEARANCE IN



                                                                 PREDICTING



                                                                 GLOMERULAR



                                                                 FILTRATION RATE

.



                                                                 ESTIMATED GFR I

S



                                                                 NOT APPLICABLE 

FOR



                                                                 DIALYSIS PATIEN

TS.



CBC W/PLT COUNT &amp; AUTO [id number] 23:19:00





             Test Item    Value        Reference Range Interpretation Comments

 

             WHITE BLOOD CELL COUNT (BEAKER) 7.0 K/ L     4.0-10.0              

    



             (test code = 775)                                        

 

             RED BLOOD CELL COUNT (BEAKER) 3.76 M/ L    4.00-5.00    L          

  



             (test code = 761)                                        

 

             HEMOGLOBIN (BEAKER) (test code = 12.7 GM/DL   12.0-15.0            

     



             410)                                                

 

             HEMATOCRIT (BEAKER) (test code = 36.9 %       36.0-45.0            

     



             411)                                                

 

             MEAN CORPUSCULAR VOLUME (BEAKER) 98.2 fL      82.0-99.0            

     



             (test code = 753)                                        

 

             MEAN CORPUSCULAR HEMOGLOBIN 33.7 pg      27.0-33.0    H            



             (BEAKER) (test code = 751)                                        

 

             MEAN CORPUSCULAR HEMOGLOBIN CONC 34.4 GM/DL   32.0-36.0            

     



             (BEAKER) (test code = 752)                                        

 

             RED CELL DISTRIBUTION WIDTH 12.2 %       10.3-14.2                 



             (BEAKER) (test code = 412)                                        

 

             PLATELET COUNT (BEAKER) (test 241 K/CU MM  150-430                 

  



             code = 756)                                         

 

             MEAN PLATELET VOLUME (BEAKER) 7.2 fL       6.5-10.5                

  



             (test code = 754)                                        

 

             NUCLEATED RED BLOOD CELLS 0 /100 WBC   0-0                       



             (BEAKER) (test code = 413)                                        

 

             NEUTROPHILS RELATIVE PERCENT 58 %                                  

 



             (BEAKER) (test code = 429)                                        

 

             LYMPHOCYTES RELATIVE PERCENT 34 %                                  

 



             (BEAKER) (test code = 430)                                        

 

             MONOCYTES RELATIVE PERCENT 7 %                                    



             (BEAKER) (test code = 431)                                        

 

             EOSINOPHILS RELATIVE PERCENT 0 %                                   

 



             (BEAKER) (test code = 432)                                        

 

             BASOPHILS RELATIVE PERCENT 1 %                                    



             (BEAKER) (test code = 437)                                        

 

             NEUTROPHILS ABSOLUTE COUNT 4.08 K/ L    1.80-8.00                 



             (BEAKER) (test code = 670)                                        

 

             LYMPHOCYTES ABSOLUTE COUNT 2.36 K/ L    1.48-4.50                 



             (BEAKER) (test code = 414)                                        

 

             MONOCYTES ABSOLUTE COUNT (BEAKER) 0.52 K/ L    0.00-1.30           

      



             (test code = 415)                                        

 

             EOSINOPHILS ABSOLUTE COUNT 0.02 K/ L    0.00-0.50                 



             (BEAKER) (test code = 416)                                        

 

             BASOPHILS ABSOLUTE COUNT (BEAKER) 0.05 K/ L    0.00-0.20           

      



             (test code = 417)                                        



0.00POCT-P2Y12 PLATELET [id number] 20:57:00





             Test Item    Value        Reference Range Interpretation Comments

 

             POC-P2Y12 PLATELET AGG (BEAKER) (test 31 PRU                       

          



             code = 2303)                                        



RANGE INFORMATION: PRU reference range is 194-418. Post Drug Results: Lower PRU 
levels are associated with expected antiplatelet effect. Values may be below the
stated reference range above. The post-drug PRU values reported in the [employer]
P2Y12 package insert are .URINALYSIS W/ [id number] 09:22:00





             Test Item    Value        Reference Range Interpretation Comments

 

             COLOR (BEAKER) (test Light Yellow                           



             code = 470)                                         

 

             CLARITY (BEAKER) (test Hazy                                   



             code = 469)                                         

 

             SPECIFIC GRAVITY UA 1.009        1.001-1.035               



             (BEAKER) (test code =                                        



             468)                                                

 

             PH UA (BEAKER) (test 5.5          5.0-8.0                   



             code = 467)                                         

 

             PROTEIN UA (BEAKER) Negative     Negative                  



             (test code = 464)                                        

 

             GLUCOSE UA (BEAKER) Negative     Negative                  



             (test code = 365)                                        

 

             KETONES UA (BEAKER) Negative     Negative                  



             (test code = 371)                                        

 

             BILIRUBIN UA (BEAKER) Negative     Negative                  



             (test code = 462)                                        

 

             BLOOD UA (BEAKER) (test Small        Negative     A            



             code = 461)                                         

 

             NITRITE UA (BEAKER) Positive     Negative     A            



             (test code = 465)                                        

 

             LEUKOCYTE ESTERASE UA Large        Negative     A            



             (BEAKER) (test code =                                        



             466)                                                

 

             UROBILINOGEN UA (BEAKER) 0.2 mg/dL    0.2-1.0                   



             (test code = 463)                                        

 

             RBC UA (BEAKER) (test 12 /HPF                                



             code = 519)                                         

 

             WBC UA (BEAKER) (test 92 /HPF                                



             code = 520)                                         

 

             BACTERIA (BEAKER) (test Rare                                   



             code = 517)                                         

 

             MUCUS (BEAKER) (test Rare                                   



             code = 1574)                                        

 

             SQUAMOUS EPITHELIAL < /HPF                                 



             (BEAKER) (test code =                                        



             516)                                                

 

             YEAST (BEAKER) (test Occasional                             



             code = 1585)                                        

 

             SOURCE(BEAKER) (test Urine, Straight                           



             code = 2795) Catheter                               



[id number] 04:55:00





             Test Item    Value        Reference Range Interpretation Comments

 

             PHOSPHORUS (BEAKER) (test code = 3.3 mg/dL    2.3-4.7              

     



             604)                                                



[id number] 04:55:00





             Test Item    Value        Reference Range Interpretation Comments

 

             MAGNESIUM (BEAKER) (test code = 1.6 mg/dL    1.6-2.6               

    



             627)                                                



BASIC METABOLIC [id number] 04:55:00





             Test Item    Value        Reference Range Interpretation Comments

 

             SODIUM (BEAKER) 140 meq/L    136-145                   



             (test code = 381)                                        

 

             POTASSIUM (BEAKER) 3.4 meq/L    3.5-5.1      L            



             (test code = 379)                                        

 

             CHLORIDE (BEAKER) 107 meq/L                        



             (test code = 382)                                        

 

             CO2 (BEAKER) (test 22 meq/L     22-29                     



             code = 355)                                         

 

             BLOOD UREA NITROGEN 8 mg/dL      7-21                      



             (BEAKER) (test code                                        



             = 354)                                              

 

             CREATININE (BEAKER) 0.71 mg/dL   0.57-1.25                 



             (test code = 358)                                        

 

             GLUCOSE RANDOM 109 mg/dL           H            



             (BEAKER) (test code                                        



             = 652)                                              

 

             CALCIUM (BEAKER) 8.6 mg/dL    8.4-10.2                  



             (test code = 697)                                        

 

             EGFR (BEAKER) (test 80 mL/min/1.73                           ESTIMA

[facility] GFR IS



             code = 1092) sq m                                   NOT AS ACCURATE

 AS



                                                                 CREATININE



                                                                 CLEARANCE IN



                                                                 PREDICTING



                                                                 GLOMERULAR



                                                                 FILTRATION RATE

.



                                                                 ESTIMATED GFR I

S



                                                                 NOT APPLICABLE 

FOR



                                                                 DIALYSIS PATIEN

TS.



CBC W/PLT COUNT &amp; AUTO [id number] 04:52:00





             Test Item    Value        Reference Range Interpretation Comments

 

             WHITE BLOOD CELL COUNT (BEAKER) 7.5 K/ L     4.0-10.0              

    



             (test code = 775)                                        

 

             RED BLOOD CELL COUNT (BEAKER) 4.04 M/ L    4.00-5.00               

  



             (test code = 761)                                        

 

             HEMOGLOBIN (BEAKER) (test code = 12.9 GM/DL   12.0-15.0            

     



             410)                                                

 

             HEMATOCRIT (BEAKER) (test code = 39.7 %       36.0-45.0            

     



             411)                                                

 

             MEAN CORPUSCULAR VOLUME (BEAKER) 98.3 fL      82.0-99.0            

     



             (test code = 753)                                        

 

             MEAN CORPUSCULAR HEMOGLOBIN 31.9 pg      27.0-33.0                 



             (BEAKER) (test code = 751)                                        

 

             MEAN CORPUSCULAR HEMOGLOBIN CONC 32.5 GM/DL   32.0-36.0            

     



             (BEAKER) (test code = 752)                                        

 

             RED CELL DISTRIBUTION WIDTH 12.0 %       10.3-14.2                 



             (BEAKER) (test code = 412)                                        

 

             PLATELET COUNT (BEAKER) (test 196 K/CU MM  150-430                 

  



             code = 756)                                         

 

             MEAN PLATELET VOLUME (BEAKER) 7.2 fL       6.5-10.5                

  



             (test code = 754)                                        

 

             NUCLEATED RED BLOOD CELLS 0 /100 WBC   0-0                       



             (BEAKER) (test code = 413)                                        

 

             NEUTROPHILS RELATIVE PERCENT 81 %                                  

 



             (BEAKER) (test code = 429)                                        

 

             LYMPHOCYTES RELATIVE PERCENT 14 %                                  

 



             (BEAKER) (test code = 430)                                        

 

             MONOCYTES RELATIVE PERCENT 6 %                                    



             (BEAKER) (test code = 431)                                        

 

             EOSINOPHILS RELATIVE PERCENT 0 %                                   

 



             (BEAKER) (test code = 432)                                        

 

             BASOPHILS RELATIVE PERCENT 0 %                                    



             (BEAKER) (test code = 437)                                        

 

             NEUTROPHILS ABSOLUTE COUNT 6.03 K/ L    1.80-8.00                 



             (BEAKER) (test code = 670)                                        

 

             LYMPHOCYTES ABSOLUTE COUNT 1.01 K/ L    1.48-4.50    L            



             (BEAKER) (test code = 414)                                        

 

             MONOCYTES ABSOLUTE COUNT (BEAKER) 0.42 K/ L    0.00-1.30           

      



             (test code = 415)                                        

 

             EOSINOPHILS ABSOLUTE COUNT 0.01 K/ L    0.00-0.50                 



             (BEAKER) (test code = 416)                                        

 

             BASOPHILS ABSOLUTE COUNT (BEAKER) 0.01 K/ L    0.00-0.20           

      



             (test code = 417)                                        



[id number] 16:01:00





             Test Item    Value        Reference Range Interpretation Comments

 

             ACTIVATED CLOTTING TIME 245 sec                                TEST

ED AT [place] [address]



             ([facility]) (test code =                                        [place]



             [address])                                                [id number]



[id number] 15:00:00





             Test Item    Value        Reference Range Interpretation Comments

 

             ACTIVATED CLOTTING TIME 255 sec                                TEST

ED AT [place] [address]



             ([facility]) (test code =                                        [place]



             [address])                                                [address]



[id number] 14:44:00





             Test Item    Value        Reference Range Interpretation Comments

 

             ACTIVATED CLOTTING TIME 234 sec                                TEST

ED AT [place] [address]



             ([facility]) (test code =                                        [place]



             [address])                                                [address]



[id number] 14:31:00





             Test Item    Value        Reference Range Interpretation Comments

 

             ACTIVATED CLOTTING TIME 219 sec                                TEST

ED AT [place] [address]



             ([facility]) (test code =                                        [place]



             [facility])                                                [address]



[id number] 14:31:00





             Test Item    Value        Reference Range Interpretation Comments

 

             ACTIVATED CLOTTING TIME 183 sec                                TEST

ED AT [place] [address]



             ([facility]) (test code =                                        [place]



             [facility])                                                [address]



[id number] 13:22:00





             Test Item    Value        Reference Range Interpretation Comments

 

             ACTIVATED CLOTTING TIME 137 sec                                TEST

ED AT [place] [address]



             ([facility]) (test code =                                        [place] [address])                                                [address]



POCT-P2Y12 PLATELET [id number] 07:24:00





             Test Item    Value        Reference Range Interpretation Comments

 

             POC-P2Y12 PLATELET AGG ([facility]) (test 110 PRU                      

          



             code = 2303)                                        



RANGE INFORMATION: PRU reference range is 194-418. Post Drug Results: Lower PRU 
levels are associated with expected antiplatelet effect. Values may be below the
stated reference range above. The post-drug PRU values reported in the [employer]
P2Y12 package insert are .POCT-ASPIRIN PLATELET [id number] 
07:24:00





             Test Item    Value        Reference Range Interpretation Comments

 

             POC-ASPIRIN PLATELET AGG ([facility]) 402 ARU                          

      



             (test code = 2302)                                        



RANGE INFORMATION: 350-549 ARU Therapeutic range for platelet function.         
         550-700 ARU Non-Therapeutic range for platelet function.PLATELET 
AGGREGATION: FUNCTION [id number] 10:21:00





             Test Item    Value        Reference Range Interpretation Comments

 

             WEAK ADP     92 %         60-91        H            



             RESULT([facility]) (test                                        



             code = 2135)                                        

 

             PLATELET FUNCTION % indicates                           



             SCREEN INTERP ([facility]) normal platelet                           



             (test code = 2173) function                               

 

             [id number] Meredith Reyes, MD                           



             ([facility]) (test code = (electronic signature)                       

    



             2622)                                               

 

             PLATELET COUNT  K/CU MM  150-430                   



             (BEAKER) (test code =                                        



             2656)                                               



COMPREHENSIVE METABOLIC [id number] 05:11:00





             Test Item    Value        Reference Range Interpretation Comments

 

             TOTAL PROTEIN 6.5 gm/dL    6.0-8.3                   



             (BEAKER) (test code =                                        



             770)                                                

 

             ALBUMIN (BEAKER) 3.6 g/dL     3.5-5.0                   



             (test code = 1145)                                        

 

             ALKALINE PHOSPHATASE 60 U/L                           



             (BEAKER) (test code =                                        



             346)                                                

 

             BILIRUBIN TOTAL 1.1 mg/dL    0.2-1.2                   



             (BEAKER) (test code =                                        



             377)                                                

 

             SODIUM (BEAKER) (test 136 meq/L    136-145                   



             code = 381)                                         

 

             POTASSIUM (BEAKER) 3.8 meq/L    3.5-5.1                   



             (test code = 379)                                        

 

             CHLORIDE (BEAKER) 104 meq/L                        



             (test code = 382)                                        

 

             CO2 (BEAKER) (test 23 meq/L     22-29                     



             code = 355)                                         

 

             BLOOD UREA NITROGEN 13 mg/dL     7-21                      



             (BEAKER) (test code =                                        



             354)                                                

 

             CREATININE (BEAKER) 0.79 mg/dL   0.57-1.25                 



             (test code = 358)                                        

 

             GLUCOSE RANDOM 89 mg/dL                         



             (BEAKER) (test code =                                        



             652)                                                

 

             CALCIUM (BEAKER) 9.2 mg/dL    8.4-10.2                  



             (test code = 697)                                        

 

             AST (SGOT) (BEAKER) 19 U/L       5-34                      



             (test code = 353)                                        

 

             ALT (SGPT) (BEAKER) 8 U/L        6-55                      



             (test code = 347)                                        

 

             EGFR (BEAKER) (test 71 mL/min/1.73                           ESTIMA

[facility] GFR IS



             code = 1092) sq m                                   NOT AS ACCURATE

 AS



                                                                 CREATININE



                                                                 CLEARANCE IN



                                                                 PREDICTING



                                                                 GLOMERULAR



                                                                 FILTRATION RATE

.



                                                                 ESTIMATED GFR I

S



                                                                 NOT APPLICABLE 

FOR



                                                                 DIALYSIS PATIEN

TS.



[id number] 05:04:00





             Test Item    Value        Reference Range Interpretation Comments

 

             PARTIAL THROMBOPLASTIN TIME 37.9 seconds 22.5-36.0    H            



             (BEAKER) (test code = 760)                                        



[id number] 16:56:00





             Test Item    Value        Reference Range Interpretation Comments

 

             PARTIAL THROMBOPLASTIN TIME 39.4 seconds 22.5-36.0    H            



             (BEAKER) (test code = 760)                                        



PROTHROMBIN [id number]/[id number] 16:55:00





             Test Item    Value        Reference Range Interpretation Comments

 

             PROTIME (BEAKER) (test code = 14.4 seconds 11.7-14.7               

  



             759)                                                

 

             INR (BEAKER) (test code = 370) 1.1          <=5.9                  

   



RECOMMENDED COUMADIN/WARFARIN INR THERAPY RANGESSTANDARD DOSE: 2.0 - 3.0   
Includes: PROPHYLAXIS forvenous thrombosis, systemic embolization; TREATMENT for
venous thrombosis and/or pulmonary embolus.HIGH RISK: Target INR is 2.5-3.5 for 
patients with mechanical heart valves.COMPREHENSIVE METABOLIC [id number] 
02:20:00





             Test Item    Value        Reference Range Interpretation Comments

 

             TOTAL PROTEIN 7.0 gm/dL    6.0-8.3                   



             (BEAKER) (test code =                                        



             770)                                                

 

             ALBUMIN (BEAKER) 3.8 g/dL     3.5-5.0                   



             (test code = 1145)                                        

 

             ALKALINE PHOSPHATASE 68 U/L                           



             (BEAKER) (test code =                                        



             346)                                                

 

             BILIRUBIN TOTAL 0.9 mg/dL    0.2-1.2                   



             (BEAKER) (test code =                                        



             377)                                                

 

             SODIUM (BEAKER) (test 137 meq/L    136-145                   



             code = 381)                                         

 

             POTASSIUM (BEAKER) 3.5 meq/L    3.5-5.1                   



             (test code = 379)                                        

 

             CHLORIDE (BEAKER) 104 meq/L                        



             (test code = 382)                                        

 

             CO2 (BEAKER) (test 23 meq/L     22-29                     



             code = 355)                                         

 

             BLOOD UREA NITROGEN 10 mg/dL     7-21                      



             (BEAKER) (test code =                                        



             354)                                                

 

             CREATININE (BEAKER) 0.81 mg/dL   0.57-1.25                 



             (test code = 358)                                        

 

             GLUCOSE RANDOM 106 mg/dL           H            



             (BEAKER) (test code =                                        



             652)                                                

 

             CALCIUM (BEAKER) 9.7 mg/dL    8.4-10.2                  



             (test code = 697)                                        

 

             AST (SGOT) (BEAKER) 21 U/L       5-34                      



             (test code = 353)                                        

 

             ALT (SGPT) (BEAKER) 8 U/L        6-55                      



             (test code = 347)                                        

 

             EGFR (BEAKER) (test 69 mL/min/1.73                           ESTIMA

[facility] GFR IS



             code = 1092) sq m                                   NOT AS ACCURATE

 AS



                                                                 CREATININE



                                                                 CLEARANCE IN



                                                                 PREDICTING



                                                                 GLOMERULAR



                                                                 FILTRATION RATE

.



                                                                 ESTIMATED GFR I

S



                                                                 NOT APPLICABLE 

FOR



                                                                 DIALYSIS PATIEN

TS.



HEMOGLOBIN [id number] 07:40:00





             Test Item    Value        Reference Range Interpretation Comments

 

             HEMOGLOBIN A1C (BEAKER) (test code = 4.9 %        4.3-6.1          

         



             368)                                                



VITAMIN [id number] 06:01:00





             Test Item    Value        Reference Range Interpretation Comments

 

             VITAMIN B12 (BEAKER) (test code = 826 pg/mL    213-816      H      

      



             774)                                                



TSH/FREE T4 IF [id number] 06:01:00





             Test Item    Value        Reference Range Interpretation Comments

 

             THYROID STIMULATING HORMONE 1.37 uIU/mL  0.35-4.94                 



             (BEAKER) (test code = 772)                                        



CALCIUM, [id number] 05:48:00





             Test Item    Value        Reference Range Interpretation Comments

 

             CALCIUM IONIZED (BEAKER) (test 1.11 mmol/L  1.12-1.27    L         

   



             code = 698)                                         

 

             PH, BLOOD (BEAKER) (test code = 7.41                               

    



             1810)                                               



LIPID [id number] 05:33:00





             Test Item    Value        Reference Range Interpretation Comments

 

             TRIGLYCERIDES (BEAKER) (test code = 57 mg/dL                       

        



             540)                                                

 

             CHOLESTEROL (BEAKER) (test code = 204 mg/dL                        

      



             631)                                                

 

             HDL CHOLESTEROL (BEAKER) (test code 62 mg/dL                       

        



             = 976)                                              

 

             LDL CHOLESTEROL CALCULATED (BEAKER) 131 mg/dL                      

        



             (test code = 633)                                        



Triglyceride Reference Range:   Low Risk         &lt;150   Borderline    150-199
  High Risk     200-499   Very High Risk  &gt;=500Cholesterol Reference Range:  
Low Risk         &lt;200   Borderline 200-239    High Risk        &gt;240HDL 
Cholesterol Reference Range:   Low Risk         &gt;=60   High Risk         
&lt;40LDL Cholesterol Reference Range:   Optimal          &lt;100   Near Optimal
 100-129   Borderline    130-159   High          160-189   Very High       
&gt;=[id number] 05:33:00





             Test Item    Value        Reference Range Interpretation Comments

 

             PHOSPHORUS (BEAKER) (test code = 3.3 mg/dL    2.3-4.7              

     



             604)                                                



[id number] 05:33:00





             Test Item    Value        Reference Range Interpretation Comments

 

             MAGNESIUM (BEAKER) (test code = 2.0 mg/dL    1.6-2.6               

    



             627)                                                



COMPREHENSIVE METABOLIC [id number] 05:33:00





             Test Item    Value        Reference Range Interpretation Comments

 

             TOTAL PROTEIN 5.9 gm/dL    6.0-8.3      L            



             (BEAKER) (test code =                                        



             770)                                                

 

             ALBUMIN (BEAKER) 3.3 g/dL     3.5-5.0      L            



             (test code = 1145)                                        

 

             ALKALINE PHOSPHATASE 56 U/L                           



             (BEAKER) (test code =                                        



             346)                                                

 

             BILIRUBIN TOTAL 0.6 mg/dL    0.2-1.2                   



             (BEAKER) (test code =                                        



             377)                                                

 

             SODIUM (BEAKER) (test 140 meq/L    136-145                   



             code = 381)                                         

 

             POTASSIUM (BEAKER) 3.7 meq/L    3.5-5.1                   



             (test code = 379)                                        

 

             CHLORIDE (BEAKER) 106 meq/L                        



             (test code = 382)                                        

 

             CO2 (BEAKER) (test 27 meq/L     22-29                     



             code = 355)                                         

 

             BLOOD UREA NITROGEN 13 mg/dL     7-21                      



             (BEAKER) (test code =                                        



             354)                                                

 

             CREATININE (BEAKER) 0.79 mg/dL   0.57-1.25                 



             (test code = 358)                                        

 

             GLUCOSE RANDOM 95 mg/dL                         



             (BEAKER) (test code =                                        



             652)                                                

 

             CALCIUM (BEAKER) 8.7 mg/dL    8.4-10.2                  



             (test code = 697)                                        

 

             AST (SGOT) (BEAKER) 16 U/L       5-34                      



             (test code = 353)                                        

 

             ALT (SGPT) (BEAKER) 7 U/L        6-55                      



             (test code = 347)                                        

 

             EGFR (BEAKER) (test 71 mL/min/1.73                           ESTIMA

[facility] GFR IS



             code = 1092) sq m                                   NOT AS ACCURATE

 AS



                                                                 CREATININE



                                                                 CLEARANCE IN



                                                                 PREDICTING



                                                                 GLOMERULAR



                                                                 FILTRATION RATE

.



                                                                 ESTIMATED GFR I

S



                                                                 NOT APPLICABLE 

FOR



                                                                 DIALYSIS PATIEN

TS.



CBC W/PLT COUNT &amp; AUTO [id number] 04:51:00





             Test Item    Value        Reference Range Interpretation Comments

 

             WHITE BLOOD CELL COUNT (BEAKER) 4.8 K/ L     4.0-10.0              

    



             (test code = 775)                                        

 

             RED BLOOD CELL COUNT (BEAKER) 4.01 M/ L    4.00-5.00               

  



             (test code = 761)                                        

 

             HEMOGLOBIN (BEAKER) (test code = 13.4 GM/DL   12.0-15.0            

     



             410)                                                

 

             HEMATOCRIT (BEAKER) (test code = 39.0 %       36.0-45.0            

     



             411)                                                

 

             MEAN CORPUSCULAR VOLUME (BEAKER) 97.5 fL      82.0-99.0            

     



             (test code = 753)                                        

 

             MEAN CORPUSCULAR HEMOGLOBIN 33.5 pg      27.0-33.0    H            



             (BEAKER) (test code = 751)                                        

 

             MEAN CORPUSCULAR HEMOGLOBIN CONC 34.4 GM/DL   32.0-36.0            

     



             (BEAKER) (test code = 752)                                        

 

             RED CELL DISTRIBUTION WIDTH 12.8 %       10.3-14.2                 



             (BEAKER) (test code = 412)                                        

 

             PLATELET COUNT (BEAKER) (test 193 K/CU MM  150-430                 

  



             code = 756)                                         

 

             MEAN PLATELET VOLUME (BEAKER) 7.0 fL       6.5-10.5                

  



             (test code = 754)                                        

 

             NUCLEATED RED BLOOD CELLS 0 /100 WBC   0-0                       



             (BEAKER) (test code = 413)                                        

 

             NEUTROPHILS RELATIVE PERCENT 50 %                                  

 



             (BEAKER) (test code = 429)                                        

 

             LYMPHOCYTES RELATIVE PERCENT 40 %                                  

 



             (BEAKER) (test code = 430)                                        

 

             MONOCYTES RELATIVE PERCENT 9 %                                    



             (BEAKER) (test code = 431)                                        

 

             EOSINOPHILS RELATIVE PERCENT 0 %                                   

 



             (BEAKER) (test code = 432)                                        

 

             BASOPHILS RELATIVE PERCENT 0 %                                    



             (BEAKER) (test code = 437)                                        

 

             NEUTROPHILS ABSOLUTE COUNT 2.38 K/ L    1.80-8.00                 



             (BEAKER) (test code = 670)                                        

 

             LYMPHOCYTES ABSOLUTE COUNT 1.93 K/ L    1.48-4.50                 



             (BEAKER) (test code = 414)                                        

 

             MONOCYTES ABSOLUTE COUNT (BEAKER) 0.44 K/ L    0.00-1.30           

      



             (test code = 415)                                        

 

             EOSINOPHILS ABSOLUTE COUNT 0.01 K/ L    0.00-0.50                 



             (BEAKER) (test code = 416)                                        

 

             BASOPHILS ABSOLUTE COUNT (BEAKER) 0.02 K/ L    0.00-0.20           

      



             (test code = 417)                                        



0.00COMPREHENSIVE METABOLIC [id number] 22:47:00





             Test Item    Value        Reference Range Interpretation Comments

 

             TOTAL PROTEIN 5.8 gm/dL    6.0-8.3      L            



             (BEAKER) (test code =                                        



             770)                                                

 

             ALBUMIN (BEAKER) 3.2 g/dL     3.5-5.0      L            



             (test code = 1145)                                        

 

             ALKALINE PHOSPHATASE 56 U/L                           



             (BEAKER) (test code =                                        



             346)                                                

 

             BILIRUBIN TOTAL 0.5 mg/dL    0.2-1.2                   



             (BEAKER) (test code =                                        



             377)                                                

 

             SODIUM (BEAKER) (test 140 meq/L    136-145                   



             code = 381)                                         

 

             POTASSIUM (BEAKER) 3.7 meq/L    3.5-5.1                   



             (test code = 379)                                        

 

             CHLORIDE (BEAKER) 108 meq/L           H            



             (test code = 382)                                        

 

             CO2 (BEAKER) (test 25 meq/L     22-29                     



             code = 355)                                         

 

             BLOOD UREA NITROGEN 11 mg/dL     7-21                      



             (BEAKER) (test code =                                        



             354)                                                

 

             CREATININE (BEAKER) 0.83 mg/dL   0.57-1.25                 



             (test code = 358)                                        

 

             GLUCOSE RANDOM 94 mg/dL                         



             (BEAKER) (test code =                                        



             652)                                                

 

             CALCIUM (BEAKER) 8.6 mg/dL    8.4-10.2                  



             (test code = 697)                                        

 

             AST (SGOT) (BEAKER) 15 U/L       5-34                      



             (test code = 353)                                        

 

             ALT (SGPT) (BEAKER) 7 U/L        6-55                      



             (test code = 347)                                        

 

             EGFR (BEAKER) (test 67 mL/min/1.73                           ESTIMA

[facility] GFR IS



             code = 1092) sq m                                   NOT AS ACCURATE

 AS



                                                                 CREATININE



                                                                 CLEARANCE IN



                                                                 PREDICTING



                                                                 GLOMERULAR



                                                                 FILTRATION RATE

.



                                                                 ESTIMATED GFR I

S



                                                                 NOT APPLICABLE 

FOR



                                                                 DIALYSIS PATIEN

TS.

## 2022-03-12 NOTE — ER
Nurse's Notes                                                                                     

 [facility]                                                                 

Name: Jerica Estrada                                                                           

Age: 81 yrs                                                                                       

Sex: Female                                                                                       

: 1940                                                                                   

MRN: [id number]                                                                                   

Arrival Date: 2022                                                                          

Time: 20:47                                                                                       

Account#: [id number]                                                                            

Bed 14                                                                                            

Private MD:                                                                                       

Diagnosis: Dehydration;Altered mental status, unspecified                                         

                                                                                                  

Presentation:                                                                                     

                                                                                             

21:44 Chief complaint: Patient's son or daughter states: "Confusion...intermittently".        dalila  

      Coronavirus screen: Vaccine status: Patient reports receiving the 2nd dose of the covid     

      vaccine. Ebola Screen: Patient negative for fever greater than or equal to 101.5            

      degrees Fahrenheit, and additional compatible Ebola Virus Disease symptoms Patient          

      denies exposure to infectious person. Patient denies travel to an Ebola-affected area       

      in the 21 days before illness onset. Initial Sepsis Screen: Does the patient meet any 2     

      criteria? No. Patient's initial sepsis screen is negative. Does the patient have a          

      suspected source of infection? No. Patient's initial sepsis screen is negative. Risk        

      Assessment: Do you want to hurt yourself or someone else? Patient reports no desire to      

      harm self or others. Onset of symptoms was 2022.                                  

21:44 Method Of Arrival: Ambulatory                                                           dalila  

21:44 Acuity: LYNNETTE 3                                                                           lp1 

                                                                                                  

Historical:                                                                                       

- Allergies:                                                                                      

21:33 Bactrim;                                                                                dalila  

21:33 Ciprofloxacin;                                                                          dalila  

21:33 Codeine;                                                                                dalila  

21:33 Demerol;                                                                                dalila  

21:33 Diovan HCT;                                                                             dalila  

21:33 hydrochlorothiazide;                                                                    dalila  

21:33 Iodinated Contrast Media - IV Dye;                                                      dalila  

21:33 Tetanus Vaccines \T\ Toxoid;                                                              dalila

21:33 valsartan;                                                                              dalila  

- Home Meds:                                                                                      

21:33 atorvastatin 40 mg Oral tab 1 tab once daily [Active]; carbidopa-levodopa  mg     dalila  

      Oral tab 1 tab 3 times per day [Active]; clopidogrel 75 mg Oral tab 1 tab once daily        

      [Active]; cranberry 500 mg Oral cap 2 tab daily [Active]; folic acid 400 mcg Oral tab 2     

      tab once daily [Active]; magnesium oxide 250 mg Oral tab daily [Active]; melatonin 5 mg     

      Oral tab nightly [Active]; metoprolol tartrate 25 mg Oral tab 2 tabs once daily             

      [Active]; Ocuvite Oral daily [Active]; Vitamin B-12 1,000 mcg Oral tab daily [Active];      

- PMHx:                                                                                           

21:33 Aneurysm; HARD OF HEARING; Hypertension; Parkinsons; vericose veins;                    dalila  

                                                                                                  

- Immunization history:: Client reports receiving the 2nd dose of the Covid vaccine.              

- Family history:: not pertinent.                                                                 

- Social history:: Smoking status: Patient denies any tobacco usage or history of.                

- Hospitalizations: : No recent hospitalization is reported.                                      

                                                                                                  

                                                                                                  

Screenin:35 Abuse screen: Denies threats or abuse. Denies injuries from another. Nutritional        dalila  

      screening: No deficits noted. Tuberculosis screening: No symptoms or risk factors           

      identified. Fall Risk None identified.                                                      

                                                                                                  

Assessment:                                                                                       

21:30 General: Appears in no apparent distress. comfortable, Behavior is calm, cooperative.   dalila  

      General: The pt's son reports, that the pt was seen at her PCP, Dr. Todd, but when       

      she came to the hospital to have routine labs drawn, yesterday, they were unable, as        

      the pt had not been fasting. The pt denies any s/sx that would warrant a trip to the        

      ER, but the pt's son said that she is "confused intermittently". The pt is AAOx3 and        

      ambulates with her walker. A sl was started and a urine specimen was requested, as she      

      had just voided in the Triage bathroom. Thankfully, she is trying to provide a              

      specimen, at this time. The pt denies any "problems or pain". . Pain: Denies pain.          

21:43 General: Unfortunately, the pt "missed" the speci pan that I placed in the toilet, even dalila  

      though her son "helped". .                                                                  

22:32 Reassessment: The pt has again ambulated to the bathroom, to provide a urine specimen.  dalila  

      I have "rearranged" the speci pan, so it will collect.                                      

                                                                                                  

Vital Signs:                                                                                      

21:35  / 81; Pulse 78; Resp 16; Temp 97.8; Pulse Ox 99% on R/A; Pain 0/10;              dalila  

22:31  / 79; Pulse 75; Resp 16; Pulse Ox 100% on R/A; Pain 0/10;                        dalila  

23:23  / 76; Pulse 74; Resp 18; Temp 98.5; Pulse Ox 100% on R/A; Pain 0/10;             dalila  

                                                                                                  

ED Course:                                                                                        

20:47 Patient arrived in ED.                                                                  wm  

20:48 Efrem Mi MD is Attending Physician.                                                rn  

21:30 Christen Acevedo, RN is Primary Nurse.                                                 dalila  

21:36 Patient has correct armband on for positive identification. Call light in reach. Side   dalila  

      rails up X 1. Adult w/ patient.                                                             

21:37 No provider procedures requiring assistance completed. Inserted saline lock: 20 gauge   dalila  

      in right antecubital area, using aseptic technique.                                         

21:44 XRAY Chest (1 view) In Process Unspecified.                                             EDMS

21:46 Triage completed.                                                                       dalila  

21:46 Arm band placed on.                                                                     dalila  

21:59 LFT's Sent.                                                                             dalila  

21:59 COVID-19/FLU A+B (Document "Date of Onset" if Symptomatic) Sent.                        dalila  

21:59 Blood Culture Adult (2) Sent.                                                           dalila  

21:59 Procalcitonin Sent.                                                                     dalila  

22:00 Basic Metabolic Panel Sent.                                                             dalila  

22:00 CBC with Diff Sent.                                                                     dalila  

22:24 CT Head Brain wo Cont In Process Unspecified.                                           EDMS

23:14 Urine Culture Sent.                                                                     ph  

23:26 intact, bleeding controlled, No redness/swelling at site. Pressure dressing applied.    dalila  

                                                                                                  

Administered Medications:                                                                         

No medications were administered                                                                  

                                                                                                  

                                                                                                  

Outcome:                                                                                          

21:46 Condition: stable                                                                       dalila  

23:14 Discharge ordered by MD.                                                                rn  

23:24 Discharged to home ambulatory.                                                          dalila  

23:24 Discharge instructions given to patient, Instructed on discharge instructions, follow       

      up and referral plans. Demonstrated understanding of instructions, follow-up care,          

      medications, Prescriptions given X 1.                                                       

23:32 Patient left the ED.                                                                    dalila  

                                                                                                  

Signatures:                                                                                       

Dispatcher [employer]                           EDMS                                                 

Efrem Mi MD MD rn Pena, Laura, RN                         RN   lp1                                                  

Mila Arreola RN                      RN                                                      

Irene Kiran                                                                                    

Christen Acevedo RN                   RN   dalila                                                   

                                                                                                  

Corrections: (The following items were deleted from the chart)                                    

21:52 21:44 Acuity: LYNNETTE 4 dalila                                                                  lp1 

                                                                                                  

**************************************************************************************************

## 2022-03-12 NOTE — EDPHYS
Physician Documentation                                                                           

 [facility]                                                                 

Name: Jerica Estrada                                                                           

Age: 81 yrs                                                                                       

Sex: Female                                                                                       

: 1940                                                                                   

MRN: [id number]                                                                                   

Arrival Date: 2022                                                                          

Time: 20:47                                                                                       

Account#: [id number]                                                                            

Bed 14                                                                                            

Private MD:                                                                                       

ED Physician Efrem Mi                                                                         

HPI:                                                                                              

                                                                                             

21:25 This 81 yrs old Female presents to ER via Unassigned with complaints of AMS, confusion. rn  

21:32 The patient presents with confusion, decreased mental status. Onset: The                rn  

      symptoms/episode began/occurred 3 day(s) ago. Possible causes: unknown. Associated          

      signs and symptoms: Pertinent positives: confusion, Pertinent negatives: abdominal          

      pain, diarrhea, seizure, shortness of breath. Current symptoms: In the emergency            

      department the patient's symptoms have improved. The patient has experienced similar        

      episodes in the past. The patient has been recently seen by a physician:. Son states        

      confusion and AMS for 3 days now, seen by Dr. Todd and labs ordered but unable to        

      obtain. Reports confusion comes and goes, no pattern. No head injury. No                    

      fever/vomiting/diarrhea. No cough/sob. No blood in stool. .                                 

                                                                                                  

Historical:                                                                                       

- Allergies:                                                                                      

21:33 Bactrim;                                                                                dalila  

21:33 Ciprofloxacin;                                                                          dalila  

21:33 Codeine;                                                                                dalila  

21:33 Demerol;                                                                                dalila  

21:33 Diovan HCT;                                                                             dalila  

21:33 hydrochlorothiazide;                                                                    dalila  

21:33 Iodinated Contrast Media - IV Dye;                                                      dalila  

21:33 Tetanus Vaccines \T\ Toxoid;                                                              dalila

21:33 valsartan;                                                                              dalila  

- Home Meds:                                                                                      

21:33 atorvastatin 40 mg Oral tab 1 tab once daily [Active]; carbidopa-levodopa  mg     dalila  

      Oral tab 1 tab 3 times per day [Active]; clopidogrel 75 mg Oral tab 1 tab once daily        

      [Active]; cranberry 500 mg Oral cap 2 tab daily [Active]; folic acid 400 mcg Oral tab 2     

      tab once daily [Active]; magnesium oxide 250 mg Oral tab daily [Active]; melatonin 5 mg     

      Oral tab nightly [Active]; metoprolol tartrate 25 mg Oral tab 2 tabs once daily             

      [Active]; Ocuvite Oral daily [Active]; Vitamin B-12 1,000 mcg Oral tab daily [Active];      

- PMHx:                                                                                           

21:33 Aneurysm; HARD OF HEARING; Hypertension; Parkinsons; vericose veins;                    dalila  

                                                                                                  

- Immunization history:: Client reports receiving the 2nd dose of the Covid vaccine.              

- Family history:: not pertinent.                                                                 

- Social history:: Smoking status: Patient denies any tobacco usage or history of.                

- Hospitalizations: : No recent hospitalization is reported.                                      

                                                                                                  

                                                                                                  

ROS:                                                                                              

21:32 Constitutional: Negative for fever, chills, and weight loss, Eyes: Negative for injury, rn  

      pain, redness, and discharge, ENT: Negative for injury, pain, and discharge, Neck:          

      Negative for injury, pain, and swelling, Cardiovascular: Negative for chest pain,           

      palpitations, and edema, Respiratory: Negative for shortness of breath, cough,              

      wheezing, and pleuritic chest pain, Abdomen/GI: Negative for abdominal pain, nausea,        

      vomiting, diarrhea, and constipation, Back: Negative for injury and pain, : Negative      

      for injury, bleeding, discharge, and swelling, + increased urinary frequency                

      MS/Extremity: Negative for injury and deformity, Skin: Negative for injury, rash, and       

      discoloration, Neuro: Negative for numbness, tingling, and seizure.                         

                                                                                                  

Exam:                                                                                             

21:32 Constitutional:  This is a well developed, well nourished patient who is awake, alert,  rn  

      and in no acute distress.  Slow shuffling gait to bed from wheelchair without               

      assistance.  Head/Face:  Normocephalic, atraumatic. Eyes:  Periorbital areas with no        

      swelling, redness, or edema. ENT:  dry MM Neck:  Trachea midline, no masses palpated,       

      and no cervical lymphadenopathy.  Supple, full range of motion without nuchal rigidity,     

      or vertebral point tenderness.  No Meningismus. Cardiovascular:  Regular rate and           

      rhythm.  No pulse deficits. Respiratory:  No increased work of breathing, no                

      retractions or nasal flaring. Abdomen/GI:  soft, non-tender, non-distended Skin:  Warm,     

      dry MS/ Extremity:  Pulses equal, no cyanosis. Neuro:  Awake and alert, GCS 15,             

      oriented to person, place, and situation.  Cranial nerves II-XII grossly intact.  Motor     

      strength 4/5 in all extremities.  Sensory grossly intact.  Shuffling gait                   

                                                                                                  

Vital Signs:                                                                                      

21:35  / 81; Pulse 78; Resp 16; Temp 97.8; Pulse Ox 99% on R/A; Pain 0/10;              dalila  

22:31  / 79; Pulse 75; Resp 16; Pulse Ox 100% on R/A; Pain 0/10;                        dalila  

23:23  / 76; Pulse 74; Resp 18; Temp 98.5; Pulse Ox 100% on R/A; Pain 0/10;             dalila  

                                                                                                  

MDM:                                                                                              

20:48 Patient medically screened.                                                             rn  

23:12 Differential Diagnosis: CVA, electrolyte abnormality, hypoglycemia, intracranial bleed, rn  

      pneumonia, TIA, UTI, volume depletion. Data reviewed: vital signs, nurses notes, lab        

      test result(s), EKG, radiologic studies, CT scan, plain films, and as a result, I will      

      discharge patient. Counseling: I had a detailed discussion with the patient and/or          

      guardian regarding: the historical points, exam findings, and any diagnostic results        

      supporting the discharge/admit diagnosis, lab results, radiology results, the need for      

      outpatient follow up, to return to the emergency department if symptoms worsen or           

      persist or if there are any questions or concerns that arise at home. Response to           

      treatment: the patient's symptoms have markedly improved after treatment, the patient's     

      condition has returned to base line, the patient is now symptom free, and as a result,      

      I will discharge patient. Special discussion: I discussed with the patient/guardian in      

      detail that at this point there is no indication for admission to the hospital. It is       

      understood, however, that if the symptoms persist or worsen the patient needs to return     

      immediately for re-evaluation. Based on the history and exam findings, there is no          

      indication for further emergent testing or inpatient evaluation. I discussed with the       

      patient/guardian the need to see the neurologist for further evaluation of the              

      symptoms. I discussed with the patient/guardian the need to see the primary care            

      provider for further evaluation of the symptoms. ED course: No acute findings other         

      than maybe dehydration, ct head neg, cxr neg, UA neg. Son states she is at baseline and     

      has appeared much better and more lively since drinking a lot of water prior to             

      arrival. Offered observation for AMS story, but son and patient want to go home. They       

      have appt with Dr. Todd on Monday. Return precautions given and understood. .            

                                                                                                  

                                                                                             

21:07 Order name: CBC with Diff                                                               rn  

                                                                                             

21:07 Order name: Basic Metabolic Panel; Complete Time: 23:01                                 rn  

                                                                                             

21:07 Order name: Urine Culture                                                               rn  

                                                                                             

21: Order name: Urine Microscopic Only; Complete Time: 23:01                                rn  

                                                                                             

21:07 Order name: Procalcitonin; Complete Time: 23:01                                         rn  

                                                                                             

21:07 Order name: Blood Culture Adult (2)                                                     rn  

                                                                                             

21:06 Order name: IV Start; Complete Time: 22:00                                              rn  

                                                                                             

21:07 Order name: Urine Dipstick-Ancillary (obtain specimen); Complete Time: 23:14            rn  

                                                                                             

21:07 Order name: CT Head Brain wo Cont                                                       rn  

                                                                                             

21:07 Order name: EKG; Complete Time: 21:07                                                   rn  

                                                                                             

21:07 Order name: XRAY Chest (1 view); Complete Time: 23:01                                   rn  

                                                                                             

21:07 Order name: COVID-19/FLU A+B (Document "Date of Onset" if Symptomatic); Complete Time:  rn  

      23:                                                                                             

21:07 Order name: LFT's; Complete Time: 23:01                                                 rn  

                                                                                             

21:07 Order name: CBC with Automated Diff; Complete Time: 23:01                               EDMS

                                                                                             

21:07 Order name: Cardiac monitoring; Complete Time: 21:59                                    rn  

                                                                                             

21:07 Order name: EKG - Nurse/Tech; Complete Time: 22:19                                      rn  

                                                                                             

21:07 Order name: O2 Sat Monitoring; Complete Time: 21:59                                     rn  

                                                                                                  

Administered Medications:                                                                         

No medications were administered                                                                  

                                                                                                  

                                                                                                  

Disposition Summary:                                                                              

22 23:14                                                                                    

Discharge Ordered                                                                                 

      Location: Home                                                                          rn  

      Problem: new                                                                            rn  

      Symptoms: have improved                                                                 rn  

      Condition: Stable                                                                       rn  

      Diagnosis                                                                                   

        - Dehydration                                                                         rn  

        - Altered mental status, unspecified                                                  rn  

      Followup:                                                                               rn  

        - With: Private Physician                                                                  

        - When: As needed                                                                          

        - Reason: Recheck today's complaints, Re-evaluation by your physician                      

      Discharge Instructions:                                                                     

        - Discharge Summary Sheet                                                             rn  

        - Confusion                                                                           rn  

        - Dehydration, Elderly                                                                rn  

        - Dehydration, Adult                                                                  rn  

      Forms:                                                                                      

        - Medication Reconciliation Form                                                      rn  

        - Thank You Letter                                                                    rn  

        - Antibiotic Education                                                                rn  

        - Prescription Opioid Use                                                             rn  

Signatures:                                                                                       

Dispatcher [employer]                           Efrem Lee MD MD rn O'Farrell, Brenda RN                   FAB[facility] conner                                                   

                                                                                                  

**************************************************************************************************

## 2022-03-12 NOTE — RAD REPORT
EXAM DESCRIPTION:  RAD - Chest Single View - 3/12/2022 9:44 pm

 

CLINICAL HISTORY:  confusion

Chest pain.

 

COMPARISON:  Chest Single View dated 4/1/2021; Chest Single View dated 3/31/2021; Chest Single View d
ated 6/24/2020; Chest Single View dated 3/24/2019

 

FINDINGS:  Portable technique limits examination quality.

 

The lungs are grossly clear. The heart is mildly enlarged in size. Significant degenerative change le
ft shoulder.

 

IMPRESSION:  No acute intrathoracic process suspected.

## 2022-03-13 NOTE — EKG
Test Date:    2022-03-12               Test Time:    22:12:07

Technician:                                          

                                                     

MEASUREMENT RESULTS:                                       

Intervals:                                           

Rate:         73                                     

NC:           192                                    

QRSD:         82                                     

QT:           420                                    

QTc:          462                                    

Axis:                                                

P:            73                                     

NC:           192                                    

QRS:          47                                     

T:            64                                     

                                                     

INTERPRETIVE STATEMENTS:                                       

                                                     

Normal sinus rhythm

Normal ECG

Compared to ECG 10/05/2021 13:02:18

Myocardial infarct finding no longer present



Electronically Signed On 03-13-22 10:09:02 CDT by Tyrell Owens

## 2022-03-14 NOTE — RAD REPORT
EXAM DESCRIPTION:  CT - Head Brain Wo Cont - 3/13/2022 6:43 am

 

CLINICAL HISTORY:  CONFUSED

 

COMPARISON:  CT head April 5, 2021

 

TECHNIQUE:  Multiple helical axial tomographic images were obtained of the head without intravenous c
ontrast. This exam was performed according to our departmental dose-optimization program, which inclu
renetta automated exposure control, adjustment of the mA and/or kV according to patient size and/or use o
f iterative reconstruction technique.

 

FINDINGS:  Mild generalized brain volume loss is demonstrated. There is patchy hypoattenuation in the
 cerebral white matter suggesting chronic microvascular ischemic changes. There is no acute intracran
ial hemorrhage. No mass. No midline shift. No ventriculomegaly. Gray-white matter differentiation is 
maintained. Metallic clip in the right suprasellar region noted. Stent in the region of the M1 segmen
t of the right middle cerebral artery demonstrated.

Paranasal sinuses are clear. Mastoid air cells and middle ear spaces are clear. Orbits and orbital co
ntents are unremarkable.

Osseous structures are unremarkable. Surrounding soft tissues are unremarkable.

 

IMPRESSION:  No acute intracranial process.

 

Electronically signed by:   David Franks MD   3/12/2022 10:42 PM CST Workstation: [id number]

 

 

Due to temporary technical issues with the PACS/Fluency reporting system, reports are being signed by
 the in house radiologist without review as a courtesy to ensure prompt reporting. The interpreting r
adiologist is fully responsible for the content of the report.

## 2022-08-27 NOTE — PN
Date of Progress Note:  04/01/2021



Since hospitalization, the patient's blood pressures fluctuated significantly and seems to correlate 
somewhat with her mental status.  The last night after being bolused and sent to the floor, her blood
 pressure briefly elevated over 200.  She was given her beta-blocker, Lopressor 25 and has dropped do
wn to the normal range overnight.  This morning, she was once again over 200 systolic, diastolic was 
also elevated at that time.  She was given her Bystolic and short-acting together with clonidine, wit
h an hour blood pressure dropped below 100 systolic and she became much less alert and more confused,
 which is somewhat similar to the pattern that happened when she was seen in the office yesterday wit
hout the clonidine.  In order to try and stabilize her, we switched her to the long-acting beta-block
er and withhold her other blood pressure medicines.  At this time unsure which she has been taking an
d __________ was refilled.  The family members said she has been on her amlodipine and Ace inhibitors
.  We will confirm this with her __________ possible.  Also, she was hypokalemic, she will be placed 
on the potassium protocol.  We will monitor her blood pressure and determine her disposition when she
 is more stabilized.





HR/MODL

DD:  04/01/2021 11:50:51Voice ID:  [id number]

DT:  04/01/2021 13:15:30Report ID:  [id number]
Date of Progress Note:  04/02/2021



Subjective:  The patient seems somewhat better today for her ability to function, was sitting on the 
edge of the bed.  However, her mental status is still variable.  She was seen by Neurology.  Autonomi
c dysfunction was diagnosed.  To be placed back on her medication for parkinsonism, which seems to be
 the primary driving factor.  I have modified her beta-blocker to long-acting, which also some help. 
 Discussion of placement with the family agreed to SNF and this request will be submitted awaiting Ca
rdiology as well.





HR/MODL

DD:  04/03/2021 10:02:18Voice ID:  [id number]

DT:  04/03/2021 10:38:38Report ID:  [id number]
Date of Progress Note:  04/03/2021



Subjective:  The patient seems somewhat better responsiveness.  However, she still unable to do activ
ities.  Necessary for her to go home.  __________ pain back to then, better attempt to control her bl
ood pressure.  Although, she does respond very well to clonidine.  Preparation for her disposition ho
pefully to a nursing home, awaiting insurance okay for this.





HR/MODL

DD:  04/03/2021 11:15:12Voice ID:  [id number]

DT:  04/03/2021 11:47:49Report ID:  [id number]
Date of Progress Note:  04/04/2021



The patient is basically status post quite a bit sleepier than she has been.  She required Ativan ove
rnight as she was quite restless and constantly talking.  Discussion was held with the family as jaycee
rd to disposition and DNR status, awaiting insurance for the SNF placement.  Her blood pressure is mu
ch more stable since we added the amlodipine.  Blood work is stable. Discussed the situation further 
with her family tomorrow.





HR/MODL

DD:  04/04/2021 19:31:11Voice ID:  [id number]

DT:  04/04/2021 20:34:42Report ID:  [id number]
Date of Progress Note:  04/05/2021



The patient has had intermittent episodes of alertness; however, for the most part, she has been slee
ping and nonresponsive.  She is responsive to deep pain.  However, there has been obvious fluctuation
s in her CNS system.  We will therefore repeat a CAT scan through a carotid Doppler and depending on 
the evaluation of these tests, we will discuss her disposition.  Blood pressure is stable as she was 
seen by speech therapy.  Over that time, she was much more alert, still could not respond.  She is no
t taking fluids and her medications have been limited as well.  She is getting dehydrated.  We will u
tilize some IV fluids.





HR/MODL

DD:  04/05/2021 15:00:44Voice ID:  [id number]

DT:  04/05/2021 19:07:22Report ID:  [id number]
Date of Progress Note:  04/07/2021



The patient seems much more alert today.  She is eating on her own.  She is not orientated and had ep
isodic confusion according to the nurses, however, she did recognize being spoke to me.  I feel that 
the Sinemet may be contributing to this mental status change.  We will keep her IV fluids at a [facility] which hydration probably played a part as well.  Blood pressure seems stable.  Awaiting insurance
 clearance for SNF.





HR/MODL

DD:  04/07/2021 15:17:52Voice ID:  [id number]

DT:  04/07/2021 19:18:14Report ID:  [id number]
Date of Progress Note:  04/08/2021



The patient leads status quo.  The patient was accepted for SNF at [facility], will be discharge
d in the a.m., difference in her medication is the long-acting from the short-acting beta-blocker, Lo
pressor, and the addition of amlodipine.  Also over the past few days, the anti-parkinsonism drugs an
d med has been held.  We will reinstitute tonight, do a blood counts in the morning and assuming they
 are stable, discharge and transfer to the SNF unit.





HR/MODL

DD:  04/08/2021 16:34:48Voice ID:  [id number]

DT:  04/08/2021 19:44:34Report ID:  [id number]
Date of Progress Note:  04/09/2021



The patient does seem somewhat more alert this morning.  She recognized me.  She is fed herself.  Tab
nstituted the Sinemet.  Excepted for transfer to SNF.  This will be done.





HR/MODL

DD:  04/09/2021 11:58:11Voice ID:  [id number]

DT:  04/09/2021 12:21:38Report ID:  [id number]
The patient seems more awake and alert today.  Workup including her CT, Doppler carotids, EEG basical
ly pointing to no major vascular incident, but a generalized metabolic process associated with adam
sonism.  I feel she could qualify for a moderate amount of rehab, and therefore a SNF will be request
ed once again from the insurance company and possibly if required with Dr. Castaneda, neurologist.  Th
is information has been discussed with the family at length.





HR/MODL

DD:  04/06/2021 14:30:36Voice ID:  [id number]

DT:  04/06/2021 19:28:23Report ID:  [id number]
Fellow

## 2022-10-21 ENCOUNTER — HOSPITAL ENCOUNTER (EMERGENCY)
Dept: HOSPITAL 97 - ER | Age: 82
Discharge: HOME | End: 2022-10-21
Payer: COMMERCIAL

## 2022-10-21 VITALS — TEMPERATURE: 98.1 F

## 2022-10-21 VITALS — DIASTOLIC BLOOD PRESSURE: 83 MMHG | SYSTOLIC BLOOD PRESSURE: 149 MMHG | OXYGEN SATURATION: 98 %
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
## 2022-10-21 DIAGNOSIS — S51.812A: Primary | ICD-10-CM

## 2022-10-21 PROCEDURE — 99284 EMERGENCY DEPT VISIT MOD MDM: CPT

## 2022-10-21 PROCEDURE — 0JQH0ZZ REPAIR LEFT LOWER ARM SUBCUTANEOUS TISSUE AND FASCIA, OPEN APPROACH: ICD-10-PCS

## 2022-10-21 NOTE — ER
Nurse's Notes                                                                                     

 [facility]                                                                 

Name: Jerica Estrada                                                                           

Age: 82 yrs                                                                                       

Sex: Female                                                                                       

: 1940                                                                                   

MRN: [id number]                                                                                   

Arrival Date: 10/21/2022                                                                          

Time: 12:13                                                                                       

Account#: [id number]                                                                            

Bed 6                                                                                             

Private MD:                                                                                       

Diagnosis: Laceration without foreign body of left forearm-skin tear                              

                                                                                                  

Presentation:                                                                                     

10/21                                                                                             

12:10 Chief complaint: EMS states: toned out to pt home for a fall. States PT slid out of bed tp1 

      and landed on arm.  denies LOC. EMS reports large laceration to the left arm.        

      bleeding controlled with kerlix. States PT is on plavix.                                    

12:10 Coronavirus screen: Vaccine status: Patient reports receiving the 2nd dose of the covid tp1 

      vaccine. Ebola Screen: Patient denies exposure to infectious person. Patient denies         

      travel to an Ebola-affected area in the 21 days before illness onset. Initial Sepsis        

      Screen: Does the patient meet any 2 criteria? No. Patient's initial sepsis screen is        

      negative. Does the patient have a suspected source of infection? No. Patient's initial      

      sepsis screen is negative. Risk Assessment: Do you want to hurt yourself or someone         

      else? Patient reports no desire to harm self or others. Onset of symptoms was 2022.                                                                                   

12:10 Method Of Arrival: EMS: [facility]                                                tp1 

12:10 Acuity: LYNNETTE 2                                                                           tp1 

                                                                                                  

Triage Assessment:                                                                                

12:21 General: Appears in no apparent distress. comfortable, Behavior is calm, cooperative.   tp1 

      Pain: Denies pain. EENT: No signs and/or symptoms were reported regarding the EENT          

      system. Neuro: Level of Consciousness is awake, alert, obeys commands, Oriented to          

      person, place, time, situation. Cardiovascular: Capillary refill < 3 seconds in             

      bilateral fingers Patient's skin is warm and dry. Respiratory: Airway is patent             

      Respiratory effort is even, unlabored. GI: Abdomen is flat, non-distended. : No signs     

      and/or symptoms were reported regarding the genitourinary system. Derm: Skin is pink,       

      warm \T\ dry. Musculoskeletal: Circulation, motion, and sensation intact. Injury            

      Description: Laceration sustained to left arm A dressing was applied.                       

                                                                                                  

Historical:                                                                                       

- Allergies:                                                                                      

12:21 Bactrim;                                                                                tp1 

12:21 Ciprofloxacin;                                                                          tp1 

12:21 Codeine;                                                                                tp1 

12:21 Demerol;                                                                                tp1 

12:21 Diovan HCT;                                                                             tp1 

12:21 hydrochlorothiazide;                                                                    tp1 

12:21 Iodinated Contrast Media - IV Dye;                                                      tp1 

12:21 Tetanus Vaccines \T\ Toxoid;                                                              tp1

12:21 valsartan;                                                                              tp1 

- Home Meds:                                                                                      

12:21 Plavix Oral [Active];                                                                   tp1 

- PMHx:                                                                                           

12:21 Aneurysm; HARD OF HEARING; Hypertension; Parkinsons; vericose veins;                    tp1 

                                                                                                  

- Immunization history:: Client reports receiving the 2nd dose of the Covid vaccine.              

- Social history:: Smoking status: Patient denies any tobacco usage or history of.                

- Family history:: not pertinent.                                                                 

                                                                                                  

                                                                                                  

Screenin:20 Abuse screen: Denies threats or abuse. Denies injuries from another. Tuberculosis       tp1 

      screening: No symptoms or risk factors identified.                                          

12:22 Nutritional screening: No deficits noted. Fall Risk Fall in past 12 months (25 points). tp1 

      Secondary diagnosis (15 points) parkinsons . No IV (0 pts). Ambulatory Aid- None/Bed        

      Rest/Nurse Assist (0 pts). Gait- Weak (10 pts.). Mental Status- Oriented to own ability     

      (0 pts). Total Dowell Fall Scale indicates High Risk Score (45 or more points). Fall         

      prevention measures have been instituted. Placed Close to Nursing Station Frequent          

      Obs/Assessments Occuring.                                                                   

                                                                                                  

Assessment:                                                                                       

12:20 General: see triage assessment .                                                        tp1 

12:29 Injury Description: wound care provided to the left forearm, 11 cm skin tear noted.     tp1 

      bleeding controlled.                                                                        

13:11 Reassessment: Patient appears in no apparent distress at this time. No changes from     tp1 

      previously documented assessment. Patient and/or family updated on plan of care and         

      expected duration. Pain level reassessed. Patient is alert, oriented x 3, equal             

      unlabored respirations, skin warm/dry/pink. Patient is alert/active/playful, equal          

      unlabored respirations, skin warm/dry/pink. Patient denies pain at this time.               

13:30 Reassessment: steri strips covered with nonadherent dressing and kerlix.                tp1 

13:45 Reassessment: discharge pending transportation.                                         tp1 

                                                                                                  

Vital Signs:                                                                                      

12:10  / 77; Pulse 63; Resp 16; Temp 98.1; Pulse Ox 94% on R/A; Weight 90.72 kg; Height tp1 

      5 ft. 3 in. (160.02 cm);                                                                    

13:15  / 83; Pulse 70; Resp 16; Pulse Ox 98% on R/A;                                    tp1 

12:10 Body Mass Index 35.43 (90.72 kg, 160.02 cm)                                             tp1 

                                                                                                  

ED Course:                                                                                        

12:13 Patient arrived in ED.                                                                    

12:13 Kenney Mcdermott MD is Attending Physician.                                             jolie 

12:17 Elizabet Eisenberg, RN is Primary Nurse.                                                   tp1 

12:21 Triage completed.                                                                       tp1 

12:23 Arm band placed on.                                                                     tp1 

12:30 Patient has correct armband on for positive identification. Bed in low position. Call   tp1 

      light in reach. Side rails up X2.                                                           

12:30 Pulse ox on. NIBP on.                                                                   tp1 

13:11 Assist provider with laceration repair on dorsal aspect of left forearm using           tp1 

      Steri-strips. Set up tray. Performed by Kenney Mcdermott MD Patient tolerated well.           

13:23 Roly Chu MD is Referral Physician.                                                 [facility] 

14:07 Patient did not have IV access during this emergency room visit.                        tp1 

                                                                                                  

Administered Medications:                                                                         

13:23 Drug: KeFLEX (cephalexin) 500 mg Route: PO;                                             tp1 

13:46 Follow up: Response: No adverse reaction                                                tp1 

                                                                                                  

                                                                                                  

Medication:                                                                                       

14:07 VIS not applicable for this client.                                                     tp1 

                                                                                                  

Outcome:                                                                                          

13:22 Discharge ordered by MD.                                                                [facility] 

14:07 Discharged to home ambulatory, with family.                                             tp1 

14:07 Condition: good                                                                             

14:07 Discharge instructions given to patient, family, Instructed on discharge instructions,      

      follow up and referral plans. medication usage, Demonstrated understanding of               

      instructions, follow-up care, medications, Prescriptions given X 1.                         

14:08 Patient left the ED.                                                                    tp1 

                                                                                                  

Signatures:                                                                                       

Kenney Mcdermott MD MD cha Botello, Elizabeth                                                                              

Elizabet Eisenberg, RN                     RN   tp1                                                  

                                                                                                  

Corrections: (The following items were deleted from the chart)                                    

13:32 12:00 Abuse screen: Denies threats or abuse. Denies injuries from another. tp1          tp1 

13:32 12:00 Nutritional screening: No deficits noted. tp1                                     tp1 

13:32 12:00 Tuberculosis screening: No symptoms or risk factors identified. tp1               tp1 

13:32 12:00 Fall Risk Fall in past 12 months (25 points). Secondary diagnosis (15 points)     tp1 

      parkinsons . No IV (0 pts). Ambulatory Aid- None/Bed Rest/Nurse Assist (0 pts). Gait-       

      Weak (10 pts.). Mental Status- Oriented to own ability (0 pts). Total Dowell Fall Scale      

      indicates High Risk Score (45 or more points). Fall prevention measures have been           

      instituted. Placed Close to Nursing Station Frequent Obs/Assessments Occuring tp1           

                                                                                                  

**************************************************************************************************

## 2022-10-21 NOTE — XMS REPORT
Continuity of Care Document

                           Created on:2022



Patient:SHAHIDA ESTRADA

Sex:Female

:1940

External Reference #:[id number]





Demographics







                          Address                   [address] [place] [address]

 

                          Home Phone                [phone]

 

                          Mobile Phone              [phone]

 

                          Email Address             [address]

 

                          Preferred Language        English

 

                          Marital Status            Unknown

 

                          Restoration Affiliation     Unknown

 

                          Race                      Unknown

 

                          Additional Race(s)        Unavailable



                                                    White

 

                          Ethnic Group              Unknown









Author







                          Organization              [facility]

t

 

                          Address                   [address]



                                                    [place] [address]

 

                          Phone                     [phone]









Support







                Name            Relationship    Address         Phone

 

                Wan Estrada Spouse          [address]  [phone]



                                                [place] [address] 

 

                Musa Estrada      Unavailable     [phone]

3









Care Team Providers







                    Name                Role                Phone

 

                    Samuel Todd MD  Primary Care Physician [phone]

 

                    MIGUE AMIN Attending Clinician Unavailable

 

                    CHATA CALLE Attending Clinician Unavailable

 

                    MEJIA HOWARD Attending Clinician Unavailable

 

                    MIGUE AMIN Admitting Clinician Unavailable

 

                    SUZANNE BAER Admitting Clinician Unavailable

 

                    MEJIA HOWARD Admitting Clinician Unavailable









Problems







       Condition Condition Condition Status Onset  Resolution Last   Treating Co

mments 

Source



       Name   Details Category        Date   Date   Treatment Clinician        



                                                 Date                 

 

       Parkinson Parkinson Disease Active                              [facility]

[facility]



       disease disease               6-19                               st



                                   00:00:                             [facility]osp[facility]



                                   00                                 l

 

       Low back Low back Disease Active                              [facility]

[facility]



       pain   pain                 5-22                               st



       radiating radiating               00:00:                             Hosp

[place]



       to right to right               00                                 l



       leg    leg                                                     

 

       Varicose Varicose Disease Active                              [facility]

di



       veins of veins of               5-22                               st



       leg with leg with               00:00:                             Hospit

a



       edema, edema,               00                                 l



       bilateral bilateral                                                  

 

       Aneurysm Aneurysm Disease Active 2017                             [facility]

[facility]



                                                                  [facility]



                                   [facility]                             [facility]



                                   [facility]                                 [facility]

 

       Hypertensi Hypertensi Disease Active                              [facility]

[facility]



       on     on                                                  [facility]



                                   00:00:                             [facility]



                                   00                                 Center

 

       Other  Other  Disease Active                              [facility]



       specified specified                                              [facility]

s



       transient transient               00:00:                             Medi

debbie



       cerebral cerebral               00                                 Center



       ischemias ischemias                                                  

 

       Hypertensi Hypertensi Disease Active                              [facility]

[facility]



       ve     ve                   5-20                               [facility]



       emergency emergency               00:00:                             Medi

debbie



                                   00                                 [place]

 

       Syncope Syncope Disease Active                              [facility]



                                   5-15                               [facility]



                                   [facility]                             [facility]



                                   00                                 Center

 

       Carotid Carotid Disease Active                              [facility]



       aneurysm, aneurysm,               5-15                               [facility]

s



       right  right                00:00:                             Medical



                                   00                                 [place]

 

       Essential Essential Disease Active                              [facility]



       hypertensi hypertensi               5-14                               [facility]

[facility]



       on     on                   00:00:                             Medical



                                   00                                 [place]

 

       TIA    TIA    Disease Active                              [facility]



       (transient (transient               5-13                               [facility]

[facility]



       ischemic ischemic               00:00:                             [facility]

l



       attack) attack)               00                                 Center

 

       Hypertensi Hypertensi Disease Active                              [facility]

[facility]



       on     on                   5-13                               [facility]



                                   [facility]                             [facility]



                                   00                                 Center







Allergies, Adverse Reactions, Alerts







       Allergy Allergy Status Severity Reaction(s) Onset  Inactive Treating Comm

ents 

Source



       Name   Type                        Date   Date   Clinician        

 

       Sulfamet Propensi Active                                     [facility]

i



       hoxazole ty to                       3-16                        st



       -Trimeth adverse                      00:00:                      [facility]ospita



       oprim  reaction                      00                          l



              s to                                                    



              drug                                                    

 

       Ciproflo Propensi Active                                     Method

i



       xacin  ty to                       3-16                        st



              adverse                      00:00:                      [facility]osp[facility]



              reaction                      00                          l



              s to                                                    



              drug                                                    

 

       Valsarta Propensi Active                                     Method

i



       n      ty to                       3-16                        st



              adverse                      00:00:                      [facility]osp[facility]



              reaction                      00                          l



              s to                                                    



              drug                                                    

 

       Iodine Propensi Active                                     [facility]



              ty to                       3-16                        st



              adverse                      00:00:                      [facility]ospita



              reaction                      00                          l



              s to                                                    



              drug                                                    

 

       Predniso Propensi Active                                     Method

i



       ne     ty to                       3-16                        st



              adverse                      00:00:                      [facility]ospita



              reaction                      00                          l



              s to                                                    



              drug                                                    

 

       Tetanus Propensi Active                                     [facility]



       Vaccines ty to                       3-16                        st



       And    adverse                      00:00:                      [facility]ospita



       Toxoid reaction                      00                          l



              s to                                                    



              drug                                                    

 

       Iodine Drug   Active        Other (See 2017               Cold   [facility]



       And    Allergy               Comments) 2-20                 [facility]



       Iodide                             00:00:                      Medical



       Containi                             00                          Center



       ng                                                             



       Products                                                         

 

       Ciproflo Drug   Active        Anaphylaxis                       [facility]



       xacin  Allergy                      5-13                        [facility]



                                          00:00:                      [facility]



                                          00                          [place]

 

       Penicill Drug   Active        Anaphylaxis                       [facility]



       ins    Allergy                      5-13                        [facility]



                                          00:00:                      [facility]



                                          00                          [place]

 

       Tetanus Drug   Active        Itching                       [facility]



       Vaccines Allergy                      5-13                        [facility]



       And                                00:00:                      Medical



       Toxoid                             00                          [place]

 

       No Known Propensi Active               2016                      [facility]

i



       Drug   ty to                                               st



       Allergie adverse                      00:00:                      Hospita



       s      reaction                      00                          l



              s to                                                    



              drug                                                    







Social History







           Social Habit Start Date Stop Date  Quantity   Comments   Source

 

           History SDOH                                             [facility]



           Alcohol Std Drinks                                             [facility]ospit

al

 

           History SDOH                                             [facility]



           Alcohol Binge                                             [facility]

 

           Alcohol intake 2019 Lifetime              [facility]



                      00:00:00   00:00:00   non-drinker            Hospital



                                            (finding)             

 

           History SDOH 2019 1                     [facility]



           Alcohol Frequency 00:00:00   00:00:00                         [facility]ospita

l

 

           Tobacco use and 2017 Never used            [facility]

[facility]



           exposure   00:00:00   [facility]:00:00                         [facility]

 

           Sex Assigned At 1940                       [facility]



           Birth      00:00:00   00:00:00                         Hospital









                Smoking Status  Start Date      Stop Date       Source

 

                Never smoked tobacco                                 [facility]

ospital







Medications







       Ordered Filled Start  Stop   Current Ordering Indication Dosage Frequency

 Signature

                    Comments            Components          Source



     Medication Medication Date Date Medication? Clinician                (SIG) 

          



     Name Name                                                   

 

     carbidopa-l            Yes            1{tbl} [id number] Take 1       

    [facility]



     evodopa      6-19                          [id number] tablet by           s

t



      mg      12:26:                          3D   mouth 3           Hospi

ta



     per       00                                 (three)           l



     disintegrat                                         times a           



     ing tablet                                         day.           

 

     aspirin 325            Yes            325mg QD   Take 325           M

ethodi



     MG tablet      3-16                               mg by           st



               10:22:                               mouth           Hospita



               52                                 daily.           l

 

     amLODIPine            Yes                      TAKE ONE           Met

hodi



     (NORVASC)      3-15                               (1)            st



     10 mg      00:00:                               TABLET(S)           [facility]osp[facility]



     tablet      00                                 BY MOUTH           l



                                                  ONCE A           



                                                  DAY.           

 

     metoprolol            Yes                      TAKE ONE           Met

h[facility]



     tartrate      2-19                               (1)            st



     (LOPRESSOR)      00:00:                               TABLET(S)           H

ospita



     25 mg      00                                 BY MOUTH           l



     tablet                                         TWICE A           



                                                  DAY.           

 

     clopidogrel            Yes                      TAKE ONE           Me

thodi



     (PLAVIX) 75      2-09                               (1)            st



     mg tablet      00:00:                               TABLET(S)           [facility]

[facility]



               00                                 BY MOUTH           l



                                                  ONCE A           



                                                  DAY.           

 

     CALCIUM      2017      Yes                 Q.5D Take by           [facility]



     CARBONATE/V      2-20                               mouth 2           [facility]



     ITAMIN D3      20:34:                               (two)           Medical



     (CALCIUM      26                                 times           Center



     600 + D,3,                                         daily.           



     ORAL)                                                        

 

     VIT C/VIT      2017      Yes                 QD   Take by           [facility]

[facility]



     E/LUTEIN/MI      2-20                               mouth           [facility]



     N/OMEGA-3      20:34:                               daily.           [facility]ica

l



     (OCUVITE      26                                                Center



     ORAL)                                                        

 

     folic acid      2017      Yes            400ug QD   Take 400           [facility]

[facility]



     (FOLVITE)      2-20                               mcg by           [facility]



     400 MCG      20:34:                               mouth           Medical



     tablet      26                                 nightly.           [place]

 

     cyanocobala      2017      Yes            1000ug QD   Take 1,000         

  [facility]



     min 1000      2-20                               mcg by           [facility]



     MCG tablet      20:34:                               mouth           [facility]

l



               26                                 daily.           Center

 

     cranberry      2017      Yes                 Q.5D Take by           [facility]

t



     extract      2-20                               mouth 2           [facility]



     (CRANBERRY      20:34:                               (two)           Medica

l



     CONCENTRATE      26                                 times           Center



     ) 500 mg                                         daily.           



     Cap                                                         

 

     docusate      2017      Yes            100mg      Take 100           [facility]



     sodium      2-20                               mg by           [facility]



     (COLACE)      20:34:                               mouth 2           Medica

l



     100 MG      26                                 (two)           Center



     capsule                                         times           



                                                  daily as           



                                                  needed for           



                                                  Constipati           



                                                  on.            

 

     atorvastati      2017      Yes                      TAKE ONE           Me

thodi



     n (LIPITOR)      2-14                               (1)            st



     80 MG      00:00:                               TABLET(S)           Hospita



     tablet      00                                 BY MOUTH           l



                                                  ONCE A           



                                                  DAY.           

 

     doxycycline      2016      Yes            100mg Q.5D Take 100           M

ethodi



     (VIBRAMYCIN      2-09                               mg by           st



     ) 100 MG      00:00:                               mouth 2           Hospit

a



     capsule      00                                 (two)           l



                                                  times a           



                                                  day.           

 

     losartan      2016      Yes                                     [facility]



     (COZAAR) 50      0-31                                              st



     MG tablet      00:00:                                              Hospita



               00                                                l

 

     nitrofurant      2016      Yes                                     Method

i



     oin,      0-26                                              st



     macrocrysta      00:00:                                              Hospit

a



     l-monohydra      00                                                l



     te,                                                         



     (MACROBID)                                                        



     100 MG                                                        



     capsule                                                        







Procedures

This patient has no known procedures.



Plan of Care







             Planned Activity Planned Date Details      Comments     Source

 

             Future Scheduled 2022   HEPATITIS B VACCINES              [facility]



             Test         01:40:15     (1 of 3 - 3-dose              



                                       series) [code =              



                                       HEPATITIS B VACCINES              



                                       (1 of 3 - 3-dose              



                                       series)]                  

 

             Future Scheduled 2022   COVID-19 VACCINE (#1)              [facility]



             Test         01:40:15     [code = COVID-19              



                                       VACCINE (#1)]              

 

             Future Scheduled 2022   SHINGLES VACCINES (1              Met

[facility]



             Test         01:40:15     of 2) [code = SHINGLES              



                                       VACCINES (1 of 2)]              

 

             Future Scheduled 2022   65+ PNEUMOCOCCAL              [facility]



             Test         01:40:15     VACCINE (1 - PCV)              



                                       [code = 65+               



                                       PNEUMOCOCCAL VACCINE              



                                       (1 - PCV)]                

 

             Future Scheduled 2022   INFLUENZA VACCINE              Method

[facility]



             Test         01:40:15     [code = INFLUENZA              



                                       VACCINE]                  







Results







           Test Description Test Time  Test Comments Results    Result     [facility]

e



                                                       Comments   

 

           [place], ANGIOGRAM, 2017  Reason for FINAL REPORT PATIENT ID:        

    



           CEREBRAL   1          Exam:->cerebral [id number] DATE:            



                      11:48:00   aneurysm   2017NAME: Shahida            



                                 unruptured Elvira SeanATTENDING:            



                                            Migue Amin MDFIRST            



                                            ASSISTANT: CESARIO LooREOPERATIVE DIAGNOSIS:           

 



                                            Unruptured right            



                                            posterior communicating            



                                            artery aneurysm status            



                                            post stent assisted            



                                            coilingPOSTOPERATIVE            



                                            DIAGNOSIS: Unruptured            



                                            right posterior            



                                            communicating artery            



                                            aneurysm status post            



                                            stent assisted            



                                            coilingPROCEDURE            



                                            PERFORMED: Diagnostic            



                                            Cerebral              



                                            AngiogramANESTHESIA:            



                                            MACCOMPLICATIONS:            



                                            NoneESTIMATED BLOOD LOSS:           

 



                                            Less than 15ml ARTERIAL            



                                            VESSELS STUDIED:RIGHT            



                                            SUBCLAVIAN ARTERY            



                                            (x1)RIGHT COMMON CAROTID            



                                            ARTERY (CERVICAL x2)RIGHT           

 



                                            COMMON CAROTID ARTERY            



                                            (CRANIAL x3)RIGHT COMMON            



                                            FEMORAL ARTERY (x1)            



                                            MATERIALS EMPLOYED:1. 5            



                                            French short sheath 2. 4            



                                            French Berenstein            



                                            catheter3. Bentson            



                                            guidewire4. Terumo 0.035            



                                            LT glidewire5. 5 French            



                                            Mynx device INDICATIONS:            



                                            77-year-old female with            



                                            hypertension who is            



                                            status post endovascular            



                                            treatment of an            



                                            unruptured right            



                                            posterior communicating            



                                            artery aneurysm with            



                                            stent-assisted coiling on           

 



                                            May 19, 2017. She            



                                            presents for follow-up            



                                            cerebral angiogram. The            



                                            indications for the            



                                            procedure as well as the            



                                            risks, benefits and            



                                            alternatives were            



                                            discussed with the            



                                            patient and the family.            



                                            The risks discussed            



                                            included but were not            



                                            limited to stroke,            



                                            intracranial hemorrhage,            



                                            injury to the cervical            



                                            femoral or aortic            



                                            vessels, contrast            



                                            reaction, kidney to            



                                            toxicity, groin hematoma,           

 



                                            weakness paralysis and            



                                            even death. They            



                                            demonstrated            



                                            understanding of the risk           

 



                                            benefit profile and            



                                            agreed to proceed.            



                                            PROCEDURE: After            



                                            appropriate consent was            



                                            obtained, the patient was           

 



                                            brought to the            



                                            angiographic suite and            



                                            cardiopulmonary            



                                            monitoring was placed.            



                                            The anesthesia team            



                                            performed light sedation.           

 



                                            A timeout was performed.            



                                            Both groins were prepped            



                                            and draped in the usual            



                                            sterile fashion. After            



                                            administration of 10 mL            



                                            of 2% lidocaine, a            



                                            micropuncture needle was            



                                            used to perform a single            



                                            wall puncture of the            



                                            right common femoral            



                                            artery, a micropuncture            



                                            sheath was inserted, and            



                                            an angled DSA angiogram            



                                            was performed through the           

 



                                            sheath. Once good            



                                            location of the puncture            



                                            site was confirmed, a 5            



                                            French short sheath was            



                                            inserted over a Bentson            



                                            wire and was maintained            



                                            on heparinized saline            



                                            flush throughout the            



                                            remainder of the            



                                            procedure. Using coaxial            



                                            technique, a preflushed            



                                            Berenstein catheter on            



                                            constant heparinized            



                                            saline flush was advanced           

 



                                            over the Glidewire into            



                                            the descending aorta, the           

 



                                            Glidewire was removed,            



                                            the catheter back bled,            



                                            flushed in usual fashion            



                                            and the catheter was            



                                            maintained on heparinized           

 



                                            flushed throughout            



                                            duration of the case.            



                                            Using coaxial technique,            



                                            the catheter was advanced           

 



                                            into the aortic arch and            



                                            with the aid of            



                                            roadmapping, digital            



                                            fluoroscopy, and careful            



                                            guidewire manipulation,            



                                            the arteries described            



                                            below were selectively            



                                            catheterized. Upon each            



                                            successive            



                                            catheterization, digital            



                                            subtraction angiography            



                                            using the appropriate            



                                            rate and volume of            



                                            contrast in multiple            



                                            projections was            



                                            performed. At the            



                                            conclusion of the            



                                            procedure, the catheter            



                                            was retracted into the            



                                            aorta and the images            



                                            reviewed at the outside            



                                            workstation for quality            



                                            and content. Then the            



                                            femoral sheath was            



                                            removed and hemostasis            



                                            achieved with a 5 French            



                                            Mynx device and manual            



                                            compression. The patient            



                                            tolerated the procedure            



                                            well and was transported            



                                            in unchanged neurological           

 



                                            status without groin            



                                            hematoma and with good            



                                            distal lower extremity            



                                            pulses. The patient was            



                                            transferred to the            



                                            recovery area to be            



                                            monitored as per            



                                            protocol. FINDINGS: RIGHT           

 



                                            SUBCLAVIAN ARTERY (DSA,            



                                            PA, LATERAL ROADMAP, x1):           

 



                                            There is normal course            



                                            and caliber of the            



                                            subclavian artery with            



                                            physiological filling of            



                                            its distal branches.            



                                            Limited visualization in            



                                            this roadmap of the            



                                            origins of the right            



                                            vertebral artery,            



                                            internal mamillary            



                                            artery, thyrocervical and           

 



                                            costocervical trunks.            



                                            RIGHT COMMON CAROTID            



                                            ARTERY (DSA, PA, LATERAL,           

 



                                            CERVICAL x2): Tortuous            



                                            course of the right            



                                            common carotid artery.            



                                            The distal cervical            



                                            common carotid as well as           

 



                                            the origins of the right            



                                            internal and external            



                                            carotid arteries are            



                                            widely patent without            



                                            evidence of ulceration or           

 



                                            stenosis. The proximal            



                                            portions of the            



                                            superficial temporal            



                                            artery, middle meningeal            



                                            artery, internal            



                                            maxillary artery,            



                                            occipital artery and            



                                            their branches are            



                                            visualized and appear            



                                            normal. RIGHT COMMON            



                                            CAROTID ARTERY (DSA, PA,            



                                            LATERAL, MAGNIFIED            



                                            OBLIQUE, CRANIAL x3):            



                                            Minimal residual aneurysm           

 



                                            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            



                                            treated right posterior            



                                            communicating artery            



                                            aneurysm, best visualized           

 



                                            on sequence A9. Normal            



                                            distal cervical, petrous,           

 



                                            cavernous and            



                                            supraclinoid internal            



                                            carotid artery with            



                                            physiological filling of            



                                            the MCA and MAXIMINO branches.           

 



                                            Limited visualization of            



                                            the venous phase. No            



                                            other aneurysms or other            



                                            vascular lesions are            



                                            seen. There is no            



                                            significant            



                                            atherosclerosis or            



                                            stenosis. Normal course            



                                            and caliber of the            



                                            external carotid artery            



                                            and its branches. There            



                                            is a well visualized            



                                            superficial temporal            



                                            artery, middle meningeal            



                                            artery, internal            



                                            maxillary artery,            



                                            occipital artery and            



                                            their branches. RIGHT            



                                            COMMON FEMORAL ARTERY            



                                            (DSA, PA, X 1): Normal            



                                            location of the puncture            



                                            site above the            



                                            bifurcation and below            



                                            markers of the inguinal            



                                            ligament. IMPRESSION:            



                                            Minimal residual aneurysm           

 



                                            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            



                                            treated right posterior            



                                            communicating artery            



                                            aneurysm. No technical or           

 



                                            procedural complications            



                                            FACULTY ATTESTATION: I,            



                                            Migue Amin M.D.,            



                                            was present for the            



                                            entirety of the            



                                            procedure. I performed or           

 



                                            directly supervised all            



                                            aspects of the procedure.           

 



                                            I performed all critical            



                                            aspects of the case. I            



                                            interpreted the images            



                                            and reported the results.           

 



                                            Signed: Chaz Cox            



                                            MDReport Verified            



                                            Date/Time: 2017            



                                            11:48:56 Reading            



                                            Location: [facility] [address]            



                                            Neuro Angio Reading Room            



                                            Electronically signed by:           

 



                                            CHAZ COX on            



                                            2017 11:48 AM            









                    BASIC METABOLIC PANEL 2017 11:56:00 









                      Test Item  Value      Reference Range Interpretation Comme

nts









             SODIUM (BEAKER) (test code 136 meq/L    136-145                   



             = 381)                                              

 

             POTASSIUM (BEAKER) (test 3.9 meq/L    3.5-5.1                   



             code = 379)                                         

 

             CHLORIDE (BEAKER) (test 102 meq/L                        



             code = 382)                                         

 

             CO2 (BEAKER) (test code = 25 meq/L     22-29                     



             355)                                                

 

             BLOOD UREA NITROGEN 20 mg/dL     7-21                      



             (BEAKER) (test code = 354)                                        

 

             CREATININE (BEAKER) (test 0.82 mg/dL   0.57-1.25                 



             code = 358)                                         

 

             GLUCOSE RANDOM (BEAKER) 106 mg/dL           H            



             (test code = 652)                                        

 

             CALCIUM (BEAKER) (test code 9.6 mg/dL    8.4-10.2                  



             = 697)                                              

 

             EGFR (BEAKER) (test code = 68 mL/min/1.73 sq m                     

      ESTIMATED GFR IS NOT AS



             1092)                                               ACCURATE AS CRE

ATININE



                                                                 CLEARANCE IN OR

EDICTING



                                                                 GLOMERULAR FILT

RATION



                                                                 RATE. ESTIMATED

 GFR IS NOT



                                                                 APPLICABLE FOR 

DIALYSIS



                                                                 PATIENTS.



CBC WITH PLATELET COUNT + MANUAL [id number] 11:33:00





             Test Item    Value        Reference Range Interpretation Comments

 

             WHITE BLOOD CELL COUNT 5.0 K/ L     3.5-10.5                  



             (BEAKER) (test code =                                        



             775)                                                

 

             RED BLOOD CELL COUNT 3.94 M/ L    3.93-5.22                 



             (BEAKER) (test code =                                        



             761)                                                

 

             HEMOGLOBIN (BEAKER) 12.1 GM/DL   11.2-15.7                 



             (test code = 410)                                        

 

             HEMATOCRIT (BEAKER) 37.1 %       34.1-44.9                 



             (test code = 411)                                        

 

             MEAN CORPUSCULAR 94.2 fL      79.4-94.8                 



             VOLUME (BEAKER) (test                                        



             code = 753)                                         

 

             MEAN CORPUSCULAR 30.7 pg      25.6-32.2                 



             HEMOGLOBIN (BEAKER)                                        



             (test code = 751)                                        

 

             MEAN CORPUSCULAR 32.6 GM/DL   32.2-35.5                 



             HEMOGLOBIN CONC                                        



             (BEAKER) (test code =                                        



             752)                                                

 

             RED CELL DISTRIBUTION 12.7 %       11.7-14.4                 



             WIDTH (BEAKER) (test                                        



             code = 412)                                         

 

             PLATELET COUNT 212 K/CU MM  150-450                   



             (BEAKER) (test code =                                        



             756)                                                

 

             MEAN PLATELET VOLUME 9.4 fL       9.4-12.3                  



             (BEAKER) (test code =                                        



             754)                                                

 

             NUCLEATED RED BLOOD 0 /100 WBC   0-0                       



             CELLS (BEAKER) (test                                        



             code = 413)                                         

 

             IMMATURE     0 %          0-1                       



             GRANULOCYTES-RELATIVE                                        



             PERCENT (BEAKER) (test                                        



             code = 2805)                                        

 

             NEUTROPHILS RELATIVE 60 %                                   This is

 an appended



             PERCENT (BEAKER) (test                                        repor

t. These



             code = 429)                                         results have be

en



                                                                 appended to a



                                                                 previously sherine

l



                                                                 verified report

.

 

             LYMPHOCYTES RELATIVE 30 %                                   This is

 an appended



             PERCENT (BEAKER) (test                                        repor

t. These



             code = 430)                                         results have be

en



                                                                 appended to a



                                                                 previously sherine

l



                                                                 verified report

.

 

             MONOCYTES RELATIVE 9 %                                    This is a

n appended



             PERCENT (BEAKER) (test                                        repor

t. These



             code = 431)                                         results have be

en



                                                                 appended to a



                                                                 previously sherine

l



                                                                 verified report

.

 

             EOSINOPHILS RELATIVE 1 %                                    This is

 an appended



             PERCENT (BEAKER) (test                                        repor

t. These



             code = 432)                                         results have be

en



                                                                 appended to a



                                                                 previously sherine

l



                                                                 verified report

.

 

             BASOPHILS RELATIVE 1 %                                    This is a

n appended



             PERCENT (BEAKER) (test                                        repor

t. These



             code = 437)                                         results have be

en



                                                                 appended to a



                                                                 previously sherine

l



                                                                 verified report

.

 

             NEUTROPHILS ABSOLUTE 2.97 K/ L    1.56-6.13                 This is

 an appended



             COUNT (BEAKER) (test                                        report.

 These



             code = 670)                                         results have be

en



                                                                 appended to a



                                                                 previously sherine

l



                                                                 verified report

.

 

             LYMPHOCYTES ABSOLUTE 1.46 K/ L    1.18-3.74                 This is

 an appended



             COUNT (BEAKER) (test                                        report.

 These



             code = 414)                                         results have be

en



                                                                 appended to a



                                                                 previously sherine

l



                                                                 verified report

.

 

             MONOCYTES ABSOLUTE 0.42 K/ L    0.24-0.36    H            This is a

n appended



             COUNT (BEAKER) (test                                        report.

 These



             code = 415)                                         results have be

en



                                                                 appended to a



                                                                 previously sherine

l



                                                                 verified report

.

 

             EOSINOPHILS ABSOLUTE 0.05 K/ L    0.04-0.36                 This is

 an appended



             COUNT (BEAKER) (test                                        report.

 These



             code = 416)                                         results have be

en



                                                                 appended to a



                                                                 previously sherine

l



                                                                 verified report

.

 

             BASOPHILS ABSOLUTE 0.04 K/ L    0.01-0.08                 This is a

n appended



             COUNT (BEAKER) (test                                        report.

 These



             code = 417)                                         results have be

en



                                                                 appended to a



                                                                 previously sherine

l



                                                                 verified report

.



[id number] 11:30:00





             Test Item    Value        Reference Range Interpretation Comments

 

             PROTIME (BEAKER) (test code = 15.2 seconds 11.7-14.7    H          

  



             759)                                                

 

             INR (BEAKER) (test code = 370) 1.2          <=5.9                  

   

 

             PARTIAL THROMBOPLASTIN TIME 42.8 seconds 22.5-36.0    H            



             (BEAKER) (test code = 760)                                        



RECOMMENDED COUMADIN/WARFARIN INR THERAPY RANGESSTANDARD DOSE: 2.0 - 3.0 
Includes: PROPHYLAXIS for venous thrombosis, systemic embolization; TREATMENT 
for venous thrombosis and/or pulmonary embolus.HIGH RISK: Target INR is 2.5-3.5 
for patients with mechanical heart valves.PLATELET AGGREGATION: FUNCTION SCREEN
2017 09:23:00





             Test Item    Value        Reference Range Interpretation Comments

 

             WEAK ADP     5 %          60-91        L            



             RESULT(BEAKER) (test                                        



             code = 2135)                                        

 

             PLATELET FUNCTION 0-39% indicates marked                           



             SCREEN INTERP platelet dysfunction                           



             (BEAKER) (test code =                                        



             4225)                                               

 

             [id number] Nelly Zurita MD                       

    



             (BEAKER) (test code = (electronic signature)                       

    



             3185)                                               

 

             PLATELET COUNT  K/CU MM  150-430                   



             (BEAKER) (test code =                                        



             8906)                                               



CBC W/PLT COUNT &amp; AUTO [id number] 07:07:00





             Test Item    Value        Reference Range Interpretation Comments

 

             WHITE BLOOD CELL COUNT (BEAKER) 5.9 K/ L     4.0-10.0              

    



             (test code = 775)                                        

 

             RED BLOOD CELL COUNT (BEAKER) 3.88 M/ L    4.00-5.00    L          

  



             (test code = 761)                                        

 

             HEMOGLOBIN (BEAKER) (test code = 12.5 GM/DL   12.0-15.0            

     



             410)                                                

 

             HEMATOCRIT (BEAKER) (test code = 37.8 %       36.0-45.0            

     



             411)                                                

 

             MEAN CORPUSCULAR VOLUME (BEAKER) 97.3 fL      82.0-99.0            

     



             (test code = 753)                                        

 

             MEAN CORPUSCULAR HEMOGLOBIN 32.1 pg      27.0-33.0                 



             (BEAKER) (test code = 751)                                        

 

             MEAN CORPUSCULAR HEMOGLOBIN CONC 33.0 GM/DL   32.0-36.0            

     



             (BEAKER) (test code = 752)                                        

 

             RED CELL DISTRIBUTION WIDTH 13.4 %       10.3-14.2                 



             (BEAKER) (test code = 412)                                        

 

             PLATELET COUNT (BEAKER) (test 238 K/CU MM  150-430                 

  



             code = 756)                                         

 

             MEAN PLATELET VOLUME (BEAKER) 7.2 fL       6.5-10.5                

  



             (test code = 754)                                        

 

             NUCLEATED RED BLOOD CELLS 0 /100 WBC   0-0                       



             (BEAKER) (test code = 413)                                        

 

             NEUTROPHILS RELATIVE PERCENT 58 %                                  

 



             (BEAKER) (test code = 429)                                        

 

             LYMPHOCYTES RELATIVE PERCENT 31 %                                  

 



             (BEAKER) (test code = 430)                                        

 

             MONOCYTES RELATIVE PERCENT 10 %                                   



             (BEAKER) (test code = 431)                                        

 

             EOSINOPHILS RELATIVE PERCENT 0 %                                   

 



             (BEAKER) (test code = 432)                                        

 

             BASOPHILS RELATIVE PERCENT 1 %                                    



             (BEAKER) (test code = 437)                                        

 

             NEUTROPHILS ABSOLUTE COUNT 3.36 K/ L    1.80-8.00                 



             (BEAKER) (test code = 670)                                        

 

             LYMPHOCYTES ABSOLUTE COUNT 1.84 K/ L    1.48-4.50                 



             (BEAKER) (test code = 414)                                        

 

             MONOCYTES ABSOLUTE COUNT (BEAKER) 0.58 K/ L    0.00-1.30           

      



             (test code = 415)                                        

 

             EOSINOPHILS ABSOLUTE COUNT 0.01 K/ L    0.00-0.50                 



             (BEAKER) (test code = 416)                                        

 

             BASOPHILS ABSOLUTE COUNT (BEAKER) 0.06 K/ L    0.00-0.20           

      



             (test code = 417)                                        



0.00BASIC METABOLIC [id number] 06:36:00





             Test Item    Value        Reference Range Interpretation Comments

 

             SODIUM (BEAKER) 138 meq/L    136-145                   



             (test code = 381)                                        

 

             POTASSIUM (BEAKER) 4.0 meq/L    3.5-5.1                   Specimen 

slightly



             (test code = 379)                                        hemolyzed

 

             CHLORIDE (BEAKER) 106 meq/L                        



             (test code = 382)                                        

 

             CO2 (BEAKER) (test 23 meq/L     22-29                     



             code = 355)                                         

 

             BLOOD UREA NITROGEN 13 mg/dL     7-21                      



             (BEAKER) (test code                                        



             = 354)                                              

 

             CREATININE (BEAKER) 0.79 mg/dL   0.57-1.25                 Specimen

 slightly



             (test code = 358)                                        hemolyzed

 

             GLUCOSE RANDOM 97 mg/dL                         



             (BEAKER) (test code                                        



             = 652)                                              

 

             CALCIUM (BEAKER) 9.0 mg/dL    8.4-10.2                  



             (test code = 697)                                        

 

             EGFR (BEAKER) (test 71 mL/min/1.73                           ESTIMA

[place] GFR IS



             code = 1092) sq m                                   NOT AS ACCURATE

 AS



                                                                 CREATININE



                                                                 CLEARANCE IN



                                                                 PREDICTING



                                                                 GLOMERULAR



                                                                 FILTRATION RATE

.



                                                                 ESTIMATED GFR I

S



                                                                 NOT APPLICABLE 

FOR



                                                                 DIALYSIS PATIEN

TS.



[id number] 06:21:00





             Test Item    Value        Reference Range Interpretation Comments

 

             PROTIME (BEAKER) (test code = 14.1 seconds 11.7-14.7               

  



             759)                                                

 

             INR (BEAKER) (test code = 370) 1.1          <=5.9                  

   

 

             PARTIAL THROMBOPLASTIN TIME 37.0 seconds 22.5-36.0    H            



             (BEAKER) (test code = 760)                                        



RECOMMENDED COUMADIN/WARFARIN INR THERAPY RANGESSTANDARD DOSE: 2.0 - 3.0 
Includes: PROPHYLAXIS for venous thrombosis, systemic embolization; TREATMENT 
for venous thrombosis and/or pulmonary embolus.HIGH RISK: Target INR is 2.5-3.5 
for patients with mechanical heart valves.PROTHROMBIN [id number] 
06:20:00





             Test Item    Value        Reference Range Interpretation Comments

 

             PROTIME (BEAKER) (test code = 14.1 seconds 11.7-14.7               

  



             759)                                                

 

             INR (BEAKER) (test code = 370) 1.1          <=5.9                  

   



RECOMMENDED COUMADIN/WARFARIN INR THERAPY RANGESSTANDARD DOSE: 2.0 - 3.0 
Includes: PROPHYLAXIS for venous thrombosis, systemic embolization; TREATMENT 
for venous thrombosis and/or pulmonary embolus.HIGH RISK: Target INR is 2.5-3.5 
for patients with mechanical heart valves.PT/[id number] 23:38:00





             Test Item    Value        Reference Range Interpretation Comments

 

             PROTIME (BEAKER) (test code = 15.2 seconds 11.7-14.7    H          

  



             759)                                                

 

             INR (BEAKER) (test code = 370) 1.2          <=5.9                  

   

 

             PARTIAL THROMBOPLASTIN TIME 36.5 seconds 22.5-36.0    H            



             (BEAKER) (test code = 760)                                        



RECOMMENDED COUMADIN/WARFARIN INR THERAPY RANGESSTANDARD DOSE: 2.0 - 3.0 
Includes: PROPHYLAXIS for venous thrombosis, systemic embolization; TREATMENT 
for venous thrombosis and/or pulmonary embolus.HIGH RISK: Target INR is 2.5-3.5 
for patients with mechanical heart valves.BASIC METABOLIC [id number] 
23:33:00





             Test Item    Value        Reference Range Interpretation Comments

 

             SODIUM (BEAKER) 137 meq/L    136-145                   



             (test code = 381)                                        

 

             POTASSIUM (BEAKER) 5.7 meq/L    3.5-5.1      H            Specimen 

markedly



             (test code = 379)                                        hemolyzed

 

             CHLORIDE (BEAKER) 105 meq/L                        



             (test code = 382)                                        

 

             CO2 (BEAKER) (test 25 meq/L     22-29                     



             code = 355)                                         

 

             BLOOD UREA NITROGEN 13 mg/dL     7-21                      



             (BEAKER) (test code                                        



             = 354)                                              

 

             CREATININE (BEAKER) 0.84 mg/dL   0.57-1.25                 Specimen

 markedly



             (test code = 358)                                        hemolyzed

 

             GLUCOSE RANDOM 86 mg/dL                         



             (BEAKER) (test code                                        



             = 652)                                              

 

             CALCIUM (BEAKER) 8.9 mg/dL    8.4-10.2                  



             (test code = 697)                                        

 

             EGFR (BEAKER) (test 66 mL/min/1.73                           ESTIMA

[place] GFR IS



             code = 1092) sq m                                   NOT AS ACCURATE

 AS



                                                                 CREATININE



                                                                 CLEARANCE IN



                                                                 PREDICTING



                                                                 GLOMERULAR



                                                                 FILTRATION RATE

.



                                                                 ESTIMATED GFR I

S



                                                                 NOT APPLICABLE 

FOR



                                                                 DIALYSIS PATIEN

TS.



CBC W/PLT COUNT &amp; AUTO [id number] 23:19:00





             Test Item    Value        Reference Range Interpretation Comments

 

             WHITE BLOOD CELL COUNT (BEAKER) 7.0 K/ L     4.0-10.0              

    



             (test code = 775)                                        

 

             RED BLOOD CELL COUNT (BEAKER) 3.76 M/ L    4.00-5.00    L          

  



             (test code = 761)                                        

 

             HEMOGLOBIN (BEAKER) (test code = 12.7 GM/DL   12.0-15.0            

     



             410)                                                

 

             HEMATOCRIT (BEAKER) (test code = 36.9 %       36.0-45.0            

     



             411)                                                

 

             MEAN CORPUSCULAR VOLUME (BEAKER) 98.2 fL      82.0-99.0            

     



             (test code = 753)                                        

 

             MEAN CORPUSCULAR HEMOGLOBIN 33.7 pg      27.0-33.0    H            



             (BEAKER) (test code = 751)                                        

 

             MEAN CORPUSCULAR HEMOGLOBIN CONC 34.4 GM/DL   32.0-36.0            

     



             (BEAKER) (test code = 752)                                        

 

             RED CELL DISTRIBUTION WIDTH 12.2 %       10.3-14.2                 



             (BEAKER) (test code = 412)                                        

 

             PLATELET COUNT (BEAKER) (test 241 K/CU MM  150-430                 

  



             code = 756)                                         

 

             MEAN PLATELET VOLUME (BEAKER) 7.2 fL       6.5-10.5                

  



             (test code = 754)                                        

 

             NUCLEATED RED BLOOD CELLS 0 /100 WBC   0-0                       



             (BEAKER) (test code = 413)                                        

 

             NEUTROPHILS RELATIVE PERCENT 58 %                                  

 



             (BEAKER) (test code = 429)                                        

 

             LYMPHOCYTES RELATIVE PERCENT 34 %                                  

 



             (BEAKER) (test code = 430)                                        

 

             MONOCYTES RELATIVE PERCENT 7 %                                    



             (BEAKER) (test code = 431)                                        

 

             EOSINOPHILS RELATIVE PERCENT 0 %                                   

 



             (BEAKER) (test code = 432)                                        

 

             BASOPHILS RELATIVE PERCENT 1 %                                    



             (BEAKER) (test code = 437)                                        

 

             NEUTROPHILS ABSOLUTE COUNT 4.08 K/ L    1.80-8.00                 



             (BEAKER) (test code = 670)                                        

 

             LYMPHOCYTES ABSOLUTE COUNT 2.36 K/ L    1.48-4.50                 



             (BEAKER) (test code = 414)                                        

 

             MONOCYTES ABSOLUTE COUNT (BEAKER) 0.52 K/ L    0.00-1.30           

      



             (test code = 415)                                        

 

             EOSINOPHILS ABSOLUTE COUNT 0.02 K/ L    0.00-0.50                 



             (BEAKER) (test code = 416)                                        

 

             BASOPHILS ABSOLUTE COUNT (BEAKER) 0.05 K/ L    0.00-0.20           

      



             (test code = 417)                                        



0.00POCT-P2Y12 PLATELET [id number] 20:57:00





             Test Item    Value        Reference Range Interpretation Comments

 

             POC-P2Y12 PLATELET AGG (BEAKER) (test 31 PRU                       

          



             code = 2303)                                        



RANGE INFORMATION: PRU reference range is 194-418. Post Drug Results: Lower PRU 
levels are associated with expected antiplatelet effect. Values may be below the
stated reference range above. The post-drug PRU values reported in the [employer]
P2Y12 package insert are .URINALYSIS W/ [id number] 09:22:00





             Test Item    Value        Reference Range Interpretation Comments

 

             COLOR (BEAKER) (test Light Yellow                           



             code = 470)                                         

 

             CLARITY (BEAKER) (test Hazy                                   



             code = 469)                                         

 

             SPECIFIC GRAVITY UA 1.009        1.001-1.035               



             (BEAKER) (test code =                                        



             468)                                                

 

             PH UA (BEAKER) (test 5.5          5.0-8.0                   



             code = 467)                                         

 

             PROTEIN UA (BEAKER) Negative     Negative                  



             (test code = 464)                                        

 

             GLUCOSE UA (BEAKER) Negative     Negative                  



             (test code = 365)                                        

 

             KETONES UA (BEAKER) Negative     Negative                  



             (test code = 371)                                        

 

             BILIRUBIN UA (BEAKER) Negative     Negative                  



             (test code = 462)                                        

 

             BLOOD UA (BEAKER) (test Small        Negative     A            



             code = 461)                                         

 

             NITRITE UA (BEAKER) Positive     Negative     A            



             (test code = 465)                                        

 

             LEUKOCYTE ESTERASE UA Large        Negative     A            



             (BEAKER) (test code =                                        



             466)                                                

 

             UROBILINOGEN UA (BEAKER) 0.2 mg/dL    0.2-1.0                   



             (test code = 463)                                        

 

             RBC UA (BEAKER) (test 12 /HPF                                



             code = 519)                                         

 

             WBC UA (BEAKER) (test 92 /HPF                                



             code = 520)                                         

 

             BACTERIA (BEAKER) (test Rare                                   



             code = 517)                                         

 

             MUCUS (BEAKER) (test Rare                                   



             code = 1574)                                        

 

             SQUAMOUS EPITHELIAL < /HPF                                 



             (BEAKER) (test code =                                        



             516)                                                

 

             YEAST (BEAKER) (test Occasional                             



             code = 1585)                                        

 

             SOURCE(BEAKER) (test Urine, Straight                           



             code = 9245) Catheter                               



[id number] 04:55:00





             Test Item    Value        Reference Range Interpretation Comments

 

             PHOSPHORUS (BEAKER) (test code = 3.3 mg/dL    2.3-4.7              

     



             604)                                                



[id number] 04:55:00





             Test Item    Value        Reference Range Interpretation Comments

 

             MAGNESIUM (BEAKER) (test code = 1.6 mg/dL    1.6-2.6               

    



             627)                                                



BASIC METABOLIC [id number] 04:55:00





             Test Item    Value        Reference Range Interpretation Comments

 

             SODIUM (BEAKER) 140 meq/L    136-145                   



             (test code = 381)                                        

 

             POTASSIUM (BEAKER) 3.4 meq/L    3.5-5.1      L            



             (test code = 379)                                        

 

             CHLORIDE (BEAKER) 107 meq/L                        



             (test code = 382)                                        

 

             CO2 (BEAKER) (test 22 meq/L     22-29                     



             code = 355)                                         

 

             BLOOD UREA NITROGEN 8 mg/dL      7-21                      



             (BEAKER) (test code                                        



             = 354)                                              

 

             CREATININE (BEAKER) 0.71 mg/dL   0.57-1.25                 



             (test code = 358)                                        

 

             GLUCOSE RANDOM 109 mg/dL           H            



             (BEAKER) (test code                                        



             = 652)                                              

 

             CALCIUM (BEAKER) 8.6 mg/dL    8.4-10.2                  



             (test code = 697)                                        

 

             EGFR (BEAKER) (test 80 mL/min/1.73                           ESTIMA

[facility] GFR IS



             code = 1092) sq m                                   NOT AS ACCURATE

 AS



                                                                 CREATININE



                                                                 CLEARANCE IN



                                                                 PREDICTING



                                                                 GLOMERULAR



                                                                 FILTRATION RATE

.



                                                                 ESTIMATED GFR I

S



                                                                 NOT APPLICABLE 

FOR



                                                                 DIALYSIS PATIEN

TS.



CBC W/PLT COUNT &amp; AUTO [id number] 04:52:00





             Test Item    Value        Reference Range Interpretation Comments

 

             WHITE BLOOD CELL COUNT (BEAKER) 7.5 K/ L     4.0-10.0              

    



             (test code = 775)                                        

 

             RED BLOOD CELL COUNT (BEAKER) 4.04 M/ L    4.00-5.00               

  



             (test code = 761)                                        

 

             HEMOGLOBIN (BEAKER) (test code = 12.9 GM/DL   12.0-15.0            

     



             410)                                                

 

             HEMATOCRIT (BEAKER) (test code = 39.7 %       36.0-45.0            

     



             411)                                                

 

             MEAN CORPUSCULAR VOLUME (BEAKER) 98.3 fL      82.0-99.0            

     



             (test code = 753)                                        

 

             MEAN CORPUSCULAR HEMOGLOBIN 31.9 pg      27.0-33.0                 



             (BEAKER) (test code = 751)                                        

 

             MEAN CORPUSCULAR HEMOGLOBIN CONC 32.5 GM/DL   32.0-36.0            

     



             (BEAKER) (test code = 752)                                        

 

             RED CELL DISTRIBUTION WIDTH 12.0 %       10.3-14.2                 



             (BEAKER) (test code = 412)                                        

 

             PLATELET COUNT (BEAKER) (test 196 K/CU MM  150-430                 

  



             code = 756)                                         

 

             MEAN PLATELET VOLUME (BEAKER) 7.2 fL       6.5-10.5                

  



             (test code = 754)                                        

 

             NUCLEATED RED BLOOD CELLS 0 /100 WBC   0-0                       



             (BEAKER) (test code = 413)                                        

 

             NEUTROPHILS RELATIVE PERCENT 81 %                                  

 



             (BEAKER) (test code = 429)                                        

 

             LYMPHOCYTES RELATIVE PERCENT 14 %                                  

 



             (BEAKER) (test code = 430)                                        

 

             MONOCYTES RELATIVE PERCENT 6 %                                    



             (BEAKER) (test code = 431)                                        

 

             EOSINOPHILS RELATIVE PERCENT 0 %                                   

 



             (BEAKER) (test code = 432)                                        

 

             BASOPHILS RELATIVE PERCENT 0 %                                    



             (BEAKER) (test code = 437)                                        

 

             NEUTROPHILS ABSOLUTE COUNT 6.03 K/ L    1.80-8.00                 



             (BEAKER) (test code = 670)                                        

 

             LYMPHOCYTES ABSOLUTE COUNT 1.01 K/ L    1.48-4.50    L            



             (BEAKER) (test code = 414)                                        

 

             MONOCYTES ABSOLUTE COUNT (BEAKER) 0.42 K/ L    0.00-1.30           

      



             (test code = 415)                                        

 

             EOSINOPHILS ABSOLUTE COUNT 0.01 K/ L    0.00-0.50                 



             (BEAKER) (test code = 416)                                        

 

             BASOPHILS ABSOLUTE COUNT (BEAKER) 0.01 K/ L    0.00-0.20           

      



             (test code = 417)                                        



[id number] 16:01:00





             Test Item    Value        Reference Range Interpretation Comments

 

             ACTIVATED CLOTTING TIME 245 sec                                TEST

ED AT [place] [address]



             ([facility]) (test code =                                        [facility])                                                [address]



[id number] 15:00:00





             Test Item    Value        Reference Range Interpretation Comments

 

             ACTIVATED CLOTTING TIME 255 sec                                TEST

ED AT [place] [address]



             ([facility]) (test code =                                        [facility])                                                [address]



[id number] 14:44:00





             Test Item    Value        Reference Range Interpretation Comments

 

             ACTIVATED CLOTTING TIME 234 sec                                TEST

ED AT [place] [address]



             ([facility]) (test code =                                        [facility])                                                [address]



[id number] 14:31:00





             Test Item    Value        Reference Range Interpretation Comments

 

             ACTIVATED CLOTTING TIME 219 sec                                TEST

ED AT [place] [address]



             (BEAKER) (test code =                                        [facility]

[place] [address])                                                [address]



[id number] 14:31:00





             Test Item    Value        Reference Range Interpretation Comments

 

             ACTIVATED CLOTTING TIME 183 sec                                TEST

ED AT [place] [address]



             ([facility]) (test code =                                        [facility]

[place] [address])                                                [address]



[id number] 13:22:00





             Test Item    Value        Reference Range Interpretation Comments

 

             ACTIVATED CLOTTING TIME 137 sec                                TEST

ED AT [place] [address]



             ([facility]) (test code =                                        [place] [address])                                                [address]



POCT-P2Y12 PLATELET [id number] 07:24:00





             Test Item    Value        Reference Range Interpretation Comments

 

             POC-P2Y12 PLATELET AGG (AKER) (test 110 PRU                      

          



             code = 2303)                                        



RANGE INFORMATION: PRU reference range is 194-418. Post Drug Results: Lower PRU 
levels are associated with expected antiplatelet effect. Values may be below the
stated reference range above. The post-drug PRU values reported in the [employer]
P2Y12 package insert are .POCT-ASPIRIN PLATELET [id number] 
07:24:00





             Test Item    Value        Reference Range Interpretation Comments

 

             POC-ASPIRIN PLATELET AGG (BEAKER) 402 ARU                          

      



             (test code = 2302)                                        



RANGE INFORMATION: 350-549 ARU Therapeutic range for platelet function. 550-700 
ARU Non-Therapeutic range for platelet function.PLATELET AGGREGATION: FUNCTION 
[id number] 10:21:00





             Test Item    Value        Reference Range Interpretation Comments

 

             WEAK ADP     92 %         60-91        H            



             RESULT([facility]) (test                                        



             code = 2135)                                        

 

             PLATELET FUNCTION % indicates                           



             SCREEN INTERP ([facility]) normal platelet                           



             (test code = 2173) function                               

 

             [id number] Meredith Reyes, MD                           



             ([facility]) (test code = (electronic signature)                       

    



             7064)                                               

 

             PLATELET COUNT  K/CU MM  150-430                   



             (AKER) (test code =                                        



             2656)                                               



COMPREHENSIVE METABOLIC [id number] 05:11:00





             Test Item    Value        Reference Range Interpretation Comments

 

             TOTAL PROTEIN 6.5 gm/dL    6.0-8.3                   



             ([facility]) (test code =                                        



             770)                                                

 

             ALBUMIN (AKER) 3.6 g/dL     3.5-5.0                   



             (test code = 1145)                                        

 

             ALKALINE PHOSPHATASE 60 U/L                           



             ([facility]) (test code =                                        



             346)                                                

 

             BILIRUBIN TOTAL 1.1 mg/dL    0.2-1.2                   



             (BEAKER) (test code =                                        



             377)                                                

 

             SODIUM (BEAKER) (test 136 meq/L    136-145                   



             code = 381)                                         

 

             POTASSIUM (BEAKER) 3.8 meq/L    3.5-5.1                   



             (test code = 379)                                        

 

             CHLORIDE (BEAKER) 104 meq/L                        



             (test code = 382)                                        

 

             CO2 (BEAKER) (test 23 meq/L     22-29                     



             code = 355)                                         

 

             BLOOD UREA NITROGEN 13 mg/dL     7-21                      



             (BEAKER) (test code =                                        



             354)                                                

 

             CREATININE (BEAKER) 0.79 mg/dL   0.57-1.25                 



             (test code = 358)                                        

 

             GLUCOSE RANDOM 89 mg/dL                         



             (BEAKER) (test code =                                        



             652)                                                

 

             CALCIUM (BEAKER) 9.2 mg/dL    8.4-10.2                  



             (test code = 697)                                        

 

             AST (SGOT) (BEAKER) 19 U/L       5-34                      



             (test code = 353)                                        

 

             ALT (SGPT) (BEAKER) 8 U/L        6-55                      



             (test code = 347)                                        

 

             EGFR (BEAKER) (test 71 mL/min/1.73                           ESTIMA

[facility] GFR IS



             code = 1092) sq m                                   NOT AS ACCURATE

 AS



                                                                 CREATININE



                                                                 CLEARANCE IN



                                                                 PREDICTING



                                                                 GLOMERULAR



                                                                 FILTRATION RATE

.



                                                                 ESTIMATED GFR I

S



                                                                 NOT APPLICABLE 

FOR



                                                                 DIALYSIS PATIEN

TS.



[id number] 05:04:00





             Test Item    Value        Reference Range Interpretation Comments

 

             PARTIAL THROMBOPLASTIN TIME 37.9 seconds 22.5-36.0    H            



             (BEAKER) (test code = 760)                                        



[id number] 16:56:00





             Test Item    Value        Reference Range Interpretation Comments

 

             PARTIAL THROMBOPLASTIN TIME 39.4 seconds 22.5-36.0    H            



             (BEAKER) (test code = 760)                                        



PROTHROMBIN [id number]/[id number] 16:55:00





             Test Item    Value        Reference Range Interpretation Comments

 

             PROTIME (BEAKER) (test code = 14.4 seconds 11.7-14.7               

  



             759)                                                

 

             INR (BEAKER) (test code = 370) 1.1          <=5.9                  

   



RECOMMENDED COUMADIN/WARFARIN INR THERAPY RANGESSTANDARD DOSE: 2.0 - 3.0 
Includes: PROPHYLAXIS for venous thrombosis, systemic embolization; TREATMENT 
for venous thrombosis and/or pulmonary embolus.HIGH RISK: Target INR is 2.5-3.5 
for patients with mechanical heart valves.COMPREHENSIVE METABOLIC PANEL
2017-05-15 02:20:00





             Test Item    Value        Reference Range Interpretation Comments

 

             TOTAL PROTEIN 7.0 gm/dL    6.0-8.3                   



             (BEAKER) (test code =                                        



             770)                                                

 

             ALBUMIN (BEAKER) 3.8 g/dL     3.5-5.0                   



             (test code = 1145)                                        

 

             ALKALINE PHOSPHATASE 68 U/L                           



             (BEAKER) (test code =                                        



             346)                                                

 

             BILIRUBIN TOTAL 0.9 mg/dL    0.2-1.2                   



             (BEAKER) (test code =                                        



             377)                                                

 

             SODIUM (BEAKER) (test 137 meq/L    136-145                   



             code = 381)                                         

 

             POTASSIUM (BEAKER) 3.5 meq/L    3.5-5.1                   



             (test code = 379)                                        

 

             CHLORIDE (BEAKER) 104 meq/L                        



             (test code = 382)                                        

 

             CO2 (BEAKER) (test 23 meq/L     22-29                     



             code = 355)                                         

 

             BLOOD UREA NITROGEN 10 mg/dL     7-21                      



             (BEAKER) (test code =                                        



             354)                                                

 

             CREATININE (BEAKER) 0.81 mg/dL   0.57-1.25                 



             (test code = 358)                                        

 

             GLUCOSE RANDOM 106 mg/dL           H            



             (BEAKER) (test code =                                        



             652)                                                

 

             CALCIUM (BEAKER) 9.7 mg/dL    8.4-10.2                  



             (test code = 697)                                        

 

             AST (SGOT) (BEAKER) 21 U/L       5-34                      



             (test code = 353)                                        

 

             ALT (SGPT) (BEAKER) 8 U/L        6-55                      



             (test code = 347)                                        

 

             EGFR (BEAKER) (test 69 mL/min/1.73                           ESTIMA

[facility] GFR IS



             code = 1092) sq m                                   NOT AS ACCURATE

 AS



                                                                 CREATININE



                                                                 CLEARANCE IN



                                                                 PREDICTING



                                                                 GLOMERULAR



                                                                 FILTRATION RATE

.



                                                                 ESTIMATED GFR I

S



                                                                 NOT APPLICABLE 

FOR



                                                                 DIALYSIS PATIEN

TS.



HEMOGLOBIN [id number] 07:40:00





             Test Item    Value        Reference Range Interpretation Comments

 

             HEMOGLOBIN A1C (BEAKER) (test code = 4.9 %        4.3-6.1          

         



             368)                                                



VITAMIN [id number] 06:01:00





             Test Item    Value        Reference Range Interpretation Comments

 

             VITAMIN B12 (BEAKER) (test code = 826 pg/mL    213-816      H      

      



             774)                                                



TSH/FREE T4 IF [id number] 06:01:00





             Test Item    Value        Reference Range Interpretation Comments

 

             THYROID STIMULATING HORMONE 1.37 uIU/mL  0.35-4.94                 



             (BEAKER) (test code = 772)                                        



CALCIUM, [id number] 05:48:00





             Test Item    Value        Reference Range Interpretation Comments

 

             CALCIUM IONIZED (BEAKER) (test 1.11 mmol/L  1.12-1.27    L         

   



             code = 698)                                         

 

             PH, BLOOD (BEAKER) (test code = 7.41                               

    



             1810)                                               



LIPID [id number] 05:33:00





             Test Item    Value        Reference Range Interpretation Comments

 

             TRIGLYCERIDES (BEAKER) (test code = 57 mg/dL                       

        



             540)                                                

 

             CHOLESTEROL (BEAKER) (test code = 204 mg/dL                        

      



             631)                                                

 

             HDL CHOLESTEROL (BEAKER) (test code 62 mg/dL                       

        



             = 976)                                              

 

             LDL CHOLESTEROL CALCULATED (BEAKER) 131 mg/dL                      

        



             (test code = 633)                                        



Triglyceride Reference Range: Low Risk &lt;150 Borderline 150-199 High Risk 200-
499 Very High Risk &gt;=500Cholesterol Reference Range: Low Risk &lt;200 
Borderline 200-239 High Risk &gt;240HDL Cholesterol Reference Range: Low Risk 
&gt;=60 High Risk &lt;40LDL Cholesterol Reference Range: Optimal &lt;100 Near 
Optimal 100-129 Borderline 130-159 High 160-189 Very High &gt;=190PHOSPHORUS
2017 05:33:00





             Test Item    Value        Reference Range Interpretation Comments

 

             PHOSPHORUS (BEAKER) (test code = 3.3 mg/dL    2.3-4.7              

     



             604)                                                



[id number] 05:33:00





             Test Item    Value        Reference Range Interpretation Comments

 

             MAGNESIUM (BEAKER) (test code = 2.0 mg/dL    1.6-2.6               

    



             627)                                                



COMPREHENSIVE METABOLIC [id number] 05:33:00





             Test Item    Value        Reference Range Interpretation Comments

 

             TOTAL PROTEIN 5.9 gm/dL    6.0-8.3      L            



             (BEAKER) (test code =                                        



             770)                                                

 

             ALBUMIN (BEAKER) 3.3 g/dL     3.5-5.0      L            



             (test code = 1145)                                        

 

             ALKALINE PHOSPHATASE 56 U/L                           



             (BEAKER) (test code =                                        



             346)                                                

 

             BILIRUBIN TOTAL 0.6 mg/dL    0.2-1.2                   



             (BEAKER) (test code =                                        



             377)                                                

 

             SODIUM (BEAKER) (test 140 meq/L    136-145                   



             code = 381)                                         

 

             POTASSIUM (BEAKER) 3.7 meq/L    3.5-5.1                   



             (test code = 379)                                        

 

             CHLORIDE (BEAKER) 106 meq/L                        



             (test code = 382)                                        

 

             CO2 (BEAKER) (test 27 meq/L     22-29                     



             code = 355)                                         

 

             BLOOD UREA NITROGEN 13 mg/dL     7-21                      



             (BEAKER) (test code =                                        



             354)                                                

 

             CREATININE (BEAKER) 0.79 mg/dL   0.57-1.25                 



             (test code = 358)                                        

 

             GLUCOSE RANDOM 95 mg/dL                         



             (BEAKER) (test code =                                        



             652)                                                

 

             CALCIUM (BEAKER) 8.7 mg/dL    8.4-10.2                  



             (test code = 697)                                        

 

             AST (SGOT) (BEAKER) 16 U/L       5-34                      



             (test code = 353)                                        

 

             ALT (SGPT) (BEAKER) 7 U/L        6-55                      



             (test code = 347)                                        

 

             EGFR (BEAKER) (test 71 mL/min/1.73                           ESTIMA

[facility]



             code = 1092) sq m                                   NOT AS ACCURATE

 AS



                                                                 CREATININE



                                                                 CLEARANCE IN



                                                                 PREDICTING



                                                                 GLOMERULAR



                                                                 FILTRATION RATE

.



                                                                 ESTIMATED GFR I

S



                                                                 NOT APPLICABLE 

FOR



                                                                 DIALYSIS PATIEN

TS.



CBC W/PLT COUNT &amp; AUTO [id number] 04:51:00





             Test Item    Value        Reference Range Interpretation Comments

 

             WHITE BLOOD CELL COUNT (BEAKER) 4.8 K/ L     4.0-10.0              

    



             (test code = 775)                                        

 

             RED BLOOD CELL COUNT (BEAKER) 4.01 M/ L    4.00-5.00               

  



             (test code = 761)                                        

 

             HEMOGLOBIN (BEAKER) (test code = 13.4 GM/DL   12.0-15.0            

     



             410)                                                

 

             HEMATOCRIT (BEAKER) (test code = 39.0 %       36.0-45.0            

     



             411)                                                

 

             MEAN CORPUSCULAR VOLUME (BEAKER) 97.5 fL      82.0-99.0            

     



             (test code = 753)                                        

 

             MEAN CORPUSCULAR HEMOGLOBIN 33.5 pg      27.0-33.0    H            



             (BEAKER) (test code = 751)                                        

 

             MEAN CORPUSCULAR HEMOGLOBIN CONC 34.4 GM/DL   32.0-36.0            

     



             (BEAKER) (test code = 752)                                        

 

             RED CELL DISTRIBUTION WIDTH 12.8 %       10.3-14.2                 



             (BEAKER) (test code = 412)                                        

 

             PLATELET COUNT (BEAKER) (test 193 K/CU MM  150-430                 

  



             code = 756)                                         

 

             MEAN PLATELET VOLUME (BEAKER) 7.0 fL       6.5-10.5                

  



             (test code = 754)                                        

 

             NUCLEATED RED BLOOD CELLS 0 /100 WBC   0-0                       



             (BEAKER) (test code = 413)                                        

 

             NEUTROPHILS RELATIVE PERCENT 50 %                                  

 



             (BEAKER) (test code = 429)                                        

 

             LYMPHOCYTES RELATIVE PERCENT 40 %                                  

 



             (BEAKER) (test code = 430)                                        

 

             MONOCYTES RELATIVE PERCENT 9 %                                    



             (BEAKER) (test code = 431)                                        

 

             EOSINOPHILS RELATIVE PERCENT 0 %                                   

 



             (BEAKER) (test code = 432)                                        

 

             BASOPHILS RELATIVE PERCENT 0 %                                    



             (BEAKER) (test code = 437)                                        

 

             NEUTROPHILS ABSOLUTE COUNT 2.38 K/ L    1.80-8.00                 



             (BEAKER) (test code = 670)                                        

 

             LYMPHOCYTES ABSOLUTE COUNT 1.93 K/ L    1.48-4.50                 



             (BEAKER) (test code = 414)                                        

 

             MONOCYTES ABSOLUTE COUNT (BEAKER) 0.44 K/ L    0.00-1.30           

      



             (test code = 415)                                        

 

             EOSINOPHILS ABSOLUTE COUNT 0.01 K/ L    0.00-0.50                 



             (BEAKER) (test code = 416)                                        

 

             BASOPHILS ABSOLUTE COUNT (BEAKER) 0.02 K/ L    0.00-0.20           

      



             (test code = 417)                                        



0.00COMPREHENSIVE METABOLIC [id number] 22:47:00





             Test Item    Value        Reference Range Interpretation Comments

 

             TOTAL PROTEIN 5.8 gm/dL    6.0-8.3      L            



             (BEAKER) (test code =                                        



             770)                                                

 

             ALBUMIN (BEAKER) 3.2 g/dL     3.5-5.0      L            



             (test code = 1145)                                        

 

             ALKALINE PHOSPHATASE 56 U/L                           



             (BEAKER) (test code =                                        



             346)                                                

 

             BILIRUBIN TOTAL 0.5 mg/dL    0.2-1.2                   



             (BEAKER) (test code =                                        



             377)                                                

 

             SODIUM (BEAKER) (test 140 meq/L    136-145                   



             code = 381)                                         

 

             POTASSIUM (BEAKER) 3.7 meq/L    3.5-5.1                   



             (test code = 379)                                        

 

             CHLORIDE (BEAKER) 108 meq/L           H            



             (test code = 382)                                        

 

             CO2 (BEAKER) (test 25 meq/L     22-29                     



             code = 355)                                         

 

             BLOOD UREA NITROGEN 11 mg/dL     7-21                      



             (BEAKER) (test code =                                        



             354)                                                

 

             CREATININE (BEAKER) 0.83 mg/dL   0.57-1.25                 



             (test code = 358)                                        

 

             GLUCOSE RANDOM 94 mg/dL                         



             (AKER) (test code =                                        



             652)                                                

 

             CALCIUM (BEAKER) 8.6 mg/dL    8.4-10.2                  



             (test code = 697)                                        

 

             AST (SGOT) (BEAKER) 15 U/L       5-34                      



             (test code = 353)                                        

 

             ALT (SGPT) (BEAKER) 7 U/L        6-55                      



             (test code = 347)                                        

 

             EGFR (AKER) (test 67 mL/min/1.73                           ESTIMA

[facility] GFR IS



             code = 1092) sq m                                   NOT AS ACCURATE

 AS



                                                                 CREATININE



                                                                 CLEARANCE IN



                                                                 PREDICTING



                                                                 GLOMERULAR



                                                                 FILTRATION RATE

.



                                                                 ESTIMATED GFR I

S



                                                                 NOT APPLICABLE 

FOR



                                                                 DIALYSIS PATIEN

TS.

## 2022-10-21 NOTE — EDPHYS
Physician Documentation                                                                           

 [facility]                                                                 

Name: Jerica Estrada                                                                           

Age: 82 yrs                                                                                       

Sex: Female                                                                                       

: 1940                                                                                   

MRN: [id number]                                                                                   

Arrival Date: 10/21/2022                                                                          

Time: 12:13                                                                                       

Account#: [id number]                                                                            

Bed 6                                                                                             

Private MD:                                                                                       

[facility] Physician Kenney Mcdermott                                                                      

HPI:                                                                                              

10/21                                                                                             

13:08 This 82 yrs old  Female presents to ER via EMS with complaints of left arm     jolie 

      skin tear.                                                                                  

13:08 The patient or guardian complains of a laceration, 10 cm(s). The complaints affect the  jolie 

      palmar aspect of left forearm. Context: resulted from a fall. Onset: The                    

      symptoms/episode began/occurred just prior to arrival, this morning. Treatment prior to     

      arrival includes: applying pressure to the affected area. Modifying factors: The            

      symptoms are alleviated by remaining still, the symptoms are aggravated by movement.        

      Associated signs and symptoms: The patient has no apparent associated signs or              

      symptoms. The patient has not experienced similar symptoms in the past.                     

                                                                                                  

Historical:                                                                                       

- Allergies:                                                                                      

12:21 Bactrim;                                                                                tp1 

12:21 Ciprofloxacin;                                                                          tp1 

12:21 Codeine;                                                                                tp1 

12:21 Demerol;                                                                                tp1 

12:21 Diovan HCT;                                                                             tp1 

12:21 hydrochlorothiazide;                                                                    tp1 

12:21 Iodinated Contrast Media - IV Dye;                                                      tp1 

12:21 Tetanus Vaccines \T\ Toxoid;                                                              tp1

12:21 valsartan;                                                                              tp1 

- Home Meds:                                                                                      

12:21 Plavix Oral [Active];                                                                   tp1 

- PMHx:                                                                                           

12:21 Aneurysm; HARD OF HEARING; Hypertension; Parkinsons; vericose veins;                    tp1 

                                                                                                  

- Immunization history:: Client reports receiving the 2nd dose of the Covid vaccine.              

- Social history:: Smoking status: Patient denies any tobacco usage or history of.                

- Family history:: not pertinent.                                                                 

                                                                                                  

                                                                                                  

ROS:                                                                                              

13:08 Constitutional: Negative for fever, chills, and weight loss, Eyes: Negative for injury, jolie 

      pain, redness, and discharge, ENT: Negative for injury, pain, and discharge, Neck:          

      Negative for injury, pain, and swelling, Cardiovascular: Negative for chest pain,           

      palpitations, and edema, Respiratory: Negative for shortness of breath, cough,              

      wheezing, and pleuritic chest pain, Abdomen/GI: Negative for abdominal pain, nausea,        

      vomiting, diarrhea, and constipation, Back: Negative for injury and pain, : Negative      

      for injury, bleeding, discharge, and swelling, Skin: Negative for injury, rash, and         

      discoloration, Neuro: Negative for headache, weakness, numbness, tingling, and seizure,     

      Psych: Negative for depression, anxiety, suicide ideation, homicidal ideation, and          

      hallucinations, Allergy/Immunology: Negative for hives, rash, and allergies, Endocrine:     

      Negative for neck swelling, polydipsia, polyuria, polyphagia, and marked weight             

      changes, Hematologic/Lymphatic: Negative for swollen nodes, abnormal bleeding, and          

      unusual bruising.                                                                           

13:08 MS/extremity: Positive for decreased range of motion, laceration, pain, tenderness, of      

      the palmar aspect of left forearm.                                                          

                                                                                                  

Exam:                                                                                             

13:08 Constitutional:  This is a well developed, well nourished patient who is awake, alert,  jolie 

      and in no acute distress. Head/Face:  Normocephalic, atraumatic. Eyes:  Pupils equal        

      round and reactive to light, extra-ocular motions intact.  Lids and lashes normal.          

      Conjunctiva and sclera are non-icteric and not injected.  Cornea within normal limits.      

      Periorbital areas with no swelling, redness, or edema. ENT:  Nares patent. No nasal         

      discharge, no septal abnormalities noted.  Tympanic membranes are normal and external       

      auditory canals are clear.  Oropharynx with no redness, swelling, or masses, exudates,      

      or evidence of obstruction, uvula midline.  Mucous membranes moist. Neck:  Trachea          

      midline, no thyromegaly or masses palpated, and no cervical lymphadenopathy.  Supple,       

      full range of motion without nuchal rigidity, or vertebral point tenderness.  No            

      Meningismus. Chest/axilla:  Normal chest wall appearance and motion.  Nontender with no     

      deformity.  No lesions are appreciated. Cardiovascular:  Regular rate and rhythm with a     

      normal S1 and S2.  No gallops, murmurs, or rubs.  Normal PMI, no JVD.  No pulse             

      deficits. Respiratory:  Lungs have equal breath sounds bilaterally, clear to                

      auscultation and percussion.  No rales, rhonchi or wheezes noted.  No increased work of     

      breathing, no retractions or nasal flaring. Abdomen/GI:  Soft, non-tender, with normal      

      bowel sounds.  No distension or tympany.  No guarding or rebound.  No evidence of           

      tenderness throughout. Back:  No spinal tenderness.  No costovertebral tenderness.          

      Full range of motion. Female :  Normal external genitalia. MS/ Extremity:  Pulses         

      equal, no cyanosis.  Neurovascular intact.  Full, normal range of motion. Neuro:  Awake     

      and alert, GCS 15, oriented to person, place, time, and situation.  Cranial nerves          

      II-XII grossly intact.  Motor strength 5/5 in all extremities.  Sensory grossly intact.     

       Cerebellar exam normal.  Normal gait. Psych:  Awake, alert, with orientation to            

      person, place and time.  Behavior, mood, and affect are within normal limits.               

13:08 Skin: injury, laceration(s), the wound is approximately  10 cm(s), with a depth of          

      .025 cm(s), of the dorsal aspect of left forearm and palmar aspect of left forearm.         

                                                                                                  

Vital Signs:                                                                                      

12:10  / 77; Pulse 63; Resp 16; Temp 98.1; Pulse Ox 94% on R/A; Weight 90.72 kg; Height tp1 

      5 ft. 3 in. (160.02 cm);                                                                    

13:15  / 83; Pulse 70; Resp 16; Pulse Ox 98% on R/A;                                    tp1 

12:10 Body Mass Index 35.43 (90.72 kg, 160.02 cm)                                             tp1 

                                                                                                  

Laceration:                                                                                       

13:18 Wound Repair of 10cm ( 3.9in ) subcutaneous laceration to dorsal aspect of left         jolie 

      forearm. Irregularly shaped.. skin tear. Distal neuro/vascular/tendon intact.               

      Anesthesia: none with 0 mls of none. Wound prep: Moderate cleansing by me. Skin closed      

      with 10 none sterie strips using steri strips. Dressed with non-adherent dressing.          

      Patient tolerated well.                                                                     

                                                                                                  

MDM:                                                                                              

12:13 Patient medically screened.                                                             [facility] 

13:18 Differential diagnosis: contusion, abrasion. Data reviewed: vital signs, nurses notes.  [facility] 

      Data interpreted: Cardiac monitor: not applicable for this patient encounter. rate is       

      63 beats/min, rhythm is regular, Pulse oximetry: on room air is 94 %. Test                  

      interpretation: by ED physician or midlevel provider: ECG, plain radiologic studies.        

      Counseling: I had a detailed discussion with the patient and/or guardian regarding: the     

      historical points, exam findings, and any diagnostic results supporting the                 

      discharge/admit diagnosis.                                                                  

                                                                                                  

10/21                                                                                             

13:07 Order name: Wound dressing; Complete Time: 13:09                                        jolie 

                                                                                                  

Administered Medications:                                                                         

13:23 Drug: KeFLEX (cephalexin) 500 mg Route: PO;                                             tp1 

13:46 Follow up: Response: No adverse reaction                                                tp1 

                                                                                                  

                                                                                                  

Disposition Summary:                                                                              

10/21/22 13:22                                                                                    

Discharge Ordered                                                                                 

      Location: Home                                                                          [facility] 

      Problem: new                                                                            jolie 

      Symptoms: have improved                                                                 jolie 

      Condition: Stable                                                                       jolie 

      Diagnosis                                                                                   

        - Laceration without foreign body of left forearm - skin tear                         jolie 

      Followup:                                                                               jolie 

        - With: Private Physician                                                                  

        - When: 2 - 3 days                                                                         

        - Reason: Recheck today's complaints, Continuance of care, Re-evaluation by your           

      physician                                                                                   

      Followup:                                                                               jolie 

        - With: Roly Chu MD                                                                   

        - When: 2 - 3 days                                                                         

        - Reason: Recheck today's complaints, Re-evaluation by your physician                      

      Discharge Instructions:                                                                     

        - Discharge Summary Sheet                                                             jolie 

        - Laceration Care, Adult                                                              jolie 

        - Nonsutured Laceration Care                                                          jolie 

        - Skin Tear                                                                           jolie 

        - Laceration Care, Adult, Easy-to-Read                                                jolie 

        - Skin Tear, Easy-to-Read                                                             jolie 

      Forms:                                                                                      

        - Medication Reconciliation Form                                                      jolie 

        - Thank You Letter                                                                    jolie 

        - Antibiotic Education                                                                jolie 

        - Prescription Opioid Use                                                             jolie 

      Prescriptions:                                                                              

        - Cephalexin 500 mg Oral Capsule                                                           

            - take 1 capsule by ORAL route every 6 hours for 7 days; 28 capsule; Refills: 0,  jolie 

      Product Selection Permitted                                                                 

Signatures:                                                                                       

Kenney Mcdermott MD MD cha Parker, Tiffany RN                     RN   tp1                                                  

                                                                                                  

**************************************************************************************************

## 2022-11-27 ENCOUNTER — HOSPITAL ENCOUNTER (INPATIENT)
Dept: HOSPITAL 97 - ER | Age: 82
LOS: 5 days | Discharge: NURSING, REHAB OR LONG-TERM CARE | DRG: 65 | End: 2022-12-02
Attending: FAMILY MEDICINE | Admitting: FAMILY MEDICINE
Payer: COMMERCIAL

## 2022-11-27 ENCOUNTER — HOSPITAL ENCOUNTER (EMERGENCY)
Dept: HOSPITAL 97 - ER | Age: 82
Discharge: HOME | End: 2022-11-27
Payer: COMMERCIAL

## 2022-11-27 VITALS — TEMPERATURE: 98 F

## 2022-11-27 VITALS — DIASTOLIC BLOOD PRESSURE: 79 MMHG | SYSTOLIC BLOOD PRESSURE: 187 MMHG | OXYGEN SATURATION: 96 %

## 2022-11-27 DIAGNOSIS — Z88.1: ICD-10-CM

## 2022-11-27 DIAGNOSIS — R47.81: ICD-10-CM

## 2022-11-27 DIAGNOSIS — G81.91: ICD-10-CM

## 2022-11-27 DIAGNOSIS — Z88.8: ICD-10-CM
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
## 2022-11-27 DIAGNOSIS — Z91.041: ICD-10-CM

## 2022-11-27 DIAGNOSIS — G20: ICD-10-CM

## 2022-11-27 DIAGNOSIS — I10: ICD-10-CM

## 2022-11-27 DIAGNOSIS — Z86.73: ICD-10-CM

## 2022-11-27 DIAGNOSIS — Z88.5: ICD-10-CM

## 2022-11-27 DIAGNOSIS — Z88.7: ICD-10-CM

## 2022-11-27 DIAGNOSIS — Z79.899: ICD-10-CM

## 2022-11-27 DIAGNOSIS — I63.9: Primary | ICD-10-CM

## 2022-11-27 DIAGNOSIS — R29.810: ICD-10-CM

## 2022-11-27 DIAGNOSIS — R41.0: ICD-10-CM

## 2022-11-27 DIAGNOSIS — R32: ICD-10-CM

## 2022-11-27 DIAGNOSIS — R29.703: ICD-10-CM

## 2022-11-27 DIAGNOSIS — Z91.81: ICD-10-CM
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
## 2022-11-27 DIAGNOSIS — R47.02: ICD-10-CM

## 2022-11-27 DIAGNOSIS — Z79.02: ICD-10-CM

## 2022-11-27 DIAGNOSIS — Z20.822: ICD-10-CM

## 2022-11-27 DIAGNOSIS — R53.1: Primary | ICD-10-CM

## 2022-11-27 DIAGNOSIS — R13.10: ICD-10-CM
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
## 2022-11-27 DIAGNOSIS — E78.5: ICD-10-CM

## 2022-11-27 DIAGNOSIS — Z79.82: ICD-10-CM

## 2022-11-27 LAB
BUN BLD-MCNC: 11 MG/DL (ref 7–18)
BUN BLD-MCNC: 11 MG/DL (ref 7–18)
GLUCOSE SERPLBLD-MCNC: 106 MG/DL (ref 74–106)
GLUCOSE SERPLBLD-MCNC: 97 MG/DL (ref 74–106)
HCT VFR BLD CALC: 43.4 % (ref 36–45)
HCT VFR BLD CALC: 44.2 % (ref 36–45)
INR BLD: 1.13
INR BLD: 1.14
LYMPHOCYTES # SPEC AUTO: 1.3 K/UL (ref 0.7–4.9)
LYMPHOCYTES # SPEC AUTO: 1.7 K/UL (ref 0.7–4.9)
MCV RBC: 95.6 FL (ref 80–100)
MCV RBC: 96 FL (ref 80–100)
PMV BLD: 7.1 FL (ref 7.6–11.3)
PMV BLD: 7.3 FL (ref 7.6–11.3)
POTASSIUM SERPL-SCNC: 3.7 MMOL/L (ref 3.5–5.1)
POTASSIUM SERPL-SCNC: 3.9 MMOL/L (ref 3.5–5.1)
RBC # BLD: 4.52 M/UL (ref 3.86–4.86)
RBC # BLD: 4.62 M/UL (ref 3.86–4.86)
TROPONIN I SERPL HS-MCNC: 9.5 PG/ML (ref ?–58.9)
TROPONIN I SERPL HS-MCNC: 9.9 PG/ML (ref ?–58.9)

## 2022-11-27 PROCEDURE — 97112 NEUROMUSCULAR REEDUCATION: CPT

## 2022-11-27 PROCEDURE — 71045 X-RAY EXAM CHEST 1 VIEW: CPT

## 2022-11-27 PROCEDURE — 84484 ASSAY OF TROPONIN QUANT: CPT

## 2022-11-27 PROCEDURE — 80048 BASIC METABOLIC PNL TOTAL CA: CPT

## 2022-11-27 PROCEDURE — 0241U: CPT

## 2022-11-27 PROCEDURE — 80061 LIPID PANEL: CPT

## 2022-11-27 PROCEDURE — 85610 PROTHROMBIN TIME: CPT

## 2022-11-27 PROCEDURE — 85730 THROMBOPLASTIN TIME PARTIAL: CPT

## 2022-11-27 PROCEDURE — 99291 CRITICAL CARE FIRST HOUR: CPT

## 2022-11-27 PROCEDURE — 70450 CT HEAD/BRAIN W/O DYE: CPT

## 2022-11-27 PROCEDURE — 80076 HEPATIC FUNCTION PANEL: CPT

## 2022-11-27 PROCEDURE — 92611 MOTION FLUOROSCOPY/SWALLOW: CPT

## 2022-11-27 PROCEDURE — 97110 THERAPEUTIC EXERCISES: CPT

## 2022-11-27 PROCEDURE — 51702 INSERT TEMP BLADDER CATH: CPT

## 2022-11-27 PROCEDURE — 84132 ASSAY OF SERUM POTASSIUM: CPT

## 2022-11-27 PROCEDURE — 72125 CT NECK SPINE W/O DYE: CPT

## 2022-11-27 PROCEDURE — 83735 ASSAY OF MAGNESIUM: CPT

## 2022-11-27 PROCEDURE — 99284 EMERGENCY DEPT VISIT MOD MDM: CPT

## 2022-11-27 PROCEDURE — 92526 ORAL FUNCTION THERAPY: CPT

## 2022-11-27 PROCEDURE — 87811 SARS-COV-2 COVID19 W/OPTIC: CPT

## 2022-11-27 PROCEDURE — 81003 URINALYSIS AUTO W/O SCOPE: CPT

## 2022-11-27 PROCEDURE — 81015 MICROSCOPIC EXAM OF URINE: CPT

## 2022-11-27 PROCEDURE — 93005 ELECTROCARDIOGRAM TRACING: CPT

## 2022-11-27 PROCEDURE — 74230 X-RAY XM SWLNG FUNCJ C+: CPT

## 2022-11-27 PROCEDURE — 94760 N-INVAS EAR/PLS OXIMETRY 1: CPT

## 2022-11-27 PROCEDURE — 97530 THERAPEUTIC ACTIVITIES: CPT

## 2022-11-27 PROCEDURE — 93880 EXTRACRANIAL BILAT STUDY: CPT

## 2022-11-27 PROCEDURE — 85025 COMPLETE CBC W/AUTO DIFF WBC: CPT

## 2022-11-27 PROCEDURE — 36415 COLL VENOUS BLD VENIPUNCTURE: CPT

## 2022-11-27 PROCEDURE — 82947 ASSAY GLUCOSE BLOOD QUANT: CPT

## 2022-11-27 PROCEDURE — 97161 PT EVAL LOW COMPLEX 20 MIN: CPT

## 2022-11-27 RX ADMIN — Medication SCH ML: at 21:00

## 2022-11-27 RX ADMIN — SODIUM CHLORIDE SCH: 0.9 INJECTION, SOLUTION INTRAVENOUS at 15:00

## 2022-11-27 RX ADMIN — SODIUM CHLORIDE SCH MLS: 0.9 INJECTION, SOLUTION INTRAVENOUS at 18:30

## 2022-11-27 RX ADMIN — ATORVASTATIN CALCIUM SCH: 20 TABLET, FILM COATED ORAL at 21:00

## 2022-11-27 NOTE — RAD REPORT
EXAM DESCRIPTION:  CT - Head C Spine Mpr Wo Con - 11/27/2022 9:02 am

 

CLINICAL HISTORY:  Head and neck injury status post fall. Head and neck pain

 

COMPARISON:  None.

 

TECHNIQUE:  Computed axial tomography of the head and cervical spine was obtained.

 

Sagittal and coronal reconstruction was performed.

 

All CT scans are performed using dose optimization technique as appropriate and may include automated
 exposure control or mA/KV adjustment according to patient size.

 

FINDINGS:  Aneurysm repair. Mild to moderate low-density areas within periventricular, deep and subco
rtical white matter probably ischemic changes secondary to small vessel disease

 

An intracranial bleed is not seen. The ventricles are normal in caliber. An extra-axial fluid collect
ion is not noted.Fluid within the visualized sinuses and mastoids is not seen

 

A cervical fracture is not visualized. No dislocation is noted.

 

Spondylosis cervical spine

 

IMPRESSION:  No acute intracranial abnormality is seen.

 

A cervical fracture is not visualized.  If the patient continues to have symptoms to suggest intracra
nial /spinal cord pathology then MRI would be recommended

## 2022-11-27 NOTE — ER
Nurse's Notes                                                                                     

 [facility]                                                                 

Name: Jerica Estrada                                                                           

Age: 82 yrs                                                                                       

Sex: Female                                                                                       

: 1940                                                                                   

MRN: [id number]                                                                                   

Arrival Date: 2022                                                                          

Time: 13:29                                                                                       

Account#: [id number]                                                                            

Bed 24                                                                                            

Private MD: Samuel Todd                                                                       

Diagnosis: ischemic thallamic stroke- left sided;Essential (primary) hypertension;Dysphagia,      

  unspecified                                                                                     

                                                                                                  

Presentation:                                                                                     

                                                                                             

13:35 Chief complaint: Patient states: Last known well 1030 am, went for a nap. Awoke at 1230 ll1 

      unable to move R side and slurred speech. Coronavirus screen: Vaccine status: Patient       

      reports receiving the 2nd dose of the covid vaccine. Client denies travel out of the        

      [place] in the last 14 days. At this time, the client does not indicate any symptoms           

      associated with coronavirus-19. Ebola Screen: Patient denies travel to an                   

      Ebola-affected area in the 21 days before illness onset. An acute neurological deficit      

      is present. Initial Sepsis Screen: Does the patient meet any 2 criteria? No. Patient's      

      initial sepsis screen is negative. Does the patient have a suspected source of              

      infection? No. Patient's initial sepsis screen is negative. Risk Assessment: Do you         

      want to hurt yourself or someone else? Patient reports no desire to harm self or            

      others. Onset of symptoms was 2022.                                            

13:35 Method Of Arrival: Wheelchair                                                           ll1 

13:35 Acuity: LYNNETTE 1                                                                           ll1 

14:54 Pre-hospital glucose is not applicable to this patient.                                 ll1 

                                                                                                  

Triage Assessment:                                                                                

13:40 The onset of the patients symptoms was more than three but less than six hours ago.     ll1 

      General: Appears uncomfortable, Behavior is calm, cooperative, appropriate for age.         

      Neuro: Moves all extremities. arm drift noted. Reports weakness R side not moving.          

15:15 The onset of the patients symptoms was 2022 at 10:30.                      ll1 

                                                                                                  

Stroke Activation: Symtpom onset >3 hours and < 6 hours                                           

 Physician: Stroke Attending; Name: n/a; Notified At:  13:30; Arrived At:               

 Physician: Chief Stroke Resident; Name: n/a; Notified At:  13:30; Arrived              

  At:                                                                                             

 Physician: Stroke Resident; Name: n/a; Notified At:  13:30; Arrived At:                

 Physician: ED Attending; Name: Dr. Marrufo; Notified At:  13:30; Arrived At:              

   13:30                                                                                

 Physician: ED Resident; Name: n/a; Notified At:  13:30; Arrived At:                    

                                                                                                  

Historical:                                                                                       

- Allergies:                                                                                      

13:31 Bactrim;                                                                                hb  

13:31 Ciprofloxacin;                                                                          hb  

13:31 Codeine;                                                                                hb  

13:31 Demerol;                                                                                hb  

13:31 Diovan HCT;                                                                             hb  

13:31 hydrochlorothiazide;                                                                    hb  

13:31 Iodinated Contrast Media - IV Dye;                                                      hb  

13:31 Tetanus Vaccines \T\ Toxoid;                                                              hb

13:31 valsartan;                                                                              hb  

- PMHx:                                                                                           

13:31 Aneurysm; Cerebrovascular accident; HARD OF HEARING; Hypertension; Parkinsons; vericose hb  

      veins;                                                                                      

                                                                                                  

- Immunization history:: Adult Immunizations up to date.                                          

- Social history:: Smoking status: Patient denies any tobacco usage or history of.                

                                                                                                  

                                                                                                  

Screenin:42 Abuse screen: Denies threats or abuse. Nutritional screening: No deficits noted.        ll1 

      Tuberculosis screening: No symptoms or risk factors identified. Fall Risk IV access (20     

      points). Gait- Impaired (20 pts.). Mental Status- Overestimates/Forgets Limitations (15     

      pts.). Total Dowell Fall Scale indicates High Risk Score (45 or more points). Fall           

      prevention measures have been instituted. Side Rails Up X 2 Placed Close to Nursing         

      Station Frequent Obs/Assessments Occuring Family Present and informed to notify staff       

      if the need to leave the bedside As available patient and family educated on Fall           

      Prevention Program and Strategies.                                                          

                                                                                                  

Assessment:                                                                                       

13:55 Reassessment: No changes from previously documented assessment. Patient and/or family   ll1 

      updated on plan of care and expected duration. Pain level reassessed. Patient is alert,     

      oriented x 3, equal unlabored respirations, skin warm/dry/pink.                             

14:51 The patient has not been NPO before screening. The patient is alert, and able to follow ll1 

      commands. The patient exhibits slurred or garbled speech. The patient is not exhibiting     

      difficulty speaking. The patient is exhibiting difficulty understanding words. The          

      patient is able to swallow own secretions with no drooling or need for suction. The         

      patient did not tolerate one teaspoon of water. Drooling, immediate coughing, gurgling,     

      or clearing of the throat was noted. Bedside swallow screening discontinued. Patient        

      kept NPO until cleared by Speech Therapy or Physician. The patient failed the bedside       

      swallow screening. The patient will be kept NPO until cleared by Speech Therapy or          

      Physician. Provider notified of bedside swallow screening results: Eduardo Marrufo DO.          

      Pain: Denies pain.                                                                          

14:53 VAN Scoring: Arm Drift: Minor drift Visual Disturbance: No visual disturbance noted.    ll1 

      Aphasia: No aphasia noted. Neglect: No neglect noted. TNKase (Tenecteplase) Screening:      

      Contraindications: Other: by CT result Patient reports onset of signs and symptoms of       

      stroke greater than 6 hours ago: Rapidly improving condition or minor deficit:.             

15:10 Reassessment: No changes from previously documented assessment. Patient and/or family   ll1 

      updated on plan of care and expected duration. Pain level reassessed. Patient is alert,     

      oriented x 3, equal unlabored respirations, skin warm/dry/pink.                             

15:14 Reassessment: No changes from previously documented assessment. moved to exam 24, ER    ll1 

      HOLD.                                                                                       

                                                                                                  

Vital Signs:                                                                                      

13:35 Resp 17;                                                                                ll1 

13:50  / 70; Pulse 67; Resp 18; Temp 98.2; Pulse Ox 97% on R/A;                         kr3 

13:54  / 70; Pulse 69; Pulse Ox 97% on R/A;                                             ll1 

15:00  / 71; Pulse 68; Resp 17; Pulse Ox 95% on R/A;                                    ll1 

                                                                                                  

NIH Stroke Scale Scores:                                                                          

14:03 NIHSS Score: 3                                                                          ms3 

14:53 NIHSS Score: 3                                                                          ll1 

                                                                                                  

ED Course:                                                                                        

13:29 Patient arrived in ED.                                                                  am2 

13:30 Samuel Todd MD is Private Physician.                                               am2 

13:30 Inserted saline lock: 20 gauge in right antecubital area, using aseptic technique.      ll1 

      Blood collected.                                                                            

13:31 Arm band placed on Patient placed in an exam room, on a stretcher.                      hb  

13:37 Eduardo Marrufo DO is Attending Physician.                                                ms3 

13:40 Triage completed.                                                                       ll1 

13:42 Feli Bonner, RN is Primary Nurse.                                                     ll1 

13:43 Patient has correct armband on for positive identification. Bed in low position. Call   ll1 

      light in reach. Side rails up X2. Client placed on continuous cardiac and pulse             

      oximetry monitoring. NIBP monitoring applied. Cardiac monitor on.                           

13:57 Ct Stroke Brain Wo Cont In Process Unspecified.                                         EDMS

14:21 Maximo Mi MD is Hospitalizing Provider.                                           ms3 

14:54 No provider procedures requiring assistance completed.                                  ll1 

15:15 Patient admitted, IV remains in place.                                                  ll1 

                                                                                                  

Administered Medications:                                                                         

No medications were administered                                                                  

                                                                                                  

                                                                                                  

Medication:                                                                                       

13:43 VIS not applicable for this client.                                                     ll1 

                                                                                                  

Point of Care Testing:                                                                            

      Blood Glucose:                                                                              

14:55 Blood Glucose: 91 mg/dL;                                                                ll1 

      Ranges:                                                                                     

                                                                                                  

Outcome:                                                                                          

14:22 Decision to Hospitalize by Provider.                                                    ms3 

15:15 Admitted to ER Hold.  Please see Simparel for further documentation.                    ll1 

15:15 Condition: stable                                                                           

15:15 Instructed on the need for admit.                                                           

                                                                                             

15:44 Patient left the ED.                                                                    iw  

                                                                                                  

                                                                                                  

NIH Stroke Scale - NIH Stroke Score                                                               

Date: 2022                                                                                  

Time: 14:03                                                                                       

Total Score = 3                                                                                   

  1a. Level of Consciousness (LOC) - 0(Alert)                                                     

  1b. Level of Consciousness (LOC) (Month \T\ Age) - 1(One)                                       

  1c. LOC Commands (Open \T\ Closes Eyes/) - 0(Both)                                          

   2. Best Gaze (Lateral Gaze Paresis) - 0(Normal)                                                

   3. Visual Field Loss - 0(No visual loss)                                                       

   4. Facial Palsy - 0(Normal)                                                                    

  5a. Left Arm: Motor (10-second hold) - 0(No drift)                                              

  5b. Right Arm: Motor (10-second hold) - 1(Drift)                                                

  6a. Left Leg: Motor (5-second hold - always test supine) - 0(No drift)                          

  6b. Right Leg: Motor (5-second hold - always test supine) - 0(No drift)                         

   7. Limb Ataxia (finger/nose \T\ heel/shin - test with eyes open) - 0(Absent)                   

   8. Sensory Loss (pinprick arms/legs/face) - 0(Normal)                                          

   9. Best Language: Aphasia (description/naming/reading) - 0(No aphasia)                         

  10. Dysarthria (speech clarity - read or repeat words) - 1(Mild to Moderate)                    

  11. Extinction and Inattention (visual/tactile/auditory/spatial/personal) - 0(No                

      abnormality)                                                                                

Initials: ms3                                                                                     

                                                                                                  

                                                                                                  

NIH Stroke Scale - NIH Stroke Score                                                               

Date: 2022                                                                                  

Time: 14:53                                                                                       

Total Score = 3                                                                                   

  1a. Level of Consciousness (LOC) - 0(Alert)                                                     

  1b. Level of Consciousness (LOC) (Month \T\ Age) - 1(One)                                       

  1c. LOC Commands (Open \T\ Closes Eyes/) - 0(Both)                                          

   2. Best Gaze (Lateral Gaze Paresis) - 0(Normal)                                                

   3. Visual Field Loss - 0(No visual loss)                                                       

   4. Facial Palsy - 0(Normal)                                                                    

  5a. Left Arm: Motor (10-second hold) - 0(No drift)                                              

  5b. Right Arm: Motor (10-second hold) - 1(Drift)                                                

  6a. Left Leg: Motor (5-second hold - always test supine) - 0(No drift)                          

  6b. Right Leg: Motor (5-second hold - always test supine) - 0(No drift)                         

   7. Limb Ataxia (finger/nose \T\ heel/shin - test with eyes open) - 0(Absent)                   

   8. Sensory Loss (pinprick arms/legs/face) - 0(Normal)                                          

   9. Best Language: Aphasia (description/naming/reading) - 0(No aphasia)                         

  10. Dysarthria (speech clarity - read or repeat words) - 1(Mild to Moderate)                    

  11. Extinction and Inattention (visual/tactile/auditory/spatial/personal) - 0(No                

      abnormality)                                                                                

Initials: ll1                                                                                     

                                                                                                  

Signatures:                                                                                       

Dispatcher [employer]                           Debby Monterroso, RN                     FABY                                                      

Lida Bhatia RN RN hb Moreno, Amanda                               am2                                                  

Feli Bonner RN RN   ll1                                                  

Eduardo Marrufo,                         DO   ms3                                                  

Tiffany Mobley RN RN   kr3                                                  

                                                                                                  

**************************************************************************************************

## 2022-11-27 NOTE — XMS REPORT
Continuity of Care Document

                          Created on:2022



Patient:SHAHIDA ESTRADA

Sex:Female

:1940

External Reference #:[id number]





Demographics







                          Address                   [address] [place] [address]

 

                          Home Phone                [phone]

 

                          Mobile Phone              [phone]

 

                          Email Address             [email or web address]

 

                          Preferred Language        English

 

                          Marital Status            Unknown

 

                          Caodaism Affiliation     Unknown

 

                          Race                      Unknown

 

                          Additional Race(s)        White

 

                          Ethnic Group              Unknown









Author







                          Organization              [facility]

t

 

                          Address                   [address]



                                                    [place] [address]

 

                          Phone                     [phone]









Support







                Name            Relationship    Address         Phone

 

                Wan Estrada Spouse          [address]  [phone]



                                                [place] [address] 

 

                Musa Estrada      Unavailable     [phone]

3









Care Team Providers







                    Name                Role                Phone

 

                    Samuel Todd MD  Primary Care Physician [phone]

 

                    MIGUE AMIN Attending Clinician Unavailable

 

                    CHATA CALLE Attending Clinician Unavailable

 

                    MEJIA HOWARD Attending Clinician Unavailable

 

                    MIGUE AMIN Admitting Clinician Unavailable

 

                    SUZANNE BAER Admitting Clinician Unavailable

 

                    MEJIA HOWARD Admitting Clinician Unavailable









Problems







       Condition Condition Condition Status Onset  Resolution Last   Treating Co

mments 

Source



       Name   Details Category        Date   Date   Treatment Clinician        



                                                 Date                 

 

       Parkinson Parkinson Disease Active                              [facility]

[facility]



       disease disease               6-19                               st



                                   00:00:                             [facility]osp[facility]



                                   00                                 l

 

       Low back Low back Disease Active                              [facility]

[facility]



       pain   pain                 5-22                               st



       radiating radiating               00:00:                             Hosp

[place]



       to right to right               00                                 l



       leg    leg                                                     

 

       Varicose Varicose Disease Active                              [facility]

[facility]



       veins of veins of               5-22                               st



       leg with leg with               00:00:                             Hospit

a



       edema, edema,               00                                 l



       bilateral bilateral                                                  

 

       Aneurysm Aneurysm Disease Active 2017                             [facility]

[facility]



                                   [facility]                               [facility]



                                   [facility]                             [facility]



                                   [facility]                                 [facility]

 

       Hypertensi Hypertensi Disease Active                              [facility]

[facility]



       on     on                                                  [facility]



                                   [facility]                             [facility]



                                   00                                 Center

 

       Other  Other  Disease Active                              [facility]



       specified specified                                              [facility]

s



       transient transient               00:00:                             Medi

debbie



       cerebral cerebral               00                                 Center



       ischemias ischemias                                                  

 

       Hypertensi Hypertensi Disease Active                              [facility]

[facility]



       ve     ve                   5-20                               [facility]



       emergency emergency               00:00:                             Medi

[facility]



                                   00                                 [facility]

 

       Syncope Syncope Disease Active                              [facility]



                                   5-15                               [facility]



                                   [facility]                             [facility]



                                   00                                 Center

 

       Carotid Carotid Disease Active                              [facility]



       aneurysm, aneurysm,               5-15                               [facility]

[facility]



       right  right                00:00:                             Medical



                                   00                                 [facility]

 

       Essential Essential Disease Active                              [facility]



       hypertensi hypertensi               5-14                               [facility]

[facility]



       on     on                   00:00:                             Medical



                                   00                                 [facility]

 

       TIA    TIA    Disease Active                              [facility]



       (transient (transient               5-13                               [facility]

[facility]



       ischemic ischemic               00:00:                             [facility]

l



       attack) attack)               00                                 Center

 

       Hypertensi Hypertensi Disease Active                              [facility]

[facility]



       on     on                   5-13                               [facility]



                                   [facility]                             [facility]



                                   00                                 Center







Allergies, Adverse Reactions, Alerts







       Allergy Allergy Status Severity Reaction(s) Onset  Inactive Treating Comm

ents 

Source



       Name   Type                        Date   Date   Clinician        

 

       Sulfamet Propensi Active                                     [facility]

i



       hoxazole ty to                       3-16                        st



       -Trimeth adverse                      00:00:                      [facility]osp[facility]



       oprim  reaction                      00                          l



              s to                                                    



              drug                                                    

 

       Ciproflo Propensi Active                                     [facility]

i



       xacin  ty to                       3-16                        st



              adverse                      00:00:                      [facility]osp[facility]



              reaction                      00                          l



              s to                                                    



              drug                                                    

 

       Valsarta Propensi Active                                     Method

i



       n      ty to                       3-16                        st



              adverse                      00:00:                      [facility]osp[facility]



              reaction                      00                          l



              s to                                                    



              drug                                                    

 

       Iodine Propensi Active                                     [facility]



              ty to                       3-16                        st



              adverse                      00:00:                      [facility]osp[facility]



              reaction                      00                          l



              s to                                                    



              drug                                                    

 

       Predniso Propensi Active                                     Method

i



       ne     ty to                       3-16                        st



              adverse                      00:00:                      [facility]osp[facility]



              reaction                      00                          l



              s to                                                    



              drug                                                    

 

       Tetanus Propensi Active                                     [facility]



       Vaccines ty to                       3-16                        st



       And    adverse                      00:00:                      [facility]ospita



       Toxoid reaction                      00                          l



              s to                                                    



              drug                                                    

 

       Iodine Drug   Active        Other (See 2017               Cold   [facility]



       And    Allergy               Comments) 2-20                 [facility]



       Iodide                             00:00:                      Medical



       Containi                             00                          Center



       ng                                                             



       Products                                                         

 

       Ciproflo Drug   Active        Anaphylaxis                       [facility]



       xacin  Allergy                      5-13                        [facility]



                                          00:00:                      [facility]



                                          00                          [facility]

 

       Penicill Drug   Active        Anaphylaxis                       [facility]



       ins    Allergy                      5-13                        [facility]



                                          00:00:                      [facility]



                                          00                          [facility]

 

       Tetanus Drug   Active        Itching                       [facility]



       Vaccines Allergy                      5-13                        [facility]



       And                                00:00:                      Medical



       Toxoid                             00                          [facility]

 

       No Known Propensi Active               2016                      [facility]

i



       Drug   ty to                       2-22                        st



       Allergie adverse                      00:00:                      [facility]ospita



       s      reaction                      00                          l



              s to                                                    



              drug                                                    







Social History







           Social Habit Start Date Stop Date  Quantity   Comments   Source

 

           History SDOH                                             [facility]



           Alcohol Std Drinks                                             [facility]ospit

al

 

           History SDOH                                             [facility]



           Alcohol Binge                                             [facility]

 

           History SDOH 2019 1                     [facility]



           Alcohol Frequency 00:00:00   00:00:00                         [facility]osp[facility]

l

 

           Alcohol intake 2017 Current               [facility]

es



                      00:00:00   00:00:00   non-drinker of            Medical Ce

nter



                                            alcohol               



                                            (finding)             

 

           Tobacco use and 2017 Never used            [facility]

[facility]



           exposure   00:00:00   [facility]:00                         [facility]

 

           Sex Assigned At 1940                       [facility]

[facility]



           B[facility]      [facility]00[facility]   [facility]00                         [facility]









                Smoking Status  Start Date      Stop Date       Source

 

                Never smoked tobacco                                 [facility]

[facility]







Medications







       Ordered Filled Start  Stop   Current Ordering Indication Dosage Frequency

 Signature

                    Comments            Components          Source



     Medication Medication Date Date Medication? Clinician                (SIG) 

          



     Name Name                                                   

 

     carbidopa-l            Yes            1{tbl} [id number] Take 1       

    [facility]



     evodopa      6-19                          [id number] tablet by           s

t



      mg      12:26:                          3D   mouth 3           Hos[facility]

ta



     per                                        (three)           l



     disintegrat                                         times a           



     ing tablet                                         day.           

 

     carbidopa-l            Yes            1{tbl} [id number] Take 1       

    [facility]



     evodopa      6-19                          [id number] tablet by           s

t



      mg      12:26:                          3D   mouth 3           [facility]os[facility]

ta



     per                                        (three)           l



     disintegrat                                         times a           



     ing tablet                                         day.           

 

     carbidopa-l            Yes            1{tbl} [id number] Take 1       

    [facility]



     evodopa      6-19                          [id number] tablet by           s

t



      mg      12:26:                          3D   mouth 3           Hos[facility]

ta



     per       00                                 (three)           l



     disintegrat                                         times a           



     ing tablet                                         day.           

 

     aspirin 325            Yes            325mg QD   Take 325           M

ethodi



     MG tablet      3-16                               mg by           st



               10:22:                               mouth           [facility]osp[facility]



               52                                 daily.           l

 

     aspirin 325            Yes            325mg QD   Take 325           M

ethodi



     MG tablet      3-16                               mg by           st



               10:22:                               mouth           [facility]



               52                                 daily.           l

 

     aspirin 325            Yes            325mg QD   Take 325           M

ethodi



     MG tablet      3-16                               mg by           st



               10:22:                               mouth           [facility]osp[facility]



               52                                 daily.           l

 

     amLODIPine            Yes                      TAKE ONE           Met

hodi



     (NORVASC)      3-15                               (1)            st



     10 mg      00:00:                               TABLET(S)           [facility]osp[facility]



     tablet      00                                 BY MOUTH           l



                                                  ONCE A           



                                                  DAY.           

 

     amLODIPine            Yes                      TAKE ONE           Met

h[facility]



     (NORVASC)      3-15                               (1)            st



     10 mg      00:00:                               TABLET(S)           [facility]



     tablet      00                                 BY MOUTH           l



                                                  ONCE A           



                                                  DAY.           

 

     amLODIPine            Yes                      TAKE ONE           Met

hodi



     (NORVASC)      3-15                               (1)            st



     10 mg      00:00:                               TABLET(S)           [facility]



     tablet      00                                 BY MOUTH           l



                                                  ONCE A           



                                                  DAY.           

 

     metoprolol            Yes                      TAKE ONE           Met

hodi



     tartrate      2-19                               (1)            st



     (LOPRESSOR)      00:00:                               TABLET(S)           H

ospita



     25 mg      00                                 BY MOUTH           l



     tablet                                         TWICE A           



                                                  DAY.           

 

     metoprolol            Yes                      TAKE ONE           Met

hodi



     tartrate      2-19                               (1)            st



     (LOPRESSOR)      00:00:                               TABLET(S)           H

ospita



     25 mg      00                                 BY MOUTH           l



     tablet                                         TWICE A           



                                                  DAY.           

 

     metoprolol            Yes                      TAKE ONE           Met

hodi



     tartrate      2-19                               (1)            st



     (LOPRESSOR)      00:00:                               TABLET(S)           H

ospita



     25 mg      00                                 BY MOUTH           l



     tablet                                         TWICE A           



                                                  DAY.           

 

     clopidogrel            Yes                      TAKE ONE           Me

thodi



     (PLAVIX) 75      2-09                               (1)            st



     mg tablet      00:00:                               TABLET(S)           [facility]

[facility]



               00                                 BY MOUTH           l



                                                  ONCE A           



                                                  DAY.           

 

     clopidogrel            Yes                      TAKE ONE           Me

thodi



     (PLAVIX) 75      2-09                               (1)            st



     mg tablet      00:00:                               TABLET(S)           [facility]

[facility]



               00                                 BY MOUTH           l



                                                  ONCE A           



                                                  DAY.           

 

     clopidogrel            Yes                      TAKE ONE           Me

thodi



     (PLAVIX) 75      2-09                               (1)            st



     mg tablet      00:00:                               TABLET(S)           [facility]

[facility]



               00                                 BY MOUTH           l



                                                  ONCE A           



                                                  DAY.           

 

     docusate      2017      Yes            100mg      Take 100           [facility]



     sodium      2-20                               mg by           [facility]



     (COLACE)      20:34:                               mouth 2           Medica

l



     100 MG      26                                 (two)           Center



     capsule                                         times           



                                                  daily as           



                                                  needed for           



                                                  Constipati           



                                                  on.            

 

     CALCIUM      2017      Yes                 Q.5D Take by           [facility]



     CARBONATE/V      2-20                               mouth 2           [facility]



     ITAMIN D3      20:34:                               (two)           Medical



     (CALCIUM      26                                 times           Center



     600 + D,3,                                         daily.           



     ORAL)                                                        

 

     VIT C/VIT      2017      Yes                 QD   Take by           [facility]

[facility]



     E/LUTEIN/MI      2-20                               mouth           [facility]



     N/OMEGA-3      20:34:                               daily.           Medica

l



     (OCUVITE      26                                                Center



     ORAL)                                                        

 

     folic acid      2017      Yes            400ug QD   Take 400           [facility]



     (FOLVITE)      2-20                               mcg by           [facility]



     400 MCG      20:34:                               mouth           Medical



     tablet      26                                 nightly.           [facility]

 

     cyanocobala      2017      Yes            1000ug QD   Take 1,000         

  [facility]



     min 1000      2-20                               mcg by           [facility]



     MCG tablet      20:34:                               mouth           [facility]

l



               26                                 daily.           [facility]

 

     cranberry      2017      Yes                 Q.5D Take by           [facility]

[facility]



     extract      2-20                               mouth 2           [facility]



     (CRANBERRY      20:34:                               (two)           Medica

l



     CONCENTRATE      26                                 times           Center



     ) 500 mg                                         daily.           



     Cap                                                         

 

     docusate      2017      Yes            100mg      Take 100           [facility]



     sodium      2-20                               mg by           [facility]



     (COLACE)      20:34:                               mouth 2           [facility]

l



     100 MG      26                                 (two)           Center



     capsule                                         times           



                                                  daily as           



                                                  needed for           



                                                  Constipati           



                                                  on.            

 

     CALCIUM      2017      Yes                 Q.5D Take by           [facility]



     CARBONATE/V      2-20                               mouth 2           [facility]



     ITAMIN D3      20:34:                               (two)           Medical



     (CALCIUM      26                                 times           Center



     600 + D,3,                                         daily.           



     ORAL)                                                        

 

     VIT C/VIT      2017      Yes                 QD   Take by           [facility]

t



     E/LUTEIN/MI      2-20                               mouth           [facility]



     N/OMEGA-3      20:34:                               daily.           [facility]

l



     (OCUVITE      26                                                [facility]



     ORAL)                                                        

 

     folic acid      2017      Yes            400ug QD   Take 400           [facility]



     (FOLVITE)      2-20                               mcg by           [facility]



     400 MCG      20:34:                               mouth           [facility]



     tablet      26                                 nightly.           [facility]

 

     cyanocobala      2017      Yes            1000ug QD   Take 1,000         

  [facility]



     min 1000      2-20                               mcg by           [facility]



     MCG tablet      20:34:                               mouth           [facility]

[facility]



               26                                 daily.           [facility]

 

     cranberry      2017      Yes                 Q.5D Take by           [facility]

t



     extract      2-20                               mouth 2           [facility]



     (CRANBERRY      20:34:                               (two)           [facility]

l



     CONCENTRATE      26                                 times           [facility]



     ) 500 mg                                         daily.           



     Cap                                                         

 

     docusate      2017      Yes            100mg      Take 100           [facility]



     sodium      2-20                               mg by           [facility]



     (COLACE)      20:34:                               mouth 2           [facility]

l



     100 MG      26                                 (two)           [facility]



     capsule                                         times           



                                                  daily as           



                                                  needed for           



                                                  Constipati           



                                                  on.            

 

     CALCIUM      2017      Yes                 Q.5D Take by           [facility]



     CARBONATE/V      2-20                               mouth 2           [facility]



     ITAMIN D3      20:34:                               (two)           [facility]



     (CALCIUM      26                                 times           [facility]



     600 + D,3,                                         daily.           



     ORAL)                                                        

 

     VIT C/VIT      2017      Yes                 QD   Take by           [facility]

t



     E/LUTEIN/MI      2-20                               mouth           [facility]



     N/OMEGA-3      20:34:                               daily.           [facility]

[facility]



     (OCUVITE      26                                                [facility]



     ORAL)                                                        

 

     folic acid      2017      Yes            400ug QD   Take 400           [facility]



     (FOLVITE)      2-20                               mcg by           [facility]



     400 MCG      20:34:                               mouth           [facility]



     tablet      26                                 nightly.           [facility]

 

     cyanocobala      2017      Yes            1000ug QD   Take 1,000         

  [facility]



     min 1000      2-20                               mcg by           [facility]



     MCG tablet      20:34:                               mouth           [facility]

l



               26                                 daily.           [facility]

 

     cranberry      2017      Yes                 Q.5D Take by           [facility]

[facility]



     extract      2-20                               mouth 2           [facility]



     (CRANBERRY      20:34:                               (two)           [facility]

l



     CONCENTRATE      26                                 times           [facility]



     ) 500 mg                                         daily.           



     Cap                                                         

 

     atorvastati      2017      Yes                      TAKE ONE           [facility]

thodi



     n (LIPITOR)      2-14                               (1)            st



     80 MG      00:00:                               TABLET(S)           [facility]



     tablet      00                                 BY MOUTH           l



                                                  ONCE A           



                                                  DAY.           

 

     atorvastati      2017      Yes                      TAKE ONE           [facility]

[facility]



     n (LIPITOR)      2-14                               (1)            st



     80 MG      00:00:                               TABLET(S)           [facility]



     tablet      00                                 BY MOUTH           l



                                                  ONCE A           



                                                  DAY.           

 

     atorvastati      2017      Yes                      TAKE ONE           Me

thodi



     n (LIPITOR)      2-14                               (1)            st



     80 MG      00:00:                               TABLET(S)           [facility]



     tablet      00                                 BY MOUTH           l



                                                  ONCE A           



                                                  DAY.           

 

     doxycycline      2016      Yes            100mg Q.5D Take 100           [facility]

[facility]



     (VIBRAMYCIN      2-09                               mg by           st



     ) 100 MG      00:00:                               mouth 2           [facility]

a



     capsule      00                                 (two)           l



                                                  times a           



                                                  day.           

 

     doxycycline      2016      Yes            100mg Q.5D Take 100           [facility]

[facility]



     (VIBRAMYCIN      2-09                               mg by           st



     ) 100 MG      00:00:                               mouth 2           [facility]

a



     capsule      00                                 (two)           l



                                                  times a           



                                                  day.           

 

     doxycycline      2016      Yes            100mg Q.5D Take 100           [facility]

[facility]



     (VIBRAMYCIN      2-09                               mg by           st



     ) 100 MG      00:00:                               mouth 2           [facility]

a



     capsule      00                                 (two)           l



                                                  times a           



                                                  day.           

 

     losartan      2016      Yes                                     [facility]



     (COZAAR) 50      0-31                                              st



     MG tablet      00:00:                                              [facility]



               00                                                l

 

     losartan      2016      Yes                                     [facility]



     (COZAAR) 50      0-31                                              st



     MG tablet      00:00:                                              [facility]



               00                                                l

 

     losartan      2016      Yes                                     [facility]



     (COZAAR) 50      0-31                                              st



     MG tablet      00:00:                                              [facility]



               00                                                l

 

     nitrofurant      2016      Yes                                     [facility]

i



     oin,      0-26                                              st



     macrocrysta      00:00:                                              [facility]ospit

a



     l-monohydra      00                                                l



     te,                                                         



     (MACROBID)                                                        



     100 MG                                                        



     capsule                                                        

 

     nitrofurant      2016      Yes                                     [facility]

i



     oin,      0-26                                              st



     macrocrysta      00:00:                                              Hospit

a



     l-monohydra      00                                                l



     te,                                                         



     (MACROBID)                                                        



     100 MG                                                        



     capsule                                                        

 

     nitrofurant      2016      Yes                                     [facility]

i



     oin,      0-26                                              st



     macrocrysta      00:00:                                              Hospit

a



     l-monohydra      00                                                l



     te,                                                         



     (MACROBID)                                                        



     100 MG                                                        



     capsule                                                        







Procedures

This patient has no known procedures.



Plan of Care







             Planned Activity Planned Date Details      Comments     Source

 

             Future Scheduled 2022   HEPATITIS B VACCINES              Met

[facility]



             Test         07:22:51     (1 of 3 - 3-dose              



                                       series) [code =              



                                       HEPATITIS B VACCINES              



                                       (1 of 3 - 3-dose              



                                       series)]                  

 

             Future Scheduled 2022   COVID-19 VACCINE (#1)              [facility]



             Test         07:22:51     [code = COVID-19              



                                       VACCINE (#1)]              

 

             Future Scheduled 2022   SHINGLES VACCINES (1              Met

[facility]



             Test         07:22:51     of 2) [code = SHINGLES              



                                       VACCINES (1 of 2)]              

 

             Future Scheduled 2022   65+ [facility]OCCAL              [facility]



             Test         07:22:51     VACCINE (1 - PCV)              



                                       [code = 65+               



                                       PNEUMOCOCCAL VACCINE              



                                       (1 - PCV)]                

 

             Future Scheduled 2022   INFLUENZA VACCINE              Method

[facility]



             Test         07:22:51     [code = INFLUENZA              



                                       VACCINE]                  

 

             Future Scheduled 2022   HEPATITIS B VACCINES              Met

[facility]



             Test         07:22:51     (1 of 3 - 3-dose              



                                       series) [code =              



                                       HEPATITIS B VACCINES              



                                       (1 of 3 - 3-dose              



                                       series)]                  

 

             Future Scheduled 2022   COVID-19 VACCINE (#1)              [facility]



             Test         07:22:51     [code = COVID-19              



                                       VACCINE (#1)]              

 

             Future Scheduled 2022   SHINGLES VACCINES (1              Met

[facility]



             Test         07:22:51     of 2) [code = SHINGLES              



                                       VACCINES (1 of 2)]              

 

             Future Scheduled 2022   65+ [facility]CCAL              [facility]



             Test         07:22:51     VACCINE (1 - PCV)              



                                       [code = 65+               



                                       PNEUMOCOCCAL VACCINE              



                                       (1 - PCV)]                

 

             Future Scheduled 2022   INFLUENZA VACCINE              Method

[facility]



             Test         07:22:51     [code = INFLUENZA              



                                       VACCINE]                  

 

             Future Scheduled 2022   HEPATITIS B VACCINES              Met

[facility]



             Test         01:40:15     (1 of 3 - 3-dose              



                                       series) [code =              



                                       HEPATITIS B VACCINES              



                                       (1 of 3 - 3-dose              



                                       series)]                  

 

             Future Scheduled 2022   COVID-19 VACCINE (#1)              [facility]



             Test         01:40:15     [code = COVID-19              



                                       VACCINE (#1)]              

 

             Future Scheduled 2022   SHINGLES VACCINES (1              Met

[facility]



             Test         01:40:15     of 2) [code = SHINGLES              



                                       VACCINES (1 of 2)]              

 

             Future Scheduled 2022   65+ PNEUMOCOCCAL              Methodi

[facility]



             Test         01:40:15     VACCINE (1 - PCV)              



                                       [code = 65+               



                                       PNEUMOCOCCAL VACCINE              



                                       (1 - PCV)]                

 

             Future Scheduled 2022   INFLUENZA VACCINE              Method

[facility]



             Test         01:40:15     [code = INFLUENZA              



                                       VACCINE]                  







Results







           Test Description Test Time  Test Comments Results    Result     [facility]

e



                                                       Comments   

 

           [place], ANGIOGRAM, 2017  Reason for FINAL REPORT PATIENT ID:        

    



           CEREBRAL   1          Exam:->cerebral [id number] DATE:            



                      11:48:00   aneurysm   2017NAME: Shahida            



                                 unruptured Elvria EstradaATTENDING:            



                                            Migue Amin MDFIRST            



                                            ASSISTANT: CESARIO LooREOPERATIVE DIAGNOSIS:           

 



                                            Unruptured right            



                                            posterior communicating            



                                            artery aneurysm status            



                                            post stent assisted            



                                            coilingPOSTOPERATIVE            



                                            DIAGNOSIS: Unruptured            



                                            right posterior            



                                            communicating artery            



                                            aneurysm status post            



                                            stent assisted            



                                            coilingPROCEDURE            



                                            PERFORMED: Diagnostic            



                                            Cerebral              



                                            AngiogramANESTHESIA:            



                                            MACCOMPLICATIONS:            



                                            NoneESTIMATED BLOOD LOSS:           

 



                                            Less than 15ml ARTERIAL            



                                            VESSELS STUDIED:RIGHT            



                                            SUBCLAVIAN ARTERY            



                                            (x1)RIGHT COMMON CAROTID            



                                            ARTERY (CERVICAL x2)RIGHT           

 



                                            COMMON CAROTID ARTERY            



                                            (CRANIAL x3)RIGHT COMMON            



                                            FEMORAL ARTERY (x1)            



                                            MATERIALS EMPLOYED:1. 5            



                                            French short sheath 2. 4            



                                            French Berenstein            



                                            catheter3. Bentson            



                                            guidewire4. Terumo 0.035            



                                            LT glidewire5. 5 French            



                                            Mynx device INDICATIONS:            



                                            77-year-old female with            



                                            hypertension who is            



                                            status post endovascular            



                                            treatment of an            



                                            unruptured right            



                                            posterior communicating            



                                            artery aneurysm with            



                                            stent-assisted coiling on           

 



                                            May 19, 2017. She            



                                            presents for follow-up            



                                            cerebral angiogram. The            



                                            indications for the            



                                            procedure as well as the            



                                            risks, benefits and            



                                            alternatives were            



                                            discussed with the            



                                            patient and the family.            



                                            The risks discussed            



                                            included but were not            



                                            limited to stroke,            



                                            intracranial hemorrhage,            



                                            injury to the cervical            



                                            femoral or aortic            



                                            vessels, contrast            



                                            reaction, kidney to            



                                            toxicity, groin hematoma,           

 



                                            weakness paralysis and            



                                            even death. They            



                                            demonstrated            



                                            understanding of the risk           

 



                                            benefit profile and            



                                            agreed to proceed.            



                                            PROCEDURE: After            



                                            appropriate consent was            



                                            obtained, the patient was           

 



                                            brought to the            



                                            angiographic suite and            



                                            cardiopulmonary            



                                            monitoring was placed.            



                                            The anesthesia team            



                                            performed light sedation.           

 



                                            A timeout was performed.            



                                            Both groins were prepped            



                                            and draped in the usual            



                                            sterile fashion. After            



                                            administration of 10 mL            



                                            of 2% lidocaine, a            



                                            micropuncture needle was            



                                            used to perform a single            



                                            wall puncture of the            



                                            right common femoral            



                                            artery, a micropuncture            



                                            sheath was inserted, and            



                                            an angled DSA angiogram            



                                            was performed through the           

 



                                            sheath. Once good            



                                            location of the puncture            



                                            site was confirmed, a 5            



                                            French short sheath was            



                                            inserted over a Bentson            



                                            wire and was maintained            



                                            on heparinized saline            



                                            flush throughout the            



                                            remainder of the            



                                            procedure. Using coaxial            



                                            technique, a preflushed            



                                            Berenstein catheter on            



                                            constant heparinized            



                                            saline flush was advanced           

 



                                            over the Glidewire into            



                                            the descending aorta, the           

 



                                            Glidewire was removed,            



                                            the catheter back bled,            



                                            flushed in usual fashion            



                                            and the catheter was            



                                            maintained on heparinized           

 



                                            flushed throughout            



                                            duration of the case.            



                                            Using coaxial technique,            



                                            the catheter was advanced           

 



                                            into the aortic arch and            



                                            with the aid of            



                                            roadmapping, digital            



                                            fluoroscopy, and careful            



                                            guidewire manipulation,            



                                            the arteries described            



                                            below were selectively            



                                            catheterized. Upon each            



                                            successive            



                                            catheterization, digital            



                                            subtraction angiography            



                                            using the appropriate            



                                            rate and volume of            



                                            contrast in multiple            



                                            projections was            



                                            performed. At the            



                                            conclusion of the            



                                            procedure, the catheter            



                                            was retracted into the            



                                            aorta and the images            



                                            reviewed at the outside            



                                            workstation for quality            



                                            and content. Then the            



                                            femoral sheath was            



                                            removed and hemostasis            



                                            achieved with a 5 French            



                                            Mynx device and manual            



                                            compression. The patient            



                                            tolerated the procedure            



                                            well and was transported            



                                            in unchanged neurological           

 



                                            status without groin            



                                            hematoma and with good            



                                            distal lower extremity            



                                            pulses. The patient was            



                                            transferred to the            



                                            recovery area to be            



                                            monitored as per            



                                            protocol. FINDINGS: RIGHT           

 



                                            SUBCLAVIAN ARTERY (DSA,            



                                            PA, LATERAL ROADMAP, x1):           

 



                                            There is normal course            



                                            and caliber of the            



                                            subclavian artery with            



                                            physiological filling of            



                                            its distal branches.            



                                            Limited visualization in            



                                            this roadmap of the            



                                            origins of the right            



                                            vertebral artery,            



                                            internal mamillary            



                                            artery, thyrocervical and           

 



                                            costocervical trunks.            



                                            RIGHT COMMON CAROTID            



                                            ARTERY (DSA, PA, LATERAL,           

 



                                            CERVICAL x2): Tortuous            



                                            course of the right            



                                            common carotid artery.            



                                            The distal cervical            



                                            common carotid as well as           

 



                                            the origins of the right            



                                            internal and external            



                                            carotid arteries are            



                                            widely patent without            



                                            evidence of ulceration or           

 



                                            stenosis. The proximal            



                                            portions of the            



                                            superficial temporal            



                                            artery, middle meningeal            



                                            artery, internal            



                                            maxillary artery,            



                                            occipital artery and            



                                            their branches are            



                                            visualized and appear            



                                            normal. RIGHT COMMON            



                                            CAROTID ARTERY (DSA, PA,            



                                            LATERAL, MAGNIFIED            



                                            OBLIQUE, CRANIAL x3):            



                                            Minimal residual aneurysm           

 



                                            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            



                                            treated right posterior            



                                            communicating artery            



                                            aneurysm, best visualized           

 



                                            on sequence A9. Normal            



                                            distal cervical, petrous,           

 



                                            cavernous and            



                                            supraclinoid internal            



                                            carotid artery with            



                                            physiological filling of            



                                            the MCA and MAXIMINO branches.           

 



                                            Limited visualization of            



                                            the venous phase. No            



                                            other aneurysms or other            



                                            vascular lesions are            



                                            seen. There is no            



                                            significant            



                                            atherosclerosis or            



                                            stenosis. Normal course            



                                            and caliber of the            



                                            external carotid artery            



                                            and its branches. There            



                                            is a well visualized            



                                            superficial temporal            



                                            artery, middle meningeal            



                                            artery, internal            



                                            maxillary artery,            



                                            occipital artery and            



                                            their branches. RIGHT            



                                            COMMON FEMORAL ARTERY            



                                            (DSA, PA, X 1): Normal            



                                            location of the puncture            



                                            site above the            



                                            bifurcation and below            



                                            markers of the inguinal            



                                            ligament. IMPRESSION:            



                                            Minimal residual aneurysm           

 



                                            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            



                                            treated right posterior            



                                            communicating artery            



                                            aneurysm. No technical or           

 



                                            procedural complications            



                                            FACULTY ATTESTATION: I,            



                                            Migue Amin M.D.,            



                                            was present for the            



                                            entirety of the            



                                            procedure. I performed or           

 



                                            directly supervised all            



                                            aspects of the procedure.           

 



                                            I performed all critical            



                                            aspects of the case. I            



                                            interpreted the images            



                                            and reported the results.           

 



                                            Signed: Chaz Cox            



                                            MDReport Verified            



                                            Date/Time: 2017            



                                            11:48:56 Reading            



                                            Location: [facility] Y026            



                                            Neuro Angio Reading Room            



                                            Electronically signed by:           

 



                                            CHAZ COX on            



                                            2017 11:48 AM            









                    BASIC METABOLIC PANEL 2017 11:56:00 









                      Test Item  Value      Reference Range Interpretation Comme

nts









             SODIUM (BEAKER) (test code 136 meq/L    136-145                   



             = 381)                                              

 

             POTASSIUM (BEAKER) (test 3.9 meq/L    3.5-5.1                   



             code = 379)                                         

 

             CHLORIDE (BEAKER) (test 102 meq/L                        



             code = 382)                                         

 

             CO2 (BEAKER) (test code = 25 meq/L     22-29                     



             355)                                                

 

             BLOOD UREA NITROGEN 20 mg/dL     7-21                      



             (BEAKER) (test code = 354)                                        

 

             CREATININE (BEAKER) (test 0.82 mg/dL   0.57-1.25                 



             code = 358)                                         

 

             GLUCOSE RANDOM (BEAKER) 106 mg/dL           H            



             (test code = 652)                                        

 

             CALCIUM (BEAKER) (test code 9.6 mg/dL    8.4-10.2                  



             = 697)                                              

 

             EGFR (BEAKER) (test code = 68 mL/min/1.73 sq m                     

      ESTIMATED GFR IS NOT AS



             1092)                                               ACCURATE AS CRE

ATININE



                                                                 CLEARANCE IN NE

EDICTING



                                                                 GLOMERULAR FILT

RATION



                                                                 RATE. ESTIMATED

 GFR IS NOT



                                                                 APPLICABLE FOR 

DIALYSIS



                                                                 PATIENTS.



CBC WITH PLATELET COUNT + MANUAL [id number] 11:33:00





             Test Item    Value        Reference Range Interpretation Comments

 

             WHITE BLOOD CELL COUNT 5.0 K/ L     3.5-10.5                  



             (BEAKER) (test code =                                        



             775)                                                

 

             RED BLOOD CELL COUNT 3.94 M/ L    3.93-5.22                 



             (BEAKER) (test code =                                        



             761)                                                

 

             HEMOGLOBIN (BEAKER) 12.1 GM/DL   11.2-15.7                 



             (test code = 410)                                        

 

             HEMATOCRIT (BEAKER) 37.1 %       34.1-44.9                 



             (test code = 411)                                        

 

             MEAN CORPUSCULAR 94.2 fL      79.4-94.8                 



             VOLUME (BEAKER) (test                                        



             code = 753)                                         

 

             MEAN CORPUSCULAR 30.7 pg      25.6-32.2                 



             HEMOGLOBIN (BEAKER)                                        



             (test code = 751)                                        

 

             MEAN CORPUSCULAR 32.6 GM/DL   32.2-35.5                 



             HEMOGLOBIN CONC                                        



             (BEAKER) (test code =                                        



             752)                                                

 

             RED CELL DISTRIBUTION 12.7 %       11.7-14.4                 



             WIDTH (BEAKER) (test                                        



             code = 412)                                         

 

             PLATELET COUNT 212 K/CU MM  150-450                   



             (BEAKER) (test code =                                        



             756)                                                

 

             MEAN PLATELET VOLUME 9.4 fL       9.4-12.3                  



             (BEAKER) (test code =                                        



             754)                                                

 

             NUCLEATED RED BLOOD 0 /100 WBC   0-0                       



             CELLS (BEAKER) (test                                        



             code = 413)                                         

 

             IMMATURE     0 %          0-1                       



             GRANULOCYTES-RELATIVE                                        



             PERCENT (BEAKER) (test                                        



             code = 2801)                                        

 

             NEUTROPHILS RELATIVE 60 %                                   This is

 an appended



             PERCENT (BEAKER) (test                                        repor

t. These



             code = 429)                                         results have be

en



                                                                 appended to a



                                                                 previously sherine

l



                                                                 verified report

.

 

             LYMPHOCYTES RELATIVE 30 %                                   This is

 an appended



             PERCENT (BEAKER) (test                                        repor

t. These



             code = 430)                                         results have be

en



                                                                 appended to a



                                                                 previously sherine

l



                                                                 verified report

.

 

             MONOCYTES RELATIVE 9 %                                    This is a

n appended



             PERCENT (BEAKER) (test                                        repor

t. These



             code = 431)                                         results have be

en



                                                                 appended to a



                                                                 previously sherine

l



                                                                 verified report

.

 

             EOSINOPHILS RELATIVE 1 %                                    This is

 an appended



             PERCENT (BEAKER) (test                                        repor

t. These



             code = 432)                                         results have be

en



                                                                 appended to a



                                                                 previously sherine

l



                                                                 verified report

.

 

             BASOPHILS RELATIVE 1 %                                    This is a

n appended



             PERCENT (BEAKER) (test                                        repor

t. These



             code = 437)                                         results have be

en



                                                                 appended to a



                                                                 previously sherine

l



                                                                 verified report

.

 

             NEUTROPHILS ABSOLUTE 2.97 K/ L    1.56-6.13                 This is

 an appended



             COUNT (BEAKER) (test                                        report.

 These



             code = 670)                                         results have be

en



                                                                 appended to a



                                                                 previously sherine

l



                                                                 verified report

.

 

             LYMPHOCYTES ABSOLUTE 1.46 K/ L    1.18-3.74                 This is

 an appended



             COUNT (BEAKER) (test                                        report.

 These



             code = 414)                                         results have be

en



                                                                 appended to a



                                                                 previously sherine

l



                                                                 verified report

.

 

             MONOCYTES ABSOLUTE 0.42 K/ L    0.24-0.36    H            This is a

n appended



             COUNT (BEAKER) (test                                        report.

 These



             code = 415)                                         results have be

en



                                                                 appended to a



                                                                 previously sherine

l



                                                                 verified report

.

 

             EOSINOPHILS ABSOLUTE 0.05 K/ L    0.04-0.36                 This is

 an appended



             COUNT (BEAKER) (test                                        report.

 These



             code = 416)                                         results have be

en



                                                                 appended to a



                                                                 previously sherine

l



                                                                 verified report

.

 

             BASOPHILS ABSOLUTE 0.04 K/ L    0.01-0.08                 This is a

n appended



             COUNT (BEAKER) (test                                        report.

 These



             code = 417)                                         results have be

en



                                                                 appended to a



                                                                 previously sherine

l



                                                                 verified report

.



[id number] 11:30:00





             Test Item    Value        Reference Range Interpretation Comments

 

             PROTIME (BEAKER) (test code = 15.2 seconds 11.7-14.7    H          

  



             759)                                                

 

             INR (BEAKER) (test code = 370) 1.2          <=5.9                  

   

 

             PARTIAL THROMBOPLASTIN TIME 42.8 seconds 22.5-36.0    H            



             (BEAKER) (test code = 760)                                        



RECOMMENDED COUMADIN/WARFARIN INR THERAPY RANGESSTANDARD DOSE: 2.0 - 3.0 
Includes: PROPHYLAXIS for venous thrombosis, systemic embolization; TREATMENT 
for venous thrombosis and/or pulmonary embolus.HIGH RISK: Target INR is 2.5-3.5 
for patients with mechanical heart valves.PLATELET AGGREGATION: FUNCTION SCREEN
2017 09:23:00





             Test Item    Value        Reference Range Interpretation Comments

 

             WEAK ADP     5 %          60-91        L            



             RESULT(BEAKER) (test                                        



             code = 2135)                                        

 

             PLATELET FUNCTION 0-39% indicates marked                           



             SCREEN INTERP platelet dysfunction                           



             (BEAKER) (test code =                                        



             2173)                                               

 

             [id number] Nelly Zurita MD                       

    



             (BEAKER) (test code = (electronic signature)                       

    



             6651)                                               

 

             PLATELET COUNT  K/CU MM  150-430                   



             (BEAKER) (test code =                                        



             6426)                                               



CBC W/PLT COUNT &amp; AUTO [id number] 07:07:00





             Test Item    Value        Reference Range Interpretation Comments

 

             WHITE BLOOD CELL COUNT (BEAKER) 5.9 K/ L     4.0-10.0              

    



             (test code = 775)                                        

 

             RED BLOOD CELL COUNT (BEAKER) 3.88 M/ L    4.00-5.00    L          

  



             (test code = 761)                                        

 

             HEMOGLOBIN (BEAKER) (test code = 12.5 GM/DL   12.0-15.0            

     



             410)                                                

 

             HEMATOCRIT (BEAKER) (test code = 37.8 %       36.0-45.0            

     



             411)                                                

 

             MEAN CORPUSCULAR VOLUME (BEAKER) 97.3 fL      82.0-99.0            

     



             (test code = 753)                                        

 

             MEAN CORPUSCULAR HEMOGLOBIN 32.1 pg      27.0-33.0                 



             (BEAKER) (test code = 751)                                        

 

             MEAN CORPUSCULAR HEMOGLOBIN CONC 33.0 GM/DL   32.0-36.0            

     



             (BEAKER) (test code = 752)                                        

 

             RED CELL DISTRIBUTION WIDTH 13.4 %       10.3-14.2                 



             (BEAKER) (test code = 412)                                        

 

             PLATELET COUNT (BEAKER) (test 238 K/CU MM  150-430                 

  



             code = 756)                                         

 

             MEAN PLATELET VOLUME (BEAKER) 7.2 fL       6.5-10.5                

  



             (test code = 754)                                        

 

             NUCLEATED RED BLOOD CELLS 0 /100 WBC   0-0                       



             (BEAKER) (test code = 413)                                        

 

             NEUTROPHILS RELATIVE PERCENT 58 %                                  

 



             (BEAKER) (test code = 429)                                        

 

             LYMPHOCYTES RELATIVE PERCENT 31 %                                  

 



             (BEAKER) (test code = 430)                                        

 

             MONOCYTES RELATIVE PERCENT 10 %                                   



             (BEAKER) (test code = 431)                                        

 

             EOSINOPHILS RELATIVE PERCENT 0 %                                   

 



             (BEAKER) (test code = 432)                                        

 

             BASOPHILS RELATIVE PERCENT 1 %                                    



             (BEAKER) (test code = 437)                                        

 

             NEUTROPHILS ABSOLUTE COUNT 3.36 K/ L    1.80-8.00                 



             (BEAKER) (test code = 670)                                        

 

             LYMPHOCYTES ABSOLUTE COUNT 1.84 K/ L    1.48-4.50                 



             (BEAKER) (test code = 414)                                        

 

             MONOCYTES ABSOLUTE COUNT (BEAKER) 0.58 K/ L    0.00-1.30           

      



             (test code = 415)                                        

 

             EOSINOPHILS ABSOLUTE COUNT 0.01 K/ L    0.00-0.50                 



             (BEAKER) (test code = 416)                                        

 

             BASOPHILS ABSOLUTE COUNT (BEAKER) 0.06 K/ L    0.00-0.20           

      



             (test code = 417)                                        



0.00BASI METABOLIC [id number] 06:36:00





             Test Item    Value        Reference Range Interpretation Comments

 

             SODIUM (BEAKER) 138 meq/L    136-145                   



             (test code = 381)                                        

 

             POTASSIUM (BEAKER) 4.0 meq/L    3.5-5.1                   Specimen 

slightly



             (test code = 379)                                        hemolyzed

 

             CHLORIDE (BEAKER) 106 meq/L                        



             (test code = 382)                                        

 

             CO2 (BEAKER) (test 23 meq/L     22-29                     



             code = 355)                                         

 

             BLOOD UREA NITROGEN 13 mg/dL     7-21                      



             (BEAKER) (test code                                        



             = 354)                                              

 

             CREATININE (BEAKER) 0.79 mg/dL   0.57-1.25                 Specimen

 slightly



             (test code = 358)                                        hemolyzed

 

             GLUCOSE RANDOM 97 mg/dL                         



             (BEAKER) (test code                                        



             = 652)                                              

 

             CALCIUM (BEAKER) 9.0 mg/dL    8.4-10.2                  



             (test code = 697)                                        

 

             EGFR (BEAKER) (test 71 mL/min/1.73                           ESTIMA

[facility] GFR IS



             code = 1092) sq m                                   NOT AS ACCURATE

 AS



                                                                 CREATININE



                                                                 CLEARANCE IN



                                                                 PREDICTING



                                                                 GLOMERULAR



                                                                 FILTRATION RATE

.



                                                                 ESTIMATED GFR I

S



                                                                 NOT APPLICABLE 

FOR



                                                                 DIALYSIS PATIEN

TS.



[id number] 06:21:00





             Test Item    Value        Reference Range Interpretation Comments

 

             PROTIME (BEAKER) (test code = 14.1 seconds 11.7-14.7               

  



             759)                                                

 

             INR (BEAKER) (test code = 370) 1.1          <=5.9                  

   

 

             PARTIAL THROMBOPLASTIN TIME 37.0 seconds 22.5-36.0    H            



             (BEAKER) (test code = 760)                                        



RECOMMENDED COUMADIN/WARFARIN INR THERAPY RANGESSTANDARD DOSE: 2.0 - 3.0 
Includes: PROPHYLAXIS for venous thrombosis, systemic embolization; TREATMENT 
for venous thrombosis and/or pulmonary embolus.HIGH RISK: Target INR is 2.5-3.5 
for patients with mechanical heart valves.PROTHROMBIN [id number] 
06:20:00





             Test Item    Value        Reference Range Interpretation Comments

 

             PROTIME (BEAKER) (test code = 14.1 seconds 11.7-14.7               

  



             759)                                                

 

             INR (BEAKER) (test code = 370) 1.1          <=5.9                  

   



RECOMMENDED COUMADIN/WARFARIN INR THERAPY RANGESSTANDARD DOSE: 2.0 - 3.0 
Includes: PROPHYLAXIS for venous thrombosis, systemic embolization; TREATMENT 
for venous thrombosis and/or pulmonary embolus.HIGH RISK: Target INR is 2.5-3.5 
for patients with mechanical heart valves.[id number] 23:38:00





             Test Item    Value        Reference Range Interpretation Comments

 

             PROTIME (BEAKER) (test code = 15.2 seconds 11.7-14.7    H          

  



             759)                                                

 

             INR (BEAKER) (test code = 370) 1.2          <=5.9                  

   

 

             PARTIAL THROMBOPLASTIN TIME 36.5 seconds 22.5-36.0    H            



             (BEAKER) (test code = 760)                                        



RECOMMENDED COUMADIN/WARFARIN INR THERAPY RANGESSTANDARD DOSE: 2.0 - 3.0 
Includes: PROPHYLAXIS for venous thrombosis, systemic embolization; TREATMENT 
for venous thrombosis and/or pulmonary embolus.HIGH RISK: Target INR is 2.5-3.5 
for patients with mechanical heart valves.BASIC METABOLIC [id number] 
23:33:00





             Test Item    Value        Reference Range Interpretation Comments

 

             SODIUM (BEAKER) 137 meq/L    136-145                   



             (test code = 381)                                        

 

             POTASSIUM (BEAKER) 5.7 meq/L    3.5-5.1      H            Specimen 

markedly



             (test code = 379)                                        hemolyzed

 

             CHLORIDE (BEAKER) 105 meq/L                        



             (test code = 382)                                        

 

             CO2 (BEAKER) (test 25 meq/L     22-29                     



             code = 355)                                         

 

             BLOOD UREA NITROGEN 13 mg/dL     7-21                      



             (BEAKER) (test code                                        



             = 354)                                              

 

             CREATININE (BEAKER) 0.84 mg/dL   0.57-1.25                 Specimen

 markedly



             (test code = 358)                                        hemolyzed

 

             GLUCOSE RANDOM 86 mg/dL                         



             (BEAKER) (test code                                        



             = 652)                                              

 

             CALCIUM (BEAKER) 8.9 mg/dL    8.4-10.2                  



             (test code = 697)                                        

 

             EGFR (BEAKER) (test 66 mL/min/1.73                           ESTIMA

[place] GFR IS



             code = 1092) sq m                                   NOT AS ACCURATE

 AS



                                                                 CREATININE



                                                                 CLEARANCE IN



                                                                 PREDICTING



                                                                 GLOMERULAR



                                                                 FILTRATION RATE

.



                                                                 ESTIMATED GFR I

S



                                                                 NOT APPLICABLE 

FOR



                                                                 DIALYSIS PATIEN

TS.



CBC W/PLT COUNT &amp; AUTO [id number] 23:19:00





             Test Item    Value        Reference Range Interpretation Comments

 

             WHITE BLOOD CELL COUNT (BEAKER) 7.0 K/ L     4.0-10.0              

    



             (test code = 775)                                        

 

             RED BLOOD CELL COUNT (BEAKER) 3.76 M/ L    4.00-5.00    L          

  



             (test code = 761)                                        

 

             HEMOGLOBIN (BEAKER) (test code = 12.7 GM/DL   12.0-15.0            

     



             410)                                                

 

             HEMATOCRIT (BEAKER) (test code = 36.9 %       36.0-45.0            

     



             411)                                                

 

             MEAN CORPUSCULAR VOLUME (BEAKER) 98.2 fL      82.0-99.0            

     



             (test code = 753)                                        

 

             MEAN CORPUSCULAR HEMOGLOBIN 33.7 pg      27.0-33.0    H            



             (BEAKER) (test code = 751)                                        

 

             MEAN CORPUSCULAR HEMOGLOBIN CONC 34.4 GM/DL   32.0-36.0            

     



             (BEAKER) (test code = 752)                                        

 

             RED CELL DISTRIBUTION WIDTH 12.2 %       10.3-14.2                 



             (BEAKER) (test code = 412)                                        

 

             PLATELET COUNT (BEAKER) (test 241 K/CU MM  150-430                 

  



             code = 756)                                         

 

             MEAN PLATELET VOLUME (BEAKER) 7.2 fL       6.5-10.5                

  



             (test code = 754)                                        

 

             NUCLEATED RED BLOOD CELLS 0 /100 WBC   0-0                       



             (BEAKER) (test code = 413)                                        

 

             NEUTROPHILS RELATIVE PERCENT 58 %                                  

 



             (BEAKER) (test code = 429)                                        

 

             LYMPHOCYTES RELATIVE PERCENT 34 %                                  

 



             (BEAKER) (test code = 430)                                        

 

             MONOCYTES RELATIVE PERCENT 7 %                                    



             (BEAKER) (test code = 431)                                        

 

             EOSINOPHILS RELATIVE PERCENT 0 %                                   

 



             (BEAKER) (test code = 432)                                        

 

             BASOPHILS RELATIVE PERCENT 1 %                                    



             (BEAKER) (test code = 437)                                        

 

             NEUTROPHILS ABSOLUTE COUNT 4.08 K/ L    1.80-8.00                 



             (BEAKER) (test code = 670)                                        

 

             LYMPHOCYTES ABSOLUTE COUNT 2.36 K/ L    1.48-4.50                 



             (BEAKER) (test code = 414)                                        

 

             MONOCYTES ABSOLUTE COUNT (BEAKER) 0.52 K/ L    0.00-1.30           

      



             (test code = 415)                                        

 

             EOSINOPHILS ABSOLUTE COUNT 0.02 K/ L    0.00-0.50                 



             (BEAKER) (test code = 416)                                        

 

             BASOPHILS ABSOLUTE COUNT (BEAKER) 0.05 K/ L    0.00-0.20           

      



             (test code = 417)                                        



0.00POCT-P2Y12 PLATELET [id number] 20:57:00





             Test Item    Value        Reference Range Interpretation Comments

 

             POC-P2Y12 PLATELET AGG (BEAKER) (test 31 PRU                       

          



             code = 2303)                                        



RANGE INFORMATION: PRU reference range is 194-418. Post Drug Results: Lower PRU 
levels are associated with expected antiplatelet effect. Values may be below the
stated reference range above. The post-drug PRU values reported in the [employer]
P2Y12 package insert are .URINALYSIS W/ [id number] 09:22:00





             Test Item    Value        Reference Range Interpretation Comments

 

             COLOR (BEAKER) (test Light Yellow                           



             code = 470)                                         

 

             CLARITY (BEAKER) (test Hazy                                   



             code = 469)                                         

 

             SPECIFIC GRAVITY UA 1.009        1.001-1.035               



             (BEAKER) (test code =                                        



             468)                                                

 

             PH UA (BEAKER) (test 5.5          5.0-8.0                   



             code = 467)                                         

 

             PROTEIN UA (BEAKER) Negative     Negative                  



             (test code = 464)                                        

 

             GLUCOSE UA (BEAKER) Negative     Negative                  



             (test code = 365)                                        

 

             KETONES UA (BEAKER) Negative     Negative                  



             (test code = 371)                                        

 

             BILIRUBIN UA (BEAKER) Negative     Negative                  



             (test code = 462)                                        

 

             BLOOD UA (BEAKER) (test Small        Negative     A            



             code = 461)                                         

 

             NITRITE UA (BEAKER) Positive     Negative     A            



             (test code = 465)                                        

 

             LEUKOCYTE ESTERASE UA Large        Negative     A            



             (BEAKER) (test code =                                        



             466)                                                

 

             UROBILINOGEN UA (BEAKER) 0.2 mg/dL    0.2-1.0                   



             (test code = 463)                                        

 

             RBC UA (BEAKER) (test 12 /HPF                                



             code = 519)                                         

 

             WBC UA (BEAKER) (test 92 /HPF                                



             code = 520)                                         

 

             BACTERIA (BEAKER) (test Rare                                   



             code = 517)                                         

 

             MUCUS (BEAKER) (test Rare                                   



             code = 1574)                                        

 

             SQUAMOUS EPITHELIAL < /HPF                                 



             (BEAKER) (test code =                                        



             516)                                                

 

             YEAST (BEAKER) (test Occasional                             



             code = 1585)                                        

 

             SOURCE(BEAKER) (test Urine, Straight                           



             code = 2795) Catheter                               



[id number] 04:55:00





             Test Item    Value        Reference Range Interpretation Comments

 

             PHOSPHORUS (BEAKER) (test code = 3.3 mg/dL    2.3-4.7              

     



             604)                                                



[id number] 04:55:00





             Test Item    Value        Reference Range Interpretation Comments

 

             MAGNESIUM (BEAKER) (test code = 1.6 mg/dL    1.6-2.6               

    



             627)                                                



BASIC METABOLIC [id number] 04:55:00





             Test Item    Value        Reference Range Interpretation Comments

 

             SODIUM (BEAKER) 140 meq/L    136-145                   



             (test code = 381)                                        

 

             POTASSIUM (BEAKER) 3.4 meq/L    3.5-5.1      L            



             (test code = 379)                                        

 

             CHLORIDE (BEAKER) 107 meq/L                        



             (test code = 382)                                        

 

             CO2 (BEAKER) (test 22 meq/L     22-29                     



             code = 355)                                         

 

             BLOOD UREA NITROGEN 8 mg/dL      7-21                      



             (BEAKER) (test code                                        



             = 354)                                              

 

             CREATININE (BEAKER) 0.71 mg/dL   0.57-1.25                 



             (test code = 358)                                        

 

             GLUCOSE RANDOM 109 mg/dL           H            



             (BEAKER) (test code                                        



             = 652)                                              

 

             CALCIUM (BEAKER) 8.6 mg/dL    8.4-10.2                  



             (test code = 697)                                        

 

             EGFR (BEAKER) (test 80 mL/min/1.73                           ESTIMA

[place] GFR IS



             code = 1092) sq m                                   NOT AS ACCURATE

 AS



                                                                 CREATININE



                                                                 CLEARANCE IN



                                                                 PREDICTING



                                                                 GLOMERULAR



                                                                 FILTRATION RATE

.



                                                                 ESTIMATED GFR I

S



                                                                 NOT APPLICABLE 

FOR



                                                                 DIALYSIS PATIEN

TS.



CBC W/PLT COUNT &amp; AUTO [id number] 04:52:00





             Test Item    Value        Reference Range Interpretation Comments

 

             WHITE BLOOD CELL COUNT (BEAKER) 7.5 K/ L     4.0-10.0              

    



             (test code = 775)                                        

 

             RED BLOOD CELL COUNT (BEAKER) 4.04 M/ L    4.00-5.00               

  



             (test code = 761)                                        

 

             HEMOGLOBIN (BEAKER) (test code = 12.9 GM/DL   12.0-15.0            

     



             410)                                                

 

             HEMATOCRIT (BEAKER) (test code = 39.7 %       36.0-45.0            

     



             411)                                                

 

             MEAN CORPUSCULAR VOLUME (BEAKER) 98.3 fL      82.0-99.0            

     



             (test code = 753)                                        

 

             MEAN CORPUSCULAR HEMOGLOBIN 31.9 pg      27.0-33.0                 



             (BEAKER) (test code = 751)                                        

 

             MEAN CORPUSCULAR HEMOGLOBIN CONC 32.5 GM/DL   32.0-36.0            

     



             (BEAKER) (test code = 752)                                        

 

             RED CELL DISTRIBUTION WIDTH 12.0 %       10.3-14.2                 



             (BEAKER) (test code = 412)                                        

 

             PLATELET COUNT (BEAKER) (test 196 K/CU MM  150-430                 

  



             code = 756)                                         

 

             MEAN PLATELET VOLUME (BEAKER) 7.2 fL       6.5-10.5                

  



             (test code = 754)                                        

 

             NUCLEATED RED BLOOD CELLS 0 /100 WBC   0-0                       



             (BEAKER) (test code = 413)                                        

 

             NEUTROPHILS RELATIVE PERCENT 81 %                                  

 



             (BEAKER) (test code = 429)                                        

 

             LYMPHOCYTES RELATIVE PERCENT 14 %                                  

 



             (BEAKER) (test code = 430)                                        

 

             MONOCYTES RELATIVE PERCENT 6 %                                    



             (BEAKER) (test code = 431)                                        

 

             EOSINOPHILS RELATIVE PERCENT 0 %                                   

 



             (BEAKER) (test code = 432)                                        

 

             BASOPHILS RELATIVE PERCENT 0 %                                    



             (BEAKER) (test code = 437)                                        

 

             NEUTROPHILS ABSOLUTE COUNT 6.03 K/ L    1.80-8.00                 



             (BEAKER) (test code = 670)                                        

 

             LYMPHOCYTES ABSOLUTE COUNT 1.01 K/ L    1.48-4.50    L            



             (BEAKER) (test code = 414)                                        

 

             MONOCYTES ABSOLUTE COUNT (BEAKER) 0.42 K/ L    0.00-1.30           

      



             (test code = 415)                                        

 

             EOSINOPHILS ABSOLUTE COUNT 0.01 K/ L    0.00-0.50                 



             (BEAKER) (test code = 416)                                        

 

             BASOPHILS ABSOLUTE COUNT ([facility]) 0.01 K/ L    0.00-0.20           

      



             (test code = 417)                                        



[id number] 16:01:00





             Test Item    Value        Reference Range Interpretation Comments

 

             ACTIVATED CLOTTING TIME 245 sec                                TEST

ED AT [place] [address]



             ([facility]) (test code =                                        [address] [place]



             441)                                                [address]



[id number] 15:00:00





             Test Item    Value        Reference Range Interpretation Comments

 

             ACTIVATED CLOTTING TIME 255 sec                                TEST

ED AT [place] [address]



             ([facility]) (test code =                                        [address] [place]



             441)                                                [address]



[id number] 14:44:00





             Test Item    Value        Reference Range Interpretation Comments

 

             ACTIVATED CLOTTING TIME 234 sec                                TEST

ED AT [place] [address]



             ([facility]) (test code =                                        [address] [place]



             441)                                                [address]



[id number] 14:31:00





             Test Item    Value        Reference Range Interpretation Comments

 

             ACTIVATED CLOTTING TIME 219 sec                                TEST

ED AT [place] [address]



             ([facility]) (test code =                                        DAYO LOPEZ [place]



             441)                                                [address]



[id number] 14:31:00





             Test Item    Value        Reference Range Interpretation Comments

 

             ACTIVATED CLOTTING TIME 183 sec                                TEST

ED AT [place] [address]



             ([facility]) (test code =                                        DAYO LOPEZ [place]



             441)                                                [address]



[id number] 13:22:00





             Test Item    Value        Reference Range Interpretation Comments

 

             ACTIVATED CLOTTING TIME 137 sec                                TEST

ED AT [place] [address]



             ([facility]) (test code =                                        MARYANNNE

[place] [address])                                                [address]



POCT-P2Y12 PLATELET [id number] 07:24:00





             Test Item    Value        Reference Range Interpretation Comments

 

             POC-P2Y12 PLATELET AGG ([facility]) (test 110 PRU                      

          



             code = 2303)                                        



RANGE INFORMATION: PRU reference range is 194-418. Post Drug Results: Lower PRU 
levels are associated with expected antiplatelet effect. Values may be below the
stated reference range above. The post-drug PRU values reported in the [employer]
P2Y12 package insert are .POCT-ASPIRIN PLATELET [id number] 
07:24:00





             Test Item    Value        Reference Range Interpretation Comments

 

             POC-ASPIRIN PLATELET AGG ([facility]) 402 ARU                          

      



             (test code = 2302)                                        



RANGE INFORMATION: 350-549 ARU Therapeutic range for platelet function. 550-700 
ARU Non-Therapeutic range for platelet function.PLATELET AGGREGATION: FUNCTION 
[id number] 10:21:00





             Test Item    Value        Reference Range Interpretation Comments

 

             WEAK ADP     92 %         60-91        H            



             RESULT(BEAKER) (test                                        



             code = 2135)                                        

 

             PLATELET FUNCTION % indicates                           



             SCREEN INTERP (BEAKER) normal platelet                           



             (test code = 2173) function                               

 

             [id number] Meredith Reyes, MD                           



             (BEAKER) (test code = (electronic signature)                       

    



             8598)                                               

 

             PLATELET COUNT  K/CU MM  150-430                   



             (BEAKER) (test code =                                        



             1754)                                               



COMPREHENSIVE METABOLIC [id number] 05:11:00





             Test Item    Value        Reference Range Interpretation Comments

 

             TOTAL PROTEIN 6.5 gm/dL    6.0-8.3                   



             (BEAKER) (test code =                                        



             770)                                                

 

             ALBUMIN (BEAKER) 3.6 g/dL     3.5-5.0                   



             (test code = 1145)                                        

 

             ALKALINE PHOSPHATASE 60 U/L                           



             (BEAKER) (test code =                                        



             346)                                                

 

             BILIRUBIN TOTAL 1.1 mg/dL    0.2-1.2                   



             (BEAKER) (test code =                                        



             377)                                                

 

             SODIUM (BEAKER) (test 136 meq/L    136-145                   



             code = 381)                                         

 

             POTASSIUM (BEAKER) 3.8 meq/L    3.5-5.1                   



             (test code = 379)                                        

 

             CHLORIDE (BEAKER) 104 meq/L                        



             (test code = 382)                                        

 

             CO2 (BEAKER) (test 23 meq/L     22-29                     



             code = 355)                                         

 

             BLOOD UREA NITROGEN 13 mg/dL     7-21                      



             (BEAKER) (test code =                                        



             354)                                                

 

             CREATININE (BEAKER) 0.79 mg/dL   0.57-1.25                 



             (test code = 358)                                        

 

             GLUCOSE RANDOM 89 mg/dL                         



             (BEAKER) (test code =                                        



             652)                                                

 

             CALCIUM (BEAKER) 9.2 mg/dL    8.4-10.2                  



             (test code = 697)                                        

 

             AST (SGOT) (BEAKER) 19 U/L       5-34                      



             (test code = 353)                                        

 

             ALT (SGPT) (BEAKER) 8 U/L        6-55                      



             (test code = 347)                                        

 

             EGFR (BEAKER) (test 71 mL/min/1.73                           ESTIMA

[facility] GFR IS



             code = 1092) sq m                                   NOT AS ACCURATE

 AS



                                                                 CREATININE



                                                                 CLEARANCE IN



                                                                 PREDICTING



                                                                 GLOMERULAR



                                                                 FILTRATION RATE

.



                                                                 ESTIMATED GFR I

S



                                                                 NOT APPLICABLE 

FOR



                                                                 DIALYSIS PATIEN

TS.



[id number] 05:04:00





             Test Item    Value        Reference Range Interpretation Comments

 

             PARTIAL THROMBOPLASTIN TIME 37.9 seconds 22.5-36.0    H            



             (BEAKER) (test code = 760)                                        



[id number] 16:56:00





             Test Item    Value        Reference Range Interpretation Comments

 

             PARTIAL THROMBOPLASTIN TIME 39.4 seconds 22.5-36.0    H            



             (BEAKER) (test code = 760)                                        



PROTHROMBIN [id number]/[id number] 16:55:00





             Test Item    Value        Reference Range Interpretation Comments

 

             PROTIME (BEAKER) (test code = 14.4 seconds 11.7-14.7               

  



             759)                                                

 

             INR (BEAKER) (test code = 370) 1.1          <=5.9                  

   



RECOMMENDED COUMADIN/WARFARIN INR THERAPY RANGESSTANDARD DOSE: 2.0 - 3.0 
Includes: PROPHYLAXIS for venous thrombosis, systemic embolization; TREATMENT 
for venous thrombosis and/or pulmonary embolus.HIGH RISK: Target INR is 2.5-3.5 
for patients with mechanical heart valves.COMPREHENSIVE METABOLIC PANEL
2017-05-15 02:20:00





             Test Item    Value        Reference Range Interpretation Comments

 

             TOTAL PROTEIN 7.0 gm/dL    6.0-8.3                   



             (BEAKER) (test code =                                        



             770)                                                

 

             ALBUMIN (BEAKER) 3.8 g/dL     3.5-5.0                   



             (test code = 1145)                                        

 

             ALKALINE PHOSPHATASE 68 U/L                           



             (BEAKER) (test code =                                        



             346)                                                

 

             BILIRUBIN TOTAL 0.9 mg/dL    0.2-1.2                   



             (BEAKER) (test code =                                        



             377)                                                

 

             SODIUM (BEAKER) (test 137 meq/L    136-145                   



             code = 381)                                         

 

             POTASSIUM (BEAKER) 3.5 meq/L    3.5-5.1                   



             (test code = 379)                                        

 

             CHLORIDE (BEAKER) 104 meq/L                        



             (test code = 382)                                        

 

             CO2 (BEAKER) (test 23 meq/L     22-29                     



             code = 355)                                         

 

             BLOOD UREA NITROGEN 10 mg/dL     7-21                      



             (BEAKER) (test code =                                        



             354)                                                

 

             CREATININE (BEAKER) 0.81 mg/dL   0.57-1.25                 



             (test code = 358)                                        

 

             GLUCOSE RANDOM 106 mg/dL           H            



             (BEAKER) (test code =                                        



             652)                                                

 

             CALCIUM (BEAKER) 9.7 mg/dL    8.4-10.2                  



             (test code = 697)                                        

 

             AST (SGOT) (BEAKER) 21 U/L       5-34                      



             (test code = 353)                                        

 

             ALT (SGPT) (BEAKER) 8 U/L        6-55                      



             (test code = 347)                                        

 

             EGFR (BEAKER) (test 69 mL/min/1.73                           ESTIMA

[facility] GFR IS



             code = 1092) sq m                                   NOT AS ACCURATE

 AS



                                                                 CREATININE



                                                                 CLEARANCE IN



                                                                 PREDICTING



                                                                 GLOMERULAR



                                                                 FILTRATION RATE

.



                                                                 ESTIMATED GFR I

S



                                                                 NOT APPLICABLE 

FOR



                                                                 DIALYSIS PATIEN

TS.



HEMOGLOBIN [id number] 07:40:00





             Test Item    Value        Reference Range Interpretation Comments

 

             HEMOGLOBIN A1C (BEAKER) (test code = 4.9 %        4.3-6.1          

         



             368)                                                



VITAMIN [id number] 06:01:00





             Test Item    Value        Reference Range Interpretation Comments

 

             VITAMIN B12 (BEAKER) (test code = 826 pg/mL    213-816      H      

      



             774)                                                



TSH/FREE T4 IF [id number] 06:01:00





             Test Item    Value        Reference Range Interpretation Comments

 

             THYROID STIMULATING HORMONE 1.37 uIU/mL  0.35-4.94                 



             (BEAKER) (test code = 772)                                        



CALCIUM, [id number] 05:48:00





             Test Item    Value        Reference Range Interpretation Comments

 

             CALCIUM IONIZED (BEAKER) (test 1.11 mmol/L  1.12-1.27    L         

   



             code = 698)                                         

 

             PH, BLOOD (BEAKER) (test code = 7.41                               

    



             1810)                                               



[id number] 05:33:00





             Test Item    Value        Reference Range Interpretation Comments

 

             PHOSPHORUS (BEAKER) (test code = 3.3 mg/dL    2.3-4.7              

     



             604)                                                



[id number] 05:33:00





             Test Item    Value        Reference Range Interpretation Comments

 

             MAGNESIUM (BEAKER) (test code = 2.0 mg/dL    1.6-2.6               

    



             627)                                                



COMPREHENSIVE METABOLIC [id number] 05:33:00





             Test Item    Value        Reference Range Interpretation Comments

 

             TOTAL PROTEIN 5.9 gm/dL    6.0-8.3      L            



             (BEAKER) (test code =                                        



             770)                                                

 

             ALBUMIN (BEAKER) 3.3 g/dL     3.5-5.0      L            



             (test code = 1145)                                        

 

             ALKALINE PHOSPHATASE 56 U/L                           



             (BEAKER) (test code =                                        



             346)                                                

 

             BILIRUBIN TOTAL 0.6 mg/dL    0.2-1.2                   



             (BEAKER) (test code =                                        



             377)                                                

 

             SODIUM (BEAKER) (test 140 meq/L    136-145                   



             code = 381)                                         

 

             POTASSIUM (BEAKER) 3.7 meq/L    3.5-5.1                   



             (test code = 379)                                        

 

             CHLORIDE (BEAKER) 106 meq/L                        



             (test code = 382)                                        

 

             CO2 (BEAKER) (test 27 meq/L     22-29                     



             code = 355)                                         

 

             BLOOD UREA NITROGEN 13 mg/dL     7-21                      



             (BEAKER) (test code =                                        



             354)                                                

 

             CREATININE (BEAKER) 0.79 mg/dL   0.57-1.25                 



             (test code = 358)                                        

 

             GLUCOSE RANDOM 95 mg/dL                         



             (BEAKER) (test code =                                        



             652)                                                

 

             CALCIUM (BEAKER) 8.7 mg/dL    8.4-10.2                  



             (test code = 697)                                        

 

             AST (SGOT) (BEAKER) 16 U/L       5-34                      



             (test code = 353)                                        

 

             ALT (SGPT) (BEAKER) 7 U/L        6-55                      



             (test code = 347)                                        

 

             EGFR (BEAKER) (test 71 mL/min/1.73                           ESTIMA

[facility] GFR IS



             code = 1092) sq m                                   NOT AS ACCURATE

 AS



                                                                 CREATININE



                                                                 CLEARANCE IN



                                                                 PREDICTING



                                                                 GLOMERULAR



                                                                 FILTRATION RATE

.



                                                                 ESTIMATED GFR I

S



                                                                 NOT APPLICABLE 

FOR



                                                                 DIALYSIS PATIEN

TS.



LIPID [id number] 05:33:00





             Test Item    Value        Reference Range Interpretation Comments

 

             TRIGLYCERIDES (BEAKER) (test code = 57 mg/dL                       

        



             540)                                                

 

             CHOLESTEROL (BEAKER) (test code = 204 mg/dL                        

      



             631)                                                

 

             HDL CHOLESTEROL (BEAKER) (test code 62 mg/dL                       

        



             = 976)                                              

 

             LDL CHOLESTEROL CALCULATED (BEAKER) 131 mg/dL                      

        



             (test code = 633)                                        



Triglyceride Reference Range: Low Risk &lt;150 Borderline 150-199 High Risk 200-
499 Very High Risk &gt;=500Cholesterol Reference Range: Low Risk &lt;200 
Borderline 200-239 High Risk &gt;240HDL Cholesterol Reference Range: Low Risk 
&gt;=60 High Risk &lt;40LDL Cholesterol Reference Range: Optimal &lt;100 Near 
Optimal 100-129 Borderline 130-159 High 160-189  Very High &gt;=190CBC W/PLT 
COUNT &amp; AUTO [id number] 04:51:00





             Test Item    Value        Reference Range Interpretation Comments

 

             WHITE BLOOD CELL COUNT (BEAKER) 4.8 K/ L     4.0-10.0              

    



             (test code = 775)                                        

 

             RED BLOOD CELL COUNT (BEAKER) 4.01 M/ L    4.00-5.00               

  



             (test code = 761)                                        

 

             HEMOGLOBIN (BEAKER) (test code = 13.4 GM/DL   12.0-15.0            

     



             410)                                                

 

             HEMATOCRIT (BEAKER) (test code = 39.0 %       36.0-45.0            

     



             411)                                                

 

             MEAN CORPUSCULAR VOLUME (BEAKER) 97.5 fL      82.0-99.0            

     



             (test code = 753)                                        

 

             MEAN CORPUSCULAR HEMOGLOBIN 33.5 pg      27.0-33.0    H            



             (BEAKER) (test code = 751)                                        

 

             MEAN CORPUSCULAR HEMOGLOBIN CONC 34.4 GM/DL   32.0-36.0            

     



             (BEAKER) (test code = 752)                                        

 

             RED CELL DISTRIBUTION WIDTH 12.8 %       10.3-14.2                 



             (BEAKER) (test code = 412)                                        

 

             PLATELET COUNT (BEAKER) (test 193 K/CU MM  150-430                 

  



             code = 756)                                         

 

             MEAN PLATELET VOLUME (BEAKER) 7.0 fL       6.5-10.5                

  



             (test code = 754)                                        

 

             NUCLEATED RED BLOOD CELLS 0 /100 WBC   0-0                       



             (BEAKER) (test code = 413)                                        

 

             NEUTROPHILS RELATIVE PERCENT 50 %                                  

 



             (BEAKER) (test code = 429)                                        

 

             LYMPHOCYTES RELATIVE PERCENT 40 %                                  

 



             (BEAKER) (test code = 430)                                        

 

             MONOCYTES RELATIVE PERCENT 9 %                                    



             (BEAKER) (test code = 431)                                        

 

             EOSINOPHILS RELATIVE PERCENT 0 %                                   

 



             (BEAKER) (test code = 432)                                        

 

             BASOPHILS RELATIVE PERCENT 0 %                                    



             (BEAKER) (test code = 437)                                        

 

             NEUTROPHILS ABSOLUTE COUNT 2.38 K/ L    1.80-8.00                 



             (BEAKER) (test code = 670)                                        

 

             LYMPHOCYTES ABSOLUTE COUNT 1.93 K/ L    1.48-4.50                 



             (BEAKER) (test code = 414)                                        

 

             MONOCYTES ABSOLUTE COUNT (BEAKER) 0.44 K/ L    0.00-1.30           

      



             (test code = 415)                                        

 

             EOSINOPHILS ABSOLUTE COUNT 0.01 K/ L    0.00-0.50                 



             (BEAKER) (test code = 416)                                        

 

             BASOPHILS ABSOLUTE COUNT (BEAKER) 0.02 K/ L    0.00-0.20           

      



             (test code = 417)                                        



0.00COMPREHENSIVE METABOLIC [id number] 22:47:00





             Test Item    Value        Reference Range Interpretation Comments

 

             TOTAL PROTEIN 5.8 gm/dL    6.0-8.3      L            



             (BEAKER) (test code =                                        



             770)                                                

 

             ALBUMIN (BEAKER) 3.2 g/dL     3.5-5.0      L            



             (test code = 1145)                                        

 

             ALKALINE PHOSPHATASE 56 U/L                           



             (BEAKER) (test code =                                        



             346)                                                

 

             BILIRUBIN TOTAL 0.5 mg/dL    0.2-1.2                   



             (BEAKER) (test code =                                        



             377)                                                

 

             SODIUM (BEAKER) (test 140 meq/L    136-145                   



             code = 381)                                         

 

             POTASSIUM (BEAKER) 3.7 meq/L    3.5-5.1                   



             (test code = 379)                                        

 

             CHLORIDE (BEAKER) 108 meq/L           H            



             (test code = 382)                                        

 

             CO2 (BEAKER) (test 25 meq/L     22-29                     



             code = 355)                                         

 

             BLOOD UREA NITROGEN 11 mg/dL     7-21                      



             (BEAKER) (test code =                                        



             354)                                                

 

             CREATININE (BEAKER) 0.83 mg/dL   0.57-1.25                 



             (test code = 358)                                        

 

             GLUCOSE RANDOM 94 mg/dL                         



             (BEAKER) (test code =                                        



             652)                                                

 

             CALCIUM (BEAKER) 8.6 mg/dL    8.4-10.2                  



             (test code = 697)                                        

 

             AST (SGOT) (BEAKER) 15 U/L       5-34                      



             (test code = 353)                                        

 

             ALT (SGPT) (BEAKER) 7 U/L        6-55                      



             (test code = 347)                                        

 

             EGFR (BEAKER) (test 67 mL/min/1.73                           ESTIMA

[place] GFR IS



             code = 1092) sq m                                   NOT AS ACCURATE

 AS



                                                                 CREATININE



                                                                 CLEARANCE IN



                                                                 PREDICTING



                                                                 GLOMERULAR



                                                                 FILTRATION RATE

.



                                                                 ESTIMATED GFR I

S



                                                                 NOT APPLICABLE 

FOR



                                                                 DIALYSIS PATIEN

TS.

## 2022-11-27 NOTE — P.HP
Certification for Inpatient


Patient admitted to: Inpatient


With expected LOS: >2 Midnights


Practitioner: I am a practitioner with admitting privileges, knowledge of 

patient current condition, hospital course, and medical plan of care.


Services: Services provided to patient in accordance with Admission requirements

found in Title 42 Section 412.3 of the Code of Federal Regulations





Patient History


Date of Service: 11/27/22


Reason for admission: CVA


History of Present Illness: 





81yo F, PMH: Parkinsons, HTN, Hard of hearing, CVA


Presents to ED a 2nd time today, now due to sudden onset of slurred speech, 

facial droop, and confusion. Patient presented earlier today due to multiple 

falls from standing position over the last few days. She is somewhat confused, 

denies fever/chills, no nausea/vomiting/diarrhea. +generalized weakness. Denies 

new vision changes, no sensory changes.


In the ED, stroke alert was called. ED physician reviewed case with Dr. Castaneda. Unclear exactly when patient was last known well since she had falls 

yesterday. Slurred speech was new compared to when she first presented to ED. CT

head noted acute infarct. Dr. Castaneda recommeded against TNKase due to the 

above. At time of my encounter, she had noticeable improvement.





Allergies





ciprofloxacin [From Cipro] Allergy (Verified 06/24/20 05:04)


   Unknown


codeine Allergy (Verified 06/24/20 05:04)


   Anaphylaxis


hydrochlorothiazide [From Diovan HCT] Allergy (Verified 09/19/18 15:05)


   Unknown


valsartan [From Diovan HCT] Allergy (Verified 09/19/18 15:05)


   Unknown


bautrai Allergy (Uncoded 09/19/18 15:05)


   Unknown


Iodine Allergy (Uncoded 09/19/18 15:05)


   Unknown


Prednisone Allergy (Uncoded 09/19/18 15:05)


   Unknown


Tetanus Vacci Allergy (Uncoded 09/19/18 15:05)


   Unknown


Zithromax Z-P Allergy (Uncoded 09/19/18 15:05)


   Unknown





Home Medications: 








Ascorbic Acid [Vitamin C] 500 mg PO DAILY AT SUPPER 06/24/20 


Ca/D3/Mag Ox/Zinc//Claude/Bor [Calcium 600+D3 Plus Caplet] 1 each PO DAILY 

06/24/20 


Carbidopa/Levodopa [Carbidopa-Levodopa  Tab] 1.5 tab PO TID 06/24/20 


Clopidogrel Bisulfate [Plavix] 75 mg PO DAILY 06/24/20 


Cranberry 1 cap PO BID 06/24/20 


Folic Acid 2 tab PO DAILY 06/24/20 


Magnesium Oxide [Magnesium] 250 mg PO DAILY AT SUPPER 03/31/21 


Qunol Ultra Coq10 100 mg PO NOON 03/31/21 


Vit A,C & E/Lutein/Minerals [Ocuvite Tablet] 1 tab PO NOON 03/31/21 


Amlodipine [Norvasc*] 5 mg PO DAILY 04/08/21 


Metoprolol Succinate [Toprol Xl] 25 mg PO BID 04/08/21 








- Past Medical/Surgical History


Diabetic: No


-: History of cerebral aneurysm


-: History of CVA


-: Parkinson's disease


-: Hypertension


-: varicose veins


-: Cerebral aneurysm coiling


-: Eye surgery


-: varicose vein stripping


-: Knee Surgery





- Family History


Family History: Reviewed- Non-Contributory





- Family History


  ** Father


-: Heart disease, Hypertension


Notes: migraine headaches





  ** Mother


-: Heart disease


Notes: varicose veins





- Social History


Smoking Status: Unknown if ever smoked


Alcohol use: No


CD- Drugs: No


Caffeine use: Yes


Place of Residence: Home





Review of Systems


10-point ROS is otherwise unremarkable





Physical Examination





- Vital Signs


Temperature: 98.4 F


Blood Pressure: 135/79


Pulse: 84


Respirations: 18


Pulse Ox (%): 97





- Physical Exam


General: Alert, In no apparent distress, Oriented x2


HEENT: PERRLA, EOMI, Sclerae nonicteric


Neck: Supple, No LAD


Respiratory: Clear to auscultation bilaterally, Normal air movement


Cardiovascular: No edema, Regular rate/rhythm


Gastrointestinal: Soft and benign, Non-distended, No tenderness


Musculoskeletal: No swelling, No contractures


Integumentary: No significant lesion, Other (few scattered ecchymosis on b/l 

arms)


Neurological: Normal strength at 5/5 x4 extr, Normal affect, Other (lower face 

droop), Abnormal speech (very slight slurring)





- Studies


Laboratory Data (last 24 hrs)





11/27/22 13:52: PT 12.5, INR 1.14, APTT 33.3


11/27/22 13:52: WBC 5.70, Hgb 14.5, Hct 43.4, Plt Count 250


11/27/22 13:52: Sodium 138, Potassium 3.7, BUN 11, Creatinine 0.73, Glucose 106








Assessment and Plan





- Advance Directives


Does patient have a Living Will: Yes


Does patient have a Durable POA for Healthcare: Yes


Physician Review Additional Text: 





Problem List


Acute CVA: L thalamus / left internal capsule


HTN


prior CVA


Hard of hearing





Patient unable to give medical history. Family not around, unreachable.


Unclear on comorbidities, she doesn't know what medications she takes


aspirin ,plavix, statin, folic acid


neuro consulted


mri tomorrow


unclear etiology of CVA


uncertain on code status, pt seems confused, but states full code





Dispo: home, ~2 days





Time Spent Managing Pts Care (In Minutes): 70

## 2022-11-27 NOTE — RAD REPORT
EXAM DESCRIPTION:  CT - Ct Stroke Brain Wo Cont - 11/27/2022 1:55 pm

 

CLINICAL HISTORY:  CVA

 

COMPARISON:  November 27, 2022 head CT

 

TECHNIQUE:  Computed axial tomography of the head was obtained.

 

All CT scans are performed using dose optimization technique as appropriate and may include automated
 exposure control or mA/KV adjustment according to patient size.

 

FINDINGS:  Aneurysm repair.An intracranial  bleed is not seen .

 

The ventricles are normal in caliber.

 

No extra-axial fluid collection is noted.

 

Subtle area of low density within the left thalamus/left internal capsule.

 

Mild to moderate low-density areas within periventricular, deep and subcortical white matter probably
 ischemic changes secondary to small vessel disease

 

Fluid within the sinuses/ mastoids is not seen.

 

IMPRESSION:  Subtle area of low-density left thalamus/left internal capsule suspicious for acute infa
rction.

 

Dr Marrufo of the emergency room was notified at 1:51 p.m. November 27, 2022

## 2022-11-27 NOTE — RAD REPORT
EXAM DESCRIPTION:  RAD - Chest Single View - 11/27/2022 8:55 am

 

CLINICAL HISTORY:  fall

 

COMPARISON:  Chest Single View dated 3/12/2022; Chest Single View dated 4/1/2021; Chest Single View d
ated 3/31/2021; Chest Single View dated 6/24/2020

 

FINDINGS:  Lines: None.

Lungs: Decreased lung volumes with increased basilar opacities as well some increased prominence of t
he pulmonary interstitium.

Pleural: No significant pleural effusions or pneumothorax.

Cardiac: The heart size is within normal limits.

Mediastinum: Within normal limits.

Bones: Degenerative changes in the shoulders, left greater than right.

Other: None

 

IMPRESSION:  Decreased lung volumes with probable increased atelectasis. Lower lung volumes can also 
accentuate the pulmonary vasculature. A standard PA and lateral could further evaluate if an acute in
trathoracic process is suspected. .

## 2022-11-27 NOTE — XMS REPORT
Continuity of Care Document

                          Created on:2022



Patient:SHAHIDA ESTRADA

Sex:Female

:1940

External Reference #:[id number]





Demographics







                          Address                   [address] [place] [address]

 

                          Home Phone                [phone]

 

                          Mobile Phone              [phone]

 

                          Email Address             DECLINE 10/21/22

 

                          Preferred Language        English

 

                          Marital Status            Unknown

 

                          Jew Affiliation     Unknown

 

                          Race                      Unknown

 

                          Additional Race(s)        White

 

                          Ethnic Group              Unknown









Author







                          Organization              [facility]

t

 

                          Address                   [address]



                                                    [place] [address]

 

                          Phone                     [phone]









Support







                Name            Relationship    Address         Phone

 

                Wan Estrada Spouse          [address]  [phone]



                                                [place] [address] 

 

                Musa Estrada      Unavailable     [phone]

3









Care Team Providers







                    Name                Role                Phone

 

                    Samuel Todd MD  Primary Care Physician [phone]

 

                    MIGUE AMIN Attending Clinician Unavailable

 

                    CHATA CALLE Attending Clinician Unavailable

 

                    MEJIA HOWARD Attending Clinician Unavailable

 

                    MIGUE AMIN Admitting Clinician Unavailable

 

                    SUZANNE BAER Admitting Clinician Unavailable

 

                    MEJIA HOWARD Admitting Clinician Unavailable









Problems







       Condition Condition Condition Status Onset  Resolution Last   Treating Co

mments 

Source



       Name   Details Category        Date   Date   Treatment Clinician        



                                                 Date                 

 

       Parkinson Parkinson Disease Active                              [facility]

[facility]



       disease disease               6-19                               st



                                   00:00:                             [facility]osp[facility]



                                   00                                 l

 

       Low back Low back Disease Active                              [facility]

[facility]



       pain   pain                 5-22                               st



       radiating radiating               00:00:                             Hosp

[place]



       to right to right               00                                 l



       leg    leg                                                     

 

       Varicose Varicose Disease Active                              [facility]

[facility]



       veins of veins of               5-22                               st



       leg with leg with               00:00:                             Hospit

a



       edema, edema,               00                                 l



       bilateral bilateral                                                  

 

       Aneurysm Aneurysm Disease Active 2017                             [facility]

[facility]



                                                                  [facility]



                                   [facility]                             [facility]



                                   [facility]                                 [facility]

 

       Hypertensi Hypertensi Disease Active                              [facility]

[facility]



       on     on                   



                                   00:00:                             Medical



                                   00                                 Center

 

       Other  Other  Disease Active                              [facility]



       specified specified                                              [facility]

s



       transient transient               00:00:                             Medi

[facility]bbie



       cerebral cerebral               00                                 Center



       ischemias ischemias                                                  

 

       Hypertensi Hypertensi Disease Active                              [facility]

[facility]



       ve     ve                   5-20                               [facility]



       emergency emergency               00:00:                             Medi

debbie



                                   00                                 [place]

 

       Syncope Syncope Disease Active                              [facility]



                                   5-15                               [facility]



                                   [facility]                             [facility]



                                   00                                 Center

 

       Carotid Carotid Disease Active                              [facility]



       aneurysm, aneurysm,               5-15                               [facility]

s



       right  right                00:00:                             Medical



                                   00                                 [place]

 

       Essential Essential Disease Active                              [facility]



       hypertensi hypertensi               5-14                               [facility]

[facility]



       on     on                   00:00:                             Medical



                                   00                                 [place]

 

       TIA    TIA    Disease Active                              [facility]



       (transient (transient               5-13                               [facility]

[facility]



       ischemic ischemic               00:00:                             [facility]

l



       attack) attack)               00                                 Center

 

       Hypertensi Hypertensi Disease Active                              [facility]

[facility]



       on     on                   5-13                               [facility]



                                   [facility]                             [facility]



                                   00                                 Center







Allergies, Adverse Reactions, Alerts







       Allergy Allergy Status Severity Reaction(s) Onset  Inactive Treating Comm

ents 

Source



       Name   Type                        Date   Date   Clinician        

 

       Sulfamet Propensi Active                                     [facility]

i



       hoxazole ty to                       3-16                        st



       -Trimeth adverse                      00:00:                      [facility]osp[facility]



       oprim  reaction                      00                          l



              s to                                                    



              drug                                                    

 

       Ciproflo Propensi Active                                     [facility]

i



       xacin  ty to                       3-16                        st



              adverse                      00:00:                      [facility]osp[facility]



              reaction                      00                          l



              s to                                                    



              drug                                                    

 

       Valsarta Propensi Active                                     Method

i



       n      ty to                       3-16                        st



              adverse                      00:00:                      [facility]osp[facility]



              reaction                      00                          l



              s to                                                    



              drug                                                    

 

       Iodine Propensi Active                                     [facility]



              ty to                       3-16                        st



              adverse                      00:00:                      [facility]osp[facility]



              reaction                      00                          l



              s to                                                    



              drug                                                    

 

       Predniso Propensi Active                                     Method

i



       ne     ty to                       3-16                        st



              adverse                      00:00:                      Hospita



              reaction                      00                          l



              s to                                                    



              drug                                                    

 

       Tetanus Propensi Active                                     [facility]



       Vaccines ty to                       3-16                        st



       And    adverse                      00:00:                      Hospita



       Toxoid reaction                      00                          l



              s to                                                    



              drug                                                    

 

       Iodine Drug   Active        Other (See 2017               Cold   [facility]



       And    Allergy               Comments) 2-20                 [facility]



       Iodide                             00:00:                      Medical



       Containi                             00                          Center



       ng                                                             



       Products                                                         

 

       Ciproflo Drug   Active        Anaphylaxis                       [facility]



       xacin  Allergy                      5-13                        [facility]



                                          00:00:                      [facility]



                                          00                          [place]

 

       Penicill Drug   Active        Anaphylaxis                       [facility]



       ins    Allergy                      5-13                        [facility]



                                          00:00:                      [facility]



                                          00                          [place]

 

       Tetanus Drug   Active        Itching                       [facility]



       Vaccines Allergy                      5-13                        [facility]



       And                                00:00:                      Medical



       Toxoid                             00                          [place]

 

       No Known Propensi Active               2016                      [facility]

i



       Drug   ty to                       2-22                        st



       Allergie adverse                      00:00:                      Hospita



       s      reaction                      00                          l



              s to                                                    



              drug                                                    







Social History







           Social Habit Start Date Stop Date  Quantity   Comments   Source

 

           History SDOH                                             [facility]



           Alcohol Std Drinks                                             Hospit

al

 

           History SDOH                                             [facility]



           Alcohol Binge                                             [facility]ospital

 

           History SDOH 2019 1                     [facility]



           Alcohol Frequency 00:00:00   00:00:00                         [facility]osp[facility]

l

 

           Alcohol intake 2017 Current               [facility]

es



                      00:00:00   00:00:00   non-drinker of            Medical Ce

nter



                                            alcohol               



                                            (finding)             

 

           Tobacco use and 2017 Never used            [facility]

[facility]



           exposure   00:00:00   [facility]:00:00                         [facility]

 

           Sex Assigned At 1940                       [facility]

[facility]



           B[facility]      00:00:00   [facility]:00                         [facility]









                Smoking Status  Start Date      Stop Date       Source

 

                Never smoked tobacco                                 [facility]

[facility]







Medications







       Ordered Filled Start  Stop   Current Ordering Indication Dosage Frequency

 Signature

                    Comments            Components          Source



     Medication Medication Date Date Medication? Clinician                (SIG) 

          



     Name Name                                                   

 

     carbidopa-l            Yes            1{tbl} [id number] Take 1       

    [facility]



     evodopa      6-19                          [id number] tablet by           s

t



      mg      12:26:                          3D   mouth 3           Hos[facility]

ta



     per       00                                 (three)           l



     disintegrat                                         times a           



     ing tablet                                         day.           

 

     carbidopa-l            Yes            1{tbl} [id number] Take 1       

    [facility]



     evodopa      6-19                          [id number] tablet by           s

t



      mg      12:26:                          3D   mouth 3           Hos[facility]

ta



     per       00                                 (three)           l



     disintegrat                                         times a           



     ing tablet                                         day.           

 

     aspirin 325            Yes            325mg QD   Take 325           M

ethodi



     MG tablet      3-16                               mg by           st



               10:22:                               mouth           [facility]ospita



               52                                 daily.           l

 

     aspirin 325            Yes            325mg QD   Take 325           M

ethodi



     MG tablet      3-16                               mg by           st



               10:22:                               mouth           [facility]



               52                                 daily.           l

 

     amLODIPine            Yes                      TAKE ONE           Met

hodi



     (NORVASC)      3-15                               (1)            st



     10 mg      00:00:                               TABLET(S)           [facility]osp[facility]



     tablet      00                                 BY MOUTH           l



                                                  ONCE A           



                                                  DAY.           

 

     amLODIPine            Yes                      TAKE ONE           Met

hodi



     (NORVASC)      3-15                               (1)            st



     10 mg      00:00:                               TABLET(S)           [facility]osp[facility]



     tablet      00                                 BY MOUTH           l



                                                  ONCE A           



                                                  DAY.           

 

     metoprolol            Yes                      TAKE ONE           Met

hodi



     tartrate      2-19                               (1)            st



     (LOPRESSOR)      00:00:                               TABLET(S)           H

ospita



     25 mg      00                                 BY MOUTH           l



     tablet                                         TWICE A           



                                                  DAY.           

 

     metoprolol            Yes                      TAKE ONE           Met

hodi



     tartrate      2-19                               (1)            st



     (LOPRESSOR)      00:00:                               TABLET(S)           H

ospita



     25 mg      00                                 BY MOUTH           l



     tablet                                         TWICE A           



                                                  DAY.           

 

     clopidogrel            Yes                      TAKE ONE           Me

thodi



     (PLAVIX) 75      2-09                               (1)            st



     mg tablet      00:00:                               TABLET(S)           [facility]

[facility]



               00                                 BY MOUTH           l



                                                  ONCE A           



                                                  DAY.           

 

     clopidogrel            Yes                      TAKE ONE           Me

thodi



     (PLAVIX) 75      2-09                               (1)            st



     mg tablet      00:00:                               TABLET(S)           [facility]

[facility]



               00                                 BY MOUTH           l



                                                  ONCE A           



                                                  DAY.           

 

     folic acid      2017      Yes            400ug QD   Take 400           [facility]



     (FOLVITE)      2-20                               mcg by           [facility]



     400 MCG      20:34:                               mouth           Medical



     tablet      26                                 nightly.           [place]

 

     cyanocobala      2017      Yes            1000ug QD   Take 1,000         

  [facility]



     min 1000      2-20                               mcg by           [facility]



     MCG tablet      20:34:                               mouth           [facility]

l



               26                                 daily.           [place]

 

     cranberry      2017      Yes                 Q.5D Take by           [facility]

[facility]



     extract      2-20                               mouth 2           [facility]



     (CRANBERRY      20:34:                               (two)           [facility]ica

l



     CONCENTRATE      26                                 times           Center



     ) 500 mg                                         daily.           



     Cap                                                         

 

     docusate      2017      Yes            100mg      Take 100           [facility]



     sodium      2-20                               mg by           [facility]



     (COLACE)      20:34:                               mouth 2           [facility]

l



     100 MG      26                                 (two)           Center



     capsule                                         times           



                                                  daily as           



                                                  needed for           



                                                  Constipati           



                                                  on.            

 

     CALCIUM      2017      Yes                 Q.5D Take by           [facility]



     CARBONATE/V      2-20                               mouth 2           [facility]



     ITAMIN D3      20:34:                               (two)           Medical



     (CALCIUM      26                                 times           Center



     600 + D,3,                                         daily.           



     ORAL)                                                        

 

     VIT C/VIT      2017      Yes                 QD   Take by           [facility]

[facility]



     E/LUTEIN/MI      2-20                               mouth           [facility]



     N/OMEGA-3      20:34:                               daily.           [facility]

l



     (OCUVITE      26                                                Center



     ORAL)                                                        

 

     folic acid      2017      Yes            400ug QD   Take 400           [facility]



     (FOLVITE)      2-20                               mcg by           [facility]



     400 MCG      20:34:                               mouth           Medical



     tablet      26                                 nightly.           Center

 

     cyanocobala      2017      Yes            1000ug QD   Take 1,000         

  [facility]



     min 1000      2-20                               mcg by           [facility]



     MCG tablet      20:34:                               mouth           [facility]

l



               26                                 daily.           Center

 

     CALCIUM      2017      Yes                 Q.5D Take by           [facility]



     CARBONATE/V      2-20                               mouth 2           [facility]



     ITAMIN D3      20:34:                               (two)           Medical



     (CALCIUM      26                                 times           Center



     600 + D,3,                                         daily.           



     ORAL)                                                        

 

     cranberry      2017      Yes                 Q.5D Take by           [facility]

[facility]



     extract      2-20                               mouth 2           [facility]



     (CRANBERRY      20:34:                               (two)           [facility]ica

l



     CONCENTRATE      26                                 times           Center



     ) 500 mg                                         daily.           



     Cap                                                         

 

     VIT C/VIT      2017      Yes                 QD   Take by           [facility]

t



     E/LUTEIN/MI      2-20                               mouth           [facility]



     N/OMEGA-3      20:34:                               daily.           [facility]

l



     (OCUVITE      26                                                Center



     ORAL)                                                        

 

     docusate      2017      Yes            100mg      Take 100           [facility]



     sodium      2-20                               mg by           [facility]



     (COLACE)      20:34:                               mouth 2           [facility]

l



     100 MG      26                                 (two)           Center



     capsule                                         times           



                                                  daily as           



                                                  needed for           



                                                  Constipati           



                                                  on.            

 

     atorvastati      2017      Yes                      TAKE ONE           Me

[facility]odi



     n (LIPITOR)      2-14                               (1)            st



     80 MG      00:00:                               TABLET(S)           [facility]ospita



     tablet      00                                 BY MOUTH           l



                                                  ONCE A           



                                                  DAY.           

 

     atorvastati      2017      Yes                      TAKE ONE           Me

[facility]odi



     n (LIPITOR)      2-14                               (1)            st



     80 MG      00:00:                               TABLET(S)           [facility]osp[facility]



     tablet      00                                 BY MOUTH           l



                                                  ONCE A           



                                                  DAY.           

 

     doxycycline      2016      Yes            100mg Q.5D Take 100           M

ethodi



     (VIBRAMYCIN      2-09                               mg by           st



     ) 100 MG      00:00:                               mouth 2           Hospit

a



     capsule      00                                 (two)           l



                                                  times a           



                                                  day.           

 

     doxycycline      2016      Yes            100mg Q.5D Take 100           M

ethodi



     (VIBRAMYCIN      2-09                               mg by           st



     ) 100 MG      00:00:                               [facility] 2           [facility]ospit

a



     capsule      00                                 (two)           l



                                                  times a           



                                                  day.           

 

     losartan      2016      Yes                                     [facility]



     (COZAAR) 50      0-31                                              st



     MG tablet      00:00:                                              Hospita



               00                                                l

 

     losartan      2016      Yes                                     [facility]



     (COZAAR) 50      0-31                                              st



     MG tablet      00:00:                                              [facility]ospita



               00                                                l

 

     nitrofurant      2016      Yes                                     [facility]

i



     oin,      0-26                                              st



     macrocrysta      00:00:                                              Hospit

a



     l-monohydra      00                                                l



     te,                                                         



     (MACROBID)                                                        



     100 MG                                                        



     capsule                                                        

 

     nitrofurant      2016      Yes                                     Method

i



     oin,      0-26                                              st



     macrocrysta      00:00:                                              Hospit

a



     l-monohydra      00                                                l



     te,                                                         



     (MACROBID)                                                        



     100 MG                                                        



     capsule                                                        







Procedures

This patient has no known procedures.



Plan of Care







             Planned Activity Planned Date Details      Comments     Source

 

             Future Scheduled 2022   HEPATITIS B VACCINES              Met

[facility]



             Test         07:22:51     (1 of 3 - 3-dose              



                                       series) [code =              



                                       HEPATITIS B VACCINES              



                                       (1 of 3 - 3-dose              



                                       series)]                  

 

             Future Scheduled 2022   COVID-19 VACCINE (#1)              [facility]



             Test         07:22:51     [code = COVID-19              



                                       VACCINE (#1)]              

 

             Future Scheduled 2022   SHINGLES VACCINES (1              Met

[facility]



             Test         07:22:51     of 2) [code = SHINGLES              



                                       VACCINES (1 of 2)]              

 

             Future Scheduled 2022   65+ PNEUMOCOCCAL              [facility]



             Test         07:22:51     VACCINE (1 - PCV)              



                                       [code = 65+               



                                       PNEUMOCOCCAL VACCINE              



                                       (1 - PCV)]                

 

             Future Scheduled 2022   INFLUENZA VACCINE              Method

[facility]



             Test         07:22:51     [code = INFLUENZA              



                                       VACCINE]                  

 

             Future Scheduled 2022   HEPATITIS B VACCINES              Met

[facility]



             Test         01:40:15     (1 of 3 - 3-dose              



                                       series) [code =              



                                       HEPATITIS B VACCINES              



                                       (1 of 3 - 3-dose              



                                       series)]                  

 

             Future Scheduled 2022   COVID-19 VACCINE (#1)              [facility]



             Test         01:40:15     [code = COVID-19              



                                       VACCINE (#1)]              

 

             Future Scheduled 2022   SHINGLES VACCINES (1              Met

[facility]



             Test         01:40:15     of 2) [code = SHINGLES              



                                       VACCINES (1 of 2)]              

 

             Future Scheduled 2022   65+ PNEUMOCOCCAL              [facility]



             Test         01:40:15     VACCINE (1 - PCV)              



                                       [code = 65+               



                                       PNEUMOCOCCAL VACCINE              



                                       (1 - PCV)]                

 

             Future Scheduled 2022   INFLUENZA VACCINE              Method

[facility]



             Test         01:40:15     [code = INFLUENZA              



                                       VACCINE]                  







Results







           Test Description Test Time  Test Comments Results    Result     [facility]

e



                                                       Comments   

 

           [place], ANGIOGRAM, 2017  Reason for FINAL REPORT PATIENT ID:        

    



           CEREBRAL   1          Exam:->cerebral [id number] DATE:            



                      11:48:00   aneurysm   2017NAME: Shahida            



                                 unruptured Elvira EstradaATTENDING:            



                                            Migue Amin MDFIR            



                                            ASSISTANT: CESARIO LooREOPERATIVE DIAGNOSIS:           

 



                                            Unruptured right            



                                            posterior communicating            



                                            artery aneurysm status            



                                            post stent assisted            



                                            coilingPOSTOPERATIVE            



                                            DIAGNOSIS: Unruptured            



                                            right posterior            



                                            communicating artery            



                                            aneurysm status post            



                                            stent assisted            



                                            coilingPROCEDURE            



                                            PERFORMED: Diagnostic            



                                            Cerebral              



                                            AngiogramANESTHESIA:            



                                            MACCOMPLICATIONS:            



                                            NoneESTIMATED BLOOD LOSS:           

 



                                            Less than 15ml ARTERIAL            



                                            VESSELS STUDIED:RIGHT            



                                            SUBCLAVIAN ARTERY            



                                            (x1)RIGHT COMMON CAROTID            



                                            ARTERY (CERVICAL x2)RIGHT           

 



                                            COMMON CAROTID ARTERY            



                                            (CRANIAL x3)RIGHT COMMON            



                                            FEMORAL ARTERY (x1)            



                                            MATERIALS EMPLOYED:1. 5            



                                            French short sheath 2. 4            



                                            French Berenstein            



                                            catheter3. Bentson            



                                            guidewire4. Terumo 0.035            



                                            LT glidewire5. 5 French            



                                            Mynx device INDICATIONS:            



                                            77-year-old female with            



                                            hypertension who is            



                                            status post endovascular            



                                            treatment of an            



                                            unruptured right            



                                            posterior communicating            



                                            artery aneurysm with            



                                            stent-assisted coiling on           

 



                                            May 19, 2017. She            



                                            presents for follow-up            



                                            cerebral angiogram. The            



                                            indications for the            



                                            procedure as well as the            



                                            risks, benefits and            



                                            alternatives were            



                                            discussed with the            



                                            patient and the family.            



                                            The risks discussed            



                                            included but were not            



                                            limited to stroke,            



                                            intracranial hemorrhage,            



                                            injury to the cervical            



                                            femoral or aortic            



                                            vessels, contrast            



                                            reaction, kidney to            



                                            toxicity, groin hematoma,           

 



                                            weakness paralysis and            



                                            even death. They            



                                            demonstrated            



                                            understanding of the risk           

 



                                            benefit profile and            



                                            agreed to proceed.            



                                            PROCEDURE: After            



                                            appropriate consent was            



                                            obtained, the patient was           

 



                                            brought to the            



                                            angiographic suite and            



                                            cardiopulmonary            



                                            monitoring was placed.            



                                            The anesthesia team            



                                            performed light sedation.           

 



                                            A timeout was performed.            



                                            Both groins were prepped            



                                            and draped in the usual            



                                            sterile fashion. After            



                                            administration of 10 mL            



                                            of 2% lidocaine, a            



                                            micropuncture needle was            



                                            used to perform a single            



                                            wall puncture of the            



                                            right common femoral            



                                            artery, a micropuncture            



                                            sheath was inserted, and            



                                            an angled DSA angiogram            



                                            was performed through the           

 



                                            sheath. Once good            



                                            location of the puncture            



                                            site was confirmed, a 5            



                                            French short sheath was            



                                            inserted over a Bentson            



                                            wire and was maintained            



                                            on heparinized saline            



                                            flush throughout the            



                                            remainder of the            



                                            procedure. Using coaxial            



                                            technique, a preflushed            



                                            Berenstein catheter on            



                                            constant heparinized            



                                            saline flush was advanced           

 



                                            over the Glidewire into            



                                            the descending aorta, the           

 



                                            Glidewire was removed,            



                                            the catheter back bled,            



                                            flushed in usual fashion            



                                            and the catheter was            



                                            maintained on heparinized           

 



                                            flushed throughout            



                                            duration of the case.            



                                            Using coaxial technique,            



                                            the catheter was advanced           

 



                                            into the aortic arch and            



                                            with the aid of            



                                            roadmapping, digital            



                                            fluoroscopy, and careful            



                                            guidewire manipulation,            



                                            the arteries described            



                                            below were selectively            



                                            catheterized. Upon each            



                                            successive            



                                            catheterization, digital            



                                            subtraction angiography            



                                            using the appropriate            



                                            rate and volume of            



                                            contrast in multiple            



                                            projections was            



                                            performed. At the            



                                            conclusion of the            



                                            procedure, the catheter            



                                            was retracted into the            



                                            aorta and the images            



                                            reviewed at the outside            



                                            workstation for quality            



                                            and content. Then the            



                                            femoral sheath was            



                                            removed and hemostasis            



                                            achieved with a 5 French            



                                            Mynx device and manual            



                                            compression. The patient            



                                            tolerated the procedure            



                                            well and was transported            



                                            in unchanged neurological           

 



                                            status without groin            



                                            hematoma and with good            



                                            distal lower extremity            



                                            pulses. The patient was            



                                            transferred to the            



                                            recovery area to be            



                                            monitored as per            



                                            protocol. FINDINGS: RIGHT           

 



                                            SUBCLAVIAN ARTERY (DSA,            



                                            PA, LATERAL ROADMAP, x1):           

 



                                            There is normal course            



                                            and caliber of the            



                                            subclavian artery with            



                                            physiological filling of            



                                            its distal branches.            



                                            Limited visualization in            



                                            this roadmap of the            



                                            origins of the right            



                                            vertebral artery,            



                                            internal mamillary            



                                            artery, thyrocervical and           

 



                                            costocervical trunks.            



                                            RIGHT COMMON CAROTID            



                                            ARTERY (DSA, PA, LATERAL,           

 



                                            CERVICAL x2): Tortuous            



                                            course of the right            



                                            common carotid artery.            



                                            The distal cervical            



                                            common carotid as well as           

 



                                            the origins of the right            



                                            internal and external            



                                            carotid arteries are            



                                            widely patent without            



                                            evidence of ulceration or           

 



                                            stenosis. The proximal            



                                            portions of the            



                                            superficial temporal            



                                            artery, middle meningeal            



                                            artery, internal            



                                            maxillary artery,            



                                            occipital artery and            



                                            their branches are            



                                            visualized and appear            



                                            normal. RIGHT COMMON            



                                            CAROTID ARTERY (DSA, PA,            



                                            LATERAL, MAGNIFIED            



                                            OBLIQUE, CRANIAL x3):            



                                            Minimal residual aneurysm           

 



                                            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            



                                            treated right posterior            



                                            communicating artery            



                                            aneurysm, best visualized           

 



                                            on sequence A9. Normal            



                                            distal cervical, petrous,           

 



                                            cavernous and            



                                            supraclinoid internal            



                                            carotid artery with            



                                            physiological filling of            



                                            the MCA and MAXIMINO branches.           

 



                                            Limited visualization of            



                                            the venous phase. No            



                                            other aneurysms or other            



                                            vascular lesions are            



                                            seen. There is no            



                                            significant            



                                            atherosclerosis or            



                                            stenosis. Normal course            



                                            and caliber of the            



                                            external carotid artery            



                                            and its branches. There            



                                            is a well visualized            



                                            superficial temporal            



                                            artery, middle meningeal            



                                            artery, internal            



                                            maxillary artery,            



                                            occipital artery and            



                                            their branches. RIGHT            



                                            COMMON FEMORAL ARTERY            



                                            (DSA, PA, X 1): Normal            



                                            location of the puncture            



                                            site above the            



                                            bifurcation and below            



                                            markers of the inguinal            



                                            ligament. IMPRESSION:            



                                            Minimal residual aneurysm           

 



                                            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            



                                            treated right posterior            



                                            communicating artery            



                                            aneurysm. No technical or           

 



                                            procedural complications            



                                            FACULTY ATTESTATION: I,            



                                            Migue Amin M.D.,            



                                            was present for the            



                                            entirety of the            



                                            procedure. I performed or           

 



                                            directly supervised all            



                                            aspects of the procedure.           

 



                                            I performed all critical            



                                            aspects of the case. I            



                                            interpreted the images            



                                            and reported the results.           

 



                                            Signed: Chaz [place]ep[place] Verified            



                                            Date/Time: 2017            



                                            11:48:56 Reading            



                                            Location: [facility] Y026            



                                            Neuro Angio Reading Room            



                                            Electronically signed by:           

 



                                            CHAZ COX on            



                                            2017 11:48 AM            









                    BASIC METABOLIC PANEL 2017 11:56:00 









                      Test Item  Value      Reference Range Interpretation Comme

nts









             SODIUM (BEAKER) (test code 136 meq/L    136-145                   



             = 381)                                              

 

             POTASSIUM (BEAKER) (test 3.9 meq/L    3.5-5.1                   



             code = 379)                                         

 

             CHLORIDE (BEAKER) (test 102 meq/L                        



             code = 382)                                         

 

             CO2 (BEAKER) (test code = 25 meq/L     22-29                     



             355)                                                

 

             BLOOD UREA NITROGEN 20 mg/dL     7-21                      



             (BEAKER) (test code = 354)                                        

 

             CREATININE (BEAKER) (test 0.82 mg/dL   0.57-1.25                 



             code = 358)                                         

 

             GLUCOSE RANDOM (BEAKER) 106 mg/dL           H            



             (test code = 652)                                        

 

             CALCIUM (BEAKER) (test code 9.6 mg/dL    8.4-10.2                  



             = 697)                                              

 

             EGFR (BEAKER) (test code = 68 mL/min/1.73 sq m                     

      ESTIMATED GFR IS NOT AS



             1092)                                               ACCURATE AS CRE

ATININE



                                                                 CLEARANCE IN AK

EDICTING



                                                                 GLOMERULAR FILT

RATION



                                                                 RATE. ESTIMATED

 GFR IS NOT



                                                                 APPLICABLE FOR 

DIALYSIS



                                                                 PATIENTS.



CBC WITH PLATELET COUNT + MANUAL [id number] 11:33:00





             Test Item    Value        Reference Range Interpretation Comments

 

             WHITE BLOOD CELL COUNT 5.0 K/ L     3.5-10.5                  



             (BEAKER) (test code =                                        



             775)                                                

 

             RED BLOOD CELL COUNT 3.94 M/ L    3.93-5.22                 



             (BEAKER) (test code =                                        



             761)                                                

 

             HEMOGLOBIN (BEAKER) 12.1 GM/DL   11.2-15.7                 



             (test code = 410)                                        

 

             HEMATOCRIT (BEAKER) 37.1 %       34.1-44.9                 



             (test code = 411)                                        

 

             MEAN CORPUSCULAR 94.2 fL      79.4-94.8                 



             VOLUME (BEAKER) (test                                        



             code = 753)                                         

 

             MEAN CORPUSCULAR 30.7 pg      25.6-32.2                 



             HEMOGLOBIN (BEAKER)                                        



             (test code = 751)                                        

 

             MEAN CORPUSCULAR 32.6 GM/DL   32.2-35.5                 



             HEMOGLOBIN CONC                                        



             (BEAKER) (test code =                                        



             752)                                                

 

             RED CELL DISTRIBUTION 12.7 %       11.7-14.4                 



             WIDTH (BEAKER) (test                                        



             code = 412)                                         

 

             PLATELET COUNT 212 K/CU MM  150-450                   



             (BEAKER) (test code =                                        



             756)                                                

 

             MEAN PLATELET VOLUME 9.4 fL       9.4-12.3                  



             (BEAKER) (test code =                                        



             754)                                                

 

             NUCLEATED RED BLOOD 0 /100 WBC   0-0                       



             CELLS (BEAKER) (test                                        



             code = 413)                                         

 

             IMMATURE     0 %          0-1                       



             GRANULOCYTES-RELATIVE                                        



             PERCENT (BEAKER) (test                                        



             code = 6838)                                        

 

             NEUTROPHILS RELATIVE 60 %                                   This is

 an appended



             PERCENT (BEAKER) (test                                        repor

t. These



             code = 429)                                         results have be

en



                                                                 appended to a



                                                                 previously sherine

l



                                                                 verified report

.

 

             LYMPHOCYTES RELATIVE 30 %                                   This is

 an appended



             PERCENT (BEAKER) (test                                        repor

t. These



             code = 430)                                         results have be

en



                                                                 appended to a



                                                                 previously sherine

l



                                                                 verified report

.

 

             MONOCYTES RELATIVE 9 %                                    This is a

n appended



             PERCENT (BEAKER) (test                                        repor

t. These



             code = 431)                                         results have be

en



                                                                 appended to a



                                                                 previously sherine

l



                                                                 verified report

.

 

             EOSINOPHILS RELATIVE 1 %                                    This is

 an appended



             PERCENT (BEAKER) (test                                        repor

t. These



             code = 432)                                         results have be

en



                                                                 appended to a



                                                                 previously sherine

l



                                                                 verified report

.

 

             BASOPHILS RELATIVE 1 %                                    This is a

n appended



             PERCENT (BEAKER) (test                                        repor

t. These



             code = 437)                                         results have be

en



                                                                 appended to a



                                                                 previously sherine

l



                                                                 verified report

.

 

             NEUTROPHILS ABSOLUTE 2.97 K/ L    1.56-6.13                 This is

 an appended



             COUNT (BEAKER) (test                                        report.

 These



             code = 670)                                         results have be

en



                                                                 appended to a



                                                                 previously sherine

l



                                                                 verified report

.

 

             LYMPHOCYTES ABSOLUTE 1.46 K/ L    1.18-3.74                 This is

 an appended



             COUNT (BEAKER) (test                                        report.

 These



             code = 414)                                         results have be

en



                                                                 appended to a



                                                                 previously sherine

l



                                                                 verified report

.

 

             MONOCYTES ABSOLUTE 0.42 K/ L    0.24-0.36    H            This is a

n appended



             COUNT (BEAKER) (test                                        report.

 These



             code = 415)                                         results have be

en



                                                                 appended to a



                                                                 previously sherine

l



                                                                 verified report

.

 

             EOSINOPHILS ABSOLUTE 0.05 K/ L    0.04-0.36                 This is

 an appended



             COUNT (BEAKER) (test                                        report.

 These



             code = 416)                                         results have be

en



                                                                 appended to a



                                                                 previously sherine

l



                                                                 verified report

.

 

             BASOPHILS ABSOLUTE 0.04 K/ L    0.01-0.08                 This is a

n appended



             COUNT (BEAKER) (test                                        report.

 These



             code = 417)                                         results have be

en



                                                                 appended to a



                                                                 previously sherine

l



                                                                 verified report

.



[id number] 11:30:00





             Test Item    Value        Reference Range Interpretation Comments

 

             PROTIME (BEAKER) (test code = 15.2 seconds 11.7-14.7    H          

  



             759)                                                

 

             INR (BEAKER) (test code = 370) 1.2          <=5.9                  

   

 

             PARTIAL THROMBOPLASTIN TIME 42.8 seconds 22.5-36.0    H            



             (BEAKER) (test code = 760)                                        



RECOMMENDED COUMADIN/WARFARIN INR THERAPY RANGESSTANDARD DOSE: 2.0 - 3.0 
Includes: PROPHYLAXIS for venous thrombosis, systemic embolization; TREATMENT 
for venous thrombosis and/or pulmonary embolus.HIGH RISK: Target INR is 2.5-3.5 
for patients with mechanical heart valves.PLATELET AGGREGATION: FUNCTION SCREEN
2017 09:23:00





             Test Item    Value        Reference Range Interpretation Comments

 

             WEAK ADP     5 %          60-91        L            



             RESULT(BEAKER) (test                                        



             code = 2135)                                        

 

             PLATELET FUNCTION 0-39% indicates marked                           



             SCREEN INTERP platelet dysfunction                           



             (BEAKER) (test code =                                        



             2173)                                               

 

             [id number] Nelly Zurita MD                       

    



             (BEAKER) (test code = (electronic signature)                       

    



             6937)                                               

 

             PLATELET COUNT  K/CU MM  150-430                   



             (BEAKER) (test code =                                        



             2376)                                               



CBC W/PLT COUNT &amp; AUTO [id number] 07:07:00





             Test Item    Value        Reference Range Interpretation Comments

 

             WHITE BLOOD CELL COUNT (BEAKER) 5.9 K/ L     4.0-10.0              

    



             (test code = 775)                                        

 

             RED BLOOD CELL COUNT (BEAKER) 3.88 M/ L    4.00-5.00    L          

  



             (test code = 761)                                        

 

             HEMOGLOBIN (BEAKER) (test code = 12.5 GM/DL   12.0-15.0            

     



             410)                                                

 

             HEMATOCRIT (BEAKER) (test code = 37.8 %       36.0-45.0            

     



             411)                                                

 

             MEAN CORPUSCULAR VOLUME (BEAKER) 97.3 fL      82.0-99.0            

     



             (test code = 753)                                        

 

             MEAN CORPUSCULAR HEMOGLOBIN 32.1 pg      27.0-33.0                 



             (BEAKER) (test code = 751)                                        

 

             MEAN CORPUSCULAR HEMOGLOBIN CONC 33.0 GM/DL   32.0-36.0            

     



             (BEAKER) (test code = 752)                                        

 

             RED CELL DISTRIBUTION WIDTH 13.4 %       10.3-14.2                 



             (BEAKER) (test code = 412)                                        

 

             PLATELET COUNT (BEAKER) (test 238 K/CU MM  150-430                 

  



             code = 756)                                         

 

             MEAN PLATELET VOLUME (BEAKER) 7.2 fL       6.5-10.5                

  



             (test code = 754)                                        

 

             NUCLEATED RED BLOOD CELLS 0 /100 WBC   0-0                       



             (BEAKER) (test code = 413)                                        

 

             NEUTROPHILS RELATIVE PERCENT 58 %                                  

 



             (BEAKER) (test code = 429)                                        

 

             LYMPHOCYTES RELATIVE PERCENT 31 %                                  

 



             (BEAKER) (test code = 430)                                        

 

             MONOCYTES RELATIVE PERCENT 10 %                                   



             (BEAKER) (test code = 431)                                        

 

             EOSINOPHILS RELATIVE PERCENT 0 %                                   

 



             (BEAKER) (test code = 432)                                        

 

             BASOPHILS RELATIVE PERCENT 1 %                                    



             (BEAKER) (test code = 437)                                        

 

             NEUTROPHILS ABSOLUTE COUNT 3.36 K/ L    1.80-8.00                 



             (BEAKER) (test code = 670)                                        

 

             LYMPHOCYTES ABSOLUTE COUNT 1.84 K/ L    1.48-4.50                 



             (BEAKER) (test code = 414)                                        

 

             MONOCYTES ABSOLUTE COUNT (BEAKER) 0.58 K/ L    0.00-1.30           

      



             (test code = 415)                                        

 

             EOSINOPHILS ABSOLUTE COUNT 0.01 K/ L    0.00-0.50                 



             (BEAKER) (test code = 416)                                        

 

             BASOPHILS ABSOLUTE COUNT (BEAKER) 0.06 K/ L    0.00-0.20           

      



             (test code = 417)                                        



0.00BASI METABOLIC [id number] 06:36:00





             Test Item    Value        Reference Range Interpretation Comments

 

             SODIUM (BEAKER) 138 meq/L    136-145                   



             (test code = 381)                                        

 

             POTASSIUM (BEAKER) 4.0 meq/L    3.5-5.1                   Specimen 

slightly



             (test code = 379)                                        hemolyzed

 

             CHLORIDE (BEAKER) 106 meq/L                        



             (test code = 382)                                        

 

             CO2 (BEAKER) (test 23 meq/L     22-29                     



             code = 355)                                         

 

             BLOOD UREA NITROGEN 13 mg/dL     7-21                      



             (BEAKER) (test code                                        



             = 354)                                              

 

             CREATININE (BEAKER) 0.79 mg/dL   0.57-1.25                 Specimen

 slightly



             (test code = 358)                                        hemolyzed

 

             GLUCOSE RANDOM 97 mg/dL                         



             (BEAKER) (test code                                        



             = 652)                                              

 

             CALCIUM (BEAKER) 9.0 mg/dL    8.4-10.2                  



             (test code = 697)                                        

 

             EGFR (BEAKER) (test 71 mL/min/1.73                           ESTIMA

[place] GFR IS



             code = 1092) sq m                                   NOT AS ACCURATE

 AS



                                                                 CREATININE



                                                                 CLEARANCE IN



                                                                 PREDICTING



                                                                 GLOMERULAR



                                                                 FILTRATION RATE

.



                                                                 ESTIMATED GFR I

S



                                                                 NOT APPLICABLE 

FOR



                                                                 DIALYSIS PATIEN

TS.



[id number] 06:21:00





             Test Item    Value        Reference Range Interpretation Comments

 

             PROTIME (BEAKER) (test code = 14.1 seconds 11.7-14.7               

  



             759)                                                

 

             INR (BEAKER) (test code = 370) 1.1          <=5.9                  

   

 

             PARTIAL THROMBOPLASTIN TIME 37.0 seconds 22.5-36.0    H            



             (BEAKER) (test code = 760)                                        



RECOMMENDED COUMADIN/WARFARIN INR THERAPY RANGESSTANDARD DOSE: 2.0 - 3.0 
Includes: PROPHYLAXIS for venous thrombosis, systemic embolization; TREATMENT 
for venous thrombosis and/or pulmonary embolus.HIGH RISK: Target INR is 2.5-3.5 
for patients with mechanical heart valves.PROTHROMBIN [id number] 
06:20:00





             Test Item    Value        Reference Range Interpretation Comments

 

             PROTIME (BEAKER) (test code = 14.1 seconds 11.7-14.7               

  



             759)                                                

 

             INR (BEAKER) (test code = 370) 1.1          <=5.9                  

   



RECOMMENDED COUMADIN/WARFARIN INR THERAPY RANGESSTANDARD DOSE: 2.0 - 3.0 
Includes: PROPHYLAXIS for venous thrombosis, systemic embolization; TREATMENT 
for venous thrombosis and/or pulmonary embolus.HIGH RISK: Target INR is 2.5-3.5 
for patients with mechanical heart valves.PT/[id number] 23:38:00





             Test Item    Value        Reference Range Interpretation Comments

 

             PROTIME (BEAKER) (test code = 15.2 seconds 11.7-14.7    H          

  



             759)                                                

 

             INR (BEAKER) (test code = 370) 1.2          <=5.9                  

   

 

             PARTIAL THROMBOPLASTIN TIME 36.5 seconds 22.5-36.0    H            



             (BEAKER) (test code = 760)                                        



RECOMMENDED COUMADIN/WARFARIN INR THERAPY RANGESSTANDARD DOSE: 2.0 - 3.0 
Includes: PROPHYLAXIS for venous thrombosis, systemic embolization; TREATMENT 
for venous thrombosis and/or pulmonary embolus.HIGH RISK: Target INR is 2.5-3.5 
for patients with mechanical heart valves.BASIC METABOLIC [id number] 
23:33:00





             Test Item    Value        Reference Range Interpretation Comments

 

             SODIUM (BEAKER) 137 meq/L    136-145                   



             (test code = 381)                                        

 

             POTASSIUM (BEAKER) 5.7 meq/L    3.5-5.1      H            Specimen 

markedly



             (test code = 379)                                        hemolyzed

 

             CHLORIDE (BEAKER) 105 meq/L                        



             (test code = 382)                                        

 

             CO2 (BEAKER) (test 25 meq/L     22-29                     



             code = 355)                                         

 

             BLOOD UREA NITROGEN 13 mg/dL     7-21                      



             (BEAKER) (test code                                        



             = 354)                                              

 

             CREATININE (BEAKER) 0.84 mg/dL   0.57-1.25                 Specimen

 markedly



             (test code = 358)                                        hemolyzed

 

             GLUCOSE RANDOM 86 mg/dL                         



             (BEAKER) (test code                                        



             = 652)                                              

 

             CALCIUM (BEAKER) 8.9 mg/dL    8.4-10.2                  



             (test code = 697)                                        

 

             EGFR (BEAKER) (test 66 mL/min/1.73                           ESTIMA

[place] GFR IS



             code = 1092) sq m                                   NOT AS ACCURATE

 AS



                                                                 CREATININE



                                                                 CLEARANCE IN



                                                                 PREDICTING



                                                                 GLOMERULAR



                                                                 FILTRATION RATE

.



                                                                 ESTIMATED GFR I

S



                                                                 NOT APPLICABLE 

FOR



                                                                 DIALYSIS PATIEN

TS.



CBC W/PLT COUNT &amp; AUTO [id number] 23:19:00





             Test Item    Value        Reference Range Interpretation Comments

 

             WHITE BLOOD CELL COUNT (BEAKER) 7.0 K/ L     4.0-10.0              

    



             (test code = 775)                                        

 

             RED BLOOD CELL COUNT (BEAKER) 3.76 M/ L    4.00-5.00    L          

  



             (test code = 761)                                        

 

             HEMOGLOBIN (BEAKER) (test code = 12.7 GM/DL   12.0-15.0            

     



             410)                                                

 

             HEMATOCRIT (BEAKER) (test code = 36.9 %       36.0-45.0            

     



             411)                                                

 

             MEAN CORPUSCULAR VOLUME (BEAKER) 98.2 fL      82.0-99.0            

     



             (test code = 753)                                        

 

             MEAN CORPUSCULAR HEMOGLOBIN 33.7 pg      27.0-33.0    H            



             (BEAKER) (test code = 751)                                        

 

             MEAN CORPUSCULAR HEMOGLOBIN CONC 34.4 GM/DL   32.0-36.0            

     



             (BEAKER) (test code = 752)                                        

 

             RED CELL DISTRIBUTION WIDTH 12.2 %       10.3-14.2                 



             (BEAKER) (test code = 412)                                        

 

             PLATELET COUNT (BEAKER) (test 241 K/CU MM  150-430                 

  



             code = 756)                                         

 

             MEAN PLATELET VOLUME (BEAKER) 7.2 fL       6.5-10.5                

  



             (test code = 754)                                        

 

             NUCLEATED RED BLOOD CELLS 0 /100 WBC   0-0                       



             (BEAKER) (test code = 413)                                        

 

             NEUTROPHILS RELATIVE PERCENT 58 %                                  

 



             (BEAKER) (test code = 429)                                        

 

             LYMPHOCYTES RELATIVE PERCENT 34 %                                  

 



             (BEAKER) (test code = 430)                                        

 

             MONOCYTES RELATIVE PERCENT 7 %                                    



             (BEAKER) (test code = 431)                                        

 

             EOSINOPHILS RELATIVE PERCENT 0 %                                   

 



             (BEAKER) (test code = 432)                                        

 

             BASOPHILS RELATIVE PERCENT 1 %                                    



             (BEAKER) (test code = 437)                                        

 

             NEUTROPHILS ABSOLUTE COUNT 4.08 K/ L    1.80-8.00                 



             (BEAKER) (test code = 670)                                        

 

             LYMPHOCYTES ABSOLUTE COUNT 2.36 K/ L    1.48-4.50                 



             (BEAKER) (test code = 414)                                        

 

             MONOCYTES ABSOLUTE COUNT (BEAKER) 0.52 K/ L    0.00-1.30           

      



             (test code = 415)                                        

 

             EOSINOPHILS ABSOLUTE COUNT 0.02 K/ L    0.00-0.50                 



             (BEAKER) (test code = 416)                                        

 

             BASOPHILS ABSOLUTE COUNT (BEAKER) 0.05 K/ L    0.00-0.20           

      



             (test code = 417)                                        



0.00POCT-P2Y12 PLATELET [id number] 20:57:00





             Test Item    Value        Reference Range Interpretation Comments

 

             POC-P2Y12 PLATELET AGG (BEAKER) (test 31 PRU                       

          



             code = 2303)                                        



RANGE INFORMATION: PRU reference range is 194-418. Post Drug Results: Lower PRU 
levels are associated with expected antiplatelet effect. Values may be below the
stated reference range above. The post-drug PRU values reported in the [employer]
P2Y12 package insert are .URINALYSIS W/ [id number] 09:22:00





             Test Item    Value        Reference Range Interpretation Comments

 

             COLOR (BEAKER) (test Light Yellow                           



             code = 470)                                         

 

             CLARITY (BEAKER) (test Hazy                                   



             code = 469)                                         

 

             SPECIFIC GRAVITY UA 1.009        1.001-1.035               



             (BEAKER) (test code =                                        



             468)                                                

 

             PH UA (BEAKER) (test 5.5          5.0-8.0                   



             code = 467)                                         

 

             PROTEIN UA (BEAKER) Negative     Negative                  



             (test code = 464)                                        

 

             GLUCOSE UA (BEAKER) Negative     Negative                  



             (test code = 365)                                        

 

             KETONES UA (BEAKER) Negative     Negative                  



             (test code = 371)                                        

 

             BILIRUBIN UA (BEAKER) Negative     Negative                  



             (test code = 462)                                        

 

             BLOOD UA (BEAKER) (test Small        Negative     A            



             code = 461)                                         

 

             NITRITE UA (BEAKER) Positive     Negative     A            



             (test code = 465)                                        

 

             LEUKOCYTE ESTERASE UA Large        Negative     A            



             (BEAKER) (test code =                                        



             466)                                                

 

             UROBILINOGEN UA (BEAKER) 0.2 mg/dL    0.2-1.0                   



             (test code = 463)                                        

 

             RBC UA (BEAKER) (test 12 /HPF                                



             code = 519)                                         

 

             WBC UA (BEAKER) (test 92 /HPF                                



             code = 520)                                         

 

             BACTERIA (BEAKER) (test Rare                                   



             code = 517)                                         

 

             MUCUS (BEAKER) (test Rare                                   



             code = 1574)                                        

 

             SQUAMOUS EPITHELIAL < /HPF                                 



             (BEAKER) (test code =                                        



             516)                                                

 

             YEAST (BEAKER) (test Occasional                             



             code = 1585)                                        

 

             SOURCE(BEAKER) (test Urine, Straight                           



             code = 2795) Catheter                               



[id number] 04:55:00





             Test Item    Value        Reference Range Interpretation Comments

 

             PHOSPHORUS (BEAKER) (test code = 3.3 mg/dL    2.3-4.7              

     



             604)                                                



[id number] 04:55:00





             Test Item    Value        Reference Range Interpretation Comments

 

             MAGNESIUM (BEAKER) (test code = 1.6 mg/dL    1.6-2.6               

    



             627)                                                



BASIC METABOLIC [id number] 04:55:00





             Test Item    Value        Reference Range Interpretation Comments

 

             SODIUM (BEAKER) 140 meq/L    136-145                   



             (test code = 381)                                        

 

             POTASSIUM (BEAKER) 3.4 meq/L    3.5-5.1      L            



             (test code = 379)                                        

 

             CHLORIDE (BEAKER) 107 meq/L                        



             (test code = 382)                                        

 

             CO2 (BEAKER) (test 22 meq/L     22-29                     



             code = 355)                                         

 

             BLOOD UREA NITROGEN 8 mg/dL      7-21                      



             (BEAKER) (test code                                        



             = 354)                                              

 

             CREATININE (BEAKER) 0.71 mg/dL   0.57-1.25                 



             (test code = 358)                                        

 

             GLUCOSE RANDOM 109 mg/dL           H            



             (BEAKER) (test code                                        



             = 652)                                              

 

             CALCIUM (BEAKER) 8.6 mg/dL    8.4-10.2                  



             (test code = 697)                                        

 

             EGFR (BEAKER) (test 80 mL/min/1.73                           ESTIMA

[facility] GFR IS



             code = 1092) sq m                                   NOT AS ACCURATE

 AS



                                                                 CREATININE



                                                                 CLEARANCE IN



                                                                 PREDICTING



                                                                 GLOMERULAR



                                                                 FILTRATION RATE

.



                                                                 ESTIMATED GFR I

S



                                                                 NOT APPLICABLE 

FOR



                                                                 DIALYSIS PATIEN

TS.



CBC W/PLT COUNT &amp; AUTO [id number] 04:52:00





             Test Item    Value        Reference Range Interpretation Comments

 

             WHITE BLOOD CELL COUNT (BEAKER) 7.5 K/ L     4.0-10.0              

    



             (test code = 775)                                        

 

             RED BLOOD CELL COUNT (BEAKER) 4.04 M/ L    4.00-5.00               

  



             (test code = 761)                                        

 

             HEMOGLOBIN (BEAKER) (test code = 12.9 GM/DL   12.0-15.0            

     



             410)                                                

 

             HEMATOCRIT (BEAKER) (test code = 39.7 %       36.0-45.0            

     



             411)                                                

 

             MEAN CORPUSCULAR VOLUME (BEAKER) 98.3 fL      82.0-99.0            

     



             (test code = 753)                                        

 

             MEAN CORPUSCULAR HEMOGLOBIN 31.9 pg      27.0-33.0                 



             (BEAKER) (test code = 751)                                        

 

             MEAN CORPUSCULAR HEMOGLOBIN CONC 32.5 GM/DL   32.0-36.0            

     



             (BEAKER) (test code = 752)                                        

 

             RED CELL DISTRIBUTION WIDTH 12.0 %       10.3-14.2                 



             (BEAKER) (test code = 412)                                        

 

             PLATELET COUNT (BEAKER) (test 196 K/CU MM  150-430                 

  



             code = 756)                                         

 

             MEAN PLATELET VOLUME (BEAKER) 7.2 fL       6.5-10.5                

  



             (test code = 754)                                        

 

             NUCLEATED RED BLOOD CELLS 0 /100 WBC   0-0                       



             (BEAKER) (test code = 413)                                        

 

             NEUTROPHILS RELATIVE PERCENT 81 %                                  

 



             (BEAKER) (test code = 429)                                        

 

             LYMPHOCYTES RELATIVE PERCENT 14 %                                  

 



             (BEAKER) (test code = 430)                                        

 

             MONOCYTES RELATIVE PERCENT 6 %                                    



             (BEAKER) (test code = 431)                                        

 

             EOSINOPHILS RELATIVE PERCENT 0 %                                   

 



             (BEAKER) (test code = 432)                                        

 

             BASOPHILS RELATIVE PERCENT 0 %                                    



             (BEAKER) (test code = 437)                                        

 

             NEUTROPHILS ABSOLUTE COUNT 6.03 K/ L    1.80-8.00                 



             (BEAKER) (test code = 670)                                        

 

             LYMPHOCYTES ABSOLUTE COUNT 1.01 K/ L    1.48-4.50    L            



             (BEAKER) (test code = 414)                                        

 

             MONOCYTES ABSOLUTE COUNT (BEAKER) 0.42 K/ L    0.00-1.30           

      



             (test code = 415)                                        

 

             EOSINOPHILS ABSOLUTE COUNT 0.01 K/ L    0.00-0.50                 



             (BEAKER) (test code = 416)                                        

 

             BASOPHILS ABSOLUTE COUNT (BEAKER) 0.01 K/ L    0.00-0.20           

      



             (test code = 417)                                        



[id number] 16:01:00





             Test Item    Value        Reference Range Interpretation Comments

 

             ACTIVATED CLOTTING TIME 245 sec                                TEST

ED AT [place] [address]



             ([facility]) (test code =                                        MARYANNNE

[address] [address])                                                [address]



[id number] 15:00:00





             Test Item    Value        Reference Range Interpretation Comments

 

             ACTIVATED CLOTTING TIME 255 sec                                TEST

ED AT [place] [address]



             ([facility]) (test code =                                        MARYANNNE

[place] [address])                                                [id number]



[id number] 14:44:00





             Test Item    Value        Reference Range Interpretation Comments

 

             ACTIVATED CLOTTING TIME 234 sec                                TEST

ED AT [place] [address]



             ([facility]) (test code =                                        DAYO LOPEZ [place] [address])                                                [address]



[id number] 14:31:00





             Test Item    Value        Reference Range Interpretation Comments

 

             ACTIVATED CLOTTING TIME 219 sec                                TEST

ED AT [place] [address]



             ([facility]) (test code =                                        DAYO LOPEZ [place] [address])                                                [address]



[id number] 14:31:00





             Test Item    Value        Reference Range Interpretation Comments

 

             ACTIVATED CLOTTING TIME 183 sec                                TEST

ED AT [place] [address]



             ([facility]) (test code =                                        MARYANNFAWAD LOPEZ [place] [address])                                                [address]



[id number] 13:22:00





             Test Item    Value        Reference Range Interpretation Comments

 

             ACTIVATED CLOTTING TIME 137 sec                                TEST

ED AT [place] [address]



             ([facility]) (test code =                                        DAYO [place]OPE[place]



             [facility])                                                [address]



POCT-P2Y12 PLATELET [id number] 07:24:00





             Test Item    Value        Reference Range Interpretation Comments

 

             POC-P2Y12 PLATELET AGG (BEAKER) (test 110 PRU                      

          



             code = 2303)                                        



RANGE INFORMATION: PRU reference range is 194-418. Post Drug Results: Lower PRU 
levels are associated with expected antiplatelet effect. Values may be below the
stated reference range above. The post-drug PRU values reported in the [employer]
P2Y12 package insert are .POCT-ASPIRIN PLATELET [id number] 
07:24:00





             Test Item    Value        Reference Range Interpretation Comments

 

             POC-ASPIRIN PLATELET AGG (BEAKER) 402 ARU                          

      



             (test code = 2302)                                        



RANGE INFORMATION: 350-549 ARU Therapeutic range for platelet function. 550-700 
ARU Non-Therapeutic range for platelet function.PLATELET AGGREGATION: FUNCTION 
[id number] 10:21:00





             Test Item    Value        Reference Range Interpretation Comments

 

             WEAK ADP     92 %         60-91        H            



             RESULT(BEAKER) (test                                        



             code = 2135)                                        

 

             PLATELET FUNCTION % indicates                           



             SCREEN INTERP (BEAKER) normal platelet                           



             (test code = 2173) function                               

 

             [id number] Meredith Reyes, MD                           



             (BEAKER) (test code = (electronic signature)                       

    



             4153)                                               

 

             PLATELET COUNT  K/CU MM  150-430                   



             (BEAKER) (test code =                                        



             2656)                                               



COMPREHENSIVE METABOLIC [id number] 05:11:00





             Test Item    Value        Reference Range Interpretation Comments

 

             TOTAL PROTEIN 6.5 gm/dL    6.0-8.3                   



             (BEAKER) (test code =                                        



             770)                                                

 

             ALBUMIN (BEAKER) 3.6 g/dL     3.5-5.0                   



             (test code = 1145)                                        

 

             ALKALINE PHOSPHATASE 60 U/L                           



             (BEAKER) (test code =                                        



             346)                                                

 

             BILIRUBIN TOTAL 1.1 mg/dL    0.2-1.2                   



             (BEAKER) (test code =                                        



             377)                                                

 

             SODIUM (BEAKER) (test 136 meq/L    136-145                   



             code = 381)                                         

 

             POTASSIUM (BEAKER) 3.8 meq/L    3.5-5.1                   



             (test code = 379)                                        

 

             CHLORIDE (BEAKER) 104 meq/L                        



             (test code = 382)                                        

 

             CO2 (BEAKER) (test 23 meq/L     22-29                     



             code = 355)                                         

 

             BLOOD UREA NITROGEN 13 mg/dL     7-21                      



             (BEAKER) (test code =                                        



             354)                                                

 

             CREATININE (BEAKER) 0.79 mg/dL   0.57-1.25                 



             (test code = 358)                                        

 

             GLUCOSE RANDOM 89 mg/dL                         



             (BEAKER) (test code =                                        



             652)                                                

 

             CALCIUM (BEAKER) 9.2 mg/dL    8.4-10.2                  



             (test code = 697)                                        

 

             AST (SGOT) (BEAKER) 19 U/L       5-34                      



             (test code = 353)                                        

 

             ALT (SGPT) (BEAKER) 8 U/L        6-55                      



             (test code = 347)                                        

 

             EGFR (BEAKER) (test 71 mL/min/1.73                           ESTIMA

[place] GFR IS



             code = 1092) sq m                                   NOT AS ACCURATE

 AS



                                                                 CREATININE



                                                                 CLEARANCE IN



                                                                 PREDICTING



                                                                 GLOMERULAR



                                                                 FILTRATION RATE

.



                                                                 ESTIMATED GFR I

S



                                                                 NOT APPLICABLE 

FOR



                                                                 DIALYSIS PATIEN

TS.



[id number] 05:04:00





             Test Item    Value        Reference Range Interpretation Comments

 

             PARTIAL THROMBOPLASTIN TIME 37.9 seconds 22.5-36.0    H            



             (BEAKER) (test code = 760)                                        



[id number] 16:56:00





             Test Item    Value        Reference Range Interpretation Comments

 

             PARTIAL THROMBOPLASTIN TIME 39.4 seconds 22.5-36.0    H            



             (BEAKER) (test code = 760)                                        



PROTHROMBIN TIME/[id number] 16:55:00





             Test Item    Value        Reference Range Interpretation Comments

 

             PROTIME (BEAKER) (test code = 14.4 seconds 11.7-14.7               

  



             759)                                                

 

             INR (BEAKER) (test code = 370) 1.1          <=5.9                  

   



RECOMMENDED COUMADIN/WARFARIN INR THERAPY RANGESSTANDARD DOSE: 2.0 - 3.0  
Includes: PROPHYLAXIS for venous thrombosis, systemic embolization; TREATMENT 
for venous thrombosis and/or pulmonary embolus.HIGH RISK: Target INR is 2.5-3.5 
for patients with mechanical heart valves.COMPREHENSIVE METABOLIC PANEL
2017-05-15 02:20:00





             Test Item    Value        Reference Range Interpretation Comments

 

             TOTAL PROTEIN 7.0 gm/dL    6.0-8.3                   



             (BEAKER) (test code =                                        



             770)                                                

 

             ALBUMIN (BEAKER) 3.8 g/dL     3.5-5.0                   



             (test code = 1145)                                        

 

             ALKALINE PHOSPHATASE 68 U/L                           



             (BEAKER) (test code =                                        



             346)                                                

 

             BILIRUBIN TOTAL 0.9 mg/dL    0.2-1.2                   



             (BEAKER) (test code =                                        



             377)                                                

 

             SODIUM (BEAKER) (test 137 meq/L    136-145                   



             code = 381)                                         

 

             POTASSIUM (BEAKER) 3.5 meq/L    3.5-5.1                   



             (test code = 379)                                        

 

             CHLORIDE (BEAKER) 104 meq/L                        



             (test code = 382)                                        

 

             CO2 (BEAKER) (test 23 meq/L     22-29                     



             code = 355)                                         

 

             BLOOD UREA NITROGEN 10 mg/dL     7-21                      



             (BEAKER) (test code =                                        



             354)                                                

 

             CREATININE (BEAKER) 0.81 mg/dL   0.57-1.25                 



             (test code = 358)                                        

 

             GLUCOSE RANDOM 106 mg/dL           H            



             (BEAKER) (test code =                                        



             652)                                                

 

             CALCIUM (BEAKER) 9.7 mg/dL    8.4-10.2                  



             (test code = 697)                                        

 

             AST (SGOT) (BEAKER) 21 U/L       5-34                      



             (test code = 353)                                        

 

             ALT (SGPT) (BEAKER) 8 U/L        6-55                      



             (test code = 347)                                        

 

             EGFR (BEAKER) (test 69 mL/min/1.73                           ESTIMA

[facility] GFR IS



             code = 1092) sq m                                   NOT AS ACCURATE

 AS



                                                                 CREATININE



                                                                 CLEARANCE IN



                                                                 PREDICTING



                                                                 GLOMERULAR



                                                                 FILTRATION RATE

.



                                                                 ESTIMATED GFR I

S



                                                                 NOT APPLICABLE 

FOR



                                                                 DIALYSIS PATIEN

TS.



HEMOGLOBIN [id number] 07:40:00





             Test Item    Value        Reference Range Interpretation Comments

 

             HEMOGLOBIN A1C (BEAKER) (test code = 4.9 %        4.3-6.1          

         



             368)                                                



VITAMIN [id number] 06:01:00





             Test Item    Value        Reference Range Interpretation Comments

 

             VITAMIN B12 (BEAKER) (test code = 826 pg/mL    213-816      H      

      



             774)                                                



TSH/FREE T4 IF [id number] 06:01:00





             Test Item    Value        Reference Range Interpretation Comments

 

             THYROID STIMULATING HORMONE 1.37 uIU/mL  0.35-4.94                 



             (BEAKER) (test code = 772)                                        



CALCIUM, [id number] 05:48:00





             Test Item    Value        Reference Range Interpretation Comments

 

             CALCIUM IONIZED (BEAKER) (test 1.11 mmol/L  1.12-1.27    L         

   



             code = 698)                                         

 

             PH, BLOOD (BEAKER) (test code = 7.41                               

    



             1810)                                               



[id number] 05:33:00





             Test Item    Value        Reference Range Interpretation Comments

 

             PHOSPHORUS (BEAKER) (test code = 3.3 mg/dL    2.3-4.7              

     



             604)                                                



[id number] 05:33:00





             Test Item    Value        Reference Range Interpretation Comments

 

             MAGNESIUM (BEAKER) (test code = 2.0 mg/dL    1.6-2.6               

    



             627)                                                



COMPREHENSIVE METABOLIC [id number] 05:33:00





             Test Item    Value        Reference Range Interpretation Comments

 

             TOTAL PROTEIN 5.9 gm/dL    6.0-8.3      L            



             (BEAKER) (test code =                                        



             770)                                                

 

             ALBUMIN (BEAKER) 3.3 g/dL     3.5-5.0      L            



             (test code = 1145)                                        

 

             ALKALINE PHOSPHATASE 56 U/L                           



             (BEAKER) (test code =                                        



             346)                                                

 

             BILIRUBIN TOTAL 0.6 mg/dL    0.2-1.2                   



             (BEAKER) (test code =                                        



             377)                                                

 

             SODIUM (BEAKER) (test 140 meq/L    136-145                   



             code = 381)                                         

 

             POTASSIUM (BEAKER) 3.7 meq/L    3.5-5.1                   



             (test code = 379)                                        

 

             CHLORIDE (BEAKER) 106 meq/L                        



             (test code = 382)                                        

 

             CO2 (BEAKER) (test 27 meq/L     22-29                     



             code = 355)                                         

 

             BLOOD UREA NITROGEN 13 mg/dL     7-21                      



             (BEAKER) (test code =                                        



             354)                                                

 

             CREATININE (BEAKER) 0.79 mg/dL   0.57-1.25                 



             (test code = 358)                                        

 

             GLUCOSE RANDOM 95 mg/dL                         



             (BEAKER) (test code =                                        



             652)                                                

 

             CALCIUM (BEAKER) 8.7 mg/dL    8.4-10.2                  



             (test code = 697)                                        

 

             AST (SGOT) (BEAKER) 16 U/L       5-34                      



             (test code = 353)                                        

 

             ALT (SGPT) (BEAKER) 7 U/L        6-55                      



             (test code = 347)                                        

 

             EGFR (BEAKER) (test 71 mL/min/1.73                           ESTIMA

[facility] GFR IS



             code = 1092) sq m                                   NOT AS ACCURATE

 AS



                                                                 CREATININE



                                                                 CLEARANCE IN



                                                                 PREDICTING



                                                                 GLOMERULAR



                                                                 FILTRATION RATE

.



                                                                 ESTIMATED GFR I

S



                                                                 NOT APPLICABLE 

FOR



                                                                 DIALYSIS PATIEN

TS.



LIPID [id number] 05:33:00





             Test Item    Value        Reference Range Interpretation Comments

 

             TRIGLYCERIDES (BEAKER) (test code = 57 mg/dL                       

        



             540)                                                

 

             CHOLESTEROL (BEAKER) (test code = 204 mg/dL                        

      



             631)                                                

 

             HDL CHOLESTEROL (BEAKER) (test code 62 mg/dL                       

        



             = 976)                                              

 

             LDL CHOLESTEROL CALCULATED (BEAKER) 131 mg/dL                      

        



             (test code = 633)                                        



Triglyceride Reference Range: Low Risk  &lt;150 Borderline 150-199 High Risk 
200-499 Very High Risk &gt;=500Cholesterol Reference Range: Low Risk &lt;200 
Borderline 200-239 High Risk &gt;240HDL Cholesterol Reference Range: Low Risk 
&gt;=60 High Risk &lt;40LDL Cholesterol Reference Range: Optimal &lt;100 Near 
Optimal 100-129 Borderline 130-159 High 160-189 Very High &gt;=190CBC W/PLT 
COUNT &amp; AUTO [id number] 04:51:00





             Test Item    Value        Reference Range Interpretation Comments

 

             WHITE BLOOD CELL COUNT (BEAKER) 4.8 K/ L     4.0-10.0              

    



             (test code = 775)                                        

 

             RED BLOOD CELL COUNT (BEAKER) 4.01 M/ L    4.00-5.00               

  



             (test code = 761)                                        

 

             HEMOGLOBIN (BEAKER) (test code = 13.4 GM/DL   12.0-15.0            

     



             410)                                                

 

             HEMATOCRIT (BEAKER) (test code = 39.0 %       36.0-45.0            

     



             411)                                                

 

             MEAN CORPUSCULAR VOLUME (BEAKER) 97.5 fL      82.0-99.0            

     



             (test code = 753)                                        

 

             MEAN CORPUSCULAR HEMOGLOBIN 33.5 pg      27.0-33.0    H            



             (BEAKER) (test code = 751)                                        

 

             MEAN CORPUSCULAR HEMOGLOBIN CONC 34.4 GM/DL   32.0-36.0            

     



             (BEAKER) (test code = 752)                                        

 

             RED CELL DISTRIBUTION WIDTH 12.8 %       10.3-14.2                 



             (BEAKER) (test code = 412)                                        

 

             PLATELET COUNT (BEAKER) (test 193 K/CU MM  150-430                 

  



             code = 756)                                         

 

             MEAN PLATELET VOLUME (BEAKER) 7.0 fL       6.5-10.5                

  



             (test code = 754)                                        

 

             NUCLEATED RED BLOOD CELLS 0 /100 WBC   0-0                       



             (BEAKER) (test code = 413)                                        

 

             NEUTROPHILS RELATIVE PERCENT 50 %                                  

 



             (BEAKER) (test code = 429)                                        

 

             LYMPHOCYTES RELATIVE PERCENT 40 %                                  

 



             (BEAKER) (test code = 430)                                        

 

             MONOCYTES RELATIVE PERCENT 9 %                                    



             (BEAKER) (test code = 431)                                        

 

             EOSINOPHILS RELATIVE PERCENT 0 %                                   

 



             (BEAKER) (test code = 432)                                        

 

             BASOPHILS RELATIVE PERCENT 0 %                                    



             (BEAKER) (test code = 437)                                        

 

             NEUTROPHILS ABSOLUTE COUNT 2.38 K/ L    1.80-8.00                 



             (BEAKER) (test code = 670)                                        

 

             LYMPHOCYTES ABSOLUTE COUNT 1.93 K/ L    1.48-4.50                 



             (BEAKER) (test code = 414)                                        

 

             MONOCYTES ABSOLUTE COUNT (BEAKER) 0.44 K/ L    0.00-1.30           

      



             (test code = 415)                                        

 

             EOSINOPHILS ABSOLUTE COUNT 0.01 K/ L    0.00-0.50                 



             (BEAKER) (test code = 416)                                        

 

             BASOPHILS ABSOLUTE COUNT (BEAKER) 0.02 K/ L    0.00-0.20           

      



             (test code = 417)                                        



0.00COMPREHENSIVE METABOLIC [id number] 22:47:00





             Test Item    Value        Reference Range Interpretation Comments

 

             TOTAL PROTEIN 5.8 gm/dL    6.0-8.3      L            



             (BEAKER) (test code =                                        



             770)                                                

 

             ALBUMIN (BEAKER) 3.2 g/dL     3.5-5.0      L            



             (test code = 1145)                                        

 

             ALKALINE PHOSPHATASE 56 U/L                           



             (BEAKER) (test code =                                        



             346)                                                

 

             BILIRUBIN TOTAL 0.5 mg/dL    0.2-1.2                   



             (BEAKER) (test code =                                        



             377)                                                

 

             SODIUM (BEAKER) (test 140 meq/L    136-145                   



             code = 381)                                         

 

             POTASSIUM (BEAKER) 3.7 meq/L    3.5-5.1                   



             (test code = 379)                                        

 

             CHLORIDE (BEAKER) 108 meq/L           H            



             (test code = 382)                                        

 

             CO2 (BEAKER) (test 25 meq/L     22-29                     



             code = 355)                                         

 

             BLOOD UREA NITROGEN 11 mg/dL     7-21                      



             (BEAKER) (test code =                                        



             354)                                                

 

             CREATININE (BEAKER) 0.83 mg/dL   0.57-1.25                 



             (test code = 358)                                        

 

             GLUCOSE RANDOM 94 mg/dL                         



             (BEAKER) (test code =                                        



             652)                                                

 

             CALCIUM (BEAKER) 8.6 mg/dL    8.4-10.2                  



             (test code = 697)                                        

 

             AST (SGOT) (BEAKER) 15 U/L       5-34                      



             (test code = 353)                                        

 

             ALT (SGPT) (BEAKER) 7 U/L        6-55                      



             (test code = 347)                                        

 

             EGFR (BEAKER) (test 67 mL/min/1.73                           ESTIMA

[facility] GFR IS



             code = 1092) sq m                                   NOT AS ACCURATE

 AS



                                                                 CREATININE



                                                                 CLEARANCE IN



                                                                 PREDICTING



                                                                 GLOMERULAR



                                                                 FILTRATION RATE

.



                                                                 ESTIMATED GFR I

S



                                                                 NOT APPLICABLE 

FOR



                                                                 DIALYSIS PATIEN

TS.

## 2022-11-27 NOTE — EDPHYS
Physician Documentation                                                                           

 [facility]                                                                 

Name: Jerica Estrada                                                                           

Age: 82 yrs                                                                                       

Sex: Female                                                                                       

: 1940                                                                                   

MRN: [id number]                                                                                   

Arrival Date: 2022                                                                          

Time: 08:28                                                                                       

Account#: [id number]                                                                            

Bed 18                                                                                            

Private MD:                                                                                       

ED Physician Eduardo Marrufo                                                                         

HPI:                                                                                              

                                                                                             

09:49 This 82 yrs old Female presents to ER via EMS with complaints of multiple falls.        ms3 

09:49 Details of fall: The patient fell from an upright position, while standing. Onset: The  ms3 

      symptoms/episode began/occurred last night. Associated injuries: The patient sustained      

      no obvious injury. Severity of symptoms: At their worst the symptoms were mild, in the      

      emergency department the symptoms are unchanged. 82-year-old female with past medical       

      history of Parkinson's, hypertension, difficulty hearing, varicose veins presents via       

      [facility] for multiple falls since last night. EMS notes patient is on Plavix.       

      Patient denies pain on evaluation..                                                         

                                                                                                  

Historical:                                                                                       

- Allergies:                                                                                      

08:30 Bactrim;                                                                                bp  

08:30 Ciprofloxacin;                                                                          bp  

08:30 Codeine;                                                                                bp  

08:30 Demerol;                                                                                bp  

08:30 Diovan HCT;                                                                             bp  

08:30 hydrochlorothiazide;                                                                    bp  

08:30 Iodinated Contrast Media - IV Dye;                                                      bp  

08:30 Tetanus Vaccines \T\ Toxoid;                                                              bp

08:30 valsartan;                                                                              bp  

- Home Meds:                                                                                      

08:30 Plavix Oral [Active];                                                                   bp  

- PMHx:                                                                                           

08:30 vericose veins; Parkinsons; Hypertension; HARD OF HEARING; Aneurysm; Cerebrovascular    bp  

      accident;                                                                                   

                                                                                                  

- Immunization history:: Adult Immunizations up to date.                                          

- Social history:: Smoking status: Patient denies any tobacco usage or history of.                

                                                                                                  

                                                                                                  

ROS:                                                                                              

09:49 ENT: Negative for injury, pain, and discharge, Neck: Negative for injury, pain, and     ms3 

      swelling, Cardiovascular: Negative for chest pain, and palpitations. Respiratory:           

      Negative for shortness of breath, cough, wheezing, and pleuritic chest pain,                

      Abdomen/GI: Negative for abdominal pain, nausea, vomiting, diarrhea, and constipation,      

      Skin: Negative for injury, rash, and discoloration.                                         

09:49 Constitutional: Positive for Generalized weakness.                                          

09:49 All other systems are negative.                                                             

                                                                                                  

Exam:                                                                                             

09:49 Constitutional:  This is a well developed, well nourished patient who is awake, alert,  ms3 

      and in no acute distress. Head/Face:  Normocephalic, atraumatic. Neck:  Trachea             

      midline, no cervical lymphadenopathy.  Supple, full range of motion without nuchal          

      rigidity, or vertebral point tenderness.  No Meningismus. Chest/axilla:  Normal chest       

      wall appearance and motion.  Nontender with no deformity.   Cardiovascular:  Regular        

      rate and rhythm with a normal S1 and S2.  No gallops, murmurs, or rubs.  Normal PMI, no     

      JVD.  No pulse deficits. Respiratory:  Lungs have equal breath sounds bilaterally,          

      clear to auscultation and percussion.  No rales, rhonchi or wheezes noted.  No              

      increased work of breathing, no retractions or nasal flaring. Abdomen/GI:  Soft,            

      non-tender, with normal bowel sounds.  No distension or tympany.  No guarding or            

      rebound.  No evidence of tenderness throughout. Skin:  Warm, dry with normal turgor.        

      Normal color with no rashes, no lesions, and no evidence of cellulitis. MS/ Extremity:      

      Pulses equal, no cyanosis.  Neurovascular intact.  Full, normal range of motion.            

09:49 ECG was reviewed by the Attending Physician.                                                

                                                                                                  

Vital Signs:                                                                                      

08:30  / 67; Pulse 63; Resp 17; Temp 98; Pulse Ox 98% ;                                 bp  

10:05  / 79; Pulse 68; Resp 17; Pulse Ox 96% ;                                          bp  

                                                                                                  

NIH Stroke Scale Scores:                                                                          

08:03 NIHSS Score: 0                                                                          ms3 

                                                                                                  

MDM:                                                                                              

08:38 Patient medically screened.                                                             ms3 

10:17 Differential diagnosis: abrasion, closed head injury, contusion, fracture, sprain,      ms3 

      strain. Data reviewed: vital signs, nurses notes, lab test result(s), EKG, radiologic       

      studies, and as a result, I will discharge patient. Data interpreted: Cardiac monitor:      

      rate is 69 beats/min, rhythm is normal sinus rhythm, regular, with no ectopy,               

      Interpretation: normal rate, normal rhythm. Counseling: I had a detailed discussion         

      with the patient and/or guardian regarding: the historical points, exam findings, and       

      any diagnostic results supporting the discharge/admit diagnosis, lab results, radiology     

      results, the need for outpatient follow up, to return to the emergency department if        

      symptoms worsen or persist or if there are any questions or concerns that arise at          

      home. Special discussion: I discussed with the patient/guardian in detail that at this      

      point there is no indication for admission to the hospital. It is understood, however,      

      that if the symptoms persist or worsen the patient needs to return immediately for          

      re-evaluation. ED course: Discussed labs, imaging, EKG with patient and her son.            

      Patient's son agrees with plan for discharge. Patient to follow-up with Dr. Todd in      

      1 to 2 days. All questions were answered. Return precautions discussed include              

      worsening symptoms, or any other concerns. On reevaluation patient is alert, in no          

      apparent distress, nontoxic appearing..                                                     

                                                                                                  

                                                                                             

08:39 Order name: Basic Metabolic Panel; Complete Time: 10:05                                 ms3 

                                                                                             

08:39 Order name: CBC with Diff; Complete Time: 10:05                                         ms3 

                                                                                             

08:39 Order name: Troponin HS; Complete Time: 10:05                                           ms3 

                                                                                             

08:57 Order name: Urine Dipstick-Ancillary; Complete Time: 10:05                              EDMS

                                                                                             

08:58 Order name: Urine Microscopic Only; Complete Time: 10:05                                bp  

                                                                                             

09:21 Order name: Protime (+inr); Complete Time: 10:05                                        bp  

                                                                                             

08:39 Order name: XRAY Chest (1 view); Complete Time: 10:05                                   ms3 

                                                                                             

08:39 Order name: EKG; Complete Time: 08:40                                                   ms3 

                                                                                             

08:39 Order name: Cardiac monitoring; Complete Time: 08:57                                    ms3 

                                                                                             

08:39 Order name: EKG - Nurse/Tech; Complete Time: 08:57                                      ms3 

                                                                                             

08:48 Order name: Head C Spine Mpr Wo Con; Complete Time: 10:05                               EDMS

                                                                                             

09:21 Order name: Ptt, Activated; Complete Time: 10:05                                        bp  

                                                                                             

08:39 Order name: IV Saline Lock; Complete Time: 09:22                                        ms3 

                                                                                             

08:39 Order name: Labs collected and sent; Complete Time: 09:22                               ms3 

                                                                                             

08:39 Order name: O2 Per Protocol; Complete Time: 08:57                                       ms3 

                                                                                             

08:39 Order name: O2 Sat Monitoring; Complete Time: 08:57                                     ms3 

                                                                                             

08:58 Order name: Urine Dipstick-Ancillary (obtain specimen); Complete Time: 09:06            bp  

                                                                                                  

EC:49 Rate is 70 beats/min. Rhythm is regular. QRS Axis is Normal. TN interval is normal. QRS ms3 

      interval is normal. Clinical impression: NSR w/ Non-specific ST/T Changes. Interpreted      

      by me. Reviewed by me.                                                                      

                                                                                                  

Administered Medications:                                                                         

No medications were administered                                                                  

                                                                                                  

                                                                                                  

Disposition Summary:                                                                              

22 10:16                                                                                    

Discharge Ordered                                                                                 

      Location: Home                                                                          ms3 

      Condition: Stable                                                                       ms3 

      Diagnosis                                                                                   

        - Fall on same level, unspecified                                                     ms3 

        - Parkinson's disease                                                                 ms3 

        - Essential (primary) hypertension                                                    ms3 

      Followup:                                                                               ms3 

        - With: Samuel Todd MD                                                                

        - When: 1 - 2 days                                                                         

        - Reason: Recheck today's complaints                                                       

      Discharge Instructions:                                                                     

        - Discharge Summary Sheet                                                             ms3 

        - Hypertension, Adult                                                                 ms3 

        - Fall Prevention in the Home, Adult, Easy-to-Read                                    ms3 

      Forms:                                                                                      

        - Medication Reconciliation Form                                                      ms3 

        - Thank You Letter                                                                    ms3 

        - Antibiotic Education                                                                ms3 

        - Prescription Opioid Use                                                             ms3 

                                                                                                  

NIH Stroke Scale - NIH Stroke Score                                                               

Date: 2022                                                                                  

Time: 08:03                                                                                       

Total Score = 0                                                                                   

  1a. Level of Consciousness (LOC) - 0(Alert)                                                     

  1b. Level of Consciousness (LOC) (Month \T\ Age) - 0(Both)                                      

  1c. LOC Commands (Open \T\ Closes Eyes/) - 0(Both)                                          

   2. Best Gaze (Lateral Gaze Paresis) - 0(Normal)                                                

   3. Visual Field Loss - 0(No visual loss)                                                       

   4. Facial Palsy - 0(Normal)                                                                    

  5a. Left Arm: Motor (10-second hold) - 0(No drift)                                              

  5b. Right Arm: Motor (10-second hold) - 0(No drift)                                             

  6a. Left Leg: Motor (5-second hold - always test supine) - 0(No drift)                          

  6b. Right Leg: Motor (5-second hold - always test supine) - 0(No drift)                         

   7. Limb Ataxia (finger/nose \T\ heel/shin - test with eyes open) - 0(Absent)                   

   8. Sensory Loss (pinprick arms/legs/face) - 0(Normal)                                          

   9. Best Language: Aphasia (description/naming/reading) - 0(No aphasia)                         

  10. Dysarthria (speech clarity - read or repeat words) - 0(Normal)                              

  11. Extinction and Inattention (visual/tactile/auditory/spatial/personal) - 0(No                

      abnormality)                                                                                

Initials: ms3                                                                                     

                                                                                                  

Signatures:                                                                                       

Dispatcher [employer]                           EDMS                                                 

Juma Canales, RN                      RN   bp                                                   

Eduardo Marrufo,                         DO   ms3                                                  

                                                                                                  

Corrections: (The following items were deleted from the chart)                                    

08:48 08:44 Head Brain Wo Cont ordered. EDMS                                          EDMS        

                                                                                                  

**************************************************************************************************

## 2022-11-27 NOTE — ER
Nurse's Notes                                                                                     

 [facility]                                                                 

Name: Jerica Estrada                                                                           

Age: 82 yrs                                                                                       

Sex: Female                                                                                       

: 1940                                                                                   

MRN: [id number]                                                                                   

Arrival Date: 2022                                                                          

Time: 08:28                                                                                       

Account#: [id number]                                                                            

Bed 18                                                                                            

Private MD:                                                                                       

Diagnosis: Fall on same level, unspecified;Parkinson's disease;Essential (primary) hypertension   

                                                                                                  

Presentation:                                                                                     

                                                                                             

08:30 Chief complaint: Chief complaint: EMS states: FALL AT 0100 WITH WEAKNESS AND            bp  

      INCONTINENCE, NO APPARENT TRAUMA.                                                           

08:30 Coronavirus screen: At this time, the client does not indicate any symptoms associated  bp  

      with coronavirus-19. Ebola Screen: No symptoms or risks identified at this time.            

      Initial Sepsis Screen: Does the patient meet any 2 criteria? No. Patient's initial          

      sepsis screen is negative. Does the patient have a suspected source of infection? No.       

      Patient's initial sepsis screen is negative. Risk Assessment: Do you want to hurt           

      yourself or someone else? Patient reports no desire to harm self or others. Onset of        

      symptoms is unknown. Care prior to arrival: Glucose check: 115.                             

08:30 Method Of Arrival: EMS: [facility]                                                bp  

08:30 Acuity: LYNNETTE 3                                                                           bp  

                                                                                                  

Triage Assessment:                                                                                

08:30 General: Appears distressed, Behavior is cooperative, appropriate for age, anxious.     bp  

      Pain: Complains of pain in GENERALIZED. EENT: No deficits noted. Neuro: Level of            

      Consciousness is awake, obeys commands, Oriented to person, place, time, situation,         

      Moves all extremities. Full function Gait is Cardiovascular: Rhythm is sinus rhythm.        

      Respiratory: No deficits noted. GI: No signs and/or symptoms were reported involving        

      the gastrointestinal system. : Reports incontinence. Derm: No deficits noted.             

      Musculoskeletal: Reports WEAK GAIT.                                                         

                                                                                                  

Historical:                                                                                       

- Allergies:                                                                                      

08:30 Bactrim;                                                                                bp  

08:30 Ciprofloxacin;                                                                          bp  

08:30 Codeine;                                                                                bp  

08:30 Demerol;                                                                                bp  

08:30 Diovan HCT;                                                                             bp  

08:30 hydrochlorothiazide;                                                                    bp  

08:30 Iodinated Contrast Media - IV Dye;                                                      bp  

08:30 Tetanus Vaccines \T\ Toxoid;                                                              bp

08:30 valsartan;                                                                              bp  

- Home Meds:                                                                                      

08:30 Plavix Oral [Active];                                                                   bp  

- PMHx:                                                                                           

08:30 vericose veins; Parkinsons; Hypertension; HARD OF HEARING; Aneurysm; Cerebrovascular    bp  

      accident;                                                                                   

                                                                                                  

- Immunization history:: Adult Immunizations up to date.                                          

- Social history:: Smoking status: Patient denies any tobacco usage or history of.                

                                                                                                  

                                                                                                  

Screenin:30 Abuse screen: Denies threats or abuse. Denies injuries from another. Nutritional        bp  

      screening: No deficits noted. Tuberculosis screening: No symptoms or risk factors           

      identified. Fall Risk Fall in past 12 months (25 points). Secondary diagnosis (15           

      points) CVA, No IV (0 pts). Ambulatory Aid- Crutches/Cane/Walker (15 pts). Gait- Weak       

      (10 pts.). Mental Status- Oriented to own ability (0 pts). Total Dowell Fall Scale           

      indicates High Risk Score (45 or more points). Fall prevention measures have been           

      instituted. Side Rails Up X 2 Placed Close to Nursing Station Frequent Obs/Assessments      

      Occuring Family Present and informed to notify staff if the need to leave the bedside       

      As available patient and family educated on Fall Prevention Program and Strategies.         

                                                                                                  

Assessment:                                                                                       

08:30 General: SEE TRIAGE NOTE.                                                               bp  

10:10 Reassessment: Patient appears in no apparent distress at this time. Patient and/or      bp  

      family updated on plan of care and expected duration. Pain level reassessed.                

10:35 Reassessment: PT DC HOME VIA WC WITH FAMILY.                                            bp  

                                                                                                  

Vital Signs:                                                                                      

08:30  / 67; Pulse 63; Resp 17; Temp 98; Pulse Ox 98% ;                                 bp  

10:05  / 79; Pulse 68; Resp 17; Pulse Ox 96% ;                                          bp  

                                                                                                  

NIH Stroke Scale Scores:                                                                          

08:03 NIHSS Score: 0                                                                          ms3 

                                                                                                  

ED Course:                                                                                        

08:28 Patient arrived in ED.                                                                  em1 

08:30 Arm band placed on.                                                                     bp  

08:38 Eduardo Marrufo DO is Attending Physician.                                                ms3 

08:56 Urine collected: straight cath specimen, clear. Straight cath inserted, using sterile   mm9 

      technique, 16 Fr. Specimen obtained.                                                        

08:57 XRAY Chest (1 view) In Process Unspecified.                                             EDMS

08:57 Juma Canales, RN is Primary Nurse.                                                    bp  

08:57 Patient has correct armband on for positive identification. Placed in gown. Bed in low  mm9 

      position. Call light in reach. Side rails up X2. Adult w/ patient. Warm blanket given.      

      Cardiac monitor on. Pulse ox on. NIBP on.                                                   

09:03 Triage completed.                                                                       bp  

09:04 Head C Spine Mpr Wo Con In Process Unspecified.                                         EDMS

10:12 Samuel Todd MD is Referral Physician.                                              ms3 

10:35 No provider procedures requiring assistance completed. IV discontinued, intact,         bp  

      bleeding controlled, No redness/swelling at site. Pressure dressing applied.                

                                                                                                  

Administered Medications:                                                                         

No medications were administered                                                                  

                                                                                                  

                                                                                                  

Medication:                                                                                       

08:30 VIS not applicable for this client.                                                     bp  

                                                                                                  

Outcome:                                                                                          

10:16 Discharge ordered by MD.                                                                ms3 

10:35 Discharged to home via wheelchair, with family.                                         bp  

10:35 Condition: stable                                                                           

10:35 Discharge instructions given to patient, family, Instructed on discharge instructions,      

      follow up and referral plans. Demonstrated understanding of instructions, follow-up         

      care.                                                                                       

10:36 Patient left the ED.                                                                    bp  

                                                                                                  

                                                                                                  

NIH Stroke Scale - NIH Stroke Score                                                               

Date: 2022                                                                                  

Time: 08:03                                                                                       

Total Score = 0                                                                                   

  1a. Level of Consciousness (LOC) - 0(Alert)                                                     

  1b. Level of Consciousness (LOC) (Month \T\ Age) - 0(Both)                                      

  1c. LOC Commands (Open \T\ Closes Eyes/) - 0(Both)                                          

   2. Best Gaze (Lateral Gaze Paresis) - 0(Normal)                                                

   3. Visual Field Loss - 0(No visual loss)                                                       

   4. Facial Palsy - 0(Normal)                                                                    

  5a. Left Arm: Motor (10-second hold) - 0(No drift)                                              

  5b. Right Arm: Motor (10-second hold) - 0(No drift)                                             

  6a. Left Leg: Motor (5-second hold - always test supine) - 0(No drift)                          

  6b. Right Leg: Motor (5-second hold - always test supine) - 0(No drift)                         

   7. Limb Ataxia (finger/nose \T\ heel/shin - test with eyes open) - 0(Absent)                   

   8. Sensory Loss (pinprick arms/legs/face) - 0(Normal)                                          

   9. Best Language: Aphasia (description/naming/reading) - 0(No aphasia)                         

  10. Dysarthria (speech clarity - read or repeat words) - 0(Normal)                              

  11. Extinction and Inattention (visual/tactile/auditory/spatial/personal) - 0(No                

      abnormality)                                                                                

Initials: ms3                                                                                     

                                                                                                  

Signatures:                                                                                       

Dispatcher [employer]                           Asaf Britt                               em1                                                  

Juma Canales, RN                      RN   bp                                                   

Eduardo Marrufo DO DO   ms3                                                  

Edie Koch                              mm9                                                  

                                                                                                  

Corrections: (The following items were deleted from the chart)                                    

09:03 08:30 Chief complaint: bp                                                       bp          

                                                                                                  

**************************************************************************************************

## 2022-11-27 NOTE — RAD REPORT
EXAM DESCRIPTION:  USCarotid Artery Owungwtkt28/27/2022 4:17 pm

 

CLINICAL HISTORY:  Acute CVA

 

COMPARISON:   None

 

FINDINGS:  The velocity of the right internal carotid artery equals 55 cm/sec. The right ICA/CCA rati
o 0.8

 

The velocity of the left internal carotid artery equals 63 cm/sec. The left ICA/CCA ratio 1

 

Mild plaque is present within the carotid arteries. The common internal carotid arteries are tortuous


 

The vertebral arteries demonstrate antegrade flow

 

IMPRESSION:  Mild plaque within the carotid arteries without evidence of a hemodynamically significan
t stenosis

 

NASCET criteria used.

 

Mild  0-49% stenosis

Moderate 50-69% stenosis

Severe 70-99% stenosis

## 2022-11-27 NOTE — EDPHYS
Physician Documentation                                                                           

 [facility]                                                                 

Name: Jerica Estrada                                                                           

Age: 82 yrs                                                                                       

Sex: Female                                                                                       

: 1940                                                                                   

MRN: [id number]                                                                                   

Arrival Date: 2022                                                                          

Time: 13:29                                                                                       

Account#: [id number]                                                                            

Bed 24                                                                                            

Private MD: Samuel Todd ED Physician Eduardo Marrufo                                                                         

HPI:                                                                                              

                                                                                             

13:43 This 82 yrs old Female presents to ER via Wheelchair with complaints of S/S of Possible ms3 

      Stroke.                                                                                     

13:43 The patient's problem is reported as dysphasia, slurred speech, dysphagia, choking,     ms3 

      weakness, in the right upper extremity. Onset: The symptoms/episode began/occurred 3        

      hour(s) ago. Duration: This was a single incident. Context: symptoms became apparent        

      upon waking. The symptoms are alleviated by nothing. The symptoms are aggravated by         

      nothing. Associated signs and symptoms: The patient has no apparent associated signs or     

      symptoms. Severity of symptoms: At their worst the symptoms were moderate in the            

      emergency department the symptoms are unchanged Pain is currently a 0 / 10. Patient's       

      baseline: The patient has a previous history of CVA. Patient seen earlier in same day       

      in the emergency department for fall. Patient's son states patient took a nap at 10:30      

      AM and awoke 1 hour prior to arrival with right-sided deficits and slurred speech.          

      Patient's daughter-in-law states patient was having difficulty drinking and began           

      choking..                                                                                   

                                                                                                  

Historical:                                                                                       

- Allergies:                                                                                      

13:31 Bactrim;                                                                                hb  

13:31 Ciprofloxacin;                                                                          hb  

13:31 Codeine;                                                                                hb  

13:31 Demerol;                                                                                hb  

13:31 Diovan HCT;                                                                             hb  

13:31 hydrochlorothiazide;                                                                    hb  

13:31 Iodinated Contrast Media - IV Dye;                                                      hb  

13:31 Tetanus Vaccines \T\ Toxoid;                                                              hb

13:31 valsartan;                                                                              hb  

- PMHx:                                                                                           

13:31 Aneurysm; Cerebrovascular accident; HARD OF HEARING; Hypertension; Parkinsons; vericose hb  

      veins;                                                                                      

                                                                                                  

- Immunization history:: Adult Immunizations up to date.                                          

- Social history:: Smoking status: Patient denies any tobacco usage or history of.                

                                                                                                  

                                                                                                  

ROS:                                                                                              

13:43 Constitutional: Negative for fever, and chills. Neck: Negative for injury, pain, and    ms3 

      swelling, Cardiovascular: Negative for chest pain, and palpitations. Respiratory:           

      Negative for shortness of breath, cough, wheezing, and pleuritic chest pain,                

      Abdomen/GI: Negative for abdominal pain, nausea, vomiting, diarrhea, and constipation,      

      MS/Extremity: Negative for injury and deformity, Skin: Negative for injury, rash, and       

      discoloration.                                                                              

13:43 Neuro: Positive for weakness.                                                               

13:43 All other systems are negative.                                                             

                                                                                                  

Exam:                                                                                             

13:43 Constitutional:  This is a well developed, well nourished patient who is awake, alert,  ms3 

      and in no acute distress. Head/Face:  Normocephalic, atraumatic. Eyes:  Pupils equal        

      round and reactive to light, extra-ocular motions intact.  Lids and lashes normal.          

      Conjunctiva and sclera are non-icteric and not injected.  Periorbital areas with no         

      swelling, redness, or edema. Neck:  Trachea midline, no cervical lymphadenopathy.           

      Supple, full range of motion without nuchal rigidity, or vertebral point tenderness.        

      No Meningismus. Chest/axilla:  Normal chest wall appearance and motion.  Nontender with     

      no deformity.   Cardiovascular:  Regular rate and rhythm with a normal S1 and S2.  No       

      gallops, murmurs, or rubs.  Normal PMI, no JVD.  No pulse deficits. Respiratory:  Lungs     

      have equal breath sounds bilaterally, clear to auscultation and percussion.  No rales,      

      rhonchi or wheezes noted.  No increased work of breathing, no retractions or nasal          

      flaring. Abdomen/GI:  Soft, non-tender, with normal bowel sounds.  No distension or         

      tympany.  No guarding or rebound.  No evidence of tenderness throughout. Skin:  Warm,       

      dry with normal turgor.  Normal color with no rashes, no lesions, and no evidence of        

      cellulitis.                                                                                 

13:43 Neuro: Orientation: to person, place, Mentation: able to follow commands, Memory:           

      Cranial nerves: CN II- XII are normal as tested, Cerebellar function: the patient is        

      unable to track heel to shin on both sides, Motor: RUE drift, Sensation: is normal,         

      Gait: not tested.                                                                           

14:39 Radiologist reports: Suspicious area of low density L thalamus/ internal capsule        ms3 

15:48 ECG was reviewed by the Attending Physician.                                            ms3 

                                                                                                  

Vital Signs:                                                                                      

13:35 Resp 17;                                                                                ll1 

13:50  / 70; Pulse 67; Resp 18; Temp 98.2; Pulse Ox 97% on R/A;                         kr3 

13:54  / 70; Pulse 69; Pulse Ox 97% on R/A;                                             ll1 

15:00  / 71; Pulse 68; Resp 17; Pulse Ox 95% on R/A;                                    ll1 

                                                                                                  

NIH Stroke Scale Scores:                                                                          

14:03 NIHSS Score: 3                                                                          ms3 

14:53 NIHSS Score: 3                                                                          ll1 

                                                                                                  

MDM:                                                                                              

13:30 Patient medically screened.                                                             kb  

13:42 ED course: Discussed case with Dr Castaneda. If patient CT is negative and no ischemic   ms3 

      stroke seen on CT patient to receive TNK..                                                  

13:43 Differential diagnosis: CVA, TIA, Parkinson disease. Data reviewed: vital signs, nurses ms3 

      notes.                                                                                      

14:06 ED course: Discussed case with Dr Castaneda. CT evidence of ishemic stroke, patient is   ms3 

      not candidate for TNK.                                                                      

14:21 Counseling: I had a detailed discussion with the patient and/or guardian regarding: the ms3 

      historical points, exam findings, and any diagnostic results supporting the                 

      discharge/admit diagnosis, lab results, radiology results, the need for further work-up     

      and treatment in the hospital. ED course: Discussed case with Dr Mi and he accepts       

      patient..                                                                                   

                                                                                                  

                                                                                             

13:38 Order name: Basic Metabolic Panel; Complete Time: 14:38                                 ms3 

                                                                                             

13:38 Order name: CBC with Diff                                                               ms3 

                                                                                             

13:38 Order name: High Sensitivity Troponin; Complete Time: 14:38                             ms3 

                                                                                             

13:38 Order name: Protime (+inr); Complete Time: 14:18                                        ms3 

                                                                                             

13:38 Order name: Ptt, Activated; Complete Time: 14:18                                        ms3 

                                                                                             

13:44 Order name: Glucose, Ancillary Testing; Complete Time: 14:18                            EDMS

                                                                                             

13:38 Order name: CT Stroke Brain w/o Contrast                                                ms3 

                                                                                             

14:23 Order name: SARS RAPID; Complete Time: 22:22                                            ms3 

                                                                                             

14:37 Order name: Basic Metabolic Panel                                                       EDMS

                                                                                             

14:37 Order name: Basic Metabolic Panel                                                       EDMS

                                                                                             

14:37 Order name: CBC with Automated Diff                                                     EDMS

                                                                                             

14:37 Order name: CBC with Automated Diff                                                     EDMS

                                                                                             

14:37 Order name: Magnesium                                                                   EDMS

                                                                                             

14:37 Order name: Magnesium                                                                   EDMS

                                                                                             

13:38 Order name: EKG; Complete Time: 13:39                                                   ms3 

                                                                                             

13:38 Order name: Accucheck; Complete Time: 13:42                                             ms3 

                                                                                             

13:38 Order name: Cardiac monitoring; Complete Time: 14:03                                    ms3 

                                                                                             

13:38 Order name: EKG - Nurse/Tech; Complete Time: 14:03                                      ms3 

                                                                                             

13:38 Order name: IV Saline Lock; Complete Time: 13:54                                        ms3 

                                                                                             

13:38 Order name: Labs collected and sent; Complete Time: 13:54                               ms3 

                                                                                             

13:38 Order name: NPO; Complete Time: 13:42                                                   ms3 

                                                                                             

13:38 Order name: O2 Per Protocol; Complete Time: 13:42                                       ms3 

                                                                                             

13:38 Order name: O2 Sat Monitoring; Complete Time: 13:42                                     ms3 

                                                                                             

13:38 Order name: Stroke Swallow Screen; Complete Time: 14:55                                 ms3 

                                                                                             

13:51 Order name: Ct Stroke Brain Wo Cont; Complete Time: 14:18                               EDMS

                                                                                             

14:35 Order name: CONS Physician Consult                                                      EDMS

                                                                                             

14:37 Order name: Physical Therapy Consult                                                    EDMS

                                                                                             

14:37 Order name: Speech Therapy Consult                                                      EDMS

                                                                                             

14:37 Order name: NPO                                                                         EDMS

                                                                                             

14:37 Order name: Carotid Artery Bilateral; Complete Time: 22:22                              EDMS

                                                                                                  

ECG:                                                                                              

15:48 Rate is 67 beats/min. Rhythm is regular. QRS Axis is Normal. NC interval is prolonged   ms3 

      at 214 msec. QRS interval is normal. Clinical impression: NSR with 1st degree block.        

      Interpreted by me. Reviewed by me.                                                          

                                                                                                  

Administered Medications:                                                                         

No medications were administered                                                                  

                                                                                                  

                                                                                                  

Point of Care Testing:                                                                            

      Blood Glucose:                                                                              

14:55 Blood Glucose: 91 mg/dL;                                                                ll1 

      Ranges:                                                                                     

      Critical Glucose Levels:Adult <50 mg/dl or >400 mg/dl  <40 mg/dl or >180 mg/dl       

Disposition Summary:                                                                              

22 14:22                                                                                    

Hospitalization Ordered                                                                           

      Hospitalization Status: Inpatient Admission                                             ms3 

      Provider: Maximo Mi                                                                ms3 

      Condition: Stable                                                                       ms3 

      Problem: new                                                                            ms3 

      Symptoms: are unchanged                                                                 ms3 

      Bed/Room Type: Standard                                                                 ms3 

      Location: Telemetry/MedSurg (Inpatient)(22 13:57)                                   

      Room Assignment: [facility](22 13:57)                                                    bd  

      Diagnosis                                                                                   

        - ischemic thallamic stroke- left sided                                               ms3 

        - Essential (primary) hypertension                                                    ms3 

        - Dysphagia, unspecified                                                              ms3 

      Forms:                                                                                      

        - Medication Reconciliation Form                                                      ms3 

        - SBAR form                                                                           ms3 

                                                                                                  

NIH Stroke Scale - NIH Stroke Score                                                               

Date: 2022                                                                                  

Time: 14:03                                                                                       

Total Score = 3                                                                                   

  1a. Level of Consciousness (LOC) - 0(Alert)                                                     

  1b. Level of Consciousness (LOC) (Month \T\ Age) - 1(One)                                       

  1c. LOC Commands (Open \T\ Closes Eyes/) - 0(Both)                                          

   2. Best Gaze (Lateral Gaze Paresis) - 0(Normal)                                                

   3. Visual Field Loss - 0(No visual loss)                                                       

   4. Facial Palsy - 0(Normal)                                                                    

  5a. Left Arm: Motor (10-second hold) - 0(No drift)                                              

  5b. Right Arm: Motor (10-second hold) - 1(Drift)                                                

  6a. Left Leg: Motor (5-second hold - always test supine) - 0(No drift)                          

  6b. Right Leg: Motor (5-second hold - always test supine) - 0(No drift)                         

   7. Limb Ataxia (finger/nose \T\ heel/shin - test with eyes open) - 0(Absent)                   

   8. Sensory Loss (pinprick arms/legs/face) - 0(Normal)                                          

   9. Best Language: Aphasia (description/naming/reading) - 0(No aphasia)                         

  10. Dysarthria (speech clarity - read or repeat words) - 1(Mild to Moderate)                    

  11. Extinction and Inattention (visual/tactile/auditory/spatial/personal) - 0(No                

      abnormality)                                                                                

Initials: ms3                                                                                     

                                                                                                  

                                                                                                  

NIH Stroke Scale - NIH Stroke Score                                                               

Date: 2022                                                                                  

Time: 14:53                                                                                       

Total Score = 3                                                                                   

  1a. Level of Consciousness (LOC) - 0(Alert)                                                     

  1b. Level of Consciousness (LOC) (Month \T\ Age) - 1(One)                                       

  1c. LOC Commands (Open \T\ Closes Eyes/) - 0(Both)                                          

   2. Best Gaze (Lateral Gaze Paresis) - 0(Normal)                                                

   3. Visual Field Loss - 0(No visual loss)                                                       

   4. Facial Palsy - 0(Normal)                                                                    

  5a. Left Arm: Motor (10-second hold) - 0(No drift)                                              

  5b. Right Arm: Motor (10-second hold) - 1(Drift)                                                

  6a. Left Leg: Motor (5-second hold - always test supine) - 0(No drift)                          

  6b. Right Leg: Motor (5-second hold - always test supine) - 0(No drift)                         

   7. Limb Ataxia (finger/nose \T\ heel/shin - test with eyes open) - 0(Absent)                   

   8. Sensory Loss (pinprick arms/legs/face) - 0(Normal)                                          

   9. Best Language: Aphasia (description/naming/reading) - 0(No aphasia)                         

  10. Dysarthria (speech clarity - read or repeat words) - 1(Mild to Moderate)                    

  11. Extinction and Inattention (visual/tactile/auditory/spatial/personal) - 0(No                

      abnormality)                                                                                

Initials: ll1                                                                                     

                                                                                                  

Signatures:                                                                                       

Dispatcher [employer]                           EDKarla Castillo, FNP-C                 FNP-Ckb                                                   

Opal Velez Cindy, RN                       RN   cg                                                   

Lida Bhatia RN RN                                                      

Natan Conway RN RN jl7 Lewis, Lynsay, RN RN   ll1                                                  

Eduardo Marrufo DO                        DO   ms3                                                  

Marjorie Horn, PA-C                     PA-C sb4                                                  

                                                                                                  

Corrections: (The following items were deleted from the chart)                                    

14:05 13:43 Neuro: Orientation: to person, place, Mentation: able to follow commands, ms3         

      Memory: Cranial nerves: CN II- XII are normal as tested, Cerebellar function:               

      the patient is unable to track heel to shin on both sides, Motor: RUE drift, ms3            

16:41 14:22 Telemetry/MedSurg (Inpatient) ms3                                         jl7         

16:41 14:22 ms3                                                                       jl7         

18:54 16:41 [facility] ER HOLD jl7                                                          cg          

18:54 16:41 ERHOLD- jl7                                                               cg          

19:01 18:54 Telemetry/MedSurg (Inpatient) cg                                          cg          

19:01 18:54 cg                                                                        cg          

                                                                                             

13:57 11 19:01 [facility] ER HOLD cg                                                     bd          

                                                                                             

13:57  19:01 ERHOLD- cg                                                          bd          

                                                                                                  

**************************************************************************************************

## 2022-11-28 LAB
BUN BLD-MCNC: 10 MG/DL (ref 7–18)
GLUCOSE SERPLBLD-MCNC: 97 MG/DL (ref 74–106)
HCT VFR BLD CALC: 46.6 % (ref 36–45)
LYMPHOCYTES # SPEC AUTO: 1.7 K/UL (ref 0.7–4.9)
MAGNESIUM SERPL-MCNC: 1.9 MG/DL (ref 1.8–2.4)
MCV RBC: 94.9 FL (ref 80–100)
PMV BLD: 7.4 FL (ref 7.6–11.3)
POTASSIUM SERPL-SCNC: 3.2 MMOL/L (ref 3.5–5.1)
RBC # BLD: 4.91 M/UL (ref 3.86–4.86)

## 2022-11-28 RX ADMIN — SODIUM CHLORIDE SCH MLS: 0.9 INJECTION, SOLUTION INTRAVENOUS at 17:39

## 2022-11-28 RX ADMIN — Medication SCH: at 09:00

## 2022-11-28 RX ADMIN — Medication SCH ML: at 21:01

## 2022-11-28 RX ADMIN — PRAMIPEXOLE DIHYDROCHLORIDE SCH: 0.25 TABLET ORAL at 21:00

## 2022-11-28 RX ADMIN — ATORVASTATIN CALCIUM SCH MG: 20 TABLET, FILM COATED ORAL at 21:00

## 2022-11-28 RX ADMIN — CARBIDOPA AND LEVODOPA SCH TAB: 25; 100 TABLET ORAL at 21:01

## 2022-11-28 RX ADMIN — CLOPIDOGREL BISULFATE SCH MG: 75 TABLET, FILM COATED ORAL at 17:39

## 2022-11-28 RX ADMIN — ASPIRIN SCH MG: 81 TABLET, COATED ORAL at 17:39

## 2022-11-28 RX ADMIN — SODIUM CHLORIDE SCH MLS: 0.9 INJECTION, SOLUTION INTRAVENOUS at 04:00

## 2022-11-28 RX ADMIN — CARBIDOPA AND LEVODOPA SCH: 25; 100 TABLET ORAL at 21:00

## 2022-11-28 RX ADMIN — ATORVASTATIN CALCIUM SCH: 20 TABLET, FILM COATED ORAL at 21:00

## 2022-11-28 RX ADMIN — CARBIDOPA AND LEVODOPA SCH TAB: 25; 100 TABLET ORAL at 17:39

## 2022-11-28 RX ADMIN — PRAMIPEXOLE DIHYDROCHLORIDE SCH MG: 0.25 TABLET ORAL at 21:00

## 2022-11-28 NOTE — EKG
Test Date:    2022-11-27               Test Time:    08:41:35

Technician:   SHASHA                                     

                                                     

MEASUREMENT RESULTS:                                       

Intervals:                                           

Rate:         70                                     

NV:           198                                    

QRSD:         98                                     

QT:           432                                    

QTc:          466                                    

Axis:                                                

P:            41                                     

NV:           198                                    

QRS:          45                                     

T:            72                                     

                                                     

INTERPRETIVE STATEMENTS:                                       

                                                     

Normal sinus rhythm

Anterior infarct, age undetermined

Abnormal ECG

Compared to ECG 03/12/2022 22:12:07

Myocardial infarct finding now present



Electronically Signed On 11-28-22 12:49:13 CST by Gregorio Blancas

## 2022-11-28 NOTE — PN
Date of Progress Note:  11/28/2022



The patient's condition has deteriorated somewhat during the day according to the nurses and the fami
ly.  She is responsive to commands; however, she has decreased motion in her extremities and some dif
ficulty speaking, which was not present apparently this morning on her presentation to the hospital. 
 Discussion with the family as far as her quality of life was accomplished and it was felt the patien
t would be DNI.  We will continue to monitor.  She has been seen by Neurology and obviously progressi
on of her CVA is a significant factor in her outcome.





HR/MODL

DD:  11/28/2022 18:40:35Voice ID:  [id number]

DT:  11/28/2022 23:37:00Report ID:  [id number]

## 2022-11-28 NOTE — EKG
Test Date:    2022-11-28               Test Time:    04:14:15

Technician:   RV                                     

                                                     

MEASUREMENT RESULTS:                                       

Intervals:                                           

Rate:         120                                    

NY:           168                                    

QRSD:         96                                     

QT:           306                                    

QTc:          432                                    

Axis:                                                

P:                                                   

NY:           168                                    

QRS:          30                                     

T:            95                                     

                                                     

INTERPRETIVE STATEMENTS:                                       

                                                     

Sinus tachycardia with premature atrial complexes

Cannot rule out Anterior infarct, age undetermined

ST & T wave abnormality, consider inferior ischemia

Abnormal ECG

Compared to ECG 11/27/2022 14:01:17

Atrial premature complex(es) now present

Myocardial infarct finding now present

ST (T wave) deviation now present

Possible ischemia now present

Sinus rhythm no longer present

First degree AV block no longer present



Electronically Signed On 11-28-22 12:47:37 CST by Gregorio Blancas

## 2022-11-28 NOTE — EKG
Test Date:    2022-11-27               Test Time:    14:01:17

Technician:   KAMAR                                    

                                                     

MEASUREMENT RESULTS:                                       

Intervals:                                           

Rate:         67                                     

NH:           214                                    

QRSD:         100                                    

QT:           434                                    

QTc:          458                                    

Axis:                                                

P:            55                                     

NH:           214                                    

QRS:          70                                     

T:            80                                     

                                                     

INTERPRETIVE STATEMENTS:                                       

                                                     

Sinus rhythm with 1st degree AV block

Otherwise normal ECG

Compared to ECG 03/12/2022 22:12:07

First degree AV block now present



Electronically Signed On 11-28-22 12:49:07 CST by Gregorio Blancas

## 2022-11-28 NOTE — CON
Reason For Consultation:  Consultation called because of stroke.



History Of Present Illness:  Ms. Estrada is an 82-year-old patient with Parkinson disease, hyperten
jina, poor hearing, prior stroke who comes to [facility] initially earlier on 11/27/2022 after having
 more diffuse symptoms of weakness currently in both extremities and she was seen around 8:30 in the 
morning on the 27th.  There was negative head CT scan and no specific reason was identified for the p
atient's nonfocal symptoms.  She went back home apparently took a nap and then woke up and the patien
t's son said sometime in the early afternoon she had now some right-sided weakness, more difficulty c
ommunicating.  This weakness involved the arm and leg, which was superimposed on her generalized weak
ness.  She came back at 1:29 p.m.  At that time, she had a head CT scan, which showed the findings of
 an acute stroke in the left thalamus and internal capsule area likely being the reason for her sympt
oms.  Since the findings of her deficits perhaps started somewhere around 8:30 in the morning, she wa
s not felt to be a candidate for tissue plasminogen or TNK as she is out of the 4-1/2 hour window.  S
he was treated with IV hydration.  She had been on Plavix and Parkinson's medications.  Those were he
ld until her speech evaluation was done to clear her for swallowing.  Her blood work revealed essenti
ally unremarkable complete blood count with differential and coagulation panel shows INR 1.14.  Her e
lectrolyte panel was unremarkable except repeat today shows low potassium of 3.2, that is after hydra
tion, normal calcium, normal magnesium, glucose raised from 97 to 106.  COVID test is negative.  She 
had an electrocardiogram, which showed sinus tachycardia, premature atrial complexes, ST and T-wave a
bnormalities.  Consider inferior ischemia.  She is followed by the cardiology service. 



She is now admitted and has received aspirin, Plavix, and Lipitor 40 mg at bedtime and restarted on M
irapex 0.5 mg twice daily and carbidopa/levodopa 125/100 three times daily.  She was evaluated by [facility]
sical therapy service for the potential for admission; however, at that time, she did appear more con
fused and did have some weakness in both right upper extremity and left lower extremity, and it was d
etermined that she also would benefit from Speech due to slurred speech and confusion and she was the
n evaluated to see if she could be a candidate for aggressive inpatient physical and occupational paul
 with speech therapy.



Past Medical History:  Cerebral aneurysm, stroke, Parkinson disease, hypertension, varicose veins.



Past Surgical History:  Cerebral aneurysm following surgery in eye, varicose vein stripping, surgery 
on the knee.



Allergies:  CIPROFLOXACIN, CODEINE, HYDROCHLOROTHIAZIDE, LOSARTAN, IODINE, PREDNISONE, TETANUS VACCIN
E, ZITHROMAX, AND __________.



Social History:  Patient resides at home with family.  Her son is involved in her care.  No alcohol, 
tobacco, or IV drug use.  She drinks caffeinated beverages.



Family History:  Positive for heart disease, hypertension and migraine in father.  Heart disease and 
varicose veins in mother.



Review of Systems:

Aside from mentioned, no recent fevers or chills.  There is diffuse weakness, some difficulty with sw
allowing.  Has restless leg type symptoms reported.  No rash.  No psychiatric issues, although she ha
s had some disorientation following the stroke and no active pulmonary issues.  No genitourinary or g
astrointestinal issues.



Physical Examination:

Vital Signs:  Blood pressure 154/79, pulse 88, respiratory rate 14, temperature 97.8, oxygen saturati
on 98% on room air. 

General:  Ms. Estrada is resting comfortably.  She is in no significant distress. 

HEENT:  Head is normocephalic, atraumatic.  Sclerae anicteric.  Oropharynx is moist. 

Neck:  Supple. 

Chest:  Clear. 

Heart:  Regular. 

Extremities:  Show no significant edema or cyanosis. 

Neurological:  No asymmetry of the face.  Perhaps subtle decrease to light touch, right versus left f
ace.  Some difficulty with her labial lingual and guttural sounds.  In terms of motor examination, di
ffuse weakness is present with more on the right upper lower versus the left upper and lower extremit
ies.  There is around at least 3 to 4/5 proximally and distally on the right and 4/5 proximally and d
istally on the left, decreased to light touch temperature in the right arm and leg compared to the le
ft side.  Some difficulty with coordination noted in the upper extremities bilaterally.  Asymmetric r
eflexes more noted on the right and left upper and lower extremity.  For gait, she will be ambulated 
with physical therapists and using a gait belt.



Assessment:  Ms. Estrada is an 82-year-old patient with an acute stroke involving the left thalamus
 and internal capsule producing more right-sided weakness upper and lower extremity involved along wi
th numbness.  She has Parkinson disease and has been off her Parkinson's medicines for a couple of da
ys now.  She did not receive TNK as she was likely out of the window with symptoms beginning somewher
e before 8:30 in the morning and she arrived to the hospital at 1:30.  She does have hypertension, dy
slipidemia and is on antihypertensive medications.  She is also on aspirin and Plavix along with foli
c acid and statin.



Plan:  

1.Aggressive physical and occupational therapy if possible.  Admitting to the inpatient rehabilitati
on unit on the 5th floor.

2.Aggressive management of her stroke risk factors as noted.

3.Restart Parkinson's medication and restless leg symptom medication.

4.She has a risk of aspiration pneumonia.  Chest x-ray will be done appropriately.

5.She is at risk of skin breakdown to be offloaded as appropriate.  She also has a risk of myocardia
l infarction.  She will be evaluated for chest pain as required.  She may require breathing treatment
s as well.  If she develops aspiration, IV antibiotics may be required and the patient will be follow
ed once in hospital.





GUY/TEO

DD:  11/28/2022 17:24:39Voice ID:  [id number]

DT:  11/28/2022 22:07:38Report ID:  [id number]

## 2022-11-29 LAB
ALBUMIN SERPL BCP-MCNC: 3.1 G/DL (ref 3.4–5)
ALP SERPL-CCNC: 91 U/L (ref 45–117)
ALT SERPL W P-5'-P-CCNC: 17 U/L (ref 12–78)
AST SERPL W P-5'-P-CCNC: 30 U/L (ref 15–37)
BUN BLD-MCNC: 9 MG/DL (ref 7–18)
GLUCOSE SERPLBLD-MCNC: 80 MG/DL (ref 74–106)
HCT VFR BLD CALC: 45.1 % (ref 36–45)
HDLC SERPL-MCNC: 77 MG/DL (ref 40–60)
LDLC SERPL CALC-MCNC: 126 MG/DL (ref ?–130)
LYMPHOCYTES # SPEC AUTO: 1.9 K/UL (ref 0.7–4.9)
MCV RBC: 94.8 FL (ref 80–100)
PMV BLD: 7.6 FL (ref 7.6–11.3)
POTASSIUM SERPL-SCNC: 3.5 MMOL/L (ref 3.5–5.1)
RBC # BLD: 4.76 M/UL (ref 3.86–4.86)

## 2022-11-29 RX ADMIN — SODIUM CHLORIDE SCH MLS: 0.9 INJECTION, SOLUTION INTRAVENOUS at 05:31

## 2022-11-29 RX ADMIN — CLOPIDOGREL BISULFATE SCH MG: 75 TABLET, FILM COATED ORAL at 08:23

## 2022-11-29 RX ADMIN — ATORVASTATIN CALCIUM SCH MG: 20 TABLET, FILM COATED ORAL at 19:50

## 2022-11-29 RX ADMIN — CARBIDOPA AND LEVODOPA SCH TAB: 25; 100 TABLET ORAL at 14:07

## 2022-11-29 RX ADMIN — CARBIDOPA AND LEVODOPA SCH TAB: 25; 100 TABLET ORAL at 19:50

## 2022-11-29 RX ADMIN — PRAMIPEXOLE DIHYDROCHLORIDE SCH MG: 0.25 TABLET ORAL at 19:50

## 2022-11-29 RX ADMIN — Medication SCH: at 19:51

## 2022-11-29 RX ADMIN — PRAMIPEXOLE DIHYDROCHLORIDE SCH MG: 0.25 TABLET ORAL at 08:24

## 2022-11-29 RX ADMIN — Medication SCH: at 08:25

## 2022-11-29 RX ADMIN — ASPIRIN SCH MG: 81 TABLET, COATED ORAL at 08:24

## 2022-11-29 RX ADMIN — SODIUM CHLORIDE SCH MLS: 0.9 INJECTION, SOLUTION INTRAVENOUS at 19:50

## 2022-11-29 RX ADMIN — CARBIDOPA AND LEVODOPA SCH TAB: 25; 100 TABLET ORAL at 08:25

## 2022-11-29 NOTE — PN
Subjective:  Ms. Estrada is more awake and alert today.  She started her carbidopa/levodopa and pra
mipexole yesterday.  She was able to sit on the side of the bed and did so with the physical therapis
t helping.  Legs and arms moves somewhat more freely.  She is able to again communicate a little bett
er.



Objective:  Vital Signs:  Blood pressure 164/67, pulse 86, respiratory rate 18, temperature 98.3, oxy
gen saturation 99% room air. 

General:  Ms. Estrada is lying in bed.  She denies any significant distress. 

HEENT:  She is normocephalic and atraumatic.  She has good air movement, although slow.  She does hav
e a mask-like face. 

Extremities:  She does have increased tone in upper and lower extremities, cogwheeling on the arms an
d legs.



Laboratory Studies:  White blood cell count 6.9, hemoglobin 15.3, hematocrit 45.1, platelets 237.  Ba
sic metabolic panel is unremarkable except slightly low albumin of 3.1, otherwise normal sodium, pota
ssium, chloride, magnesium, and liver function studies.  LDL cholesterol 126, total cholesterol 214, 
HDL 77.  Yesterday, her electrocardiogram showed sinus tachycardia with premature atrial complexes.



Assessment:  Ms. Estrada is an 82-year-old patient admitted to [facility] with a stroke af
fecting the left thalamus, left internal capsule producing some right-sided weakness, which is improv
ing.  She has Parkinson disease, was off medications and has had generalized stiffness and is now imp
roving as Parkinson's medications have been restarted.  She did begin to do physical therapy today at
 the side of the bed and was able to do so.  Much she was leaning to the right when trying to stand a
nd hold onto the walker.  She had some retropulsion as well.  The rightward leaning is likely due to 
the stroke affecting the right lower extremity with weakness.  She did have some difficulty with trun
debbie control as well.



Plan:  Sinemet 25/100 three times daily, pramipexole 0.5 mg twice daily.  She should continue Lipitor
 40 mg at bedtime, aspirin 81 mg daily, Plavix 75 mg daily for stroke risk reduction. 



She is a good candidate for admission to the acute inpatient rehabilitation unit for aggressive physi
debbie and occupational therapy along with speech therapy given her strokes.  Her Parkinson's medication
 should be optimized while in rehabilitation and continue on aggressive management of dyslipidemia an
d blood pressure management.  She is on Haldol as needed for agitation.  We will be looking out for p
otential hallucinations and delusions along with paranoid thinking, which may accompany the Parkinson
 disease.





LB/MODL

DD:  11/29/2022 17:19:06Voice ID:  [id number]

DT:  11/29/2022 21:35:45Report ID:  [id number]

## 2022-11-29 NOTE — PN
Date of Progress Note:  11/29/2022



Patient does seem somewhat more alert and responsive, this afternoon, with increased motion of her ex
tremities in retrospect of combination of a stroke and __________ for parkinsonism probably contribut
ed to her symptoms and signs of yesterday and on discussion of the case with Neurology, they suggeste
d the possibility of moving her upstairs for more intensive rehab and introduction of the rest of her
 medications.  We will order a modified barium swallow and continue to monitor.





HR/MODL

DD:  11/29/2022 13:58:40Voice ID:  [id number]

DT:  11/29/2022 19:27:02Report ID:  [id number]

## 2022-11-30 LAB
BUN BLD-MCNC: 20 MG/DL (ref 7–18)
GLUCOSE SERPLBLD-MCNC: 105 MG/DL (ref 74–106)
POTASSIUM SERPL-SCNC: 3.7 MMOL/L (ref 3.5–5.1)

## 2022-11-30 RX ADMIN — Medication SCH: at 09:00

## 2022-11-30 RX ADMIN — ASPIRIN SCH MG: 81 TABLET, COATED ORAL at 11:14

## 2022-11-30 RX ADMIN — CARBIDOPA AND LEVODOPA SCH TAB: 25; 100 TABLET ORAL at 13:56

## 2022-11-30 RX ADMIN — Medication SCH: at 21:00

## 2022-11-30 RX ADMIN — CARBIDOPA AND LEVODOPA SCH TAB: 25; 100 TABLET ORAL at 11:15

## 2022-11-30 RX ADMIN — PRAMIPEXOLE DIHYDROCHLORIDE SCH MG: 0.25 TABLET ORAL at 11:15

## 2022-11-30 RX ADMIN — CLOPIDOGREL BISULFATE SCH MG: 75 TABLET, FILM COATED ORAL at 11:15

## 2022-11-30 RX ADMIN — ATORVASTATIN CALCIUM SCH MG: 20 TABLET, FILM COATED ORAL at 20:43

## 2022-11-30 RX ADMIN — CARBIDOPA AND LEVODOPA SCH TAB: 25; 100 TABLET ORAL at 20:44

## 2022-11-30 RX ADMIN — PRAMIPEXOLE DIHYDROCHLORIDE SCH MG: 0.25 TABLET ORAL at 20:43

## 2022-11-30 RX ADMIN — SODIUM CHLORIDE SCH MLS: 0.9 INJECTION, SOLUTION INTRAVENOUS at 10:33

## 2022-11-30 NOTE — PN
Date of Progress Note:  11/30/2022



The patient has shown some improvement today, as far as her speech and orientation is concerned.  She
 still has much difficulty with moving her upper extremities.  Some question about whether she could 
tolerate.  Extensive physical therapy will be required on inpatient rehab.  If in fact she improves b
y tomorrow, this could be a possibility.  The other possibility is SNF and the family has requested LEONILA Silva, so depending on her progress, a decision could be made to see her tomorrow.





HR/MODL

DD:  11/30/2022 16:03:36Voice ID:  [id number]

DT:  11/30/2022 20:12:41Report ID:  [id number]

## 2022-11-30 NOTE — RAD REPORT
EXAM DESCRIPTION:

RAD - Barium Swallow Modified - 11/30/2022 10:26 am

 

CLINICAL HISTORY:  CVA/Parkinson's

 

FINDINGS:  LARYNGEAL PENETRATION INCONSISTENTLY CLEARED

 

ASPIRATION INCONSISTENT COUGH WITH NECTAR AND THIN

 

PHARYNGEAL RESIDUE VALLECULAR

 

INCONSISTENT EPIGLOTTIC INVERSION, REDUCED ORAL A-P TRANSIT

 

Fluoroscopy time 3.1 minutes. Twenty-two fluoroscopic spot series obtained

## 2022-12-01 VITALS — OXYGEN SATURATION: 91 %

## 2022-12-01 RX ADMIN — CARBIDOPA AND LEVODOPA SCH TAB: 25; 100 TABLET ORAL at 08:36

## 2022-12-01 RX ADMIN — SODIUM CHLORIDE SCH MLS: 0.9 INJECTION, SOLUTION INTRAVENOUS at 12:36

## 2022-12-01 RX ADMIN — PRAMIPEXOLE DIHYDROCHLORIDE SCH MG: 0.25 TABLET ORAL at 08:36

## 2022-12-01 RX ADMIN — PRAMIPEXOLE DIHYDROCHLORIDE SCH MG: 0.25 TABLET ORAL at 20:45

## 2022-12-01 RX ADMIN — ASPIRIN SCH MG: 81 TABLET, COATED ORAL at 08:36

## 2022-12-01 RX ADMIN — ATORVASTATIN CALCIUM SCH MG: 20 TABLET, FILM COATED ORAL at 20:45

## 2022-12-01 RX ADMIN — Medication SCH: at 08:36

## 2022-12-01 RX ADMIN — CARBIDOPA AND LEVODOPA SCH TAB: 25; 100 TABLET ORAL at 14:44

## 2022-12-01 RX ADMIN — Medication SCH: at 20:45

## 2022-12-01 RX ADMIN — CARBIDOPA AND LEVODOPA SCH TAB: 25; 100 TABLET ORAL at 20:45

## 2022-12-01 RX ADMIN — METOPROLOL TARTRATE SCH MG: 25 TABLET ORAL at 17:40

## 2022-12-01 RX ADMIN — METOPROLOL TARTRATE SCH MG: 25 TABLET ORAL at 12:36

## 2022-12-01 RX ADMIN — CLOPIDOGREL BISULFATE SCH MG: 75 TABLET, FILM COATED ORAL at 08:36

## 2022-12-01 RX ADMIN — SODIUM CHLORIDE SCH MLS: 0.9 INJECTION, SOLUTION INTRAVENOUS at 00:21

## 2022-12-01 NOTE — PN
Date of Progress Note:  12/01/2022



The patient is basically status quo.  Does move her arms somewhat better.  Her appetite is decreased 
as well, still having some dysphagia.  We will discuss the placement tomorrow as far as the rehab lalitha
shubham the SNF is concerned with Neurology.





HR/MODL

DD:  12/01/2022 15:47:24Voice ID:  [id number]

DT:  12/01/2022 19:47:14Report ID:  [id number]

## 2022-12-02 VITALS — TEMPERATURE: 97.8 F | DIASTOLIC BLOOD PRESSURE: 78 MMHG | SYSTOLIC BLOOD PRESSURE: 141 MMHG

## 2022-12-02 LAB — SARS-COV-2 RNA RESP QL NAA+PROBE: NEGATIVE

## 2022-12-02 RX ADMIN — METOPROLOL TARTRATE SCH MG: 25 TABLET ORAL at 05:09

## 2023-03-23 ENCOUNTER — HOSPITAL ENCOUNTER (INPATIENT)
Dept: HOSPITAL 97 - ER | Age: 83
LOS: 6 days | Discharge: NURSING, REHAB OR LONG-TERM CARE | DRG: 689 | End: 2023-03-29
Attending: FAMILY MEDICINE | Admitting: FAMILY MEDICINE
Payer: COMMERCIAL

## 2023-03-23 VITALS — BODY MASS INDEX: 26.6 KG/M2

## 2023-03-23 DIAGNOSIS — I69.351: ICD-10-CM

## 2023-03-23 DIAGNOSIS — G20: ICD-10-CM

## 2023-03-23 DIAGNOSIS — E86.0: ICD-10-CM

## 2023-03-23 DIAGNOSIS — Z96.653: ICD-10-CM

## 2023-03-23 DIAGNOSIS — I50.31: ICD-10-CM

## 2023-03-23 DIAGNOSIS — Z88.5: ICD-10-CM

## 2023-03-23 DIAGNOSIS — U07.1: ICD-10-CM

## 2023-03-23 DIAGNOSIS — Z88.1: ICD-10-CM

## 2023-03-23 DIAGNOSIS — N39.0: Primary | ICD-10-CM

## 2023-03-23 DIAGNOSIS — I69.322: ICD-10-CM

## 2023-03-23 DIAGNOSIS — R62.7: ICD-10-CM

## 2023-03-23 DIAGNOSIS — I69.391: ICD-10-CM

## 2023-03-23 DIAGNOSIS — Z91.041: ICD-10-CM

## 2023-03-23 DIAGNOSIS — I11.0: ICD-10-CM

## 2023-03-23 DIAGNOSIS — Z88.7: ICD-10-CM

## 2023-03-23 DIAGNOSIS — Z88.8: ICD-10-CM

## 2023-03-23 DIAGNOSIS — I69.392: ICD-10-CM

## 2023-03-23 DIAGNOSIS — R13.10: ICD-10-CM

## 2023-03-23 LAB
ALBUMIN SERPL BCP-MCNC: 3.1 G/DL (ref 3.4–5)
ALP SERPL-CCNC: 83 U/L (ref 45–117)
ALT SERPL W P-5'-P-CCNC: < 10 U/L (ref 13–56)
AST SERPL W P-5'-P-CCNC: 21 U/L (ref 15–37)
BUN BLD-MCNC: 31 MG/DL (ref 7–18)
GLUCOSE SERPLBLD-MCNC: 99 MG/DL (ref 74–106)
GRAN CASTS #/AREA URNS LPF: (no result) /LPF
HCT VFR BLD CALC: 44.5 % (ref 36–45)
INR BLD: 1.22
LYMPHOCYTES # SPEC AUTO: 1.9 K/UL (ref 0.7–4.9)
MAGNESIUM SERPL-MCNC: 2.3 MG/DL (ref 1.6–2.4)
MCV RBC: 94.1 FL (ref 80–100)
NT-PROBNP SERPL-MCNC: 393 PG/ML (ref ?–450)
PMV BLD: 8.6 FL (ref 7.6–11.3)
POTASSIUM SERPL-SCNC: 4.1 MEQ/L (ref 3.5–5.1)
RBC # BLD: 4.73 M/UL (ref 3.86–4.86)
TROPONIN I SERPL HS-MCNC: 8.5 PG/ML (ref ?–58.9)
TSH SERPL DL<=0.05 MIU/L-ACNC: 0.59 UIU/ML (ref 0.36–3.74)

## 2023-03-23 PROCEDURE — 84100 ASSAY OF PHOSPHORUS: CPT

## 2023-03-23 PROCEDURE — 70450 CT HEAD/BRAIN W/O DYE: CPT

## 2023-03-23 PROCEDURE — 92610 EVALUATE SWALLOWING FUNCTION: CPT

## 2023-03-23 PROCEDURE — 36415 COLL VENOUS BLD VENIPUNCTURE: CPT

## 2023-03-23 PROCEDURE — 85025 COMPLETE CBC W/AUTO DIFF WBC: CPT

## 2023-03-23 PROCEDURE — 84132 ASSAY OF SERUM POTASSIUM: CPT

## 2023-03-23 PROCEDURE — 82947 ASSAY GLUCOSE BLOOD QUANT: CPT

## 2023-03-23 PROCEDURE — 80048 BASIC METABOLIC PNL TOTAL CA: CPT

## 2023-03-23 PROCEDURE — 83605 ASSAY OF LACTIC ACID: CPT

## 2023-03-23 PROCEDURE — 83735 ASSAY OF MAGNESIUM: CPT

## 2023-03-23 PROCEDURE — 97161 PT EVAL LOW COMPLEX 20 MIN: CPT

## 2023-03-23 PROCEDURE — 84443 ASSAY THYROID STIM HORMONE: CPT

## 2023-03-23 PROCEDURE — 80076 HEPATIC FUNCTION PANEL: CPT

## 2023-03-23 PROCEDURE — 83880 ASSAY OF NATRIURETIC PEPTIDE: CPT

## 2023-03-23 PROCEDURE — 96365 THER/PROPH/DIAG IV INF INIT: CPT

## 2023-03-23 PROCEDURE — 93005 ELECTROCARDIOGRAM TRACING: CPT

## 2023-03-23 PROCEDURE — 72125 CT NECK SPINE W/O DYE: CPT

## 2023-03-23 PROCEDURE — 84145 PROCALCITONIN (PCT): CPT

## 2023-03-23 PROCEDURE — 97530 THERAPEUTIC ACTIVITIES: CPT

## 2023-03-23 PROCEDURE — 84484 ASSAY OF TROPONIN QUANT: CPT

## 2023-03-23 PROCEDURE — 81001 URINALYSIS AUTO W/SCOPE: CPT

## 2023-03-23 PROCEDURE — 87205 SMEAR GRAM STAIN: CPT

## 2023-03-23 PROCEDURE — 82607 VITAMIN B-12: CPT

## 2023-03-23 PROCEDURE — 87040 BLOOD CULTURE FOR BACTERIA: CPT

## 2023-03-23 PROCEDURE — 71045 X-RAY EXAM CHEST 1 VIEW: CPT

## 2023-03-23 PROCEDURE — 80053 COMPREHEN METABOLIC PANEL: CPT

## 2023-03-23 PROCEDURE — 99285 EMERGENCY DEPT VISIT HI MDM: CPT

## 2023-03-23 PROCEDURE — 85610 PROTHROMBIN TIME: CPT

## 2023-03-23 RX ADMIN — ATORVASTATIN CALCIUM SCH: 10 TABLET, FILM COATED ORAL at 21:00

## 2023-03-23 RX ADMIN — Medication SCH: at 21:00

## 2023-03-23 RX ADMIN — ALBUTEROL SULFATE SCH: 2.5 SOLUTION RESPIRATORY (INHALATION) at 20:00

## 2023-03-23 RX ADMIN — PRAMIPEXOLE DIHYDROCHLORIDE SCH: 0.25 TABLET ORAL at 21:00

## 2023-03-23 RX ADMIN — MIRTAZAPINE SCH: 15 TABLET, FILM COATED ORAL at 21:00

## 2023-03-23 RX ADMIN — AMLODIPINE BESYLATE SCH: 5 TABLET ORAL at 21:00

## 2023-03-23 RX ADMIN — CARBIDOPA AND LEVODOPA SCH: 25; 100 TABLET ORAL at 21:00

## 2023-03-23 NOTE — XMS REPORT
Continuity of Care Document

                            Created on:2023



Patient:SHAHIDA ESTRADA

Sex:Female

:1940

External Reference #:[id number]





Demographics







                          Address                   [address] [place] [address]

 

                          Home Phone                [phone]

 

                          Mobile Phone              [phone]

 

                          Email Address             [email or web address]

 

                          Preferred Language        English

 

                          Marital Status            Unknown

 

                          Mandaeism Affiliation     Unknown

 

                          Race                      Unknown

 

                          Additional Race(s)        White

 

                          Ethnic Group              Unknown









Author







                          Organization              [facility]

t

 

                          Address                   [address] [place] [address]. [address]



                                                    [place] [address]

 

                          Phone                     [phone]









Support







                Name            Relationship    Address         Phone

 

                Wan Estrada Spouse          [address] [facility]  [phone]



                                                [place] [address] 

 

                Musa Estrada Child           Unavailable     [phone]

3









Care Team Providers







                    Name                Role                Phone

 

                    Samuel Todd MD  Primary Care Physician [phone]

 

                    MATTHEW SOLOMON      Attending Clinician Unavailable

 

                    Matthew Lemon  Attending Clinician [phone]

 

                    Flavio Villeda MD Attending Clinician [phone]

[phone]

 

                    FLAVIO VILLEDA Attending Clinician Unavailable

 

                    Doctor Unassigned, No Name Attending Clinician Unavailable

 

                    MIGUE WEBER Attending Clinician Unavailable

 

                    CHATA CALLE Attending Clinician Unavailable

 

                    MEJIA HOWARD Attending Clinician Unavailable

 

                    FLAVIO VILLEDA Admitting Clinician Unavailable

 

                    MIGUE WBEER Admitting Clinician Unavailable

 

                    SUZANNE BAER Admitting Clinician Unavailable

 

                    MEJIA HOWARD Admitting Clinician Unavailable









Payers







           Payer Name Policy Type Policy Number Effective Date Expiration Date KUMAR salvador

 

           MEDICARE PART A            [id number] 2007            



           \T\ B                            00:00:00              







Problems







       Condition Condition Condition Status Onset  Resolution Last   Treating Co

mments 

Source



       Name   Details Category        Date   Date   Treatment Clinician        



                                                 Date                 

 

       Parkinson Parkinson Disease Active                              [facility]

[facility]



       disease disease               6-19                               st



                                   00:00:                             [facility]



                                   00                                 l

 

       Low back Low back Disease Active                              [facility]

[facility]



       pain   pain                 5-22                               st



       radiating radiating               00:00:                             Hosp

[place]



       to right to right               00                                 l



       leg    leg                                                     

 

       Varicose Varicose Disease Active                              [facility]

[facility]



       veins of veins of               5-22                               st



       leg with leg with               00:00:                             Hospit

a



       edema, edema,               00                                 l



       bilateral bilateral                                                  

 

       Aneurysm Aneurysm Disease Recurre 2017                             [facility]



                            [facility]    [facility]                               [facility]



                                   [facility]                             [facility]



                                   [facility]                                 [facility]

 

       Hypertensi Hypertensi Disease Active                              [facility]

[facility]



       on     on                                                  [facility]



                                   [facility]                             [facility]



                                   00                                 Center

 

       Other  Other  Disease Active                              [facility]



       specified specified               5-27                               [facility]

s



       transient transient               00:00:                             Medi

debbie



       cerebral cerebral               00                                 Center



       ischemias ischemias                                                  

 

       Hypertensi Hypertensi Disease Active                              [facility]

[facility]



       ve     ve                   5-20                               [facility]



       emergency emergency               00:00:                             Medi

debbie



                                   00                                 Center

 

       Carotid Carotid Disease Recurre                              [facility]



       aneurysm, aneurysm,        nce    5-15                               [facility]

s



       right  right                00:00:                             Medical



                                   00                                 Center

 

       Syncope Syncope Disease Active                              [facility]



                                   5-15                               [facility]



                                   [facility]                             [facility]



                                   00                                 Center

 

       Essential Essential Disease Active                              [facility]



       hypertensi hypertensi               5-14                               [facility]

[facility]



       on     on                   00:00:                             Medical



                                   00                                 Center

 

       TIA    TIA    Disease Active                              [facility]



       (transient (transient               5-13                               [facility]

[facility]



       ischemic ischemic               00:00:                             Medica

l



       attack) attack)               00                                 Center

 

       Hypertensi Hypertensi Disease Active                              [facility]

[facility]



       on     on                   5-13                               [facility]



                                   [facility]                             [facility]



                                   00                                 Center







Allergies, Adverse Reactions, Alerts







       Allergy Allergy Status Severity Reaction(s) Onset  Inactive Treating Comm

ents 

Source



       Name   Type                        Date   Date   Clinician        

 

       AZITHROM DRUG   Active        Unknown-Cmnt                       Un

raudel



       YCIN   INGREDI                      3-14                        [facility] of



                                          00:00:                      [place]



                                          00                          [facility]



                                                                      [facility]

 

       Ciproflo Drug   Active        Unknown -                       Unive

rs



       xacin  Allergy               See comments 3-14                        ity

 of



                                          00:00:                      [place]



                                          00                          [facility]



                                                                      Branch

 

       Codeine Drug   Active        Unknown - -0                      Univer

s



              Allergy               See comments 3-14                        ity

 of



                                          00:00:                      [place]



                                          00                          Medical



                                                                      Branch

 

       Hydrochl Drug   Active        Unknown -                       Unive

rs



       orothiaz Allergy               See comments 3-14                        i

ty of



       macarena                                00:00:                      [place]



                                          00                          Medical



                                                                      Branch

 

       Iodine Drug   Active        Unknown - -0                      [facility]



              Allergy               See comments 3-14                        ity

 of



                                          00:00:                      [place]



                                                                    [facility]



                                                                      Branch

 

       Predniso Drug   Active        Unknown -                       Unive

rs



       ne     Allergy               See comments 3-14                        ity

 of



                                          00:00:                      [place]



                                          00                          Medical



                                                                      Branch

 

       Tetanus Drug   Active        Unknown - -0                      Univer

s



       Toxoid Allergy               See comments 3-14                        ity

 of



                                          00:00:                      [place]



                                          00                          Medical



                                                                      Branch

 

       Valsarta Drug   Active        Unknown - -0                      Unive

rs



       n      Allergy               See comments 3-14                        ity

 of



                                          00:00:                      [place]



                                          00                          Medical



                                                                      Branch

 

       Azithrom Drug   Active        Unknown - -0                      Unive

rs



       ycin   Allergy               See comments 3-14                        ity

 of



                                          00:00:                      [place]



                                          00                          Medical



                                                                      Branch

 

       CIPROFLO DRUG   Active        Unknown-Cmnt -0                      Un

raudle



       XACIN  INGREDI                      3-14                        ity of



                                          00:00:                      [place]



                                          00                          Medical



                                                                      Branch

 

       CODEINE DRUG   Active        Unknown-Cmnt -0                      Uni

vers



              INGREDI                      3-14                        ity of



                                          00:00:                      [place]



                                          00                          Medical



                                                                      Branch

 

       HYDROCHL DRUG   Active        Unknown-Cmnt -0                      Un

raudel



       OROTHIAZ INGREDI                      3-14                        ity of



       MACARENA                                00:00:                      [place]



                                          00                          Medical



                                                                      Branch

 

       IODINE DRUG   Active        Unknown-Cmnt -0                      Univ

ers



              INGREDI                      3-14                        ity of



                                          00:00:                      [place]



                                          00                          Medical



                                                                      Branch

 

       PREDNISO DRUG   Active        Unknown-Cmnt -0                      Un

raudel



       NE     INGREDI                      3-14                        ity of



                                          00:00:                      [place]



                                          00                          Medical



                                                                      Branch

 

       TETANUS DRUG   Active        Unknown-Cmnt -0                      Uni

vers



       TOXOID                             3-14                        ity of



                                          00:00:                      [place]



                                          00                          Medical



                                                                      Branch

 

       VALSARTA DRUG   Active        Unknown-Cmnt -0                      Un

raudel



       N      INGREDI                      3-14                        ity of



                                          00:00:                      [place]



                                          00                          Medical



                                                                      Branch

 

       Sulfamet Propensi Active                                     Method

i



       hoxazole ty to                       3-16                        st



       -Trimeth adverse                      00:00:                      Hospita



       oprim  reaction                      00                          l



              s to                                                    



              drug                                                    

 

       Ciproflo Propensi Active                                     Method

i



       xacin  ty to                       3-16                        st



              adverse                      00:00:                      Hospita



              reaction                      00                          l



              s to                                                    



              drug                                                    

 

       Valsarta Propensi Active                                     Method

i



       n      ty to                       3-16                        st



              adverse                      00:00:                      Hospita



              reaction                      00                          l



              s to                                                    



              drug                                                    

 

       Iodine Propensi Active                                     Method[facility]



              ty to                       3-16                        st



              adverse                      00:00:                      Hospita



              reaction                      00                          l



              s to                                                    



              drug                                                    

 

       Predniso Propensi Active                                     Method

i



       ne     ty to                       3-16                        st



              adverse                      00:00:                      Hospita



              reaction                      00                          l



              s to                                                    



              drug                                                    

 

       Tetanus Propensi Active                                     Methodi



       Vaccines ty to                       3-16                        st



       And    adverse                      00:00:                      Hospita



       Toxoid reaction                      00                          l



              s to                                                    



              drug                                                    

 

       Iodine Drug   Active        Other (See 2017               Cold   [facility]



       And    Allergy               Comments) 2-20                 [facility]



       Iodide                             00:00:                      Medical



       Containi                             00                          Center



       ng                                                             



       Products                                                         

 

       Penicill Drug   Active        Anaphylaxis                       [facility]



       ins    Allergy                      5-13                        [facility]



                                          00[facility]                      [facility]



                                          00                          Center

 

       Tetanus Drug   Active        Itching                       [facility]



       Vaccines Allergy                      5-13                        [facility]



       And                                00:00:                      Medical



       Toxoid                             00                          Center

 

       Ciproflo Drug   Active        Anaphylaxis                       [facility]



       xacin  Allergy                      5-13                        [facility]



                                          00:00:                      [facility]



                                          00                          Center

 

       Penicill Drug   Active        Anaphylaxis                       [facility]



       ins    Allergy                      5-13                        [facility]



                                          00:00:                      [facility]



                                          00                          Center

 

       Tetanus Drug   Active        Itching                       [facility]



       Vaccines Allergy                      5-13                        [facility]



       And                                00:00:                      Medical



       Toxoid                             00                          Center

 

       No Known Propensi Active               2016                      Method

i



       Drug   ty to                       -22                        st



       Allergie adverse                      00:00:                      [facility]osp[facility]



       s      reaction                      00                          l



              s to                                                    



              drug                                                    







Social History







           Social Habit Start Date Stop Date  Quantity   Comments   Source

 

           [facility]                                             [facility]



           Alcohol Std                                             



           Drinks                                                 

 

           [facility]                                             [facility]

[facility]



           Alcohol Binge                                             

 

           Exposure to 2023-03-10 2023 Not sure              [facility]



           SARS-CoV-2 00:00:00   12:35:00                         [place] [facility]



           (event)                                                Branch

 

           Alcohol intake 2019 Lifetime              [facility]



                      00:00:00   00:00:00   non-drinker            



                                            (finding)             

 

           [facility] 2019 1                     [facility]

spital



           Alcohol Frequency 00:00:00   00:00:00                         

 

           Tobacco use and 2017 Never used            [facility]

[facility]



           exposure   00:00:00   00:00:00                         [facility]

 

           Sex Assigned At 1940                       [facility]



           Birth      00:00:00   00:00:00                         









                Smoking Status  Start Date      Stop Date       Source

 

                Tobacco smoking consumption                                 Univ

[facility]



                unknown                                         Branch

 

                Never smoked tobacco                                 [facility]







Medications







       Ordered Filled Start  Stop   Current Ordering Indication Dosage Frequency

 Signature

                    Comments            Components          Source



     Medication Medication Date Date Medication? Clinician                (SIG) 

          



     Name Name                                                   

 

     cefTRIAXone                   1000mg      1,000 mg,         

  [facility]



     (ROCEPHIN)      3-14 03-14                          IV             ity of



     1,000 mg [facility]      23:00: 22:44                          [facility]          

 [place]



     NaCl 0.9%      00   :00                           ONCE, 1           Medical



     (NS) 100 mL                                         dose, On           [facility]

ch



     MINI-BAG                                         Tue            



                                                  3/14/23 at           



                                                  1800,           



                                                  Administer           



                                                  over 30           



                                                  Minutes,           



                                                  100            



                                                  mL<br>Reas           



                                                  on for           



                                                  Anti-Infec           



                                                  tive:           



                                                  Documented           



                                                  Infection<           



                                                  br>Documen           



                                                  lamont            



                                                  Infection           



                                                  Site:           



                                                  Urine<br&g           



                                                  t;Duration           



                                                  of             



                                                  Therapy:           



                                                  Other (see           



                                                  Comments)           

 

     cefdinir      2023- Yes       [id number] 300mg      Take 1           U

nivers



     300 mg      3-14 03-25                          capsule by           ity of



     capsule      00:00: 04:59                          mouth           [place]



               00   :00                           every 12           Medical



                                                  (twelve)           Branch



                                                  hours for           



                                                  10 days.           

 

     cefdinir      2023- Yes       [id number] 300mg      Take 1           U

nivers



     300 mg      3-14 03-25                          capsule by           [facility] of



     capsule      00:00: 04:59                          mouth           [place]



               00   :00                           every 12           [facility]



                                                  (twelve)           [facility]



                                                  hours for           



                                                  10 days.           

 

     carbidopa-l            Yes            1{tbl} Q.[id number] Take 1       

    [facility]



     evodopa      6-19                          [id number] tablet by           s

t



      mg      12:26:                          3D   mouth 3           [facility]

ta



     per       00                                 (three)           l



     disintegrat                                         times a           



     ing tablet                                         day.           

 

     carbidopa-l            Yes            1{tbl} Q.[id number] Take 1       

    [facility]



     evodopa      6-19                          [id number] tablet by           s

t



      mg      12:26:                          3D   mouth 3           [facility]

ta



     per       00                                 (three)           l



     disintegrat                                         times a           



     ing tablet                                         day.           

 

     carbidopa-l            Yes            1{tbl} Q.[id number] Take 1       

    [facility]



     evodopa      6-19                          [id number] tablet by           s

t



      mg      12:26:                          3D   mouth 3           [facility]

ta



     per       00                                 (three)           l



     disintegrat                                         times a           



     ing tablet                                         day.           

 

     carbidopa-l            Yes            1{tbl} Q.[id number] Take 1       

    [facility]



     evodopa      6-19                          [id number] tablet by           s

t



      mg      12:26:                          3D   mouth 3           [facility]

ta



     per       00                                 (three)           l



     disintegrat                                         times a           



     ing tablet                                         day.           

 

     carbidopa-l            Yes            1{tbl} Q.[id number] Take 1       

    [facility]



     evodopa      6-19                          [id number] tablet by           s

t



      mg      12:26:                          3D   mouth 3           [facility]

ta



     per       00                                 (three)           l



     disintegrat                                         times a           



     ing tablet                                         day.           

 

     aspirin 325      -0      Yes            325mg QD   Take 325           M

[facility]i



     MG tablet      3-16                               mg by           [facility]



               10:22:                               [facility]           [facility]



               52                                 daily.           l

 

     aspirin 325      -0      Yes            325mg QD   Take 325           M

[facility]



     MG tablet      3-16                               mg by           st



               10:22:                               mouth           [facility]



               52                                 daily.           l

 

     aspirin 325      -0      Yes            325mg QD   Take 325           M

[facility]i



     MG tablet      3-16                               mg by           st



               10:22:                               mouth           [facility]



               52                                 daily.           l

 

     aspirin 325      -      Yes            325mg QD   Take 325           M

[facility]i



     MG tablet      3-16                               mg by           st



               10:22:                               mouth           [facility]



               52                                 daily.           l

 

     aspirin 325      -      Yes            325mg QD   Take 325           M

[facility]i



     MG tablet      3-16                               mg by           st



               10:22:                               mouth           [facility]



               52                                 daily.           l

 

     amLODIPine            Yes                      TAKE ONE           [facility]

[facility]



     (NORVASC)      3-15                               (1)            st



     10 mg      00:00:                               TABLET(S)           [facility]



     tablet      00                                 BY MOUTH           l



                                                  ONCE A           



                                                  DAY.           

 

     amLODIPine            Yes                      TAKE ONE           [facility]

[facility]



     (NORVASC)      3-15                               (1)            st



     10 mg      00:00:                               TABLET(S)           [facility]



     tablet      00                                 BY MOUTH           l



                                                  ONCE A           



                                                  DAY.           

 

     amLODIPine            Yes                      TAKE ONE           [facility]

[facility]



     (NORVASC)      3-15                               (1)            st



     10 mg      00:00:                               TABLET(S)           [facility]



     tablet      00                                 BY MOUTH           l



                                                  ONCE A           



                                                  DAY.           

 

     amLODIPine            Yes                      TAKE ONE           [facility]

[facility]



     (NORVASC)      3-15                               (1)            st



     10 mg      00:00:                               TABLET(S)           [facility]



     tablet      00                                 BY MOUTH           l



                                                  ONCE A           



                                                  DAY.           

 

     amLODIPine            Yes                      TAKE ONE           [facility]

[facility]



     (NORVASC)      3-15                               (1)            st



     10 mg      00:00:                               TABLET(S)           [facility]



     tablet      00                                 BY MOUTH           l



                                                  ONCE A           



                                                  DAY.           

 

     metoprolol            Yes                      TAKE ONE           [facility]

[facility]



     tartrate      2-19                               (1)            st



     (LOPRESSOR)      00:00:                               TABLET(S)           [facility]

[facility]



     25 mg      00                                 BY MOUTH           l



     tablet                                         TWICE A           



                                                  DAY.           

 

     metoprolol            Yes                      TAKE ONE           [facility]

[facility]



     tartrate      2-19                               (1)            st



     (LOPRESSOR)      00:00:                               TABLET(S)           [facility]

[facility]



     25 mg      00                                 BY MOUTH           l



     tablet                                         TWICE A           



                                                  DAY.           

 

     metoprolol            Yes                      TAKE ONE           [facility]

[facility]



     tartrate      2-19                               (1)            st



     (LOPRESSOR)      00:00:                               TABLET(S)           [facility]

[facility]



     25 mg      00                                 BY MOUTH           l



     tablet                                         TWICE A           



                                                  DAY.           

 

     metoprolol            Yes                      TAKE ONE           [facility]

[facility]



     tartrate      2-19                               (1)            st



     (LOPRESSOR)      00:00:                               TABLET(S)           [facility]

[facility]



     25 mg      00                                 BY MOUTH           l



     tablet                                         TWICE A           



                                                  DAY.           

 

     metoprolol            Yes                      TAKE ONE           [facility]

[facility]



     tartrate      2-19                               (1)            st



     (LOPRESSOR)      00:00:                               TABLET(S)           [facility]

[facility]



     25 mg      00                                 BY MOUTH           l



     tablet                                         TWICE A           



                                                  DAY.           

 

     clopidogrel            Yes                      TAKE ONE           [facility]

[facility]



     (PLAVIX) 75      2-09                               (1)            st



     mg tablet      00:00:                               TABLET(S)           [facility]



               00                                 BY MOUTH           l



                                                  ONCE A           



                                                  DAY.           

 

     clopidogrel            Yes                      TAKE ONE           [facility]

[facility]



     (PLAVIX) 75      2-09                               (1)            st



     mg tablet      00:00:                               TABLET(S)           [facility]



               00                                 BY MOUTH           l



                                                  ONCE A           



                                                  DAY.           

 

     clopidogrel            Yes                      TAKE ONE           [facility]

[facility]



     (PLAVIX) 75      2-09                               (1)            st



     mg tablet      00:00:                               TABLET(S)           [facility]



               00                                 BY MOUTH           l



                                                  ONCE A           



                                                  DAY.           

 

     clopidogrel            Yes                      TAKE ONE           [facility]

[facility]



     (PLAVIX) 75      2-09                               (1)            st



     mg tablet      00:00:                               TABLET(S)           [facility]



               00                                 BY MOUTH           l



                                                  ONCE A           



                                                  DAY.           

 

     clopidogrel            Yes                      TAKE ONE           [facility]

[facility]



     (PLAVIX) 75      2-09                               (1)            st



     mg tablet      00:00:                               TABLET(S)           [facility]



               00                                 BY MOUTH           l



                                                  ONCE A           



                                                  DAY.           

 

     CALCIUM      2017      Yes                 Q.5D Take by           [facility]



     CARBONATE/V      2-20                               [facility] 2           [facility]



     ITAMIN D3      20:34:                               (two)           [facility]



     (CALCIUM      26                                 times           [facility]



     600 + D,3,                                         daily.           



     [facility])                                                        

 

     VIT C/VIT      2017      Yes                 QD   Take by           [facility]

[facility]



     E/LUTEIN/MI      2-20                               mouth           [facility]



     N/OMEGA-3      20:34:                               daily.           [facility]

l



     (OCUVITE      26                                                [facility]



     [facility])                                                        

 

     folic acid      2017      Yes            400ug QD   Take 400           [facility]



     (FOLVITE)      2-20                               mcg by           [facility]



     400 MCG      20:34:                               mouth           [facility]



     tablet      26                                 nightly.           [place]

 

     cyanocobala      2017      Yes            1000ug QD   Take 1,000         

  [facility]



     min 1000      2-20                               mcg by           [facility]



     MCG tablet      20:34:                               mouth           [facility]

l



               26                                 daily.           [place]

 

     cranberry      2017      Yes                 Q.5D Take by           [facility]

[facility]



     extract      2-20                               mouth 2           [facility]



     (CRANBERRY      20:34:                               (two)           [facility]

l



     CONCENTRATE      26                                 times           [facility]



     ) 500 mg                                         daily.           



     Cap                                                         

 

     docusate      2017      Yes            100mg      Take 100           [facility]



     sodium      2-20                               mg by           [facility]



     (COLACE)      20:34:                               mouth 2           [facility]

l



     100 MG      26                                 (two)           [facility]



     capsule                                         times           



                                                  daily as           



                                                  needed for           



                                                  Constipati           



                                                  on.            

 

     CALCIUM      2017      Yes                 Q.5D Take by           [facility]



     CARBONATE/V      2-20                               [facility] 2           [facility]



     ITAMIN D3      20:34:                               (two)           [facility]



     (CALCIUM      26                                 times           [facility]



     600 + D,3,                                         daily.           



     [facility])                                                        

 

     VIT C/VIT      2017      Yes                 QD   Take by           [facility]

[facility]



     E/LUTEIN/MI      2-20                               mouth           [facility]



     N/OMEGA-3      20:34:                               daily.           [facility]

[facility]



     (OCUVITE      26                                                [facility]



     [facility])                                                        

 

     folic acid      2017      Yes            400ug QD   Take 400           [facility]



     (FOLVITE)      2-20                               mcg by           [facility]



     400 MCG      20:34:                               mouth           [facility]



     tablet      26                                 nightly.           [place]

 

     cyanocobala      2017      Yes            1000ug QD   Take 1,000         

  [facility]



     min 1000      2-20                               mcg by           [facility]



     MCG tablet      20:34:                               mouth           [facility]

[facility]



               26                                 daily.           [place]

 

     cranberry      2017      Yes                 Q.5D Take by           [facility]

[facility]



     extract      2-20                               mouth 2           [facility]



     (CRANBERRY      20:34:                               (two)           [facility]

l



     CONCENTRATE      26                                 times           [facility]



     ) 500 mg                                         daily.           



     Cap                                                         

 

     docusate      2017      Yes            100mg      Take 100           [facility]



     sodium      2-20                               mg by           [facility]



     (COLACE)      20:34:                               mouth 2           [facility]

l



     100 MG      26                                 (two)           [facility]



     capsule                                         times           



                                                  daily as           



                                                  needed for           



                                                  Constipati           



                                                  on.            

 

     CALCIUM      2017      Yes                 Q.5D Take by           [facility]



     CARBONATE/V      2-20                               mouth 2           [facility]



     ITAMIN D3      20:34:                               (two)           [facility]



     (CALCIUM      26                                 times           [facility]



     600 + D,3,                                         daily.           



     [facility])                                                        

 

     VIT C/VIT      2017      Yes                 QD   Take by           [facility]

[facility]



     E/LUTEIN/MI      2-20                               mouth           [facility]



     N/OMEGA-3      20:34:                               daily.           [facility]

[facility]



     (OCUVITE      26                                                [facility]



     [facility])                                                        

 

     folic acid      2017      Yes            400ug QD   Take 400           [facility]



     (FOLVITE)      2-20                               mcg by           [facility]



     400 MCG      20:34:                               mouth           [facility]



     tablet      26                                 nightly.           [place]

 

     cyanocobala      2017      Yes            1000ug QD   Take 1,000         

  [facility]



     min 1000      2-20                               mcg by           [facility]



     MCG tablet      20:34:                               mouth           [facility]

l



               26                                 daily.           [place]

 

     cranberry      2017      Yes                 Q.5D Take by           [facility]

[facility]



     extract      2-20                               [facility] 2           [facility]



     (CRANBERRY      20:34:                               (two)           [facility]

l



     CONCENTRATE      26                                 times           [facility]



     ) 500 mg                                         daily.           



     Cap                                                         

 

     docusate      2017      Yes            100mg      Take 100           [facility]



     sodium      2-20                               mg by           [facility]



     (COLACE)      20:34:                               mouth 2           [facility]

l



     100 MG      26                                 (two)           [facility]



     capsule                                         times           



                                                  daily as           



                                                  needed for           



                                                  Constipati           



                                                  on.            

 

     CALCIUM      2017      Yes                 Q.5D Take by           [facility]



     CARBONATE/V      2-20                               [facility] 2           [facility]



     ITAMIN D3      20:34:                               (two)           [facility]



     (CALCIUM      26                                 times           [facility]



     600 + D,3,                                         daily.           



     [facility])                                                        

 

     VIT C/VIT      2017      Yes                 QD   Take by           [facility]

[facility]



     E/LUTEIN/MI      2-20                               mouth           [facility]



     N/OMEGA-3      20:34:                               daily.           [facility]

l



     (OCUVITE      26                                                [place]



     OR[facility])                                                        

 

     folic acid      2017      Yes            400ug QD   Take 400           [facility]



     (FOLVITE)      2-20                               mcg by           [facility]



     400 MCG      20:34:                               mouth           [facility]



     tablet      26                                 nightly.           [place]

 

     cyanocobala      2017      Yes            1000ug QD   Take 1,000         

  [facility]



     min 1000      2-20                               mcg by           [facility]



     MCG tablet      20:34:                               mouth           [facility]

[facility]



               26                                 daily.           [place]

 

     cranberry      2017      Yes                 Q.5D Take by           [facility]

[facility]



     extract      2-20                               mouth 2           [facility]



     (CRANBERRY      20:34:                               (two)           [facility]

l



     CONCENTRATE      26                                 times           [facility]



     ) 500 mg                                         daily.           



     Cap                                                         

 

     docusate      2017      Yes            100mg      Take 100           [facility]



     sodium      2-20                               mg by           [facility]



     (COLACE)      20:34:                               mouth 2           [facility]

[facility]



     100 MG      26                                 (two)           [facility]



     capsule                                         times           



                                                  daily as           



                                                  needed for           



                                                  Constipati           



                                                  on.            

 

     CALCIUM      2017      Yes                 Q.5D Take by           [facility]



     CARBONATE/V      2-20                               mouth 2           [facility]



     ITAMIN D3      20:34:                               (two)           [facility]



     (CALCIUM      26                                 times           [facility]



     600 + D,3,                                         daily.           



     [facility])                                                        

 

     VIT C/VIT      2017      Yes                 QD   Take by           [facility]

[facility]



     E/LUTEIN/MI      2-20                               mouth           [facility]



     N/OMEGA-3      20:34:                               daily.           [facility]

[facility]



     (OCUVITE      26                                                [facility]



     [facility])                                                        

 

     folic acid      2017      Yes            400ug QD   Take 400           [facility]



     (FOLVITE)      2-20                               mcg by           [facility]



     400 MCG      20:34:                               mouth           [facility]



     tablet      26                                 nightly.           [place]

 

     cyanocobala      2017      Yes            1000ug QD   Take 1,000         

  [facility]



     min 1000      2-20                               mcg by           [facility]



     MCG tablet      20:34:                               mouth           [facility]

l



               26                                 daily.           [place]

 

     cranberry      2017      Yes                 Q.5D Take by           [facility]

[facility]



     extract      2-20                               mouth 2           [facility]



     (CRANBERRY      20:34:                               (two)           [facility]

l



     CONCENTRATE      26                                 times           [facility]



     ) 500 mg                                         daily.           



     Cap                                                         

 

     docusate      2017      Yes            100mg      Take 100           [facility] 

[facility]



     sodium      2-20                               mg by           [facility]



     (COLACE)      20:34:                               mouth 2           [facility]

l



     100 MG      26                                 (two)           [place]



     capsule                                         times           



                                                  daily as           



                                                  needed for           



                                                  Constipati           



                                                  on.            

 

     atorvastati      2017      Yes                      TAKE ONE           Me

thodi



     n (LIPITOR)      2-14                               (1)            st



     80 MG      00:00:                               TABLET(S)           [facility]



     tablet      00                                 BY MOUTH           l



                                                  ONCE A           



                                                  DAY.           

 

     atorvastati      2017      Yes                      TAKE ONE           Me

thodi



     n (LIPITOR)      2-14                               (1)            st



     80 MG      00:00:                               TABLET(S)           [facility]



     tablet      00                                 BY MOUTH           l



                                                  ONCE A           



                                                  DAY.           

 

     atorvastati      2017      Yes                      TAKE ONE           Me

thodi



     n (LIPITOR)      2-14                               (1)            st



     80 MG      00:00:                               TABLET(S)           [facility]



     tablet      00                                 BY MOUTH           l



                                                  ONCE A           



                                                  DAY.           

 

     atorvastati      2017      Yes                      TAKE ONE           Me

thodi



     n (LIPITOR)      2-14                               (1)            st



     80 MG      00:00:                               TABLET(S)           [facility]



     tablet      00                                 BY MOUTH           l



                                                  ONCE A           



                                                  DAY.           

 

     atorvastati      2017      Yes                      TAKE ONE           Me

thodi



     n (LIPITOR)      2-14                               (1)            st



     80 MG      00:00:                               TABLET(S)           [facility]



     tablet      00                                 BY MOUTH           l



                                                  ONCE A           



                                                  DAY.           

 

     doxycycline      2016      Yes            100mg Q.5D Take 100           M

ethodi



     (VIBRAMYCIN      2-09                               mg by           st



     ) 100 MG      00:00:                               mouth 2           Hospit

a



     capsule      00                                 (two)           l



                                                  times a           



                                                  day.           

 

     doxycycline      2016      Yes            100mg Q.5D Take 100           M

ethodi



     (VIBRAMYCIN      2-09                               mg by           st



     ) 100 MG      00:00:                               mouth 2           Hospit

a



     capsule      00                                 (two)           l



                                                  times a           



                                                  day.           

 

     doxycycline      2016      Yes            100mg Q.5D Take 100           M

ethodi



     (VIBRAMYCIN      2-09                               mg by           st



     ) 100 MG      00:00:                               mouth 2           Hospit

a



     capsule      00                                 (two)           l



                                                  times a           



                                                  day.           

 

     doxycycline      2016      Yes            100mg Q.5D Take 100           M

ethodi



     (VIBRAMYCIN      2-09                               mg by           st



     ) 100 MG      00:00:                               mouth 2           Hospit

a



     capsule      00                                 (two)           l



                                                  times a           



                                                  day.           

 

     doxycycline      2016      Yes            100mg Q.5D Take 100           M

ethodi



     (VIBRAMYCIN      2-09                               mg by           st



     ) 100 MG      00:00:                               mouth 2           Hospit

a



     capsule      00                                 (two)           l



                                                  times a           



                                                  day.           

 

     losartan      2016      Yes                                     [facility]



     (COZAAR) 50      0-31                                              st



     MG tablet      00:00:                                              [facility]



               00                                                l

 

     losartan      2016      Yes                                     [facility]



     (COZAAR) 50      0-31                                              st



     MG tablet      00:00:                                              [facility]



               00                                                l

 

     losartan      2016      Yes                                     [facility]



     (COZAAR) 50      0-31                                              st



     MG tablet      00:00:                                              [facility]



               00                                                l

 

     losartan      2016      Yes                                     [facility]



     (COZAAR) 50      0-31                                              st



     MG tablet      00:00:                                              [facility]



               00                                                l

 

     losartan      2016      Yes                                     [facility]



     (COZAAR) 50      0-31                                              st



     MG tablet      00:00:                                              [facility]



               00                                                l

 

     nitrofurant      2016      Yes                                     Method

i



     oin,      0-26                                              st



     macrocrysta      00:00:                                              Hospit

a



     l-monohydra      00                                                l



     te,                                                         



     (MACROBID)                                                        



     100 MG                                                        



     capsule                                                        

 

     nitrofurant      2016      Yes                                     Method

i



     oin,      0-26                                              st



     macrocrysta      00:00:                                              Hospit

a



     l-monohydra      00                                                l



     te,                                                         



     (MACROBID)                                                        



     100 MG                                                        



     capsule                                                        

 

     nitrofurant      2016      Yes                                     Method

i



     oin,      0-26                                              st



     macrocrysta      00:00:                                              Hospit

a



     l-monohydra      00                                                l



     te,                                                         



     (MACROBID)                                                        



     100 MG                                                        



     capsule                                                        

 

     nitrofurant      2016      Yes                                     Method

i



     oin,      0-26                                              st



     macrocrysta      00:00:                                              Hospit

a



     l-monohydra      00                                                l



     te,                                                         



     (MACROBID)                                                        



     100 MG                                                        



     capsule                                                        

 

     nitrofurant      -      Yes                                     Method

i



     oin,      0-26                                              st



     macrocrysta      00:00:                                              Hospit

a



     l-monohydra      00                                                l



     te,                                                         



     (MACROBID)                                                        



     100 MG                                                        



     capsule                                                        







Vital Signs







             Vital Name   Observation Time Observation Value Comments     Source

 

             Systolic blood 2023 21:00:00 131 mm[Hg]                [facility]

sity of



             pressure                                            [facility]

 

             Diastolic blood 2023 21:00:00 70 mm[Hg]                 Unive

rsity of



             pressure                                            [facility]

 

             Respiratory rate 2023 21:00:00 18 /min                   Univ

ersity of



                                                                 [facility]

 

             Oxygen saturation in 2023 21:00:00 98 /min                   

[facility]



             Arterial blood by                                        [place] [facility]

debbie



             Pulse oximetry                                        [facility]

 

             Heart rate   2023 20:00:00 70 /min                   Universi

ty [facility]

 

             Body temperature 2023 17:36:00 36.67 Latisha                 Univ

ersity [facility]

 

             Body weight  2023 17:36:00 81.647 kg                 Universi

ty of



                                                                 [facility]



                                                                 [place]

 

             BMI          2023 17:36:00 27.37 kg/m2               Universi

ty [facility]

 

             Systolic blood 2023 23:00:00 144 mm[Hg]                Univer

sity of



             [facility]

 

             Diastolic blood 2023 23:00:00 57 mm[Hg]                 Unive

rsity of



             [facility]

 

             Heart rate   2023 23:00:00 66 /min                   Universi

ty [facility]

 

             Respiratory rate 2023 23:00:00 19 /min                   Univ

ersity of



                                                                 [facility]



                                                                 [place]

 

             Oxygen saturation in 2023 23:00:00 96 /min                   

[facility]



             Arterial blood by                                        [place] [facility]

debbie



             Pulse oximetry                                        [facility]

 

             Body temperature 2023 18:50:00 36.28 Latisha                 Univ

ersity [facility]

 

             Body height  2023 18:50:00 172.7 cm                  Universi

ty of



                                                                 [facility]



                                                                 [place]

 

             Body weight  2023 18:50:00 81.647 kg                 Universi

ty [facility]

 

             BMI          2023 18:50:00 27.37 kg/m2               [facility]







Procedures







                Procedure       Date / Time Performed Performing Clinician Yari

e

 

                XR CHEST 1 VW   2023 18:56:49 Matthew Solomon [facility]

 

                URINALYSIS      2023 18:39:00 Matthew Solomon [facility]

 

                COMP. METABOLIC PANEL 2023 18:30:00 Matthew Solomon S  [facility]

[facility] [place]



                ([id number])                                         [facility]

 

                CBC WITH DIFF   2023 18:30:00 Matthew Solmoon [facility]

 

                NOTICE OF PRIVACY 2023 17:29:15 Doctor Unassigned, No [facility]

 

                CONSENT/REFUSAL FOR 2023 17:28:46 Doctor [facility], [facility]

[facility]



                DIAGNOSIS AND                   [facility]



                TREATMENT                                       

 

                URINALYSIS      2023 20:19:00 Flavio Villeda [facility]

 

                CT HEAD WO CONTRAST 2023 20:01:40 Christiana Flavio [facility]

 

                TROPONIN I      2023 19:03:00 Christiana Flavio [facility]

 

                COMP. METABOLIC PANEL 2023 19:03:00 Flavio Villeda [facility]



                ([id number])                         [facility]

 

                CBC WITH DIFF   2023 19:03:00 Flavio Villeda [facility]

 

                N-TERMINAL PRO-BNP 2023 19:03:00 Flavio Villeda [facility] - OTHER 2023 06:01:00 Doctor Unassigned, No Un

iversity [facility] - OTHER 2023 06:01:00 [facility] No [facility]







Plan of Care







             Planned Activity Planned Date Details      Comments     Source

 

             Future Scheduled 2022   COVID-19 VACCINE (#1)              [facility]



             Test         10:17:20     [code = COVID-19              



                                       VACCINE (#1)]              

 

             Future Scheduled 2022   [facility]INGLES [facility]ACCINES (1              [facility]



             Test         10:17:20     of 2) [code = SHINGLES              



                                       VACCINES (1 of 2)]              

 

             Future Scheduled 2022   65+ PNEUMOCOCCAL              [facility]



             Test         10:17:20     VACCINE (1 - PCV)              



                                       [code = 65+               



                                       PNEUMOCOCCAL VACCINE              



                                       (1 - PCV)]                

 

             Future Scheduled 2022   INFLUENZA VACCINE              Method

[facility]



             Test         10:17:20     [code = INFLUENZA              



                                       VACCINE]                  

 

             Future Scheduled 2022   HEPATITIS B VACCINES              Met

[facility]



             Test         07:22:51     (1 of 3 - 3-dose              



                                       series) [code =              



                                       HEPATITIS B VACCINES              



                                       (1 of 3 - 3-dose              



                                       series)]                  

 

             Future Scheduled 2022   COVID-19 VACCINE (#1)              [facility]



             Test         07:22:51     [code = COVID-19              



                                       VACCINE (#1)]              

 

             Future Scheduled 2022   SHINGLES VACCINES (1              Met

[facility]



             Test         07:22:51     of 2) [code = SHINGLES              



                                       VACCINES (1 of 2)]              

 

             Future Scheduled 2022   65+ PNEUMOCOCCAL              [facility]



             Test         07:22:51     VACCINE (1 - PCV)              



                                       [code = 65+               



                                       PNEUMOCOCCAL VACCINE              



                                       (1 - PCV)]                

 

             Future Scheduled 2022   INFLUENZA VACCINE              Method

[facility]



             Test         07:22:51     [code = INFLUENZA              



                                       VACCINE]                  

 

             Future Scheduled 2022   HEPATITIS B VACCINES              Met

[facility]



             Test         07:22:51     (1 of 3 - 3-dose              



                                       series) [code =              



                                       HEPATITIS B VACCINES              



                                       (1 of 3 - 3-dose              



                                       series)]                  

 

             Future Scheduled 2022   COVID-19 VACCINE (#1)              [facility]



             Test         07:22:51     [code = COVID-19              



                                       VACCINE (#1)]              

 

             Future Scheduled 2022   SHINGLES VACCINES (1              Met

[facility]



             Test         07:22:51     of 2) [code = SHINGLES              



                                       VACCINES (1 of 2)]              

 

             Future Scheduled 2022   65+ PNEUMOCOCCAL              [facility]



             Test         07:22:51     VACCINE (1 - PCV)              



                                       [code = 65+               



                                       PNEUMOCOCCAL VACCINE              



                                       (1 - PCV)]                

 

             Future Scheduled 2022   INFLUENZA VACCINE              Method

[facility]



             Test         07:22:51     [code = INFLUENZA              



                                       VACCINE]                  

 

             Future Scheduled 2022   HEPATITIS B VACCINES              Met

[facility]



             Test         07:22:51     (1 of 3 - 3-dose              



                                       series) [code =              



                                       HEPATITIS B VACCINES              



                                       (1 of 3 - 3-dose              



                                       series)]                  

 

             Future Scheduled 2022   COVID-19 VACCINE (#1)              [facility]



             Test         07:22:51     [code = COVID-19              



                                       VACCINE (#1)]              

 

             Future Scheduled 2022   SHINGLES VACCINES (1              Met

[facility]



             Test         07:22:51     of 2) [code = SHINGLES              



                                       VACCINES (1 of 2)]              

 

             Future Scheduled 2022   65+ PNEUMOCOCCAL              [facility]



             Test         07:22:51     VACCINE (1 - PCV)              



                                       [code = 65+               



                                       PNEUMOCOCCAL VACCINE              



                                       (1 - PCV)]                

 

             Future Scheduled 2022   INFLUENZA VACCINE              Method

[facility]



             Test         07:22:51     [code = INFLUENZA              



                                       VACCINE]                  

 

             Future Scheduled 2022   HEPATITIS B VACCINES              Met

[facility]



             Test         01:40:15     (1 of 3 - 3-dose              



                                       series) [code =              



                                       HEPATITIS B VACCINES              



                                       (1 of 3 - 3-dose              



                                       series)]                  

 

             Future Scheduled 2022   COVID-19 VACCINE (#1)              [facility]



             Test         01:40:15     [code = COVID-19              



                                       VACCINE (#1)]              

 

             Future Scheduled 2022   SHINGLES VACCINES (1              Met

[facility]



             Test         01:40:15     of 2) [code = SHINGLES              



                                       VACCINES (1 of 2)]              

 

             Future Scheduled 2022   65+ PNEUMOCOCCAL              [facility]



             Test         01:40:15     VACCINE (1 - PCV)              



                                       [code = 65+               



                                       PNEUMOCOCCAL VACCINE              



                                       (1 - PCV)]                

 

             Future Scheduled 2022   INFLUENZA VACCINE              Method

[facility]



             Test         01:40:15     [code = INFLUENZA              



                                       VACCINE]                  







Encounters







        Start   End     Encounter Admission Attending Care    Care    Encounter 

Source



        Date/Time Date/Time Type    Type    Clinicians Facility Department ID   

   

 

        2023 Emergency X       RYDER SOLOMON    [facility]     [id number]

[id number] [facility]



        12:29:00 16:48:00                 [facility]W [facility]

 

        2023 Emergency         RYDER Solomon    [id number]

[id number] Univers



        12:29:00 16:48:00                 Matthew RONDON [id number]         i

ty of



                                                [facility] [id number]         [facility]  [phone]         Medi

debbie



                                                        084             Branch

 

        2023 Emergency         [facility]    [id number] 

[id number] Univers



        13:55:00 18:42:00                 , Flavio  ANGLETON [id number]         i

ty of



                                        Denise MONTES DE OCA [id number]         [facility]  [phone]         Medi

debbie



                                                        084             Branch

 

        2023 Emergency X       AUFDERIDE [facility]    ERT     [id number]

[id number] Univers



        13:55:00 18:42:00                 , FLAVIO                          ity of



                                                                        [facility] [facility]

 

        2023 Orders          Doctor  RAFIQ    [id number]

[address] [employer]



        00:00:00 00:00:00 Only            Unassigned, NICHOLE   [id number]       

  ity of



                                        [facility] [id number]         [place]

as



                                                        [phone]         [address]

 

        2023 Orders          Doctor  RAFIQ    [id number]

[address]



        00:00:00 00:00:00 Only            Unassigned, NICHOLE   [id number]       

  ity of



                                        [facility] [id number]         [facility]

as



                                                        [phone]         [address]







Results







           Test Description Test Time  Test Comments Results    Result Comments 

Source









                    COMP. METABOLIC PANEL (00049) 2023 19:32:53 









                      Test Item  Value      Reference Range Interpretation Comme

nts









             NA (test code = 6696637707) 144 mmol/L   135-145                   

 

             K (test code = 1026615487) 4.5 mmol/L   3.5-5.0                   

 

             CL (test code = 1562176299) 107 mmol/L                       

 

             CO2 TOTAL (test code = 3900980776) 30 mmol/L    23-31              

       

 

             AGAP (test code = 8552603339) 7            2-16                    

  

 

             BUN (test code = 8603986678) 38 mg/dL     7-23         H           

 

 

             GLUCOSE (test code = 6828938930) 93 mg/dL                    

     

 

             CREATININE (test code = 0.62 mg/dL   0.50-1.04                 



             7870249434)                                         

 

             TOTAL BILI (test code = 0.9 mg/dL    0.1-1.1                   



             1224426890)                                         

 

             CALCIUM (test code = 7795309828) 8.9 mg/dL    8.6-10.6             

     

 

             T PROTEIN (test code = 2159820383) 6.7 g/dL     6.3-8.2            

       

 

             ALBUMIN (test code = 6374429683) 3.5 g/dL     3.5-5.0              

     

 

             ALK PHOS (test code = 4023381478) 79 U/L                     

      

 

             ALTv (test code = 1742-6) 10 U/L       5-35                      

 

             AST(SGOT) (test code = 3716588598) 39 U/L       13-40              

       

 

             eGFR (test code = 1461764714) 92.6         mL/min/1.73m2           

   

 

             FIDEL (test code = FIDEL) Association of Glomerular                    

       



                          Filtration Rate (GFR) and Staging                     

      



                          of Kidney Disease*                           



                          +-----------------------+--------                     

      



                          -------------+-------------------                     

      



                          ------+| GFR (mL/min/1.73 m2) ?|                      

     



                          With Kidney Damage ?| ?Without                        

   



                          Kidney                                 



                          Damage+-----------------------+--                     

      



                          -------------------+-------------                     

      



                          ------------+| ?>90 ? ? ? ? ? ? ?                     

      



                          ? ?| ?Stage one ? ? ? ? ?| ?                          

 



                          Normal ? ? ? ? ? ? ?                           



                          ?+-----------------------+-------                     

      



                          --------------+------------------                     

      



                          -------+| ?60-89 ? ? ? ? ? ? ? ?|                     

      



                          ?Stage two ? ? ? ? ?| ? Decreased                     

      



                          GFR ? ? ? ?                            



                          +-----------------------+--------                     

      



                          -------------+-------------------                     

      



                          ------+| ?30-59 ? ? ? ? ? ? ? ?|                      

     



                          ?Stage three ? ? ? ?| ? Stage                         

  



                          three ? ? ? ? ?                           



                          +-----------------------+--------                     

      



                          -------------+-------------------                     

      



                          ------+| ?15-29 ? ? ? ? ? ? ? ?|                      

     



                          ?Stage four ? ? ? ? | ? Stage                         

  



                          four ? ? ? ? ?                           



                          ?+-----------------------+-------                     

      



                          --------------+------------------                     

      



                          -------+| ?<15 (or dialysis) ? ?|                     

      



                          ?Stage five ? ? ? ? | ? Stage                         

  



                          five ? ? ? ? ?                           



                          ?+-----------------------+-------                     

      



                          --------------+------------------                     

      



                          -------+ *Each stage assumes the                      

     



                          associated GFR level has been in                      

     



                          effect for at least three months.                     

      



                          ?Stages 1 to 5, with or without                       

    



                          kidney disease, indicate chronic                      

     



                          kidney disease. Notes:                           



                          Determination of stages one and                       

    



                          two (with eGFR >59mL/min/1.73 m2)                     

      



                          requires estimation of kidney                         

  



                          damage for at least three months                      

     



                          as defined by structural or                           



                          functional abnormalities of the                       

    



                          kidney, manifested by                           



                          either:Pathological abnormalities                     

      



                          or Markers of kidney damage                           



                          (including abnormalities in the                       

    



                          composition of the blood or urine                     

      



                          or abnormalities in imaging                           



                          tests).                                

 

             Lab Interpretation (test code = Abnormal                           

    



             92984-2)                                            



[facility] WITH [id number] 18:57:28





             Test Item    Value        Reference Range Interpretation Comments

 

             WBC (test code = 8.46         See_Comment                [Automated



             6690-2)                                             message] The sy

stem



                                                                 which generated



                                                                 this result



                                                                 transmitted



                                                                 reference range

:



                                                                 4.30 - 11.10



                                                                 10*3/?L. The



                                                                 reference range

 was



                                                                 not used to



                                                                 interpret this



                                                                 result as



                                                                 normal/abnormal

.

 

             RBC (test code = 4.56         See_Comment                [Automated



             789-8)                                              message] The sy

stem



                                                                 which generated



                                                                 this result



                                                                 transmitted



                                                                 reference range

:



                                                                 3.93 - 5.25



                                                                 10*6/?L. The



                                                                 reference range

 was



                                                                 not used to



                                                                 interpret this



                                                                 result as



                                                                 normal/abnormal

.

 

             HGB (test code = 14.6 g/dL    11.6-15.0                 



             718-7)                                              

 

             HCT (test code = 44.9 %       35.7-45.2                 



             4544-3)                                             

 

             MCV (test code = 98.5 fL      80.6-95.5    H            



             787-2)                                              

 

             MCH (test code = 32.0 pg      25.9-32.8                 



             785-6)                                              

 

             MCHC (test code = 32.5 g/dL    31.6-35.1                 



             786-4)                                              

 

             RDW-SD (test code = 47.0 fL      39.0-49.9                 



             30994-8)                                            

 

             RDW-CV (test code = 12.9 %       12.0-15.5                 



             788-0)                                              

 

             PLT (test code = 191          See_Comment                [Automated



             777-3)                                              message] The sy

stem



                                                                 which generated



                                                                 this result



                                                                 transmitted



                                                                 reference range

:



                                                                 166 - 358 10*3/

?L.



                                                                 The reference r

paulina



                                                                 was not used to



                                                                 interpret this



                                                                 result as



                                                                 normal/abnormal

.

 

             MPV (test code = 10.8 fL      9.5-12.9                  



             03013-9)                                            

 

             NRBC/100 WBC (test 0.0          See_Comment                [Automat

ed



             code = 4116737351)                                        message] 

The system



                                                                 which generated



                                                                 this result



                                                                 transmitted



                                                                 reference range

:



                                                                 0.0 - 10.0 /100



                                                                 WBCs. The refer

ence



                                                                 range was not u

sed



                                                                 to interpret th

is



                                                                 result as



                                                                 normal/abnormal

.

 

             NRBC x10^3 (test code              See_Comment                [Auto

mated



             = 9141582017)                                        message] The s

ystem



                                                                 which generated



                                                                 this result



                                                                 transmitted



                                                                 reference range

:



                                                                 10*3/?L. The



                                                                 reference range

 was



                                                                 not used to



                                                                 interpret this



                                                                 result as



                                                                 normal/abnormal

.

 

             GRAN MAT (NEUT) % 63.0 %                                 



             (test code = 770-8)                                        

 

             IMM GRAN % (test code 1.30 %                                 



             = 1836944744)                                        

 

             LYMPH % (test code = 27.0 %                                 



             736-9)                                              

 

             MONO % (test code = 7.9 %                                  



             5905-5)                                             

 

             EOS % (test code = 0.1 %                                  



             713-8)                                              

 

             BASO % (test code = 0.7 %                                  



             706-2)                                              

 

             GRAN MAT x10^3(ANC) 5.33 10*3/uL 1.88-7.09                 



             (test code =                                        



             0127865589)                                         

 

             IMM GRAN x10^3 (test 0.11 10*3/uL 0.00-0.06    H            



             code = 5140906898)                                        

 

             LYMPH x10^3 (test code 2.28 10*3/uL 1.32-3.29                 



             = 731-0)                                            

 

             MONO x10^3 (test code 0.67 10*3/uL 0.33-0.92                 



             = 742-7)                                            

 

             EOS x10^3 (test code =              0.03-0.39    L            



             711-2)                                              

 

             BASO x10^3 (test code 0.06 10*3/uL 0.01-0.07                 



             = 704-7)                                            

 

             Lab Interpretation Abnormal                               



             (test code = 18414-2)                                        



[id number]





             Test Item    Value        Reference Range Interpretation Comments

 

             TROPONIN I (test code = 0.010 ng/mL  <=0.034                   



             2872412091)                                         

 

             FIDEL (test code = FIDEL) Reference (Normal)                           



                          Range (defined by the                           



                          99th percentile                           



                          reference limit): <=                           



                          0.034 ng/mL Note:                           



                          Cardiac troponin                           



                          begins to rise 3-4                           



                          hours after the onset                           



                          of ischemia. Repeat in                           



                          4-6 hours if the                           



                          sample was drawn                           



                          within 3-4 hours of                           



                          the onset of the                           



                          symptom and found                           



                          normal. Diagnosis of                           



                          myocardial injury is                           



                          made with acute                           



                          changes in cTn                           



                          concentrations with at                           



                          least one serial                           



                          sample above the 99th                           



                          percentile upper                           



                          reference limit (URL),                           



                          taken together with                           



                          the patient's clinical                           



                          presentation. Biotin                           



                          has been reported to                           



                          cause a negative bias,                           



                          interpret results                           



                          relative to patient's                           



                          use of biotin.                           

 

             Lab Interpretation Normal                                 



             (test code = 44114-6)                                        



[id number] 19[facility]46:10





             Test Item    Value        Reference Range Interpretation Comments

 

             NT-proBNP (test code = 318 pg/mL    <=450                     



             9770535740)                                         

 

             FIDEL (test code = FIDEL) Biotin has been                           



                          reported to cause a                           



                          negative bias,                           



                          interpret results                           



                          relative to                            



                          patient's use of                           



                          biotin.                                

 

             Lab Interpretation (test Normal                                 



             code = 98814-4)                                        



[facility]. METABOLIC PANEL (03445)2023 
19:37:30





             Test Item    Value        Reference Range Interpretation Comments

 

             NA (test code = 138 mmol/L   135-145                   



             9694788872)                                         

 

             K (test code = 3.9 mmol/L   3.5-5.0                   



             2055890645)                                         

 

             CL (test code = 103 mmol/L                       



             6634935457)                                         

 

             CO2 TOTAL (test code = 25 mmol/L    23-31                     



             0097284678)                                         

 

             AGAP (test code = 10           2-16                      



             1872815816)                                         

 

             BUN (test code = 32 mg/dL     7-23         H            



             8178594572)                                         

 

             GLUCOSE (test code = 118 mg/dL           H            



             3624606681)                                         

 

             CREATININE (test code = 0.58 mg/dL   0.50-1.04                 



             0403303631)                                         

 

             TOTAL BILI (test code = 1.1 mg/dL    0.1-1.1                   



             7623136410)                                         

 

             CALCIUM (test code = 8.6 mg/dL    8.6-10.6                  



             3051888754)                                         

 

             T PROTEIN (test code = 7.2 g/dL     6.3-8.2                   



             0138657836)                                         

 

             ALBUMIN (test code = 4.0 g/dL     3.5-5.0                   



             5816952410)                                         

 

             ALK PHOS (test code = 82 U/L                           



             1889234712)                                         

 

             ALTv (test code = 35 U/L       5-35                      



             1742-6)                                             

 

             AST(SGOT) (test code = 35 U/L       13-40                     



             2111996414)                                         

 

             eGFR (test code = 100.0        mL/min/1.73m2              



             9552811131)                                         

 

             FIDEL (test code = FIDEL) Association of                           



                          Glomerular Filtration                           



                          Rate (GFR) and Staging                           



                          of Kidney Disease*                           



                          +---------------------                           



                          --+-------------------                           



                          --+-------------------                           



                          ------+| GFR                           



                          (mL/min/1.73 m2) ?|                           



                          With Kidney Damage ?|                           



                          ?Without Kidney                           



                          Damage+---------------                           



                          --------+-------------                           



                          --------+-------------                           



                          ------------+| ?>90 ?                           



                          ? ? ? ? ? ? ? ?|                           



                          ?Stage one ? ? ? ? ?|                           



                          ? Normal ? ? ? ? ? ? ?                           



                          ?+--------------------                           



                          ---+------------------                           



                          ---+------------------                           



                          -------+| ?60-89 ? ? ?                           



                          ? ? ? ? ?| ?Stage two                           



                          ? ? ? ? ?| ? Decreased                           



                          GFR ? ? ? ?                            



                          +---------------------                           



                          --+-------------------                           



                          --+-------------------                           



                          ------+| ?30-59 ? ? ?                           



                          ? ? ? ? ?| ?Stage                           



                          three ? ? ? ?| ? Stage                           



                          three ? ? ? ? ?                           



                          +---------------------                           



                          --+-------------------                           



                          --+-------------------                           



                          ------+| ?15-29 ? ? ?                           



                          ? ? ? ? ?| ?Stage four                           



                          ? ? ? ? | ? Stage four                           



                          ? ? ? ? ?                              



                          ?+--------------------                           



                          ---+------------------                           



                          ---+------------------                           



                          -------+| ?<15 (or                           



                          dialysis) ? ?| ?Stage                           



                          five ? ? ? ? | ? Stage                           



                          five ? ? ? ? ?                           



                          ?+--------------------                           



                          ---+------------------                           



                          ---+------------------                           



                          -------+ *Each stage                           



                          assumes the associated                           



                          GFR level has been in                           



                          effect for at least                           



                          three months. ?Stages                           



                          1 to 5, with or                           



                          without kidney                           



                          disease, indicate                           



                          chronic kidney                           



                          disease. Notes:                           



                          Determination of                           



                          stages one and two                           



                          (with eGFR                             



                          >59mL/min/1.73 m2)                           



                          requires estimation of                           



                          kidney damage for at                           



                          least three months as                           



                          defined by structural                           



                          or functional                           



                          abnormalities of the                           



                          kidney, manifested by                           



                          either:Pathological                           



                          abnormalities or                           



                          Markers of kidney                           



                          damage (including                           



                          abnormalities in the                           



                          composition of the                           



                          blood or urine or                           



                          abnormalities in                           



                          imaging tests).                           

 

             Lab Interpretation Abnormal                               



             (test code = 56026-8)                                        



[facility] WITH [id number] 19:25:08





             Test Item    Value        Reference Range Interpretation Comments

 

             WBC (test code = 9.07         See_Comment                [Automated



             5995-2)                                             message] The sy

stem



                                                                 which generated



                                                                 this result



                                                                 transmitted



                                                                 reference range

:



                                                                 4.30 - 11.10



                                                                 10*3/?L. The



                                                                 reference range

 was



                                                                 not used to



                                                                 interpret this



                                                                 result as



                                                                 normal/abnormal

.

 

             RBC (test code = 5.04         See_Comment                [Automated



             080-8)                                              message] The sy

stem



                                                                 which generated



                                                                 this result



                                                                 transmitted



                                                                 reference range

:



                                                                 3.93 - 5.25



                                                                 10*6/?L. The



                                                                 reference range

 was



                                                                 not used to



                                                                 interpret this



                                                                 result as



                                                                 normal/abnormal

.

 

             HGB (test code = 15.6 g/dL    11.6-15.0    H            



             718-7)                                              

 

             HCT (test code = 47.7 %       35.7-45.2    H            



             4544-3)                                             

 

             MCV (test code = 94.6 fL      80.6-95.5                 



             787-2)                                              

 

             MCH (test code = 31.0 pg      25.9-32.8                 



             785-6)                                              

 

             MCHC (test code = 32.7 g/dL    31.6-35.1                 



             786-4)                                              

 

             RDW-SD (test code = 45.8 fL      39.0-49.9                 



             21211-5)                                            

 

             RDW-CV (test code = 13.1 %       12.0-15.5                 



             788-0)                                              

 

             PLT (test code = 228          See_Comment                [Automated



             777-3)                                              message] The sy

stem



                                                                 which generated



                                                                 this result



                                                                 transmitted



                                                                 reference range

:



                                                                 166 - 358 10*3/

?L.



                                                                 The reference r

paulina



                                                                 was not used to



                                                                 interpret this



                                                                 result as



                                                                 normal/abnormal

.

 

             MPV (test code = 9.6 fL       9.5-12.9                  



             38099-7)                                            

 

             NRBC/100 WBC (test 0.0          See_Comment                [Automat

ed



             code = 3053363365)                                        message] 

The system



                                                                 which generated



                                                                 this result



                                                                 transmitted



                                                                 reference range

:



                                                                 0.0 - 10.0 /100



                                                                 WBCs. The refer

ence



                                                                 range was not u

sed



                                                                 to interpret th

is



                                                                 result as



                                                                 normal/abnormal

.

 

             NRBC x10^3 (test code              See_Comment                [Auto

mated



             = [id number])                                        message] The s

ystem



                                                                 which generated



                                                                 this result



                                                                 transmitted



                                                                 reference range

:



                                                                 10*3/?L. The



                                                                 reference range

 was



                                                                 not used to



                                                                 interpret this



                                                                 result as



                                                                 normal/abnormal

.

 

             GRAN MAT (NEUT) % 64.9 %                                 



             (test code = 770-8)                                        

 

             IMM GRAN % (test code 0.30 %                                 



             = [id number])                                        

 

             LYMPH % (test code = 23.6 %                                 



             736-9)                                              

 

             MONO % (test code = 10.9 %                                 



             5905-5)                                             

 

             EOS % (test code = 0.1 %                                  



             713-8)                                              

 

             BASO % (test code = 0.2 %                                  



             706-2)                                              

 

             GRAN MAT x10^3(ANC) 5.88 10*3/uL 1.88-7.09                 



             (test code =                                        



             2201027988)                                         

 

             IMM GRAN x10^3 (test 0.03 10*3/uL 0.00-0.06                 



             code = 4473500405)                                        

 

             LYMPH x10^3 (test code 2.14 10*3/uL 1.32-3.29                 



             = 731-0)                                            

 

             MONO x10^3 (test code 0.99 10*3/uL 0.33-0.92    H            



             = 742-7)                                            

 

             EOS x10^3 (test code =              0.03-0.39    L            



             711-2)                                              

 

             BASO x10^3 (test code              0.01-0.07                 



             = 704-7)                                            

 

             Lab Interpretation Abnormal                               



             (test code = 46372-4)                                        



[facility] [id number] 11:48:00
Reason for Exam:-&gt;cerebral aneurysm unrupturedFINAL REPORT PATIENT ID: 
[id number] DATE: 2017NAME: Shahida Felix SeanATTENDING: Migue Weber MDFIR ASSISTANT: CESARIO LooREOPERATIVE DIAGNOSIS: Unruptured right 
posterior communicating artery aneurysm status post stent assisted 
coilingPOSTOPERATIVE DIAGNOSIS: Unruptured right posterior communicating artery 
aneurysm status post stent assisted coilingPROCEDURE PERFORMED: Diagnostic Ce
rebral AngiogramANESTHESIA: MACCOMPLICATIONS: NoneESTIMATED BLOOD LOSS: Less 
than 15ml ARTERIAL VESSELS STUDIED:RIGHT SUBCLAVIAN ARTERY (x1)RIGHT COMMON 
CAROTID ARTERY (CERVICAL x2)RIGHT COMMON CAROTIDARTERY (CRANIAL x3)RIGHT COMMON 
FEMORAL ARTERY (x1) MATERIALS EMPLOYED:1. 5 French short sheath 2. 4French 
Berenstein catheter3. Bentson guidewire4. Terumo 0.035 LT glidewire5. 5 French 
Mynx device INDICATIONS: 77-year-old female with hypertension who is status post
endovascular treatment of an unruptured right posterior communicating artery 
aneurysm with stent-assisted coiling on May 19, 2017. She presents for follow-up
cerebral angiogram. The indications for the procedure as well as the risks, lillian
efits and alternatives were discussed with the patient and the family. The risks
discussed included but were not limited to stroke, intracranial hemorrhage, 
injury to the cervical femoral or aortic vessels, contrast reaction, kidney to 
toxicity, groin hematoma, weakness paralysis and even death. They demonstrated 
understanding of the risk benefit profile and agreed to proceed. PROCEDURE: 
After appropriate consent was obtained, the patient was brought to the 
angiographic suite and cardiopulmonary monitoring was placed. The anesthesia 
team performed light sedation. A timeout was performed. Both groins were prepped
and draped in the usual sterile fashion. After administration of 10 mL of 2% 
lidocaine, a micropuncture needle was used to perform a single wall puncture of 
the right common femoral artery, a micropuncture sheath was inserted, and an 
angled DSA angiogram was performed through the sheath.Once good location of the 
puncture site was confirmed, a 5 French short sheath was inserted over a Bentson
wire and was maintained on heparinized saline flush throughout the remainder of 
the procedure.Using coaxial technique, a preflushed Berenstein catheter on 
constant heparinized saline flush was advanced over the Glidewire into the 
descending aorta, the Glidewire was removed, the catheter back bled, flushed in 
usual fashion and the catheter was maintained on heparinized flushed throughout 
duration of the case. Using coaxial technique, the catheter was advanced into 
the aortic arch and with the aid of roadmapping, digital fluoroscopy, and 
careful guidewire manipulation, the arteries described below were selectively 
catheterized. Upon each successive catheterization, digital subtraction angiogra
phy using the appropriate rate and volume of contrast in multiple projections 
was performed. At the conclusion of the procedure, the catheter was retracted 
into the aorta and the [facility] for quality and
content. Then the femoral sheath was removed and hemostasis achieved with a 5 
French Mynx device and manual compression. The patient tolerated the procedure 
well andwas transported in unchanged neurological status without groin hematoma 
and with good distal lower extremity pulses. The patient was transferred to the 
recovery area to be monitored as per protocol. FINDINGS: RIGHT SUBCLAVIAN ARTERY
(DSA, PA, LATERAL ROADMAP, x1): There is normal course and caliber ofthe 
subclavian artery with physiological filling of its distal branches. Limited 
visualization in this roadmap of the origins of the right vertebral artery, 
internal mamillary artery, thyrocervical andcostocervical trunks. RIGHT COMMON 
CAROTID ARTERY (DSA, PA, LATERAL, CERVICAL x2): Tortuous course of the right 
common carotid artery. The distal cervical common carotid as well as the origins
of the right internal and external carotid arteries are widely patent without 
evidence of ulceration or stenosis. The proximal portions of the superficial 
temporal artery, middle meningeal artery, internal maxillary artery, occipital 
artery and their branches are visualized and appear normal. RIGHT COMMON CAROTID
ARTERY (DSA, PA, LATERAL, MAGNIFIED OBLIQUE, CRANIAL x3): Minimal residual 
aneurysm neck in the previously treated right posterior communicating artery 
aneurysm, best visualized on sequence A9. Normal distal cervical, petrous, 
cavernous and supraclinoid internal carotid artery with physiological filling of
the MCA and MAXIMINO branches. Limited visualization of the venous phase. No other 
aneurysms or other vascular lesions are seen. There is no significant 
atherosclerosis or stenosis. Normal course and caliber of the external carotid 
artery and its branches. There is a well visualized superficial temporal artery,
middle meningeal artery, internal maxillary artery, occipital artery and their 
branches. RIGHT COMMON FEMORAL ARTERY (DSA, PA, X 1): Normal location of the 
puncture site above the bifurcation and below markers of the inguinal ligament. 
IMPRESSION: Minimal residual aneurysm neck in the previously treated right 
posterior communicating artery aneurysm. No technical or procedural complication
s FACULTY ATTESTATION: I, Migue Weber M.D., was present for the entirety of
the procedure. I performed or directly supervised all aspects of the procedure. 
I performed all critical aspects of the case. I interpreted the images and 
reported the results. Signed: Chaz Cox MDReport Verified Date/Time: 2017
11:48:56 Reading Location: [facility] Y026 Neuro Angio Reading Room Electronically 
signed by: CHAZ COX on 2017 11:48 AMBASIC METABOLIC PANEL
2017 11:56:00





             Test Item    Value        Reference Range Interpretation Comments

 

             SODIUM (BEAKER) 136 meq/L    136-145                   



             (test code = 381)                                        

 

             POTASSIUM (BEAKER) 3.9 meq/L    3.5-5.1                   



             (test code = 379)                                        

 

             CHLORIDE (BEAKER) 102 meq/L                        



             (test code = 382)                                        

 

             CO2 (BEAKER) (test 25 meq/L     22-29                     



             code = 355)                                         

 

             BLOOD UREA NITROGEN 20 mg/dL     7-21                      



             (BEAKER) (test code                                        



             = 354)                                              

 

             CREATININE (BEAKER) 0.82 mg/dL   0.57-1.25                 



             (test code = 358)                                        

 

             GLUCOSE RANDOM 106 mg/dL           H            



             (BEAKER) (test code                                        



             = 652)                                              

 

             CALCIUM (BEAKER) 9.6 mg/dL    8.4-10.2                  



             (test code = 697)                                        

 

             EGFR (BEAKER) (test 68 mL/min/1.73                           ESTIMA

[place] GFR IS



             code = 1092) sq m                                   NOT AS ACCURATE

 AS



                                                                 CREATININE



                                                                 CLEARANCE IN



                                                                 PREDICTING



                                                                 GLOMERULAR



                                                                 FILTRATION RATE

.



                                                                 ESTIMATED GFR I

S



                                                                 NOT APPLICABLE 

FOR



                                                                 DIALYSIS PATIEN

TS.



CBC WITH PLATELET COUNT + MANUAL [id number] 11:33:00





             Test Item    Value        Reference Range Interpretation Comments

 

             WHITE BLOOD CELL COUNT 5.0 K/ L     3.5-10.5                  



             (BEAKER) (test code =                                        



             775)                                                

 

             RED BLOOD CELL COUNT 3.94 M/ L    3.93-5.22                 



             (BEAKER) (test code =                                        



             761)                                                

 

             HEMOGLOBIN (BEAKER) 12.1 GM/DL   11.2-15.7                 



             (test code = 410)                                        

 

             HEMATOCRIT (BEAKER) 37.1 %       34.1-44.9                 



             (test code = 411)                                        

 

             MEAN CORPUSCULAR 94.2 fL      79.4-94.8                 



             VOLUME (BEAKER) (test                                        



             code = 753)                                         

 

             MEAN CORPUSCULAR 30.7 pg      25.6-32.2                 



             HEMOGLOBIN (BEAKER)                                        



             (test code = 751)                                        

 

             MEAN CORPUSCULAR 32.6 GM/DL   32.2-35.5                 



             HEMOGLOBIN CONC                                        



             (BEAKER) (test code =                                        



             752)                                                

 

             RED CELL DISTRIBUTION 12.7 %       11.7-14.4                 



             WIDTH (BEAKER) (test                                        



             code = 412)                                         

 

             PLATELET COUNT 212 K/CU MM  150-450                   



             (BEAKER) (test code =                                        



             756)                                                

 

             MEAN PLATELET VOLUME 9.4 fL       9.4-12.3                  



             (BEAKER) (test code =                                        



             754)                                                

 

             NUCLEATED RED BLOOD 0 /100 WBC   0-0                       



             CELLS (BEAKER) (test                                        



             code = 413)                                         

 

             IMMATURE     0 %          0-1                       



             GRANULOCYTES-RELATIVE                                        



             PERCENT (BEAKER) (test                                        



             code = 2801)                                        

 

             NEUTROPHILS RELATIVE 60 %                                   This is

 an appended



             PERCENT (BEAKER) (test                                        repor

t. These



             code = 429)                                         results have be

en



                                                                 appended to a



                                                                 previously sherine

l



                                                                 verified report

.

 

             LYMPHOCYTES RELATIVE 30 %                                   This is

 an appended



             PERCENT (BEAKER) (test                                        repor

t. These



             code = 430)                                         results have be

en



                                                                 appended to a



                                                                 previously sherine

l



                                                                 verified report

.

 

             MONOCYTES RELATIVE 9 %                                    This is a

n appended



             PERCENT (BEAKER) (test                                        repor

t. These



             code = 431)                                         results have be

en



                                                                 appended to a



                                                                 previously sherine

l



                                                                 verified report

.

 

             EOSINOPHILS RELATIVE 1 %                                    This is

 an appended



             PERCENT (BEAKER) (test                                        repor

t. These



             code = 432)                                         results have be

en



                                                                 appended to a



                                                                 previously sherine

l



                                                                 verified report

.

 

             BASOPHILS RELATIVE 1 %                                    This is a

n appended



             PERCENT (BEAKER) (test                                        repor

t. These



             code = 437)                                         results have be

en



                                                                 appended to a



                                                                 previously sherine

l



                                                                 verified report

.

 

             NEUTROPHILS ABSOLUTE 2.97 K/ L    1.56-6.13                 This is

 an appended



             COUNT (BEAKER) (test                                        report.

 These



             code = 670)                                         results have be

en



                                                                 appended to a



                                                                 previously sherine

l



                                                                 verified report

.

 

             LYMPHOCYTES ABSOLUTE 1.46 K/ L    1.18-3.74                 This is

 an appended



             COUNT (BEAKER) (test                                        report.

 These



             code = 414)                                         results have be

en



                                                                 appended to a



                                                                 previously sherine

l



                                                                 verified report

.

 

             MONOCYTES ABSOLUTE 0.42 K/ L    0.24-0.36    H            This is a

n appended



             COUNT (BEAKER) (test                                        report.

 These



             code = 415)                                         results have be

en



                                                                 appended to a



                                                                 previously sherine

l



                                                                 verified report

.

 

             EOSINOPHILS ABSOLUTE 0.05 K/ L    0.04-0.36                 This is

 an appended



             COUNT (BEAKER) (test                                        report.

 These



             code = 416)                                         results have be

en



                                                                 appended to a



                                                                 previously sherine

l



                                                                 verified report

.

 

             BASOPHILS ABSOLUTE 0.04 K/ L    0.01-0.08                 This is a

n appended



             COUNT (BEAKER) (test                                        report.

 These



             code = 417)                                         results have be

en



                                                                 appended to a



                                                                 previously sherine

l



                                                                 verified report

.



[id number] 11:30:00





             Test Item    Value        Reference Range Interpretation Comments

 

             PROTIME (BEAKER) (test code = 15.2 seconds 11.7-14.7    H          

  



             759)                                                

 

             INR (BEAKER) (test code = 370) 1.2          <=5.9                  

   

 

             PARTIAL THROMBOPLASTIN TIME 42.8 seconds 22.5-36.0    H            



             (BEAKER) (test code = 760)                                        



RECOMMENDED COUMADIN/WARFARIN INR THERAPY RANGESSTANDARD DOSE: 2.0 - 3.0 
Includes: PROPHYLAXIS for venous thrombosis, systemic embolization; TREATMENT 
for venous thrombosis and/or pulmonary embolus.HIGH RISK: Target INR is 2.5-3.5 
for patients with mechanical heart valves.PLATELET AGGREGATION: FUNCTION SCREEN
2017 09:23:00





             Test Item    Value        Reference Range Interpretation Comments

 

             WEAK ADP     5 %          60-91        L            



             RESULT(BEAKER) (test                                        



             code = 2135)                                        

 

             PLATELET FUNCTION 0-39% indicates marked                           



             SCREEN INTERP platelet dysfunction                           



             (BEAKER) (test code =                                        



             2173)                                               

 

             [id number] Nelly Zurita MD                       

    



             (BEAKER) (test code = (electronic signature)                       

    



             3459)                                               

 

             PLATELET COUNT  K/CU MM  150-430                   



             (BEAKER) (test code =                                        



             2656)                                               



CBC W/PLT COUNT &amp; AUTO [id number] 07:07:00





             Test Item    Value        Reference Range Interpretation Comments

 

             WHITE BLOOD CELL COUNT (BEAKER) 5.9 K/ L     4.0-10.0              

    



             (test code = 775)                                        

 

             RED BLOOD CELL COUNT (BEAKER) 3.88 M/ L    4.00-5.00    L          

  



             (test code = 761)                                        

 

             HEMOGLOBIN (BEAKER) (test code = 12.5 GM/DL   12.0-15.0            

     



             410)                                                

 

             HEMATOCRIT (BEAKER) (test code = 37.8 %       36.0-45.0            

     



             411)                                                

 

             MEAN CORPUSCULAR VOLUME (BEAKER) 97.3 fL      82.0-99.0            

     



             (test code = 753)                                        

 

             MEAN CORPUSCULAR HEMOGLOBIN 32.1 pg      27.0-33.0                 



             (BEAKER) (test code = 751)                                        

 

             MEAN CORPUSCULAR HEMOGLOBIN CONC 33.0 GM/DL   32.0-36.0            

     



             (BEAKER) (test code = 752)                                        

 

             RED CELL DISTRIBUTION WIDTH 13.4 %       10.3-14.2                 



             (BEAKER) (test code = 412)                                        

 

             PLATELET COUNT (BEAKER) (test 238 K/CU MM  150-430                 

  



             code = 756)                                         

 

             MEAN PLATELET VOLUME (BEAKER) 7.2 fL       6.5-10.5                

  



             (test code = 754)                                        

 

             NUCLEATED RED BLOOD CELLS 0 /100 WBC   0-0                       



             (BEAKER) (test code = 413)                                        

 

             NEUTROPHILS RELATIVE PERCENT 58 %                                  

 



             (BEAKER) (test code = 429)                                        

 

             LYMPHOCYTES RELATIVE PERCENT 31 %                                  

 



             (BEAKER) (test code = 430)                                        

 

             MONOCYTES RELATIVE PERCENT 10 %                                   



             (BEAKER) (test code = 431)                                        

 

             EOSINOPHILS RELATIVE PERCENT 0 %                                   

 



             (BEAKER) (test code = 432)                                        

 

             BASOPHILS RELATIVE PERCENT 1 %                                    



             (BEAKER) (test code = 437)                                        

 

             NEUTROPHILS ABSOLUTE COUNT 3.36 K/ L    1.80-8.00                 



             (BEAKER) (test code = 670)                                        

 

             LYMPHOCYTES ABSOLUTE COUNT 1.84 K/ L    1.48-4.50                 



             (BEAKER) (test code = 414)                                        

 

             MONOCYTES ABSOLUTE COUNT (BEAKER) 0.58 K/ L    0.00-1.30           

      



             (test code = 415)                                        

 

             EOSINOPHILS ABSOLUTE COUNT 0.01 K/ L    0.00-0.50                 



             (BEAKER) (test code = 416)                                        

 

             BASOPHILS ABSOLUTE COUNT (BEAKER) 0.06 K/ L    0.00-0.20           

      



             (test code = 417)                                        



0.00BASIC METABOLIC [id number] 06:36:00





             Test Item    Value        Reference Range Interpretation Comments

 

             SODIUM (BEAKER) 138 meq/L    136-145                   



             (test code = 381)                                        

 

             POTASSIUM (BEAKER) 4.0 meq/L    3.5-5.1                   Specimen 

slightly



             (test code = 379)                                        hemolyzed

 

             CHLORIDE (BEAKER) 106 meq/L                        



             (test code = 382)                                        

 

             CO2 (BEAKER) (test 23 meq/L     22-29                     



             code = 355)                                         

 

             BLOOD UREA NITROGEN 13 mg/dL     7-21                      



             (BEAKER) (test code                                        



             = 354)                                              

 

             CREATININE (BEAKER) 0.79 mg/dL   0.57-1.25                 Specimen

 slightly



             (test code = 358)                                        hemolyzed

 

             GLUCOSE RANDOM 97 mg/dL                         



             (BEAKER) (test code                                        



             = 652)                                              

 

             CALCIUM (BEAKER) 9.0 mg/dL    8.4-10.2                  



             (test code = 697)                                        

 

             EGFR (BEAKER) (test 71 mL/min/1.73                           ESTIMA

[place] GFR IS



             code = 1092) sq m                                   NOT AS ACCURATE

 AS



                                                                 CREATININE



                                                                 CLEARANCE IN



                                                                 PREDICTING



                                                                 GLOMERULAR



                                                                 FILTRATION RATE

.



                                                                 ESTIMATED GFR I

S



                                                                 NOT APPLICABLE 

FOR



                                                                 DIALYSIS PATIEN

TS.



[id number] 06:21:00





             Test Item    Value        Reference Range Interpretation Comments

 

             PROTIME (BEAKER) (test code = 14.1 seconds 11.7-14.7               

  



             759)                                                

 

             INR (BEAKER) (test code = 370) 1.1          <=5.9                  

   

 

             PARTIAL THROMBOPLASTIN TIME 37.0 seconds 22.5-36.0    H            



             (BEAKER) (test code = 760)                                        



RECOMMENDED COUMADIN/WARFARIN INR THERAPY RANGESSTANDARD DOSE: 2.0 - 3.0 
Includes: PROPHYLAXIS for venous thrombosis, systemic embolization; TREATMENT 
for venous thrombosis and/or pulmonary embolus.HIGH RISK: Target INR is 2.5-3.5 
for patients with mechanical heart valves.PROTHROMBIN [id number] 
06:20:00





             Test Item    Value        Reference Range Interpretation Comments

 

             PROTIME (BEAKER) (test code = 14.1 seconds 11.7-14.7               

  



             759)                                                

 

             INR (BEAKER) (test code = 370) 1.1          <=5.9                  

   



RECOMMENDED COUMADIN/WARFARIN INR THERAPY RANGESSTANDARD DOSE: 2.0 - 3.0 
Includes: PROPHYLAXIS for venous thrombosis, systemic embolization; TREATMENT 
for venous thrombosis and/or pulmonary embolus.HIGH RISK: Target INR is 2.5-3.5 
for patients with mechanical heart valves.[id number] 23:38:00





             Test Item    Value        Reference Range Interpretation Comments

 

             PROTIME (BEAKER) (test code = 15.2 seconds 11.7-14.7    H          

  



             759)                                                

 

             INR (BEAKER) (test code = 370) 1.2          <=5.9                  

   

 

             PARTIAL THROMBOPLASTIN TIME 36.5 seconds 22.5-36.0    H            



             (BEAKER) (test code = 760)                                        



RECOMMENDED COUMADIN/WARFARIN INR THERAPY RANGESSTANDARD DOSE: 2.0 - 3.0 
Includes: PROPHYLAXIS for venous thrombosis, systemic embolization; TREATMENT 
for venous thrombosis and/or pulmonary embolus.HIGH RISK: Target INR is 2.5-3.5 
for patients with mechanical heart valves.BASIC METABOLIC [id number] 
23:33:00





             Test Item    Value        Reference Range Interpretation Comments

 

             SODIUM (BEAKER) 137 meq/L    136-145                   



             (test code = 381)                                        

 

             POTASSIUM (BEAKER) 5.7 meq/L    3.5-5.1      H            Specimen 

markedly



             (test code = 379)                                        hemolyzed

 

             CHLORIDE (BEAKER) 105 meq/L                        



             (test code = 382)                                        

 

             CO2 (BEAKER) (test 25 meq/L     22-29                     



             code = 355)                                         

 

             BLOOD UREA NITROGEN 13 mg/dL     7-21                      



             (BEAKER) (test code                                        



             = 354)                                              

 

             CREATININE (BEAKER) 0.84 mg/dL   0.57-1.25                 Specimen

 markedly



             (test code = 358)                                        hemolyzed

 

             GLUCOSE RANDOM 86 mg/dL                         



             (BEAKER) (test code                                        



             = 652)                                              

 

             CALCIUM (BEAKER) 8.9 mg/dL    8.4-10.2                  



             (test code = 697)                                        

 

             EGFR (BEAKER) (test 66 mL/min/1.73                           ESTIMA

[place] GFR IS



             code = 1092) sq m                                   NOT AS ACCURATE

 AS



                                                                 CREATININE



                                                                 CLEARANCE IN



                                                                 PREDICTING



                                                                 GLOMERULAR



                                                                 FILTRATION RATE

.



                                                                 ESTIMATED GFR I

S



                                                                 NOT APPLICABLE 

FOR



                                                                 DIALYSIS PATIEN

TS.



CBC W/PLT COUNT &amp; AUTO [id number] 23:19:00





             Test Item    Value        Reference Range Interpretation Comments

 

             WHITE BLOOD CELL COUNT (BEAKER) 7.0 K/ L     4.0-10.0              

    



             (test code = 775)                                        

 

             RED BLOOD CELL COUNT (BEAKER) 3.76 M/ L    4.00-5.00    L          

  



             (test code = 761)                                        

 

             HEMOGLOBIN (BEAKER) (test code = 12.7 GM/DL   12.0-15.0            

     



             410)                                                

 

             HEMATOCRIT (BEAKER) (test code = 36.9 %       36.0-45.0            

     



             411)                                                

 

             MEAN CORPUSCULAR VOLUME (BEAKER) 98.2 fL      82.0-99.0            

     



             (test code = 753)                                        

 

             MEAN CORPUSCULAR HEMOGLOBIN 33.7 pg      27.0-33.0    H            



             (BEAKER) (test code = 751)                                        

 

             MEAN CORPUSCULAR HEMOGLOBIN CONC 34.4 GM/DL   32.0-36.0            

     



             (BEAKER) (test code = 752)                                        

 

             RED CELL DISTRIBUTION WIDTH 12.2 %       10.3-14.2                 



             (BEAKER) (test code = 412)                                        

 

             PLATELET COUNT (BEAKER) (test 241 K/CU MM  150-430                 

  



             code = 756)                                         

 

             MEAN PLATELET VOLUME (BEAKER) 7.2 fL       6.5-10.5                

  



             (test code = 754)                                        

 

             NUCLEATED RED BLOOD CELLS 0 /100 WBC   0-0                       



             (BEAKER) (test code = 413)                                        

 

             NEUTROPHILS RELATIVE PERCENT 58 %                                  

 



             (BEAKER) (test code = 429)                                        

 

             LYMPHOCYTES RELATIVE PERCENT 34 %                                  

 



             (BEAKER) (test code = 430)                                        

 

             MONOCYTES RELATIVE PERCENT 7 %                                    



             (BEAKER) (test code = 431)                                        

 

             EOSINOPHILS RELATIVE PERCENT 0 %                                   

 



             (BEAKER) (test code = 432)                                        

 

             BASOPHILS RELATIVE PERCENT 1 %                                    



             (BEAKER) (test code = 437)                                        

 

             NEUTROPHILS ABSOLUTE COUNT 4.08 K/ L    1.80-8.00                 



             (BEAKER) (test code = 670)                                        

 

             LYMPHOCYTES ABSOLUTE COUNT 2.36 K/ L    1.48-4.50                 



             (BEAKER) (test code = 414)                                        

 

             MONOCYTES ABSOLUTE COUNT (BEAKER) 0.52 K/ L    0.00-1.30           

      



             (test code = 415)                                        

 

             EOSINOPHILS ABSOLUTE COUNT 0.02 K/ L    0.00-0.50                 



             (BEAKER) (test code = 416)                                        

 

             BASOPHILS ABSOLUTE COUNT (BEAKER) 0.05 K/ L    0.00-0.20           

      



             (test code = 417)                                        



0.00POCT-P2Y12 PLATELET [id number] 20:57:00





             Test Item    Value        Reference Range Interpretation Comments

 

             POC-P2Y12 PLATELET AGG (BEAKER) (test 31 PRU                       

          



             code = 2303)                                        



RANGE INFORMATION: PRU reference range is 194-418. Post Drug Results: Lower PRU 
levels are associated with expected antiplatelet effect. Values may be below the
stated reference range above. The post-drug PRU values reported in the [employer]
P2Y12 package insert are .URINALYSIS W/ [id number] 09:22:00





             Test Item    Value        Reference Range Interpretation Comments

 

             COLOR (BEAKER) (test Light Yellow                           



             code = 470)                                         

 

             CLARITY (BEAKER) (test Hazy                                   



             code = 469)                                         

 

             SPECIFIC GRAVITY UA 1.009        1.001-1.035               



             (BEAKER) (test code =                                        



             468)                                                

 

             PH UA (BEAKER) (test 5.5          5.0-8.0                   



             code = 467)                                         

 

             PROTEIN UA (BEAKER) Negative     Negative                  



             (test code = 464)                                        

 

             GLUCOSE UA (BEAKER) Negative     Negative                  



             (test code = 365)                                        

 

             KETONES UA (BEAKER) Negative     Negative                  



             (test code = 371)                                        

 

             BILIRUBIN UA (BEAKER) Negative     Negative                  



             (test code = 462)                                        

 

             BLOOD UA (BEAKER) (test Small        Negative     A            



             code = 461)                                         

 

             NITRITE UA (BEAKER) Positive     Negative     A            



             (test code = 465)                                        

 

             LEUKOCYTE ESTERASE UA Large        Negative     A            



             (BEAKER) (test code =                                        



             466)                                                

 

             UROBILINOGEN UA (BEAKER) 0.2 mg/dL    0.2-1.0                   



             (test code = 463)                                        

 

             RBC UA (BEAKER) (test 12 /HPF                                



             code = 519)                                         

 

             WBC UA (BEAKER) (test 92 /HPF                                



             code = 520)                                         

 

             BACTERIA (BEAKER) (test Rare                                   



             code = 517)                                         

 

             MUCUS (BEAKER) (test Rare                                   



             code = 1574)                                        

 

             SQUAMOUS EPITHELIAL < /HPF                                 



             (BEAKER) (test code =                                        



             516)                                                

 

             YEAST (BEAKER) (test Occasional                             



             code = 1585)                                        

 

             SOURCE(BEAKER) (test Urine, Straight                           



             code = 2795) Catheter                               



[id number] 04:55:00





             Test Item    Value        Reference Range Interpretation Comments

 

             PHOSPHORUS (BEAKER) (test code = 3.3 mg/dL    2.3-4.7              

     



             604)                                                



[id number] 04:55:00





             Test Item    Value        Reference Range Interpretation Comments

 

             MAGNESIUM (BEAKER) (test code = 1.6 mg/dL    1.6-2.6               

    



             627)                                                



BASIC METABOLIC [id number] 04:55:00





             Test Item    Value        Reference Range Interpretation Comments

 

             SODIUM (BEAKER) 140 meq/L    136-145                   



             (test code = 381)                                        

 

             POTASSIUM (BEAKER) 3.4 meq/L    3.5-5.1      L            



             (test code = 379)                                        

 

             CHLORIDE (BEAKER) 107 meq/L                        



             (test code = 382)                                        

 

             CO2 (BEAKER) (test 22 meq/L     22-29                     



             code = 355)                                         

 

             BLOOD UREA NITROGEN 8 mg/dL      7-21                      



             (BEAKER) (test code                                        



             = 354)                                              

 

             CREATININE (BEAKER) 0.71 mg/dL   0.57-1.25                 



             (test code = 358)                                        

 

             GLUCOSE RANDOM 109 mg/dL           H            



             (BEAKER) (test code                                        



             = 652)                                              

 

             CALCIUM (BEAKER) 8.6 mg/dL    8.4-10.2                  



             (test code = 697)                                        

 

             EGFR (BEAKER) (test 80 mL/min/1.73                           ESTIMA

[facility] GFR IS



             code = 1092) sq m                                   NOT AS ACCURATE

 AS



                                                                 CREATININE



                                                                 CLEARANCE IN



                                                                 PREDICTING



                                                                 GLOMERULAR



                                                                 FILTRATION RATE

.



                                                                 ESTIMATED GFR I

S



                                                                 NOT APPLICABLE 

FOR



                                                                 DIALYSIS PATIEN

TS.



CBC W/PLT COUNT &amp; AUTO [id number] 04:52:00





             Test Item    Value        Reference Range Interpretation Comments

 

             WHITE BLOOD CELL COUNT (BEAKER) 7.5 K/ L     4.0-10.0              

    



             (test code = 775)                                        

 

             RED BLOOD CELL COUNT (BEAKER) 4.04 M/ L    4.00-5.00               

  



             (test code = 761)                                        

 

             HEMOGLOBIN (BEAKER) (test code = 12.9 GM/DL   12.0-15.0            

     



             410)                                                

 

             HEMATOCRIT (BEAKER) (test code = 39.7 %       36.0-45.0            

     



             411)                                                

 

             MEAN CORPUSCULAR VOLUME (BEAKER) 98.3 fL      82.0-99.0            

     



             (test code = 753)                                        

 

             MEAN CORPUSCULAR HEMOGLOBIN 31.9 pg      27.0-33.0                 



             (BEAKER) (test code = 751)                                        

 

             MEAN CORPUSCULAR HEMOGLOBIN CONC 32.5 GM/DL   32.0-36.0            

     



             (BEAKER) (test code = 752)                                        

 

             RED CELL DISTRIBUTION WIDTH 12.0 %       10.3-14.2                 



             (BEAKER) (test code = 412)                                        

 

             PLATELET COUNT (BEAKER) (test 196 K/CU MM  150-430                 

  



             code = 756)                                         

 

             MEAN PLATELET VOLUME (BEAKER) 7.2 fL       6.5-10.5                

  



             (test code = 754)                                        

 

             NUCLEATED RED BLOOD CELLS 0 /100 WBC   0-0                       



             (BEAKER) (test code = 413)                                        

 

             NEUTROPHILS RELATIVE PERCENT 81 %                                  

 



             (BEAKER) (test code = 429)                                        

 

             LYMPHOCYTES RELATIVE PERCENT 14 %                                  

 



             (BEAKER) (test code = 430)                                        

 

             MONOCYTES RELATIVE PERCENT 6 %                                    



             (BEAKER) (test code = 431)                                        

 

             EOSINOPHILS RELATIVE PERCENT 0 %                                   

 



             (BEAKER) (test code = 432)                                        

 

             BASOPHILS RELATIVE PERCENT 0 %                                    



             (BEAKER) (test code = 437)                                        

 

             NEUTROPHILS ABSOLUTE COUNT 6.03 K/ L    1.80-8.00                 



             (BEAKER) (test code = 670)                                        

 

             LYMPHOCYTES ABSOLUTE COUNT 1.01 K/ L    1.48-4.50    L            



             (BEAKER) (test code = 414)                                        

 

             MONOCYTES ABSOLUTE COUNT (BEAKER) 0.42 K/ L    0.00-1.30           

      



             (test code = 415)                                        

 

             EOSINOPHILS ABSOLUTE COUNT 0.01 K/ L    0.00-0.50                 



             (BEAKER) (test code = 416)                                        

 

             BASOPHILS ABSOLUTE COUNT (BEAKER) 0.01 K/ L    0.00-0.20           

      



             (test code = 417)                                        



[id number] 16:01:00





             Test Item    Value        Reference Range Interpretation Comments

 

             ACTIVATED CLOTTING TIME 245 sec                                TEST

ED AT [place] [address]



             ([facility]) (test code =                                        [address] [address])                                                [id number]



[id number] 15:00:00





             Test Item    Value        Reference Range Interpretation Comments

 

             ACTIVATED CLOTTING TIME 255 sec                                TEST

ED AT [place] [address]



             ([facility]) (test code =                                        [address] [place] [address])                                                [address]



[id number] 14:44:00





             Test Item    Value        Reference Range Interpretation Comments

 

             ACTIVATED CLOTTING TIME 234 sec                                TEST

ED AT [place] [address]



             ([facility]) (test code =                                        [address] [address])                                                [address]



[id number] 14:31:00





             Test Item    Value        Reference Range Interpretation Comments

 

             ACTIVATED CLOTTING TIME 219 sec                                TEST

ED AT [place] [address]



             ([facility]) (test code =                                        [address] [address])                                                [id number]



[id number] 14:31:00





             Test Item    Value        Reference Range Interpretation Comments

 

             ACTIVATED CLOTTING TIME 183 sec                                TEST

ED AT [place] [address]



             ([facility]) (test code =                                        [address] [address])                                                [address]



[id number] 13:22:00





             Test Item    Value        Reference Range Interpretation Comments

 

             ACTIVATED CLOTTING TIME 137 sec                                TEST

ED AT [facility] [address]



             (BEAKER) (test code =                                        [place]



             [address])                                                [id number]



POCT-P2Y12 PLATELET [id number] 07:24:00





             Test Item    Value        Reference Range Interpretation Comments

 

             POC-P2Y12 PLATELET AGG (BEAKER) (test 110 PRU                      

          



             code = 2303)                                        



RANGE INFORMATION: PRU reference range is 194-418. Post Drug Results: Lower PRU 
levels are associated with expected antiplatelet effect. Values may be below the
stated reference range above. The post-drug PRU values reported in the [employer]
P2Y12 package insert are .POCT-ASPIRIN PLATELET [id number] 
07:24:00





             Test Item    Value        Reference Range Interpretation Comments

 

             POC-ASPIRIN PLATELET AGG (BEAKER) 402 ARU                          

      



             (test code = 2302)                                        



RANGE INFORMATION: 350-549 ARU Therapeutic range for platelet function. 550-700 
ARU Non-Therapeutic range for platelet function.PLATELET AGGREGATION: FUNCTION 
[id number] 10:21:00





             Test Item    Value        Reference Range Interpretation Comments

 

             WEAK ADP     92 %         60-91        H            



             RESULT(BEAKER) (test                                        



             code = 2135)                                        

 

             PLATELET FUNCTION % indicates                           



             SCREEN INTERP (BEAKER) normal platelet                           



             (test code = 2173) function                               

 

             [id number] Meredith Reyes, MD                           



             (BEAKER) (test code = (electronic signature)                       

    



             5725)                                               

 

             PLATELET COUNT  K/CU MM  150-430                   



             (BEAKER) (test code =                                        



             2656)                                               



COMPREHENSIVE METABOLIC [id number] 05:11:00





             Test Item    Value        Reference Range Interpretation Comments

 

             TOTAL PROTEIN 6.5 gm/dL    6.0-8.3                   



             (BEAKER) (test code =                                        



             770)                                                

 

             ALBUMIN (BEAKER) 3.6 g/dL     3.5-5.0                   



             (test code = 1145)                                        

 

             ALKALINE PHOSPHATASE 60 U/L                           



             (BEAKER) (test code =                                        



             346)                                                

 

             BILIRUBIN TOTAL 1.1 mg/dL    0.2-1.2                   



             (BEAKER) (test code =                                        



             377)                                                

 

             SODIUM (BEAKER) (test 136 meq/L    136-145                   



             code = 381)                                         

 

             POTASSIUM (BEAKER) 3.8 meq/L    3.5-5.1                   



             (test code = 379)                                        

 

             CHLORIDE (BEAKER) 104 meq/L                        



             (test code = 382)                                        

 

             CO2 (BEAKER) (test 23 meq/L     22-29                     



             code = 355)                                         

 

             BLOOD UREA NITROGEN 13 mg/dL     7-21                      



             (BEAKER) (test code =                                        



             354)                                                

 

             CREATININE (BEAKER) 0.79 mg/dL   0.57-1.25                 



             (test code = 358)                                        

 

             GLUCOSE RANDOM 89 mg/dL                         



             (BEAKER) (test code =                                        



             652)                                                

 

             CALCIUM (BEAKER) 9.2 mg/dL    8.4-10.2                  



             (test code = 697)                                        

 

             AST (SGOT) (BEAKER) 19 U/L       5-34                      



             (test code = 353)                                        

 

             ALT (SGPT) (BEAKER) 8 U/L        6-55                      



             (test code = 347)                                        

 

             EGFR (BEAKER) (test 71 mL/min/1.73                           ESTIMA

[facility] GFR IS



             code = 1092) sq m                                   NOT AS ACCURATE

 AS



                                                                 CREATININE



                                                                 CLEARANCE IN



                                                                 PREDICTING



                                                                 GLOMERULAR



                                                                 FILTRATION RATE

.



                                                                 ESTIMATED GFR I

S



                                                                 NOT APPLICABLE 

FOR



                                                                 DIALYSIS PATIEN

TS.



[id number] 05:04:00





             Test Item    Value        Reference Range Interpretation Comments

 

             PARTIAL THROMBOPLASTIN TIME 37.9 seconds 22.5-36.0    H            



             (BEAKER) (test code = 760)                                        



[id number] 16:56:00





             Test Item    Value        Reference Range Interpretation Comments

 

             PARTIAL THROMBOPLASTIN TIME 39.4 seconds 22.5-36.0    H            



             (BEAKER) (test code = 760)                                        



PROTHROMBIN [id number]/[id number] 16:55:00





             Test Item    Value        Reference Range Interpretation Comments

 

             PROTIME (BEAKER) (test code = 14.4 seconds 11.7-14.7               

  



             759)                                                

 

             INR (BEAKER) (test code = 370) 1.1          <=5.9                  

   



RECOMMENDED COUMADIN/WARFARIN INR THERAPY RANGESSTANDARD DOSE: 2.0 - 3.0 
Includes: PROPHYLAXIS for venous thrombosis, systemic embolization; TREATMENT 
for venous thrombosis and/or pulmonary embolus.HIGH RISK: Target INR is 2.5-3.5 
for patients with mechanical heart valves.COMPREHENSIVE METABOLIC PANEL
2017-05-15 02:20:00





             Test Item    Value        Reference Range Interpretation Comments

 

             TOTAL PROTEIN 7.0 gm/dL    6.0-8.3                   



             (BEAKER) (test code =                                        



             770)                                                

 

             ALBUMIN (BEAKER) 3.8 g/dL     3.5-5.0                   



             (test code = 1145)                                        

 

             ALKALINE PHOSPHATASE 68 U/L                           



             (BEAKER) (test code =                                        



             346)                                                

 

             BILIRUBIN TOTAL 0.9 mg/dL    0.2-1.2                   



             (BEAKER) (test code =                                        



             377)                                                

 

             SODIUM (BEAKER) (test 137 meq/L    136-145                   



             code = 381)                                         

 

             POTASSIUM (BEAKER) 3.5 meq/L    3.5-5.1                   



             (test code = 379)                                        

 

             CHLORIDE (BEAKER) 104 meq/L                        



             (test code = 382)                                        

 

             CO2 (BEAKER) (test 23 meq/L     22-29                     



             code = 355)                                         

 

             BLOOD UREA NITROGEN 10 mg/dL     7-21                      



             (BEAKER) (test code =                                        



             354)                                                

 

             CREATININE (BEAKER) 0.81 mg/dL   0.57-1.25                 



             (test code = 358)                                        

 

             GLUCOSE RANDOM 106 mg/dL           H            



             (BEAKER) (test code =                                        



             652)                                                

 

             CALCIUM (BEAKER) 9.7 mg/dL    8.4-10.2                  



             (test code = 697)                                        

 

             AST (SGOT) (BEAKER) 21 U/L       5-34                      



             (test code = 353)                                        

 

             ALT (SGPT) (BEAKER) 8 U/L        6-55                      



             (test code = 347)                                        

 

             EGFR (BEAKER) (test 69 mL/min/1.73                           ESTIMA

[facility] GFR IS



             code = 1092) sq m                                   NOT AS ACCURATE

 AS



                                                                 CREATININE



                                                                 CLEARANCE IN



                                                                 PREDICTING



                                                                 GLOMERULAR



                                                                 FILTRATION RATE

.



                                                                 ESTIMATED GFR I

S



                                                                 NOT APPLICABLE 

FOR



                                                                 DIALYSIS PATIEN

TS.



HEMOGLOBIN [id number] 07:40:00





             Test Item    Value        Reference Range Interpretation Comments

 

             HEMOGLOBIN A1C (BEAKER) (test code = 4.9 %        4.3-6.1          

         



             368)                                                



VITAMIN [id number] 06:01:00





             Test Item    Value        Reference Range Interpretation Comments

 

             VITAMIN B12 (BEAKER) (test code = 826 pg/mL    213-816      H      

      



             774)                                                



TSH/FREE T4 IF [id number] 06:01:00





             Test Item    Value        Reference Range Interpretation Comments

 

             THYROID STIMULATING HORMONE 1.37 uIU/mL  0.35-4.94                 



             (BEAKER) (test code = 772)                                        



CALCIUM, [id number] 05:48:00





             Test Item    Value        Reference Range Interpretation Comments

 

             CALCIUM IONIZED (BEAKER) (test 1.11 mmol/L  1.12-1.27    L         

   



             code = 698)                                         

 

             PH, BLOOD (BEAKER) (test code = 7.41                               

    



             1810)                                               



[id number] 05:33:00





             Test Item    Value        Reference Range Interpretation Comments

 

             PHOSPHORUS (BEAKER) (test code = 3.3 mg/dL    2.3-4.7              

     



             604)                                                



[id number] 05:33:00





             Test Item    Value        Reference Range Interpretation Comments

 

             MAGNESIUM (BEAKER) (test code = 2.0 mg/dL    1.6-2.6               

    



             627)                                                



COMPREHENSIVE METABOLIC [id number] 05:33:00





             Test Item    Value        Reference Range Interpretation Comments

 

             TOTAL PROTEIN 5.9 gm/dL    6.0-8.3      L            



             (BEAKER) (test code =                                        



             770)                                                

 

             ALBUMIN (BEAKER) 3.3 g/dL     3.5-5.0      L            



             (test code = 1145)                                        

 

             ALKALINE PHOSPHATASE 56 U/L                           



             (BEAKER) (test code =                                        



             346)                                                

 

             BILIRUBIN TOTAL 0.6 mg/dL    0.2-1.2                   



             (BEAKER) (test code =                                        



             377)                                                

 

             SODIUM (BEAKER) (test 140 meq/L    136-145                   



             code = 381)                                         

 

             POTASSIUM (BEAKER) 3.7 meq/L    3.5-5.1                   



             (test code = 379)                                        

 

             CHLORIDE (BEAKER) 106 meq/L                        



             (test code = 382)                                        

 

             CO2 (BEAKER) (test 27 meq/L     22-29                     



             code = 355)                                         

 

             BLOOD UREA NITROGEN 13 mg/dL     7-21                      



             (BEAKER) (test code =                                        



             354)                                                

 

             CREATININE (BEAKER) 0.79 mg/dL   0.57-1.25                 



             (test code = 358)                                        

 

             GLUCOSE RANDOM 95 mg/dL                         



             (BEAKER) (test code =                                        



             652)                                                

 

             CALCIUM (BEAKER) 8.7 mg/dL    8.4-10.2                  



             (test code = 697)                                        

 

             AST (SGOT) (BEAKER) 16 U/L       5-34                      



             (test code = 353)                                        

 

             ALT (SGPT) (BEAKER) 7 U/L        6-55                      



             (test code = 347)                                        

 

             EGFR (BEAKER) (test 71 mL/min/1.73                           ESTIMA

[facility] GFR IS



             code = 1092) sq m                                   NOT AS ACCURATE

 AS



                                                                 CREATININE



                                                                 CLEARANCE IN



                                                                 PREDICTING



                                                                 GLOMERULAR



                                                                 FILTRATION RATE

.



                                                                 ESTIMATED GFR I

S



                                                                 NOT APPLICABLE 

FOR



                                                                 DIALYSIS PATIEN

TS.



LIPID [id number] 05:33:00





             Test Item    Value        Reference Range Interpretation Comments

 

             TRIGLYCERIDES (BEAKER) (test code = 57 mg/dL                       

        



             540)                                                

 

             CHOLESTEROL (BEAKER) (test code = 204 mg/dL                        

      



             631)                                                

 

             HDL CHOLESTEROL (BEAKER) (test code 62 mg/dL                       

        



             = 976)                                              

 

             LDL CHOLESTEROL CALCULATED (BEAKER) 131 mg/dL                      

        



             (test code = 633)                                        



Triglyceride Reference Range: Low Risk &lt;150 Borderline 150-199 High Risk 200-
499 Very High Risk &gt;=500Cholesterol Reference Range: Low Risk &lt;200 
Borderline 200-239 High Risk &gt;240HDL Cholesterol Reference Range: Low Risk 
&gt;=60 High Risk &lt;40LDL Cholesterol Reference Range: Optimal  &lt;100 Near 
Optimal 100-129 Borderline 130-159 High 160-189 Very High &gt;=190CBC W/PLT 
COUNT &amp; AUTO [id number] 04:51:00





             Test Item    Value        Reference Range Interpretation Comments

 

             WHITE BLOOD CELL COUNT (BEAKER) 4.8 K/ L     4.0-10.0              

    



             (test code = 775)                                        

 

             RED BLOOD CELL COUNT (BEAKER) 4.01 M/ L    4.00-5.00               

  



             (test code = 761)                                        

 

             HEMOGLOBIN (BEAKER) (test code = 13.4 GM/DL   12.0-15.0            

     



             410)                                                

 

             HEMATOCRIT (BEAKER) (test code = 39.0 %       36.0-45.0            

     



             411)                                                

 

             MEAN CORPUSCULAR VOLUME (BEAKER) 97.5 fL      82.0-99.0            

     



             (test code = 753)                                        

 

             MEAN CORPUSCULAR HEMOGLOBIN 33.5 pg      27.0-33.0    H            



             (BEAKER) (test code = 751)                                        

 

             MEAN CORPUSCULAR HEMOGLOBIN CONC 34.4 GM/DL   32.0-36.0            

     



             (BEAKER) (test code = 752)                                        

 

             RED CELL DISTRIBUTION WIDTH 12.8 %       10.3-14.2                 



             (BEAKER) (test code = 412)                                        

 

             PLATELET COUNT (BEAKER) (test 193 K/CU MM  150-430                 

  



             code = 756)                                         

 

             MEAN PLATELET VOLUME (BEAKER) 7.0 fL       6.5-10.5                

  



             (test code = 754)                                        

 

             NUCLEATED RED BLOOD CELLS 0 /100 WBC   0-0                       



             (BEAKER) (test code = 413)                                        

 

             NEUTROPHILS RELATIVE PERCENT 50 %                                  

 



             (BEAKER) (test code = 429)                                        

 

             LYMPHOCYTES RELATIVE PERCENT 40 %                                  

 



             (BEAKER) (test code = 430)                                        

 

             MONOCYTES RELATIVE PERCENT 9 %                                    



             (BEAKER) (test code = 431)                                        

 

             EOSINOPHILS RELATIVE PERCENT 0 %                                   

 



             (BEAKER) (test code = 432)                                        

 

             BASOPHILS RELATIVE PERCENT 0 %                                    



             (BEAKER) (test code = 437)                                        

 

             NEUTROPHILS ABSOLUTE COUNT 2.38 K/ L    1.80-8.00                 



             (BEAKER) (test code = 670)                                        

 

             LYMPHOCYTES ABSOLUTE COUNT 1.93 K/ L    1.48-4.50                 



             (BEAKER) (test code = 414)                                        

 

             MONOCYTES ABSOLUTE COUNT (BEAKER) 0.44 K/ L    0.00-1.30           

      



             (test code = 415)                                        

 

             EOSINOPHILS ABSOLUTE COUNT 0.01 K/ L    0.00-0.50                 



             (BEAKER) (test code = 416)                                        

 

             BASOPHILS ABSOLUTE COUNT (BEAKER) 0.02 K/ L    0.00-0.20           

      



             (test code = 417)                                        



0.00COMPREHENSIVE METABOLIC [id number] 22:47:00





             Test Item    Value        Reference Range Interpretation Comments

 

             TOTAL PROTEIN 5.8 gm/dL    6.0-8.3      L            



             (BEAKER) (test code =                                        



             770)                                                

 

             ALBUMIN (BEAKER) 3.2 g/dL     3.5-5.0      L            



             (test code = 1145)                                        

 

             ALKALINE PHOSPHATASE 56 U/L                           



             (BEAKER) (test code =                                        



             346)                                                

 

             BILIRUBIN TOTAL 0.5 mg/dL    0.2-1.2                   



             (BEAKER) (test code =                                        



             377)                                                

 

             SODIUM (BEAKER) (test 140 meq/L    136-145                   



             code = 381)                                         

 

             POTASSIUM (BEAKER) 3.7 meq/L    3.5-5.1                   



             (test code = 379)                                        

 

             CHLORIDE (BEAKER) 108 meq/L           H            



             (test code = 382)                                        

 

             CO2 (BEAKER) (test 25 meq/L     22-29                     



             code = 355)                                         

 

             BLOOD UREA NITROGEN 11 mg/dL     7-21                      



             (BEAKER) (test code =                                        



             354)                                                

 

             CREATININE (BEAKER) 0.83 mg/dL   0.57-1.25                 



             (test code = 358)                                        

 

             GLUCOSE RANDOM 94 mg/dL                         



             (BEAKER) (test code =                                        



             652)                                                

 

             CALCIUM (BEAKER) 8.6 mg/dL    8.4-10.2                  



             (test code = 697)                                        

 

             AST (SGOT) (BEAKER) 15 U/L       5-34                      



             (test code = 353)                                        

 

             ALT (SGPT) (BEAKER) 7 U/L        6-55                      



             (test code = 347)                                        

 

             EGFR (JOVI) (test 67 mL/min/1.73                           ESTIMA

[facility] GFR IS



             code = 1092) sq m                                   NOT AS ACCURATE

 AS



                                                                 CREATININE



                                                                 CLEARANCE IN



                                                                 PREDICTING



                                                                 GLOMERULAR



                                                                 FILTRATION RATE

.



                                                                 ESTIMATED GFR I

S



                                                                 NOT APPLICABLE 

FOR



                                                                 DIALYSIS RUTHIE

TS.

## 2023-03-23 NOTE — EDPHYS
Physician Documentation                                                                           

 [facility]                                                                 

Name: Jerica Estrada                                                                           

Age: 82 yrs                                                                                       

Sex: Female                                                                                       

: 1940                                                                                   

MRN: [id number]                                                                                   

Arrival Date: 2023                                                                          

Time: 16:13                                                                                       

Account#: [id number]                                                                            

Bed 19                                                                                            

Private MD:                                                                                       

ED Physician Dmitriy Nowak                                                                        

HPI:                                                                                              

                                                                                             

19:52 This 82 yrs old Female presents to ER via Wheelchair with complaints of Weakness.       snw 

19:52 The patient presents to the emergency department with weakness of the entire body,      snw 

      generalized weakness, that is moderate, a swallowing problem, difficulty. Onset: The        

      symptoms/episode began/occurred acutely, 2 week(s) ago, and became worse and became         

      persistent. Associated signs and symptoms: Pertinent positives: weakness. Severity of       

      symptoms: At their worst the symptoms were moderate severe in the emergency department      

      the symptoms are unchanged. Current symptoms: decreased level of consciousness, is          

      arousable but tired, paralysis or paresis, that is mild, that is moderate. The patient      

      has experienced similar episodes in the past, multiple times. as noted.                     

                                                                                                  

Historical:                                                                                       

- Allergies:                                                                                      

16:22 Bactrim;                                                                                kc6 

16:22 Ciprofloxacin;                                                                          kc6 

16:22 Codeine;                                                                                kc6 

16:22 Demerol;                                                                                kc6 

16:22 Diovan HCT;                                                                             kc6 

16:22 hydrochlorothiazide;                                                                    kc6 

16:22 Iodinated Contrast Media - IV Dye;                                                      kc6 

16:22 Tetanus Vaccines \T\ Toxoid;                                                              kc6

16:22 valsartan;                                                                              kc6 

- PMHx:                                                                                           

16:22 Aneurysm; Cerebrovascular accident; HARD OF HEARING; Hypertension; Parkinsons; vericose kc6 

      veins;                                                                                      

- PSHx:                                                                                           

16:22 shunt; STEPHEN knee replacement;                                                            kc6 

                                                                                                  

- Immunization history:: Client reports receiving the 2nd dose of the Covid vaccine,              

  Flu vaccine is not up to date.                                                                  

- Social history:: Smoking status: Patient denies any tobacco usage or history of.                

                                                                                                  

                                                                                                  

ROS:                                                                                              

19:50 Eyes: Negative for injury, pain, redness, and discharge, ENT: Negative for injury,      snw 

      pain, and discharge, Neck: Negative for injury, pain, and swelling, Cardiovascular:         

      Negative for chest pain, palpitations, and edema.                                           

19:50 Back: Negative for injury and pain.                                                         

19:50 Constitutional: Positive for malaise, poor PO intake, decreased responsiveness. pt has      

      had congestion, cough, and a recent UTI over the past few weeks..                           

19:50 Respiratory: Positive for cough, with no reported sputum.                                   

19:50 Abdomen/GI: Positive for decreased appetite.                                                

19:50 : Positive for just recently tx for UTI.                                                  

19:50 MS/extremity: Positive for non-ambulatory.                                                  

                                                                                                  

Exam:                                                                                             

18:51 Head/Face:  Normocephalic, atraumatic. Eyes:  Pupils equal round and reactive to light, snw 

      extra-ocular motions intact.  Lids and lashes normal.  Conjunctiva and sclera are           

      non-icteric and not injected.  Cornea within normal limits.  Periorbital areas with no      

      swelling, redness, or edema. ENT:  Nares patent. No nasal discharge, no septal              

      abnormalities noted.  Tympanic membranes are normal and external auditory canals are        

      clear.  Oropharynx with no redness, swelling, or masses, exudates, or evidence of           

      obstruction, uvula midline.  Mucous membranes moist. Neck:  Trachea midline, no             

      thyromegaly or masses palpated, and no cervical lymphadenopathy.  Supple, full range of     

      motion without nuchal rigidity, or vertebral point tenderness.  No Meningismus.             

      Chest/axilla:  Normal chest wall appearance and motion.  Nontender with no deformity.       

      No lesions are appreciated. Cardiovascular:  Regular rate and rhythm with a normal S1       

      and S2.  No gallops, murmurs, or rubs.  Normal PMI, no JVD.  No pulse deficits.             

      Respiratory:  Lungs have equal breath sounds bilaterally, clear to auscultation and         

      percussion.  No rales, rhonchi or wheezes noted.  No increased work of breathing, no        

      retractions or nasal flaring. Abdomen/GI:  Soft, non-tender, with normal bowel sounds.      

      No distension or tympany.  No guarding or rebound.  No evidence of tenderness               

      throughout. Back:  No spinal tenderness.  No costovertebral tenderness.  Full range of      

      motion. Skin:  Warm, dry with normal turgor.  Normal color with no rashes, no lesions,      

      and no evidence of cellulitis. MS/ Extremity:  Pulses equal, no cyanosis.                   

      Neurovascular intact.  Full, normal range of motion. Psych:  Awake, alert, with             

      orientation to person, place and time.  Behavior, mood, and affect are within normal        

      limits.                                                                                     

18:51 Constitutional: The patient appears lethargic.                                              

18:51 Neuro: Orientation: recognizes Spouse, does not answer questions, Mentation: slow to        

      respond, unable to follow commands, Memory: unable to test, Motor: Hypertonic in  right     

      arm and right leg. able to hold left arm at 30 degrees, left shoulder precludes more        

      ROM. moves left lower ext against gravity, Gait: does not walk, leans to the left.          

                                                                                                  

Vital Signs:                                                                                      

16:20  / 77; Pulse 68; Resp 17 S; Pulse Ox 100% on R/A; Weight 77.11 kg (R); Height 5   kc6 

      ft. 4 in. (R); Pain 0/10;                                                                   

17:55  / 73; Pulse 68; Resp 17; Pulse Ox 100% on R/A;                                   kc6 

18:47  / 69; Pulse 68; Resp 14 S; Pulse Ox 100% on R/A;                                 kc6 

19:09  / 57; Pulse 70; Resp 18 S; Pulse Ox 98% on R/A;                                  ha1 

20:40  / 66; Pulse 70; Resp 18 S; Pulse Ox 96% on R/A;                                  ha1 

16:20 Body Mass Index 29.18 (77.11 kg, 162.56 cm)                                             [facility] 

16:20 Pain Scale: Adult                                                                       kc6 

                                                                                                  

MDM:                                                                                              

16:23 Patient medically screened.                                                             snw 

19:46 Data reviewed: vital signs, nurses notes, EMS record, old medical records, Per          [facility] notes, [facility] notes. Management of patient was discussed with the     

      following: Discussed prognosis, symptoms, and past medical history with Dr. Todd.        

      Will admit for IVF and empiric abx and reassess tomorrow.. Historians other than the        

      Patient: Daughter/Son: Son and DIL, Spouse. Care significantly affected by the              

      following chronic conditions: Hypertension, CVA, parkinson's. Care significantly            

      affected by the following Social Determinants of Health: communication barrier,             

      bed-bound. Counseling: I had a detailed discussion with the patient and/or guardian         

      regarding: the historical points, exam findings, and any diagnostic results supporting      

      the discharge/admit diagnosis, lab results, radiology results, the need for further         

      work-up and treatment in the hospital. Response to treatment: There is no appreciated       

      change of the patient's symptoms at this time. ED course: discussed end of life             

      choices, decisions, prognosis with Son and DIL.                                             

                                                                                                  

                                                                                             

16:45 Order name: Basic Metabolic Panel; Complete Time: 18:09                                 snw 

                                                                                             

16:45 Order name: CBC with Diff; Complete Time: 17:52                                                                                                                                      

16:45 Order name: LFT's; Complete Time: 18:09                                                                                                                                              

16:45 Order name: Magnesium; Complete Time: 18:09                                                                                                                                          

16:45 Order name: NT PRO-BNP; Complete Time: 18:09                                                                                                                                         

16:45 Order name: PT-INR; Complete Time: 17:52                                                                                                                                             

16:45 Order name: Troponin HS; Complete Time: 18:09                                                                                                                                        

16:45 Order name: Lactate w/ 2H reflex if indic.; Complete Time: 17:52                                                                                                                     

16:45 Order name: Procalcitonin; Complete Time: 18:10                                                                                                                                      

16:45 Order name: Blood Culture Adult (2)                                                                                                                                                  

16:45 Order name: Urine W/Microscopic ([facility])                                                                                                                                                

16:45 Order name: TSH; Complete Time: 18:09                                                                                                                                                

16:45 Order name: Vitamin B12; Complete Time: 18:09                                                                                                                                        

20:14 Order name: Basic Metabolic Panel                                                       [facility]

                                                                                             

20:14 Order name: CBC with Automated Diff                                                     [facility]

                                                                                             

20:14 Order name: Comprehensive Metabolic Panel                                               [facility]

                                                                                             

20:14 Order name: Magnesium                                                                   [facility]

                                                                                             

20:14 Order name: Phosphorus                                                                  [facility]

                                                                                             

20:14 Order name: Urinalysis w/ reflexes                                                      [facility]

                                                                                             

16:45 Order name: XRAY Chest (1 view); Complete Time: 17:52                                                                                                                                

16:46 Order name: CT Head C Spine; Complete Time: 17:52                                                                                                                                    

16:45 Order name: EKG; Complete Time: 16:46                                                                                                                                                

20:14 Order name: Patient Safety Orders                                                       [facility]

                                                                                             

20:14 Order name: Dietitian Consult                                                           [facility]

                                                                                             

20:14 Order name: Full Liquid                                                                 [facility]

                                                                                             

16:45 Order name: Cardiac monitoring; Complete Time: 16:53                                                                                                                                 

16:45 Order name: EKG - Nurse/Tech; Complete Time: 17:53                                                                                                                                   

16:45 Order name: IV Saline Lock; Complete Time: 17:09                                        snw 

                                                                                             

16:45 Order name: Labs collected and sent; Complete Time: 17:09                               snw 

                                                                                             

16:45 Order name: O2 Per Protocol; Complete Time: 16:52                                       snw 

                                                                                             

16:45 Order name: O2 Sat Monitoring; Complete Time: 16:52                                     snw 

                                                                                             

16:53 Order name: Straight Cath; Complete Time: 20:50                                         kc6 

                                                                                                  

EC:45 Rate is 63 beats/min. Rhythm is regular. QRS Axis is Normal. FL interval is normal. QRS snw 

      interval is normal. Clinical impression: NSR w/ Non-specific ST/T Changes.                  

                                                                                                  

Administered Medications:                                                                         

20:49 Drug: Rocephin IV 1 grams Route: IV; Rate: calculated rate; Site: right antecubital;    ha1 

21:20 Follow up: Response: No adverse reaction; IV Status: Completed infusion; IV Intake: [id number] 

                                                                                                  

                                                                                                  

Disposition Summary:                                                                              

23 19:46                                                                                    

Hospitalization Ordered                                                                           

      Hospitalization Status: Observation                                                     snw 

      Provider: Samuel Todd                                                               [email or web address] 

      Location: Telemetry/[facility] (observation)                                               snw 

      Condition: Fair                                                                         snw 

      Problem: an acute exacerbation                                                          snw 

      Symptoms: are unchanged                                                                 snw 

      Bed/Room Type: Standard                                                                 snw 

      Room Assignment: 413(23 20:16)                                                    mw  

      Diagnosis                                                                                   

        - Adult failure to thrive                                                             snw 

        - Other pneumonia, unspecified organism                                               snw 

      Forms:                                                                                      

        - Medication Reconciliation Form                                                      snw 

        - SBAR form                                                                           snw 

Addendum:                                                                                         

2023                                                                                        

     08:58 I reviewed the patient's care provided by the Advanced Practice Provider and agree with [id number]
[email or web address]

           the diagnosis and treatment plan.                                                      

                                                                                                  

Signatures:                                                                                       

Dispatcher [employer]                           EDMS                                                 

Martine Adame RN                        RN   mw                                                   

Anusha Weinstein, [id number]                   FNP-Csnw                                                  

Dmitriy Nowak MD MD   [id number]                                                 

Sharlene Cheung RN                        RN   ha1                                                  

Lizbeth Barr RN                   RN   kc6                                                  

                                                                                                  

Corrections: (The following items were deleted from the chart)                                    

                                                                                             

20:16 19:46 snw                                                                               mw  

20:17 18:45 Head Angio+CT.RAD.BRZ ordered. EDMS                                               EDMS

20:17 18:45 Neck Angio+CT.RAD.BRZ ordered. EDMS                                               EDMS

                                                                                                  

**************************************************************************************************

## 2023-03-23 NOTE — RAD REPORT
EXAM DESCRIPTION:  RAD - Chest Single View - 3/23/2023 5:29 pm

 

CLINICAL HISTORY:  ams

 

COMPARISON:  Chest Single View dated 12/19/2022; Chest Single View dated 12/9/2022; Chest Single View
 dated 12/2/2022; Chest Single View dated 11/27/2022

 

FINDINGS:  Lines: None.

Lungs: New mild basilar airspace disease. Decreased lung volumes.

Pleural: No significant pleural effusions or pneumothorax.

Cardiac: The heart size is within normal limits.

Mediastinum: Within normal limits.

Bones: No acute fractures.

Other: None

 

IMPRESSION:  Decreased lung volumes. Mild basilar opacities could reflect atelectasis and/or pneumoni
a.

## 2023-03-23 NOTE — RAD REPORT
EXAM DESCRIPTION:  CT - [id number] - 3/23/2023 5:19 pm

 

CLINICAL HISTORY:  Trauma, head and neck injury.

DECLINING STATE

 

COMPARISON:  Head C Spine Mpr Wo Con dated 11/27/2022; Head C Spine Mpr Wo Con dated 1/27/2020; Head 
C Spine Mpr Wo Con dated 3/24/2019; Neck Angio dated 5/27/2017

 

TECHNIQUE:  Axial 5 mm thick images of the head were obtained.

 

Axial 2 mm thick images of the cervical spine were obtained with sagittal and coronal reconstruction 
images generated and reviewed.

 

All CT scans are performed using dose optimization technique as appropriate and may include automated
 exposure control or mA/KV adjustment according to patient size.

 

FINDINGS:  CT HEAD WITHOUT CONTRAST:

 

No acute hemorrhage, hydrocephalus or extra-axial collection is identified.Age indeterminate left bas
al ganglia lacunar infarct. Chronic small vessel ischemic changes. Procedure changes from aneurysm co
iling and stent placement.

 

The paranasal sinuses and mastoids are clear.The calvarium is intact.

 

CT CERVICAL SPINE WITHOUT CONTRAST:

 

No acute fracture of cervical spine. Anterolisthesis of C2 on C3 and C3 on C4 is likely related under
lying degenerative changes. Varying degrees of neural foraminal narrowing noted.No prevertebral soft 
tissues swelling is identified. Carotid artery calcifications.

 

IMPRESSION:  1. Age indeterminate left basal ganglia lacunar infarct.

 

2. No acute intracranial hemorrhage or skull fracture.

 

3. No acute fracture traumatic malalignment cervical spine.

## 2023-03-23 NOTE — ER
Nurse's Notes                                                                                     

 [facility]                                                                 

Name: Jerica Estrada                                                                           

Age: 82 yrs                                                                                       

Sex: Female                                                                                       

: 1940                                                                                   

MRN: [id number]                                                                                   

Arrival Date: 2023                                                                          

Time: 16:13                                                                                       

Account#: [id number]                                                                            

Bed 19                                                                                            

Private MD:                                                                                       

Diagnosis: Adult failure to thrive;Other pneumonia, unspecified organism                          

                                                                                                  

Presentation:                                                                                     

                                                                                             

16:20 Chief complaint: Patient's son or daughter states: pt has not been eating or drinking   kc6 

      for several days now. reports generalized weakness and difficulty swallowing. stated        

      she is "pocketing" food in her cheeks and chokes. was seen at [facility] on Friday       

      and was discharged home with no diagnosis. Coronavirus screen: Vaccine status: Patient      

      reports receiving the 2nd dose of the covid vaccine. At this time, the client does not      

      indicate any symptoms associated with coronavirus-19. Ebola Screen: No symptoms or          

      risks identified at this time. Initial Sepsis Screen: Does the patient meet any 2           

      criteria? No. Patient's initial sepsis screen is negative. Does the patient have a          

      suspected source of infection? No. Patient's initial sepsis screen is negative. Risk        

      Assessment: Do you want to hurt yourself or someone else? Patient reports no desire to      

      harm self or others. Onset of symptoms was 2023.                                  

16:20 Method Of Arrival: Wheelchair                                                           kc6 

16:20 Acuity: LYNNETTE 3                                                                           kc6 

                                                                                                  

Triage Assessment:                                                                                

16:22 General: Appears in no apparent distress. comfortable, ill, Behavior is calm,           kc6 

      cooperative, appropriate for age. Pain: Denies pain. EENT: No signs and/or symptoms         

      were reported regarding the EENT system. Neuro: Peralse Agitation-Sedation Scale           

      (RASS): 0 - Alert and Calm Level of Consciousness is obeys commands, lethargic,             

      Oriented to person, place, time, situation, Appropriate for age. Cardiovascular: Heart      

      tones S1 S2 present Capillary refill < 3 seconds Rhythm is sinus rhythm. Respiratory:       

      Airway is patent Trachea midline Respiratory effort is even, unlabored, Respiratory         

      pattern is regular, symmetrical. GI: Patient currently denies diarrhea, nausea,             

      vomiting, Parent/caregiver reports the patient having not eating/drinking. : No signs     

      and/or symptoms were reported regarding the genitourinary system. Derm: No signs and/or     

      symptoms reported regarding the dermatologic system. Skin is intact, Skin is pink, warm     

      \T\ dry. Musculoskeletal: No signs and/or symptoms reported regarding the musculoskeletal   

      system. Circulation, motion, and sensation intact. Capillary refill < 3 seconds, Range      

      of motion: intact in all extremities.                                                       

                                                                                                  

Historical:                                                                                       

- Allergies:                                                                                      

16:22 Bactrim;                                                                                kc6 

16:22 Ciprofloxacin;                                                                          kc6 

16:22 Codeine;                                                                                kc6 

16:22 Demerol;                                                                                kc6 

16:22 Diovan HCT;                                                                             kc6 

16:22 hydrochlorothiazide;                                                                    kc6 

16:22 Iodinated Contrast Media - IV Dye;                                                      kc6 

16:22 Tetanus Vaccines \T\ Toxoid;                                                              kc6

16:22 valsartan;                                                                              kc6 

- PMHx:                                                                                           

16:22 Aneurysm; Cerebrovascular accident; HARD OF HEARING; Hypertension; Parkinsons; vericose kc6 

      veins;                                                                                      

- PSHx:                                                                                           

16:22 shunt; STEPHEN knee replacement;                                                            kc6 

                                                                                                  

- Immunization history:: Client reports receiving the 2nd dose of the Covid vaccine,              

  Flu vaccine is not up to date.                                                                  

- Social history:: Smoking status: Patient denies any tobacco usage or history of.                

                                                                                                  

                                                                                                  

Screenin:25 [facility] ED Fall Risk Assessment (Adult) History of falling in the last 3 months,       kc6 

      including since admission No falls in past 3 months (0 pts) Confusion or Disorientation     

      No (0 pts) Intoxicated or Sedated No (0 pts) Impaired Gait No (0 pts) Mobility Assist       

      Device Used No (0 pt) Altered Elimination No (0 pt) Score/Fall Risk Level 0 - 2 = Low       

      Risk Oriented to surroundings, Maintained a safe environment, Educated pt \T\ family on     

      fall prevention, incl call for assistance when getting out of bed, Assessed \T\             

      reinforced patient's understanding of fall precautions, Hourly rounding (assess needs \T\   

      fall precautionary measures) done. Abuse screen: Denies threats or abuse. Denies            

      injuries from another. Nutritional screening: Difficulty chewing/swallowing? Yes.           

      Tuberculosis screening: No symptoms or risk factors identified.                             

                                                                                                  

Assessment:                                                                                       

16:26 Reassessment: please see triage assessment.                                             kc6 

17:26 Reassessment: Patient appears in no apparent distress at this time. No changes from     kc6 

      previously documented assessment. Patient and/or family updated on plan of care and         

      expected duration. Pain level reassessed.                                                   

18:26 Reassessment: Patient appears in no apparent distress at this time. No changes from     kc6 

      previously documented assessment. Patient and/or family updated on plan of care and         

      expected duration. Pain level reassessed.                                                   

19:40 General: Appears comfortable, Behavior is calm. Pain: Unable to use pain scale. FLACC   ha1 

      scale score is 0 out of 10. Neuro: Level of Consciousness is awake, alert, Oriented to      

      person. Cardiovascular: Capillary refill < 3 seconds Patient's skin is warm and dry.        

      Respiratory: Airway is patent Respiratory effort is even, unlabored, Respiratory            

      pattern is regular, symmetrical. GI: No signs and/or symptoms were reported involving       

      the gastrointestinal system. Abdomen is non-distended, obese. : No signs and/or           

      symptoms were reported regarding the genitourinary system. Derm: Skin is moist, Skin is     

      pale. Musculoskeletal: Range of motion: limited in all extremities Parent/caregiver         

      report the patient having weakness in generalized.                                          

20:40 Reassessment: Patient and/or family updated on plan of care and expected duration. Pain ha1 

      level reassessed. Respiratory: Airway is patent Respiratory effort is even, unlabored,      

      Respiratory pattern is regular, symmetrical.                                                

                                                                                                  

Vital Signs:                                                                                      

16:20  / 77; Pulse 68; Resp 17 S; Pulse Ox 100% on R/A; Weight 77.11 kg (R); Height 5   kc6 

      ft. 4 in. (R); Pain 0/10;                                                                   

17:55  / 73; Pulse 68; Resp 17; Pulse Ox 100% on R/A;                                   kc6 

18:47  / 69; Pulse 68; Resp 14 S; Pulse Ox 100% on R/A;                                 kc6 

19:09  / 57; Pulse 70; Resp 18 S; Pulse Ox 98% on R/A;                                  ha1 

20:40  / 66; Pulse 70; Resp 18 S; Pulse Ox 96% on R/A;                                  ha1 

16:20 Body Mass Index 29.18 (77.11 kg, 162.56 cm)                                             kc6 

16:20 Pain Scale: Adult                                                                       kc 

                                                                                                  

ED Course:                                                                                        

16:13 Patient arrived in ED.                                                                  mr  

16:19 [facility]                                                          snw 

16:19 Dmitriy Nowak MD is Attending Physician.                                               snw 

16:20 Lizbeth Barr RN is Primary Nurse.                                                 kc6 

16:22 Triage completed.                                                                       kc6 

16:22 Arm band placed on.                                                                     kc6 

16:26 Patient has correct armband on for positive identification. Placed in gown. Bed in low  kc6 

      position. Call light in reach. Side rails up X2. Adult w/ patient.                          

17:09 Inserted saline lock: 20 gauge in right antecubital area, using aseptic technique.      kc6 

      Blood collected.                                                                            

17:21 CT Head C Spine In Process Unspecified.                                                 EDMS

17:30 XRAY Chest (1 view) In Process Unspecified.                                             EDMS

17:53 Blood Culture Adult (2) Sent.                                                           kc6 

19:45 Samuel Todd MD is Hospitalizing Provider.                                          snw 

21:21 No provider procedures requiring assistance completed. Patient admitted, IV remains in  ha1 

      place.                                                                                      

                                                                                                  

Administered Medications:                                                                         

20:49 Drug: Rocephin IV 1 grams Route: IV; Rate: calculated rate; Site: right antecubital;    ha1 

21:20 Follow up: Response: No adverse reaction; IV Status: Completed infusion; IV Intake: [id number] 

                                                                                                  

                                                                                                  

Medication:                                                                                       

21:22 VIS not applicable for this client.                                                     ha1 

                                                                                                  

Intake:                                                                                           

21:20 IV: 50ml; Total: 50ml.                                                                  ha1 

                                                                                                  

Outcome:                                                                                          

19:46 Decision to Hospitalize by Provider.                                                    snw 

21:21 Admitted to Med/surg accompanied by tech, family with patient, via stretcher, room 413, ha1 

      with chart, Report called to  FABY Pink                                                    

21:21 Condition: stable                                                                           

21:21 Discharge instructions given to patient, family, Instructed on the need for admit,          

      Demonstrated understanding of instructions.                                                 

21:22 Patient left the ED.                                                                    ha1 

                                                                                                  

Signatures:                                                                                       

Dispatcher [employer]                           EDMS                                                 

Anusha Weinstein FNP-C                   FNP-Amrit                                                  

SayCamille                                                                                    

Sharlene Cheung RN                        RN   ha1                                                  

Lizbeth Barr RN                   RN   kc6                                                  

                                                                                                  

Corrections: (The following items were deleted from the chart)                                    

16:26 16:20 Chief complaint: Patient's son or daughter states: pt has not been eating or      kc6 

      drinking for several days now. reports generalized weakness and difficulty swallowing.      

      stated she is "pocketing" food in her cheeks and chokes. was see at [facility] on        

      Friday and was discharged home. kc6                                                         

                                                                                                  

**************************************************************************************************

## 2023-03-24 LAB
ALBUMIN SERPL BCP-MCNC: 2.6 G/DL (ref 3.4–5)
ALP SERPL-CCNC: 80 U/L (ref 45–117)
ALT SERPL W P-5'-P-CCNC: 19 U/L (ref 13–56)
AST SERPL W P-5'-P-CCNC: 20 U/L (ref 15–37)
BUN BLD-MCNC: 27 MG/DL (ref 7–18)
GLUCOSE SERPLBLD-MCNC: 94 MG/DL (ref 74–106)
HCT VFR BLD CALC: 37.8 % (ref 36–45)
LYMPHOCYTES # SPEC AUTO: 1.5 K/UL (ref 0.7–4.9)
MAGNESIUM SERPL-MCNC: 2 MG/DL (ref 1.6–2.4)
MCV RBC: 94.2 FL (ref 80–100)
PMV BLD: 8.6 FL (ref 7.6–11.3)
POTASSIUM SERPL-SCNC: 3.7 MEQ/L (ref 3.5–5.1)
RBC # BLD: 4.01 M/UL (ref 3.86–4.86)

## 2023-03-24 RX ADMIN — AMLODIPINE BESYLATE SCH MG: 5 TABLET ORAL at 21:47

## 2023-03-24 RX ADMIN — Medication SCH ML: at 21:48

## 2023-03-24 RX ADMIN — ALBUTEROL SULFATE SCH MG: 2.5 SOLUTION RESPIRATORY (INHALATION) at 14:00

## 2023-03-24 RX ADMIN — SODIUM CHLORIDE SCH MLS: 0.9 INJECTION, SOLUTION INTRAVENOUS at 19:33

## 2023-03-24 RX ADMIN — ALBUTEROL SULFATE SCH MG: 2.5 SOLUTION RESPIRATORY (INHALATION) at 20:40

## 2023-03-24 RX ADMIN — METOPROLOL TARTRATE SCH MG: 25 TABLET ORAL at 17:18

## 2023-03-24 RX ADMIN — METOPROLOL TARTRATE SCH: 25 TABLET ORAL at 06:00

## 2023-03-24 RX ADMIN — CEFTRIAXONE SCH MLS: 1 INJECTION, POWDER, FOR SOLUTION INTRAMUSCULAR; INTRAVENOUS at 08:03

## 2023-03-24 RX ADMIN — ALPRAZOLAM PRN MG: 0.25 TABLET ORAL at 17:42

## 2023-03-24 RX ADMIN — MIRTAZAPINE SCH MG: 15 TABLET, FILM COATED ORAL at 21:46

## 2023-03-24 RX ADMIN — CARBIDOPA AND LEVODOPA SCH: 25; 100 TABLET ORAL at 08:04

## 2023-03-24 RX ADMIN — ATORVASTATIN CALCIUM SCH MG: 10 TABLET, FILM COATED ORAL at 21:45

## 2023-03-24 RX ADMIN — CLOPIDOGREL BISULFATE SCH: 75 TABLET, FILM COATED ORAL at 08:04

## 2023-03-24 RX ADMIN — CARBIDOPA AND LEVODOPA SCH TAB: 25; 100 TABLET ORAL at 21:45

## 2023-03-24 RX ADMIN — SODIUM CHLORIDE SCH MLS: 0.9 INJECTION, SOLUTION INTRAVENOUS at 00:14

## 2023-03-24 RX ADMIN — CARBIDOPA AND LEVODOPA SCH TAB: 25; 100 TABLET ORAL at 15:22

## 2023-03-24 RX ADMIN — ALBUTEROL SULFATE SCH MG: 2.5 SOLUTION RESPIRATORY (INHALATION) at 08:45

## 2023-03-24 RX ADMIN — AMLODIPINE BESYLATE SCH: 5 TABLET ORAL at 08:04

## 2023-03-24 RX ADMIN — Medication SCH ML: at 08:04

## 2023-03-24 RX ADMIN — ALBUTEROL SULFATE SCH MG: 2.5 SOLUTION RESPIRATORY (INHALATION) at 02:15

## 2023-03-24 RX ADMIN — SODIUM CHLORIDE SCH: 0.9 INJECTION, SOLUTION INTRAVENOUS at 06:00

## 2023-03-24 RX ADMIN — SODIUM CHLORIDE SCH MLS: 0.9 INJECTION, SOLUTION INTRAVENOUS at 09:04

## 2023-03-24 RX ADMIN — PRAMIPEXOLE DIHYDROCHLORIDE SCH MG: 0.25 TABLET ORAL at 21:47

## 2023-03-24 RX ADMIN — PRAMIPEXOLE DIHYDROCHLORIDE SCH: 0.25 TABLET ORAL at 08:04

## 2023-03-24 NOTE — PN
Date of Progress Note:  03/24/2023



According to the family, the patient is somewhat more responsive today.  She is still slightly dehydr
ated therefore we will continue IV fluids.  She has not been recently seen by her neurologist, Dr. Esvin leal for parkinsonism and we will put in for a consult.  Lab work was surprisingly good.  EKG is ba
sically the same as last one about 6 months ago, ischemic and her chest x-ray is possible early pneum
onitis.  We will therefore repeat the x-ray and the blood work in the morning.  She does not clinical
ly present as pneumonia; however, sometimes it is difficult on 1 x-ray.  She had been recently on ant
ibiotics for UTIs and according to the family has had some difficulty with her eating.  Her appetite 
is off, drinking fluids.  However, since being hydrated she has improved somewhat.  We will continue 
with the present regimen and make a determination when to go back to the nursing home in [place].





HR/MODL

DD:  03/24/2023 16:03:31Voice ID:  [id number]

DT:  03/24/2023 19:12:07Report ID:  [id number]

## 2023-03-24 NOTE — EKG
Test Date:    2023-03-23               Test Time:    17:42:33

Technician:   MIRACLE                                    

                                                     

MEASUREMENT RESULTS:                                       

Intervals:                                           

Rate:         63                                     

GA:           164                                    

QRSD:         76                                     

QT:           438                                    

QTc:          448                                    

Axis:                                                

P:            22                                     

GA:           164                                    

QRS:          70                                     

T:            62                                     

                                                     

INTERPRETIVE STATEMENTS:                                       

                                                     

Normal sinus rhythm

Cannot rule out Anterior infarct, age undetermined

Abnormal ECG

Compared to ECG 11/28/2022 04:14:15

Sinus tachycardia no longer present

Atrial premature complex(es) no longer present

ST (T wave) deviation no longer present

Possible ischemia no longer present

Myocardial infarct finding still present



Electronically Signed On 03-24-23 13:38:31 CDT by Gregorio Blancas

## 2023-03-25 LAB
BUN BLD-MCNC: 19 MG/DL (ref 7–18)
GLUCOSE SERPLBLD-MCNC: 96 MG/DL (ref 74–106)
HCT VFR BLD CALC: 34.9 % (ref 36–45)
LYMPHOCYTES # SPEC AUTO: 2.3 K/UL (ref 0.7–4.9)
MCV RBC: 94.3 FL (ref 80–100)
PMV BLD: 8.6 FL (ref 7.6–11.3)
POTASSIUM SERPL-SCNC: 3.4 MEQ/L (ref 3.5–5.1)
RBC # BLD: 3.7 M/UL (ref 3.86–4.86)

## 2023-03-25 RX ADMIN — Medication SCH ML: at 21:33

## 2023-03-25 RX ADMIN — Medication SCH ML: at 21:40

## 2023-03-25 RX ADMIN — ALBUTEROL SULFATE SCH MG: 2.5 SOLUTION RESPIRATORY (INHALATION) at 08:30

## 2023-03-25 RX ADMIN — PRAMIPEXOLE DIHYDROCHLORIDE SCH MG: 0.25 TABLET ORAL at 08:37

## 2023-03-25 RX ADMIN — ALPRAZOLAM PRN MG: 0.25 TABLET ORAL at 21:37

## 2023-03-25 RX ADMIN — POTASSIUM CHLORIDE SCH MLS: 200 INJECTION, SOLUTION INTRAVENOUS at 04:58

## 2023-03-25 RX ADMIN — CARBIDOPA AND LEVODOPA SCH TAB: 25; 100 TABLET ORAL at 21:37

## 2023-03-25 RX ADMIN — SODIUM CHLORIDE SCH MLS: 0.9 INJECTION, SOLUTION INTRAVENOUS at 04:58

## 2023-03-25 RX ADMIN — ALBUTEROL SULFATE SCH MG: 2.5 SOLUTION RESPIRATORY (INHALATION) at 14:30

## 2023-03-25 RX ADMIN — Medication SCH ML: at 08:34

## 2023-03-25 RX ADMIN — AMLODIPINE BESYLATE SCH MG: 5 TABLET ORAL at 08:34

## 2023-03-25 RX ADMIN — METOPROLOL TARTRATE SCH MG: 25 TABLET ORAL at 17:00

## 2023-03-25 RX ADMIN — PRAMIPEXOLE DIHYDROCHLORIDE SCH MG: 0.25 TABLET ORAL at 21:38

## 2023-03-25 RX ADMIN — ALBUTEROL SULFATE SCH MG: 2.5 SOLUTION RESPIRATORY (INHALATION) at 20:00

## 2023-03-25 RX ADMIN — CLOPIDOGREL BISULFATE SCH MG: 75 TABLET, FILM COATED ORAL at 08:34

## 2023-03-25 RX ADMIN — POTASSIUM CHLORIDE SCH MLS: 200 INJECTION, SOLUTION INTRAVENOUS at 08:43

## 2023-03-25 RX ADMIN — ALBUTEROL SULFATE SCH MG: 2.5 SOLUTION RESPIRATORY (INHALATION) at 02:40

## 2023-03-25 RX ADMIN — ATORVASTATIN CALCIUM SCH MG: 10 TABLET, FILM COATED ORAL at 21:37

## 2023-03-25 RX ADMIN — CARBIDOPA AND LEVODOPA SCH TAB: 25; 100 TABLET ORAL at 08:34

## 2023-03-25 RX ADMIN — ALPRAZOLAM PRN MG: 0.25 TABLET ORAL at 14:19

## 2023-03-25 RX ADMIN — METOPROLOL TARTRATE SCH MG: 25 TABLET ORAL at 05:28

## 2023-03-25 RX ADMIN — AMLODIPINE BESYLATE SCH MG: 5 TABLET ORAL at 21:38

## 2023-03-25 RX ADMIN — SODIUM CHLORIDE SCH MLS: 0.9 INJECTION, SOLUTION INTRAVENOUS at 14:17

## 2023-03-25 RX ADMIN — CEFTRIAXONE SCH MLS: 1 INJECTION, POWDER, FOR SOLUTION INTRAMUSCULAR; INTRAVENOUS at 08:19

## 2023-03-25 RX ADMIN — SODIUM CHLORIDE SCH MLS: 0.9 INJECTION, SOLUTION INTRAVENOUS at 21:39

## 2023-03-25 RX ADMIN — CARBIDOPA AND LEVODOPA SCH TAB: 25; 100 TABLET ORAL at 14:17

## 2023-03-25 RX ADMIN — MIRTAZAPINE SCH MG: 15 TABLET, FILM COATED ORAL at 21:37

## 2023-03-25 NOTE — RAD REPORT
EXAM DESCRIPTION:  RAD - Chest Single View - 3/25/2023 6:50 am

 

CLINICAL HISTORY:  f/u

 

COMPARISON:  Chest Single View dated 3/23/2023; Chest Single View dated 12/19/2022; Chest Single View
 dated 12/9/2022; Chest Single View dated 12/2/2022Chest Single View dated 3/23/2023; Chest Single Vi
ew dated 12/19/2022; Chest Single View dated 12/9/2022; Chest Single View dated 12/2/2022; Head C Spi
ne Mpr Wo Con dated 3/23/2023

 

FINDINGS:  Lines: None.

Lungs: Improved aeration of the lungs.

Pleural: No significant pleural effusions or pneumothorax.

Cardiac: Similar size and configuration.

Mediastinum: Within normal limits.

Bones: No acute fractures. Advanced shoulder degenerative changes.

Other: Atherosclerosis.

 

IMPRESSION:  Improved aeration of the lungs with without acute process identified.

## 2023-03-25 NOTE — P.PN
Subjective


Date of Service: 03/25/23


Subjective: No new changes, No C/O voiced, Improving





Review of Systems


10-point ROS is otherwise unremarkable





Physical Examination





- Vital Signs


Temperature: 97.6 F


Blood Pressure: 132/60


Pulse: 76


Respirations: 18


Pulse Ox (%): 97





- Physical Exam


General: Alert, In no apparent distress


HEENT: Atraumatic, PERRLA, EOMI


Neck: Supple, JVD not distended


Respiratory: Clear to auscultation bilaterally, Normal air movement


Cardiovascular: Regular rate/rhythm, Normal S1 S2


Gastrointestinal: Normal bowel sounds, No tenderness


Musculoskeletal: No tenderness


Integumentary: No rashes


Neurological: Normal speech, Normal tone, Normal affect


Lymphatics: No axilla or inguinal lymphadenopathy





- Studies


Laboratory Data (last 24 hrs)





03/25/23 03:41: Sodium 143, Potassium 3.4 L, BUN 19 H, Creatinine 0.54 L, 

Glucose 96


03/25/23 03:41: WBC 5.90, Hgb 11.6 L D, Hct 34.9 L, Plt Count 153





Medications List Reviewed: Yes





Assessment & Plan





- Problems (Diagnosis)


(1) Altered mental status


Current Visit: No   Status: Acute   





(2) Parkinsons disease


Current Visit: No   Status: Acute   





(3) Pneumonia


Current Visit: Yes   Status: Acute   





(4) Diastolic heart failure


Current Visit: Yes   Status: Acute   





(5) Hypertension


Current Visit: Yes   Status: Acute   





- Plan





Plan:


1. Continue with IV antibiotics


2. Awaiting sputum and blood culture


3. Repeat chest x-ray pending


4. Monitor hemodynamics


5. Monitor neurologic status closely


6. Continue with nebs as needed


7. O2 per protocol


8. Hep-Lock IV


9. Repeat labs including CBC and renal function in a.m.


10. GI and DVT prophylaxis


Discharge Plan: Nursing Home


Plan to discharge in: 48 Hours





- Advance Directives


Does patient have a Living Will: No


Does patient have a Durable POA for Healthcare: No





- Code Status/Comfort Care


Code Status Assessed: Yes


Code Status: Full Code


Critical Care: No


Time Spent Managing PTS Care (In Minutes): 35

## 2023-03-26 LAB
BUN BLD-MCNC: 8 MG/DL (ref 7–18)
GLUCOSE SERPLBLD-MCNC: 90 MG/DL (ref 74–106)
HCT VFR BLD CALC: 37.6 % (ref 36–45)
LYMPHOCYTES # SPEC AUTO: 1.3 K/UL (ref 0.7–4.9)
MAGNESIUM SERPL-MCNC: 2.4 MG/DL (ref 1.6–2.4)
MCV RBC: 93.9 FL (ref 80–100)
PMV BLD: 8.4 FL (ref 7.6–11.3)
POTASSIUM SERPL-SCNC: 3.4 MEQ/L (ref 3.5–5.1)
RBC # BLD: 4 M/UL (ref 3.86–4.86)

## 2023-03-26 RX ADMIN — Medication SCH ML: at 21:00

## 2023-03-26 RX ADMIN — PRAMIPEXOLE DIHYDROCHLORIDE SCH MG: 0.25 TABLET ORAL at 22:14

## 2023-03-26 RX ADMIN — METOPROLOL TARTRATE SCH MG: 25 TABLET ORAL at 17:52

## 2023-03-26 RX ADMIN — ALBUTEROL SULFATE SCH MG: 2.5 SOLUTION RESPIRATORY (INHALATION) at 09:15

## 2023-03-26 RX ADMIN — Medication SCH ML: at 08:06

## 2023-03-26 RX ADMIN — ALBUTEROL SULFATE SCH MG: 2.5 SOLUTION RESPIRATORY (INHALATION) at 20:14

## 2023-03-26 RX ADMIN — CARBIDOPA AND LEVODOPA SCH TAB: 25; 100 TABLET ORAL at 08:09

## 2023-03-26 RX ADMIN — ALPRAZOLAM PRN MG: 0.25 TABLET ORAL at 22:15

## 2023-03-26 RX ADMIN — CARBIDOPA AND LEVODOPA SCH TAB: 25; 100 TABLET ORAL at 14:32

## 2023-03-26 RX ADMIN — ALBUTEROL SULFATE SCH MG: 2.5 SOLUTION RESPIRATORY (INHALATION) at 01:50

## 2023-03-26 RX ADMIN — CARBIDOPA AND LEVODOPA SCH TAB: 25; 100 TABLET ORAL at 22:14

## 2023-03-26 RX ADMIN — SODIUM CHLORIDE SCH MLS: 0.9 INJECTION, SOLUTION INTRAVENOUS at 09:37

## 2023-03-26 RX ADMIN — ALBUTEROL SULFATE SCH MG: 2.5 SOLUTION RESPIRATORY (INHALATION) at 14:53

## 2023-03-26 RX ADMIN — MIRTAZAPINE SCH MG: 15 TABLET, FILM COATED ORAL at 22:14

## 2023-03-26 RX ADMIN — METOPROLOL TARTRATE SCH MG: 25 TABLET ORAL at 06:28

## 2023-03-26 RX ADMIN — Medication SCH ML: at 08:08

## 2023-03-26 RX ADMIN — CLOPIDOGREL BISULFATE SCH MG: 75 TABLET, FILM COATED ORAL at 08:09

## 2023-03-26 RX ADMIN — CEFTRIAXONE SCH MLS: 1 INJECTION, POWDER, FOR SOLUTION INTRAMUSCULAR; INTRAVENOUS at 08:06

## 2023-03-26 RX ADMIN — ATORVASTATIN CALCIUM SCH MG: 10 TABLET, FILM COATED ORAL at 22:15

## 2023-03-26 RX ADMIN — PRAMIPEXOLE DIHYDROCHLORIDE SCH MG: 0.25 TABLET ORAL at 08:09

## 2023-03-26 RX ADMIN — AMLODIPINE BESYLATE SCH MG: 5 TABLET ORAL at 08:09

## 2023-03-26 RX ADMIN — AMLODIPINE BESYLATE SCH MG: 5 TABLET ORAL at 22:14

## 2023-03-26 NOTE — P.PN
Date of Service: 03/26/23





Subjective


Patient is doing well no new complaints.  Clinical symptoms are better.  Patient

is awake and interacting with family members.





Physical Examination





- Vital Signs


Reviewed





- Physical Exam


General: Alert, In no apparent distress


Respiratory: Clear to auscultation bilaterally, Normal air movement


Cardiovascular: Regular rate/rhythm, Normal S1 S2


Gastrointestinal: Normal bowel sounds, No tenderness


Neurological: Normal speech, Normal tone, Normal affect





Assessment & Plan





- Problems (Diagnosis)


(1) Altered mental status


Current Visit: No   Status: Acute   





(2) Parkinsons disease


Current Visit: No   Status: Acute   





(3) Pneumonia


Current Visit: Yes   Status: Acute   





(4) Diastolic heart failure


Current Visit: Yes   Status: Acute   





(5) Hypertension


Current Visit: Yes   Status: Acute   





- Plan


Continue with plan of care as mentioned below:


1. Continue with IV antibiotics


2. Awaiting sputum and blood culture


3. Repeat chest x-ray pending


4. Monitor hemodynamics


5. Monitor neurologic status closely


6. Continue with nebs as needed


7. O2 per protocol


8. Hep-Lock IV


9. Repeat labs including CBC and renal function in a.m.


10. GI and DVT prophylaxis





*Patient will be picked up by PCP Dr. Todd in a.m.  Plan is for patient to go

to back to [facility] at discharge

## 2023-03-26 NOTE — RAD REPORT
EXAM DESCRIPTION:  RAD - Chest Single View - 3/26/2023 9:00 am

 

CLINICAL HISTORY:  pneumonia

Chest pain.

 

COMPARISON:  Chest Single View dated 3/25/2023; Chest Single View dated 3/23/2023; Chest Single View 
dated 12/19/2022; Chest Single View dated 12/9/2022

 

FINDINGS:  Portable technique limits examination quality.

 

Mild bilateral pulmonary opacities are present likely pulmonary edema. The heart is moderately enlarg
ed. No displaced fractures.Trace pleural effusion.

 

IMPRESSION:  Mild CHF.

## 2023-03-27 LAB
BUN BLD-MCNC: 7 MG/DL (ref 7–18)
GLUCOSE SERPLBLD-MCNC: 84 MG/DL (ref 74–106)
MAGNESIUM SERPL-MCNC: 1.8 MG/DL (ref 1.6–2.4)
POTASSIUM SERPL-SCNC: 3.7 MEQ/L (ref 3.5–5.1)

## 2023-03-27 RX ADMIN — ALBUTEROL SULFATE SCH MG: 2.5 SOLUTION RESPIRATORY (INHALATION) at 20:30

## 2023-03-27 RX ADMIN — CLOPIDOGREL BISULFATE SCH MG: 75 TABLET, FILM COATED ORAL at 09:44

## 2023-03-27 RX ADMIN — MIRTAZAPINE SCH MG: 15 TABLET, FILM COATED ORAL at 21:34

## 2023-03-27 RX ADMIN — Medication SCH ML: at 09:48

## 2023-03-27 RX ADMIN — ALBUTEROL SULFATE SCH MG: 2.5 SOLUTION RESPIRATORY (INHALATION) at 08:00

## 2023-03-27 RX ADMIN — PRAMIPEXOLE DIHYDROCHLORIDE SCH MG: 0.25 TABLET ORAL at 21:34

## 2023-03-27 RX ADMIN — ALBUTEROL SULFATE SCH MG: 2.5 SOLUTION RESPIRATORY (INHALATION) at 01:20

## 2023-03-27 RX ADMIN — ALBUTEROL SULFATE SCH MG: 2.5 SOLUTION RESPIRATORY (INHALATION) at 15:00

## 2023-03-27 RX ADMIN — Medication SCH ML: at 21:00

## 2023-03-27 RX ADMIN — AMLODIPINE BESYLATE SCH MG: 5 TABLET ORAL at 21:35

## 2023-03-27 RX ADMIN — CARBIDOPA AND LEVODOPA SCH TAB: 25; 100 TABLET ORAL at 21:34

## 2023-03-27 RX ADMIN — ATORVASTATIN CALCIUM SCH MG: 10 TABLET, FILM COATED ORAL at 21:34

## 2023-03-27 RX ADMIN — METOPROLOL TARTRATE SCH MG: 25 TABLET ORAL at 05:58

## 2023-03-27 RX ADMIN — Medication SCH ML: at 21:35

## 2023-03-27 RX ADMIN — Medication SCH ML: at 09:45

## 2023-03-27 RX ADMIN — CEFTRIAXONE SCH MLS: 1 INJECTION, POWDER, FOR SOLUTION INTRAMUSCULAR; INTRAVENOUS at 09:45

## 2023-03-27 RX ADMIN — METOPROLOL TARTRATE SCH MG: 25 TABLET ORAL at 16:54

## 2023-03-27 RX ADMIN — CARBIDOPA AND LEVODOPA SCH TAB: 25; 100 TABLET ORAL at 15:43

## 2023-03-27 RX ADMIN — CARBIDOPA AND LEVODOPA SCH TAB: 25; 100 TABLET ORAL at 09:48

## 2023-03-27 RX ADMIN — AMLODIPINE BESYLATE SCH MG: 5 TABLET ORAL at 09:44

## 2023-03-27 RX ADMIN — PRAMIPEXOLE DIHYDROCHLORIDE SCH MG: 0.25 TABLET ORAL at 09:44

## 2023-03-27 NOTE — CON
Reason For Consultation:  Consultation called because of worsening mental status and possibility of a
 stroke.



History Of Present Illness:  Ms. Estrada is an 83-year-old patient who resides at a local care home
.  She was admitted to [facility] earlier this year with stroke affecting the left body and 
right brain.  She did inpatient rehabilitation, but failed to progress and as a result, was determine
d to be more appropriate for a nursing home.  The reason for her admission at this time appears to be
 worsening diffuse weakness and decreased responsiveness with failure to thrive.  Her blood work on a
dmission on 03/23/2023 showed actually a completely normal complete blood count with differential.  H
er INR was 1.22.  Chemistries on admission showed potassium was normal and her creatinine was normal.
  Liver function studies were unremarkable.  Her glucose was essentially unremarkable and her urinaly
sis showed 4+ mucus, trace yeast with hyphae, occasional budding yeast, trace urine protein, and trac
e ketones, but was otherwise negative.  She had head CT scan in addition to cervical spine CT scan fo
r concerns for trauma.  The CT scan of her head identified a stroke in the left basal ganglia, which 
was noted to be chronic.  The stroke was present earlier this year in January and resulted in right u
pper and lower extremity paresis with facial weakness and some dysarthria and dysphagia.  Since hospi
talization she has reportedly had slight improvement in her interactiveness and her ability to commun
icate with family.  However, she remains significantly weak and requires moderate-to-maximum assistan
ce for transfers, mobilization, and carrying out any activities of daily living.



Past Medical History:  Left basal ganglia stroke, urinary tract infection, CNS aneurysm status post c
lipping, hypertension, Parkinson disease, and poor hearing.



Past Surgical History:  Aneurysm clipping, shunt placement, and bilateral knee replacement.



Social History:  Resides in a local care home.  No alcohol, tobacco, or IV drug use.



Allergies:  BACTRIM, CIPROFLOXACIN, CODEINE, DEMEROL, DIOVAN, HYDROCHLOROTHIAZIDE, IODINATED CONTRAST
, TETANUS VACCINE, AND VALSARTAN.



Family History:  Noncontributory.



Review of Systems:

Not able to complete a review of systems.  The patient does grunt and moan, but did not verbalize to 
appropriately do a review of systems.



Physical Examination:

Vital Signs:  Blood pressure 142/60, pulse up to 99, respiratory rate 16, temperature 100, and oxygen
 saturation 96% on room air. 

General:  Ms. Estrada is resting in bed. 

HEENT:  She appears normocephalic and atraumatic.  

Neurologic:  She does have some drooping of the right face.  There is excursion and she tries to verb
ananda.  She has more weakness noted in the right upper and lower extremity compared to the left side,
 which she moves more freely on the right.  Difficulty assessing sensation and coordination.  Tone is
 slightly increased on the right compared to the left side.  Unable to assess her gait.



Assessment:  Ms. Estrada is an 83-year-old patient with likely multiple reasons for worsening cogni
tive impairment.  At this point, she does not appear to have a new stroke on CT scan, although that c
annot be ruled out definitively.  She appears to have low-grade fever.  Her chest x-ray, which did no
t show a pneumonia, did reveal a pattern of mild congestive heart failure.  Her urinalysis did not sh
ow bacterial infection, potentially yeast related infection and liver function studies did not show s
ignificant abnormalities.  She does not have obvious signs of meningitis such as nuchal rigidity and 
no apparent rigors.



Plan:  

1.May benefit from a routine EEG to determine if there is ongoing epileptiform activity, which could
 be contributing to her decreased level of interaction. 

2.If possible brain MRI can rule out presence of an acute ischemic hemorrhagic stroke.  

3.Just continue supportive care, hydration, and IV fluids if need be and evaluation to determine the
 level at which she can manage oral 

intake.  She would likely benefit from a return to perhaps skilled nursing for general rehabilitation
.





GUY/TEO

DD:  03/27/2023 19:16:20Voice ID:  [id number]

DT:  03/27/2023 20:06:39Report ID:  [id number]

## 2023-03-28 LAB
BUN BLD-MCNC: 9 MG/DL (ref 7–18)
GLUCOSE SERPLBLD-MCNC: 89 MG/DL (ref 74–106)
MAGNESIUM SERPL-MCNC: 2 MG/DL (ref 1.6–2.4)
POTASSIUM SERPL-SCNC: 3.3 MEQ/L (ref 3.5–5.1)

## 2023-03-28 RX ADMIN — CARBIDOPA AND LEVODOPA SCH TAB: 25; 100 TABLET ORAL at 08:59

## 2023-03-28 RX ADMIN — Medication SCH ML: at 21:00

## 2023-03-28 RX ADMIN — AMLODIPINE BESYLATE SCH MG: 5 TABLET ORAL at 08:59

## 2023-03-28 RX ADMIN — CEFTRIAXONE SCH MLS: 1 INJECTION, POWDER, FOR SOLUTION INTRAMUSCULAR; INTRAVENOUS at 08:59

## 2023-03-28 RX ADMIN — MIRTAZAPINE SCH MG: 15 TABLET, FILM COATED ORAL at 21:31

## 2023-03-28 RX ADMIN — CARBIDOPA AND LEVODOPA SCH TAB: 25; 100 TABLET ORAL at 14:28

## 2023-03-28 RX ADMIN — POTASSIUM CHLORIDE SCH MLS: 200 INJECTION, SOLUTION INTRAVENOUS at 09:04

## 2023-03-28 RX ADMIN — METOPROLOL TARTRATE SCH MG: 25 TABLET ORAL at 05:46

## 2023-03-28 RX ADMIN — PRAMIPEXOLE DIHYDROCHLORIDE SCH MG: 0.25 TABLET ORAL at 21:32

## 2023-03-28 RX ADMIN — METOPROLOL TARTRATE SCH MG: 25 TABLET ORAL at 17:10

## 2023-03-28 RX ADMIN — ALBUTEROL SULFATE SCH MG: 2.5 SOLUTION RESPIRATORY (INHALATION) at 07:51

## 2023-03-28 RX ADMIN — Medication SCH ML: at 08:59

## 2023-03-28 RX ADMIN — ALBUTEROL SULFATE SCH MG: 2.5 SOLUTION RESPIRATORY (INHALATION) at 20:30

## 2023-03-28 RX ADMIN — CLOPIDOGREL BISULFATE SCH MG: 75 TABLET, FILM COATED ORAL at 08:58

## 2023-03-28 RX ADMIN — ALBUTEROL SULFATE SCH MG: 2.5 SOLUTION RESPIRATORY (INHALATION) at 13:25

## 2023-03-28 RX ADMIN — ATORVASTATIN CALCIUM SCH MG: 10 TABLET, FILM COATED ORAL at 21:32

## 2023-03-28 RX ADMIN — Medication SCH ML: at 21:32

## 2023-03-28 RX ADMIN — POTASSIUM CHLORIDE SCH MLS: 200 INJECTION, SOLUTION INTRAVENOUS at 11:26

## 2023-03-28 RX ADMIN — CARBIDOPA AND LEVODOPA SCH TAB: 25; 100 TABLET ORAL at 21:31

## 2023-03-28 RX ADMIN — PRAMIPEXOLE DIHYDROCHLORIDE SCH MG: 0.25 TABLET ORAL at 08:59

## 2023-03-28 RX ADMIN — Medication SCH ML: at 09:00

## 2023-03-28 RX ADMIN — ALBUTEROL SULFATE SCH MG: 2.5 SOLUTION RESPIRATORY (INHALATION) at 02:25

## 2023-03-28 RX ADMIN — CEPHALEXIN SCH MG: 500 CAPSULE ORAL at 21:32

## 2023-03-28 RX ADMIN — AMLODIPINE BESYLATE SCH MG: 5 TABLET ORAL at 21:32

## 2023-03-28 NOTE — PN
Date of Progress Note:  03/27/2023



The patient is basically status quo mentally; however, physically, she seems somewhat better, eating 
variable some meals well, other meals not.  Some question whether there was a contaminant or Staph in
fection on 1 of the blood cultures, should have final results of that tomorrow.  Depending on those r
esults, we will switch to p.o. medications and plan on transferring her back to the nursing home on W
ednesday and also awaiting Neurology consult to see any adjustment will be made in the medications.





HR/MODL

DD:  03/28/2023 13:28:34Voice ID:  [id number]

DT:  03/28/2023 17:21:52Report ID:  [id number]

## 2023-03-28 NOTE — PN
Date of Progress Note:  03/28/2023



The patient continues to improve __________ therefore discontinue the IV Rocephin, start her on Kefle
x p.o.  I suggest the use of long-term low-dose maintenance antibiotics.  Seen by Neurology who chu
ed medication, basically symptomatic treatment.  Potassium is __________.  At this stage, the patient
 should be ready for discharge in a.m.





HR/MODL

DD:  03/28/2023 14:11:03Voice ID:  [id number]

DT:  03/28/2023 17:23:41Report ID:  [id number]

## 2023-03-29 VITALS — OXYGEN SATURATION: 98 %

## 2023-03-29 VITALS — TEMPERATURE: 98.2 F | DIASTOLIC BLOOD PRESSURE: 54 MMHG | SYSTOLIC BLOOD PRESSURE: 118 MMHG

## 2023-03-29 LAB
BUN BLD-MCNC: 13 MG/DL (ref 7–18)
GLUCOSE SERPLBLD-MCNC: 107 MG/DL (ref 74–106)
POTASSIUM SERPL-SCNC: 3.6 MEQ/L (ref 3.5–5.1)

## 2023-03-29 RX ADMIN — AMLODIPINE BESYLATE SCH MG: 5 TABLET ORAL at 08:36

## 2023-03-29 RX ADMIN — ALBUTEROL SULFATE SCH MG: 2.5 SOLUTION RESPIRATORY (INHALATION) at 14:40

## 2023-03-29 RX ADMIN — METOPROLOL TARTRATE SCH MG: 25 TABLET ORAL at 06:09

## 2023-03-29 RX ADMIN — ALBUTEROL SULFATE SCH MG: 2.5 SOLUTION RESPIRATORY (INHALATION) at 09:09

## 2023-03-29 RX ADMIN — CARBIDOPA AND LEVODOPA SCH TAB: 25; 100 TABLET ORAL at 08:35

## 2023-03-29 RX ADMIN — Medication SCH ML: at 08:36

## 2023-03-29 RX ADMIN — ALBUTEROL SULFATE SCH MG: 2.5 SOLUTION RESPIRATORY (INHALATION) at 02:15

## 2023-03-29 RX ADMIN — CEPHALEXIN SCH MG: 500 CAPSULE ORAL at 08:35

## 2023-03-29 RX ADMIN — CLOPIDOGREL BISULFATE SCH MG: 75 TABLET, FILM COATED ORAL at 08:36

## 2023-03-29 RX ADMIN — PRAMIPEXOLE DIHYDROCHLORIDE SCH MG: 0.25 TABLET ORAL at 08:35

## 2023-03-29 RX ADMIN — CARBIDOPA AND LEVODOPA SCH TAB: 25; 100 TABLET ORAL at 14:24

## 2023-03-29 RX ADMIN — Medication SCH ML: at 08:37

## 2023-04-28 NOTE — DS
Date of Discharge:  03/29/2023



Please use the progress note dated 03/29/2023 as the discharge summary because that is what it amount
ed to.  On the blank spot, she interacted mentally at a higher level.



Discharge Diagnoses:  Urinary tract infection, altered mental status, positive COVID, dehydration, hy
pertension controlled, cerebrovascular accident, and aneurysm by history. 



See history and physical on Jerica Estrada and to the complaint, the patient was brought to the em
ergency room with apparent change in her mental status and failure to respond as she had been doing. 
 The patient has been in a local nursing home care after suffering a stroke and the family and the nu
rse felt that there was a change in her condition overall and therefore she was brought to the emerge
ncy room.



Past Medical History:  The patient had been hospitalized on numerous occasions in the past few years 
for problems ranging from UTI to strokes.  She also had Parkinson's and hypertension, which has been 
under pretty good control.



Family History:  Noncontributory.



Social History:  Nonsmoker, nondrinker.



Physical Examination:

General:  The patient is a poorly responsive elderly female. 

Vital Signs:  Stable. 

Head And Neck:  Normocephalic.  Pupils equal and reactive to accommodation.  Fundi negative.  Trachea
 midline.  Thyroid not palpable.  ENT negative.  

Chest:  Clear to P and A. 

Cardiovascular:  PMI midclavicular line.  

Heart:  Sounds normal.  

Extremities:  Peripheral pulses present and equal bilaterally. 

Abdomen:  No organomegaly.  Bowel sounds present. 

Neurologic:  The patient does not respond well to command, was difficult to evaluate. 

Rectal:  Deferred. 

Pelvic:  Deferred.



Impression:  Altered mental status post cerebrovascular accident and hypertension, controlled.



Plan:  The patient will be admitted.  CNS workup will be obtained.  Consultation was obtained with jorje
r neurologist whom she has not seen in some time.  Depending on the results, further treatment will b
e undertaken.  In the meantime, IV fluids will be given and empirically start her on antibiotics for 
the possibility of infectious disease processes, particularly UTI due to her underlying conditions as
 a precipitating factor.





HR/MODL

DD:  04/27/2023 19:46:51Voice ID:  [id number]

DT:  04/28/2023 03:21:51Report ID:  [id number]

## 2025-07-10 NOTE — RAD REPORT
EXAM DESCRIPTION:  CT - Ct Stroke Brain Wo Cont - 6/28/2020 1:45 pm

 

CLINICAL HISTORY:  Confusion/alteration of awareness

 

COMPARISON:  June 24, 2020

 

TECHNIQUE:  Computed axial tomography of the head was obtained.

 

All CT scans are performed using dose optimization technique as appropriate and may include automated
 exposure control or mA/KV adjustment according to patient size.

 

FINDINGS:  Postsurgical changes of right cerebral aneurysm repair are again demonstrated.

 

An intracranial  bleed is not seen .

 

The ventricles are normal in caliber.

 

No extra-axial fluid collection is noted.

 

Mild to moderate low-density within periventricular, deep and subcortical white matter likely ischemi
c changes secondary to small vessel disease

 

Fluid within the sinuses/ mastoids is not seen.

 

IMPRESSION:  No acute intracranial abnormality is seen.

 

Patient's charge nurse Brandi was notified 1:55 p.m. June 28, 2020 [1000 Hz] : 1000 Hz [Normal Eardrum Mobility] : consistent with normal eardrum mobility  [Type A Tympanogram] : Type A Normal [] : Auditory Brainstem Response: [___dBnHL] : 2000 Hz: [unfilled] dBnHL [Threshold] : threshold [Natural Sleep] : natural sleep [ABR responses to ___/sec] : responses to [unfilled] /sec [de-identified] : could not seal for immittance in right ear